# Patient Record
Sex: FEMALE | Race: WHITE | NOT HISPANIC OR LATINO | URBAN - METROPOLITAN AREA
[De-identification: names, ages, dates, MRNs, and addresses within clinical notes are randomized per-mention and may not be internally consistent; named-entity substitution may affect disease eponyms.]

---

## 2017-01-30 ENCOUNTER — INPATIENT (INPATIENT)
Facility: HOSPITAL | Age: 77
LOS: 10 days | Discharge: NURSING, REHAB OR LONG-TERM CARE | DRG: 292 | End: 2017-02-10
Attending: HOSPITALIST | Admitting: HOSPITALIST
Payer: MEDICARE

## 2017-01-30 VITALS
TEMPERATURE: 99 F | OXYGEN SATURATION: 98 % | DIASTOLIC BLOOD PRESSURE: 76 MMHG | HEIGHT: 66 IN | SYSTOLIC BLOOD PRESSURE: 134 MMHG | RESPIRATION RATE: 18 BRPM | HEART RATE: 82 BPM | WEIGHT: 162.04 LBS

## 2017-01-30 DIAGNOSIS — I10 ESSENTIAL (PRIMARY) HYPERTENSION: ICD-10-CM

## 2017-01-30 DIAGNOSIS — I50.9 HEART FAILURE, UNSPECIFIED: ICD-10-CM

## 2017-01-30 DIAGNOSIS — E78.5 HYPERLIPIDEMIA, UNSPECIFIED: ICD-10-CM

## 2017-01-30 DIAGNOSIS — I82.409 ACUTE EMBOLISM AND THROMBOSIS OF UNSPECIFIED DEEP VEINS OF UNSPECIFIED LOWER EXTREMITY: ICD-10-CM

## 2017-01-30 DIAGNOSIS — Z95.0 PRESENCE OF CARDIAC PACEMAKER: Chronic | ICD-10-CM

## 2017-01-30 PROBLEM — Z00.00 ENCOUNTER FOR PREVENTIVE HEALTH EXAMINATION: Status: ACTIVE | Noted: 2017-01-30

## 2017-01-30 LAB
ALBUMIN SERPL ELPH-MCNC: 3.5 G/DL — SIGNIFICANT CHANGE UP (ref 3.3–5.2)
ALP SERPL-CCNC: 99 U/L — SIGNIFICANT CHANGE UP (ref 40–120)
ALT FLD-CCNC: 12 U/L — SIGNIFICANT CHANGE UP
ANION GAP SERPL CALC-SCNC: 8 MMOL/L — SIGNIFICANT CHANGE UP (ref 5–17)
APTT BLD: 34.3 SEC — SIGNIFICANT CHANGE UP (ref 27.5–37.4)
AST SERPL-CCNC: 19 U/L — SIGNIFICANT CHANGE UP
BILIRUB SERPL-MCNC: 1 MG/DL — SIGNIFICANT CHANGE UP (ref 0.4–2)
BUN SERPL-MCNC: 19 MG/DL — SIGNIFICANT CHANGE UP (ref 8–20)
CALCIUM SERPL-MCNC: 8.9 MG/DL — SIGNIFICANT CHANGE UP (ref 8.6–10.2)
CHLORIDE SERPL-SCNC: 103 MMOL/L — SIGNIFICANT CHANGE UP (ref 98–107)
CK SERPL-CCNC: 66 U/L — SIGNIFICANT CHANGE UP (ref 25–170)
CO2 SERPL-SCNC: 34 MMOL/L — HIGH (ref 22–29)
CREAT SERPL-MCNC: 0.74 MG/DL — SIGNIFICANT CHANGE UP (ref 0.5–1.3)
EOSINOPHIL NFR BLD AUTO: 2 % — SIGNIFICANT CHANGE UP (ref 0–5)
GLUCOSE SERPL-MCNC: 97 MG/DL — SIGNIFICANT CHANGE UP (ref 70–115)
HCT VFR BLD CALC: 43.8 % — SIGNIFICANT CHANGE UP (ref 37–47)
HGB BLD-MCNC: 13.7 G/DL — SIGNIFICANT CHANGE UP (ref 12–16)
INR BLD: 1.44 RATIO — HIGH (ref 0.88–1.16)
LYMPHOCYTES # BLD AUTO: 10 % — LOW (ref 20–55)
MCHC RBC-ENTMCNC: 30.9 PG — SIGNIFICANT CHANGE UP (ref 27–31)
MCHC RBC-ENTMCNC: 31.3 G/DL — LOW (ref 32–36)
MCV RBC AUTO: 98.6 FL — SIGNIFICANT CHANGE UP (ref 81–99)
MONOCYTES NFR BLD AUTO: 7 % — SIGNIFICANT CHANGE UP (ref 3–10)
NEUTROPHILS NFR BLD AUTO: 80 % — HIGH (ref 37–73)
NT-PROBNP SERPL-SCNC: 6350 PG/ML — HIGH (ref 0–300)
PLAT MORPH BLD: NORMAL — SIGNIFICANT CHANGE UP
PLATELET # BLD AUTO: 161 K/UL — SIGNIFICANT CHANGE UP (ref 150–400)
POTASSIUM SERPL-MCNC: 4.5 MMOL/L — SIGNIFICANT CHANGE UP (ref 3.5–5.3)
POTASSIUM SERPL-SCNC: 4.5 MMOL/L — SIGNIFICANT CHANGE UP (ref 3.5–5.3)
PROT SERPL-MCNC: 6.9 G/DL — SIGNIFICANT CHANGE UP (ref 6.6–8.7)
PROTHROM AB SERPL-ACNC: 15.9 SEC — HIGH (ref 10–13.1)
RBC # BLD: 4.44 M/UL — SIGNIFICANT CHANGE UP (ref 4.4–5.2)
RBC # FLD: 15.5 % — SIGNIFICANT CHANGE UP (ref 11–15.6)
RBC BLD AUTO: NORMAL — SIGNIFICANT CHANGE UP
SODIUM SERPL-SCNC: 145 MMOL/L — SIGNIFICANT CHANGE UP (ref 135–145)
TROPONIN T SERPL-MCNC: <0.01 NG/ML — SIGNIFICANT CHANGE UP (ref 0–0.06)
VARIANT LYMPHS # BLD: 1 % — SIGNIFICANT CHANGE UP (ref 0–6)
WBC # BLD: 5.46 K/UL — SIGNIFICANT CHANGE UP (ref 4.8–10.8)
WBC # FLD AUTO: 5.46 K/UL — SIGNIFICANT CHANGE UP (ref 4.8–10.8)

## 2017-01-30 PROCEDURE — 93970 EXTREMITY STUDY: CPT | Mod: 26

## 2017-01-30 PROCEDURE — 93010 ELECTROCARDIOGRAM REPORT: CPT

## 2017-01-30 PROCEDURE — 99222 1ST HOSP IP/OBS MODERATE 55: CPT

## 2017-01-30 PROCEDURE — 71010: CPT | Mod: 26

## 2017-01-30 PROCEDURE — 99285 EMERGENCY DEPT VISIT HI MDM: CPT

## 2017-01-30 RX ORDER — SODIUM CHLORIDE 9 MG/ML
3 INJECTION INTRAMUSCULAR; INTRAVENOUS; SUBCUTANEOUS ONCE
Qty: 0 | Refills: 0 | Status: COMPLETED | OUTPATIENT
Start: 2017-01-30 | End: 2017-01-30

## 2017-01-30 RX ORDER — HEPARIN SODIUM 5000 [USP'U]/ML
5000 INJECTION INTRAVENOUS; SUBCUTANEOUS EVERY 12 HOURS
Qty: 0 | Refills: 0 | Status: DISCONTINUED | OUTPATIENT
Start: 2017-01-30 | End: 2017-02-10

## 2017-01-30 RX ORDER — CLOPIDOGREL BISULFATE 75 MG/1
75 TABLET, FILM COATED ORAL DAILY
Qty: 0 | Refills: 0 | Status: DISCONTINUED | OUTPATIENT
Start: 2017-01-30 | End: 2017-02-10

## 2017-01-30 RX ORDER — CARVEDILOL PHOSPHATE 80 MG/1
12.5 CAPSULE, EXTENDED RELEASE ORAL
Qty: 0 | Refills: 0 | Status: DISCONTINUED | OUTPATIENT
Start: 2017-01-30 | End: 2017-02-10

## 2017-01-30 RX ORDER — NYSTATIN CREAM 100000 [USP'U]/G
1 CREAM TOPICAL THREE TIMES A DAY
Qty: 0 | Refills: 0 | Status: DISCONTINUED | OUTPATIENT
Start: 2017-01-30 | End: 2017-02-10

## 2017-01-30 RX ORDER — FUROSEMIDE 40 MG
40 TABLET ORAL ONCE
Qty: 0 | Refills: 0 | Status: COMPLETED | OUTPATIENT
Start: 2017-01-30 | End: 2017-01-30

## 2017-01-30 RX ORDER — FUROSEMIDE 40 MG
40 TABLET ORAL EVERY 12 HOURS
Qty: 0 | Refills: 0 | Status: DISCONTINUED | OUTPATIENT
Start: 2017-01-30 | End: 2017-02-01

## 2017-01-30 RX ADMIN — Medication 40 MILLIGRAM(S): at 20:22

## 2017-01-30 RX ADMIN — SODIUM CHLORIDE 3 MILLILITER(S): 9 INJECTION INTRAMUSCULAR; INTRAVENOUS; SUBCUTANEOUS at 20:22

## 2017-01-30 NOTE — H&P ADULT. - ATTENDING COMMENTS
Code status Full  DVT proph heparin  Plan of care was discussed with patient, and sister at bedside in great details.  All questions were answered.  Seems to understand, and in agreement

## 2017-01-30 NOTE — H&P ADULT. - PROBLEM SELECTOR PLAN 1
Will admit patient to telemonitored Bed.  Will obtain Echo, Serial trop, Doppler US of LE to R/O DVT, and cardiology consult.  Continue with coreg, Plavix, ACEI, and start patient on IV lasix.  Monitor I/O.  Keep negative output  Wound care for Chronic LE fungal infection/Antifungal cream

## 2017-01-30 NOTE — H&P ADULT. - HISTORY OF PRESENT ILLNESS
Patient is 75 yo female with hx of CHF, S/P PPM presenting with Several day hx of SOB, increased LE swelling, and Cough that has been progressively worsen.  Afebrile.  Patient denies any headache, dizziness, blurry vision, chest pain, Palpitation, abdominal pain, N/V/D, numbness, or weakness    As per sister who is at bedside patient is living by herself, and has not seen a physician in several years

## 2017-01-30 NOTE — ED ADULT NURSE NOTE - PMH
CHF (congestive heart failure)    HLD (hyperlipidemia) CHF (congestive heart failure)    Essential hypertension    HLD (hyperlipidemia)    Pacemaker

## 2017-01-30 NOTE — ED PROVIDER NOTE - PHYSICAL EXAMINATION
PE: GEN: Awake, alert,  NAD,  CARDIAC: Reg rate and rhythm, S1,S2, RRR  RESP: No distress noted. +scattered rales throughout. no wheeze or ronchi.  ABD: distended and firm. +BS X4. non-tender, no guarding. PV: +1 DP BL LE. + elephantiasis BLE with weeping and crusting. NEURO: AOx3, no focal deficits

## 2017-01-30 NOTE — ED PROVIDER NOTE - OBJECTIVE STATEMENT
Pt is a 75yo female BIBEMS with PMHx of HTN, RBBB, CHF with pacemaker presents to ED c/o SOB x 1 week. Pt endorses SOB at baseline but states within the past week symptoms have increased. Pt reports associated UE and LE swelling. Pt LE swell at baseline. Pt states she has a lot of fluid in her abdomen. Pt is a 75yo female BIBEMS with PMHx of HTN, RBBB, CHF with pacemaker presents to ED c/o SOB x 1 week. Pt endorses SOB and cough at baseline but states within the past week symptoms have increased. Pt reports associated UE and LE swelling. Pt LE swell at baseline. Pt states she has a lot of fluid in her abdomen. Pt denies fever, chills, congestion, CP, N/V/D. ALLERGIES: ASA, PCN, STATINS.

## 2017-01-30 NOTE — ED PROVIDER NOTE - PROGRESS NOTE DETAILS
Pt stable. abnormal labs reviewed. CXR reviewed. Pt in probable CHF exacerbation. will admit to medicine. discussed with attending. pt verbalized understanding and agreement with plan and diagnosis.

## 2017-01-30 NOTE — ED PROVIDER NOTE - ATTENDING CONTRIBUTION TO CARE
agree with CORNELIA Dodd A/P.  pt with h/o CHF has not f/u with any doctor in years c/o worsening BLE edema and sob.  will w/u for CHF and admit

## 2017-01-30 NOTE — ED PROVIDER NOTE - DIAGNOSTIC INTERPRETATION
CXR acute read: + cardiomegaly with possible R sided effusion. please follow up for final radiology read.

## 2017-01-30 NOTE — ED ADULT NURSE NOTE - OBJECTIVE STATEMENT
Assumed pt care @ 2000. Pt received sitting on stretcher has some labored breathing, NRB @bedside but patient took off. Put back on and pt resp even and unlabored now. Pt AOx3 C/O 7/10 b/l leg pain dark dry cracking, flakey skin. Pt unsure what is happening to her legs "been there for some time but has gotten worse lately" Pt states she is unable to ambulate today even with cane due to leg pain and SOB. Pt has a h/o CHF and says its "backing her up". Pt reports not going to a doctor for months now. Neuro WNL. Lungs crackles B/L.  Abd soft non tender, + bowel sounds x 4quads. Denies Nausea, Vomiting, Diarrhea.

## 2017-01-30 NOTE — H&P ADULT. - NEGATIVE NEUROLOGICAL SYMPTOMS
no transient paralysis/no loss of consciousness/no syncope/no loss of sensation/no difficulty walking/no paresthesias

## 2017-01-31 LAB
ANION GAP SERPL CALC-SCNC: 11 MMOL/L — SIGNIFICANT CHANGE UP (ref 5–17)
APPEARANCE UR: CLEAR — SIGNIFICANT CHANGE UP
BILIRUB UR-MCNC: NEGATIVE — SIGNIFICANT CHANGE UP
BUN SERPL-MCNC: 17 MG/DL — SIGNIFICANT CHANGE UP (ref 8–20)
CALCIUM SERPL-MCNC: 9.1 MG/DL — SIGNIFICANT CHANGE UP (ref 8.6–10.2)
CHLORIDE SERPL-SCNC: 99 MMOL/L — SIGNIFICANT CHANGE UP (ref 98–107)
CO2 SERPL-SCNC: 33 MMOL/L — HIGH (ref 22–29)
COLOR SPEC: YELLOW — SIGNIFICANT CHANGE UP
CREAT SERPL-MCNC: 0.75 MG/DL — SIGNIFICANT CHANGE UP (ref 0.5–1.3)
DIFF PNL FLD: ABNORMAL
GLUCOSE SERPL-MCNC: 113 MG/DL — SIGNIFICANT CHANGE UP (ref 70–115)
GLUCOSE UR QL: NEGATIVE MG/DL — SIGNIFICANT CHANGE UP
HCT VFR BLD CALC: 42.3 % — SIGNIFICANT CHANGE UP (ref 37–47)
HGB BLD-MCNC: 13.2 G/DL — SIGNIFICANT CHANGE UP (ref 12–16)
KETONES UR-MCNC: NEGATIVE — SIGNIFICANT CHANGE UP
LEUKOCYTE ESTERASE UR-ACNC: NEGATIVE — SIGNIFICANT CHANGE UP
MCHC RBC-ENTMCNC: 30.6 PG — SIGNIFICANT CHANGE UP (ref 27–31)
MCHC RBC-ENTMCNC: 31.2 G/DL — LOW (ref 32–36)
MCV RBC AUTO: 97.9 FL — SIGNIFICANT CHANGE UP (ref 81–99)
NITRITE UR-MCNC: NEGATIVE — SIGNIFICANT CHANGE UP
PH UR: 5 — SIGNIFICANT CHANGE UP (ref 4.8–8)
PLATELET # BLD AUTO: 151 K/UL — SIGNIFICANT CHANGE UP (ref 150–400)
POTASSIUM SERPL-MCNC: 3.6 MMOL/L — SIGNIFICANT CHANGE UP (ref 3.5–5.3)
POTASSIUM SERPL-SCNC: 3.6 MMOL/L — SIGNIFICANT CHANGE UP (ref 3.5–5.3)
PROT UR-MCNC: NEGATIVE MG/DL — SIGNIFICANT CHANGE UP
RBC # BLD: 4.32 M/UL — LOW (ref 4.4–5.2)
RBC # FLD: 15.5 % — SIGNIFICANT CHANGE UP (ref 11–15.6)
RBC CASTS # UR COMP ASSIST: SIGNIFICANT CHANGE UP /HPF (ref 0–4)
SODIUM SERPL-SCNC: 143 MMOL/L — SIGNIFICANT CHANGE UP (ref 135–145)
SP GR SPEC: 1.01 — SIGNIFICANT CHANGE UP (ref 1.01–1.02)
TROPONIN T SERPL-MCNC: <0.01 NG/ML — SIGNIFICANT CHANGE UP (ref 0–0.06)
TROPONIN T SERPL-MCNC: <0.01 NG/ML — SIGNIFICANT CHANGE UP (ref 0–0.06)
TSH SERPL-MCNC: 3.77 UIU/ML — SIGNIFICANT CHANGE UP (ref 0.27–4.2)
UROBILINOGEN FLD QL: NEGATIVE MG/DL — SIGNIFICANT CHANGE UP
WBC # BLD: 5.05 K/UL — SIGNIFICANT CHANGE UP (ref 4.8–10.8)
WBC # FLD AUTO: 5.05 K/UL — SIGNIFICANT CHANGE UP (ref 4.8–10.8)
WBC UR QL: NEGATIVE — SIGNIFICANT CHANGE UP

## 2017-01-31 PROCEDURE — 99233 SBSQ HOSP IP/OBS HIGH 50: CPT

## 2017-01-31 RX ORDER — ACETAMINOPHEN 500 MG
650 TABLET ORAL EVERY 6 HOURS
Qty: 0 | Refills: 0 | Status: DISCONTINUED | OUTPATIENT
Start: 2017-01-31 | End: 2017-02-10

## 2017-01-31 RX ADMIN — Medication 1 APPLICATION(S): at 10:46

## 2017-01-31 RX ADMIN — CARVEDILOL PHOSPHATE 12.5 MILLIGRAM(S): 80 CAPSULE, EXTENDED RELEASE ORAL at 17:34

## 2017-01-31 RX ADMIN — NYSTATIN CREAM 1 APPLICATION(S): 100000 CREAM TOPICAL at 10:46

## 2017-01-31 RX ADMIN — Medication 10 MILLIGRAM(S): at 06:03

## 2017-01-31 RX ADMIN — Medication 40 MILLIGRAM(S): at 06:03

## 2017-01-31 RX ADMIN — HEPARIN SODIUM 5000 UNIT(S): 5000 INJECTION INTRAVENOUS; SUBCUTANEOUS at 17:35

## 2017-01-31 RX ADMIN — Medication 650 MILLIGRAM(S): at 06:02

## 2017-01-31 RX ADMIN — CLOPIDOGREL BISULFATE 75 MILLIGRAM(S): 75 TABLET, FILM COATED ORAL at 11:07

## 2017-01-31 RX ADMIN — NYSTATIN CREAM 1 APPLICATION(S): 100000 CREAM TOPICAL at 16:44

## 2017-01-31 RX ADMIN — Medication 40 MILLIGRAM(S): at 17:35

## 2017-01-31 RX ADMIN — CARVEDILOL PHOSPHATE 12.5 MILLIGRAM(S): 80 CAPSULE, EXTENDED RELEASE ORAL at 06:03

## 2017-01-31 RX ADMIN — HEPARIN SODIUM 5000 UNIT(S): 5000 INJECTION INTRAVENOUS; SUBCUTANEOUS at 06:03

## 2017-01-31 NOTE — PROGRESS NOTE ADULT - SUBJECTIVE AND OBJECTIVE BOX
Internal Medicine Hospitalist - Dr. Gabino DELCID    [id number]    76y      Female    Patient is a 76y old  Female who presents with a chief complaint of SOB/LE edema (2017 22:45)    HPI:  Patient is 75 yo female with hx of CHF, S/P PPM presenting with Several day hx of SOB, increased LE swelling, and Cough that has been progressively worsen.  Afebrile.  Patient denies any headache, dizziness, blurry vision, chest pain, Palpitation, abdominal pain, N/V/D, numbness, or weakness    INTERVAL HPI/ OVERNIGHT EVENTS: Patient is seen and examined, pt report breathing is getting better, report mild pain in LE, on NC, denied chest pain, abd. pain, nausea and vomiting.    REVIEW OF SYSTEMS:    Denied fever, chills, abd. pain, nausea, vomiting, chest pain, headache, dizziness    PHYSICAL EXAM:    Vital Signs Last 24 Hrs  T(C): 36.7, Max: 37 ( @ 18:10)  T(F): 98, Max: 98.6 ( @ 18:10)  HR: 75 (75 - 94)  BP: 113/62 (113/62 - 135/79)  BP(mean): --  RR: 26 (18 - 26)  SpO2: 97% (95% - 98%)    GENERAL: NAD  HEENT: EOMI  Neck: supple  CHEST/LUNG: few crackles over bases  HEART: S1S2+ audible  ABDOMEN: Soft, Nontender, Nondistended; Bowel sounds present  EXTREMITIES:  LE chronic LE skin changes   CNS: AAO X 3  Psychiatry: normal mood    LABS:                        13.2   5.05  )-----------( 151      ( 2017 07:37 )             42.3     2017 07:37    143    |  99     |  17.0   ----------------------------<  113    3.6     |  33.0   |  0.75     Ca    9.1        2017 07:37    TPro  6.9    /  Alb  3.5    /  TBili  1.0    /  DBili  x      /  AST  19     /  ALT  12     /  AlkPhos  99     2017 20:28    PT/INR - ( 2017 20:28 )   PT: 15.9 sec;   INR: 1.44 ratio         PTT - ( 2017 20:28 )  PTT:34.3 sec  Urinalysis Basic - ( 2017 02:02 )    Color: Yellow / Appearance: Clear / S.010 / pH: x  Gluc: x / Ketone: Negative  / Bili: Negative / Urobili: Negative mg/dL   Blood: x / Protein: Negative mg/dL / Nitrite: Negative   Leuk Esterase: Negative / RBC: 0-2 /HPF / WBC Negative   Sq Epi: x / Non Sq Epi: x / Bacteria: x        MEDICATIONS  (STANDING):  heparin  Injectable 5000Unit(s) SubCutaneous every 12 hours  enalapril 10milliGRAM(s) Oral daily  carvedilol 12.5milliGRAM(s) Oral two times a day  furosemide   Injectable 40milliGRAM(s) IV Push every 12 hours  clopidogrel Tablet 75milliGRAM(s) Oral daily  clotrimazole 1% Cream 1Application(s) Topical two times a day  nystatin Powder 1Application(s) Topical three times a day    MEDICATIONS  (PRN):  acetaminophen   Tablet 650milliGRAM(s) Oral every 6 hours PRN pain      RADIOLOGY & ADDITIONAL TEST   EXAM:  US DPLX LWR EXT VEINS COMPL BI                        PROCEDURE DATE:  2017    Impression:  There is no deep vein thrombus within either greater saphenous, common   femoral, femoral, or popliteal veins. The calf veins were not adequately   assessed secondary to overlying edema.

## 2017-01-31 NOTE — PROGRESS NOTE ADULT - ASSESSMENT
This is 76Y W with PMH of CHF s/p PPM, chronic LE skin fungal infection admitted for SOB due to CHF exacerbation.    A/P    >Chf Exacerbation  cardiology consult  cont. IV lasix, coreg, enalapril, plavix  daily weight, strict intake output  TTE    >Chronic LE fungal infection  venous doppler - no DVT  Nystatin powder  wound care consult requested     >HTN - stable  cont. home medication    >HLD  cont. statin    >DVT PPX  Heparin

## 2017-01-31 NOTE — CONSULT NOTE ADULT - SUBJECTIVE AND OBJECTIVE BOX
Patient is a 76y old  Female who presents with a chief complaint of SOB/LE edema (2017 22:45)      HPI:  Patient is 77 yo female with hx of CHF, nonischemic cardiomyopathy , S/P [employer] AICD presenting with Several day hx of SOB, increased LE swelling, and Cough that has been progressively ed.  Afebrile.  Patient denies any headache, dizziness, blurry vision, chest pain, Palpitation, abdominal pain, N/V/D, numbness, or weakness.  Her last visit wa sin 2014.  She ran out of Furosemide a "while ago".    PAST MEDICAL & SURGICAL HISTORY:  AICD  Essential hypertension  HLD (hyperlipidemia)  CHF (congestive heart failure) Non ischemic      PREVIOUS DIAGNOSTIC TESTING:      ECHO  FINDINGS: EF20-25%.  Mild MR      CATHETERIZATION  FINDINGS:  Severe diffuse disease of the distal LCX      Allergies    aspirin (Unknown)  atorvastatin (Unknown)  penicillin (Unknown)    Intolerances        MEDICATIONS  (STANDING):  heparin  Injectable 5000Unit(s) SubCutaneous every 12 hours  enalapril 10milliGRAM(s) Oral daily  carvedilol 12.5milliGRAM(s) Oral two times a day  furosemide   Injectable 40milliGRAM(s) IV Push every 12 hours  clopidogrel Tablet 75milliGRAM(s) Oral daily  clotrimazole 1% Cream 1Application(s) Topical two times a day  nystatin Powder 1Application(s) Topical three times a day    MEDICATIONS  (PRN):  acetaminophen   Tablet 650milliGRAM(s) Oral every 6 hours PRN pain    heparin  Injectable 5000Unit(s) SubCutaneous every 12 hours  enalapril 10milliGRAM(s) Oral daily  carvedilol 12.5milliGRAM(s) Oral two times a day  furosemide   Injectable 40milliGRAM(s) IV Push every 12 hours  clopidogrel Tablet 75milliGRAM(s) Oral daily  clotrimazole 1% Cream 1Application(s) Topical two times a day  nystatin Powder 1Application(s) Topical three times a day  acetaminophen   Tablet 650milliGRAM(s) Oral every 6 hours PRN      FAMILY HISTORY:  No pertinent family history in first degree relatives      SOCIAL HISTORY:    CIGARETTES:    ALCOHOL:    REVIEW OF SYSTEMS:  CONSTITUTIONAL: No fever, weight loss, or fatigue  EYES: No eye pain, visual disturbances, or discharge  ENMT:  No difficulty hearing, tinnitus, vertigo; No sinus or throat pain  NECK: No pain or stiffness  RESPIRATORY: No cough, wheezing, chills or hemoptysis;   CARDIOVASCULAR: No chest pain, palpitations, passing out, dizziness  GASTROINTESTINAL: No abdominal or epigastric pain. No nausea, vomiting, or hematemesis; No diarrhea or constipation. No melena or hematochezia.  GENITOURINARY: No dysuria, frequency, hematuria, or incontinence  NEUROLOGICAL: No headaches, memory loss, loss of strength, numbness, or tremors  SKIN: No itching, burning, rashes, or lesions   LYMPH Nodes: No enlarged glands  ENDOCRINE: No heat or cold intolerance; No hair loss  MUSCULOSKELETAL: No joint pain or swelling; No muscle, back.  Patient has had diffuse edema of the lower extremities with severe pain in both legs  PSYCHIATRIC: No depression, anxiety, mood swings, or difficulty sleeping  HEME/LYMPH: No easy bruising, or bleeding gums  ALLERY AND IMMUNOLOGIC: No hives or eczema	    Vital Signs Last 24 Hrs  T(C): 36.7, Max: 37 ( @ 18:10)  T(F): 98, Max: 98.6 ( @ 18:10)  HR: 75 (75 - 94)  BP: 113/62 (113/62 - 135/79)  BP(mean): --  RR: 26 (18 - 26)  SpO2: 97% (95% - 98%)    Daily Height in cm: 167.64 (2017 18:10)    Daily     I&O's Detail  I & Os for 24h ending 2017 07:00  =============================================  IN:    Total IN: 0 ml  ---------------------------------------------  OUT:    Voided: 1100 ml    Total OUT: 1100 ml  ---------------------------------------------  Total NET: -1100 ml    I & Os for current day (as of 2017 14:35)  =============================================  IN:    Total IN: 0 ml  ---------------------------------------------  OUT:    Voided: 2500 ml    Total OUT: 2500 ml  ---------------------------------------------  Total NET: -2500 ml      PHYSICAL EXAM:  Appearance: Normal, well nourished	  HEENT:   Normal oral mucosa, PERRL, EOMI, sclera non-icteric	  Lymphatic: No cervical lymphadenopathy  Cardiovascular: Normal S1 S2, No JVD, No cardiac murmurs, No carotid bruits, No peripheral edema  Respiratory: Rales bilateral bases  Psychiatry: A & O x 3, Mood & affect appropriate  Gastrointestinal:  Soft, Non-tender, + BS, no bruits	  Skin: No rashes, No ecchymoses, No cyanosis  Neurologic: Grossly non-focal with full strength in all four extremities  Extremities: Normal range of motion, No clubbing, cyanosis.  There is tense lower extremity edema bilaterally  Vascular: Peripheral pulses palpable 2+ bilaterally      INTERPRETATION OF TELEMETRY:    ECG:  A sensed V paced    LABS:                        13.2   5.05  )-----------( 151      ( 2017 07:37 )             42.3     2017 07:37    143    |  99     |  17.0   ----------------------------<  113    3.6     |  33.0   |  0.75     Ca    9.1        2017 07:37    TPro  6.9    /  Alb  3.5    /  TBili  1.0    /  DBili  x      /  AST  19     /  ALT  12     /  AlkPhos  99     2017 20:28    CARDIAC MARKERS ( 2017 11:57 )  x     / <0.01 ng/mL / x     / x     / x      CARDIAC MARKERS ( 2017 07:37 )  x     / <0.01 ng/mL / x     / x     / x      CARDIAC MARKERS ( 2017 20:28 )  x     / <0.01 ng/mL / 66 U/L / x     / x          PT/INR - ( 2017 20:28 )   PT: 15.9 sec;   INR: 1.44 ratio         PTT - ( 2017 20:28 )  PTT:34.3 sec  Urinalysis Basic - ( 2017 02:02 )    Color: Yellow / Appearance: Clear / S.010 / pH: x  Gluc: x / Ketone: Negative  / Bili: Negative / Urobili: Negative mg/dL   Blood: x / Protein: Negative mg/dL / Nitrite: Negative   Leuk Esterase: Negative / RBC: 0-2 /HPF / WBC Negative   Sq Epi: x / Non Sq Epi: x / Bacteria: x      I&O's Summary  I & Os for 24h ending 2017 07:00  =============================================  IN: 0 ml / OUT: 1100 ml / NET: -1100 ml    I & Os for current day (as of 2017 14:36)  =============================================  IN: 0 ml / OUT: 2500 ml / NET: -2500 ml    BNPSerum Pro-Brain Natriuretic Peptide: 6350 pg/mL ( @ 20:28)    RADIOLOGY & ADDITIONAL STUDIES:    Assessment:  Patient is 77 yo female with hx of CHF, nonischemic cardiomyopathy , S/P [employer] AICD presenting with Several day hx of SOB, increased LE swelling, and Cough that has been progressively ed.  Afebrile.  Patient denies any headache, dizziness, blurry vision, chest pain, Palpitation, abdominal pain, N/V/D, numbness, or weakness.  Her last visit wa sin January of 2014.  She ran out of Furosemide a "while ago".    Plan:  CHF exacerbation Acute on chronic most likely due to non compliance with medications as well as follow up.  IV Lasix  Continue Enalapril and Coreg  Check echo  Will follow with you  Will have AICD interrogated while in the hospital.

## 2017-02-01 LAB
ANION GAP SERPL CALC-SCNC: 9 MMOL/L — SIGNIFICANT CHANGE UP (ref 5–17)
BUN SERPL-MCNC: 15 MG/DL — SIGNIFICANT CHANGE UP (ref 8–20)
CALCIUM SERPL-MCNC: 8.6 MG/DL — SIGNIFICANT CHANGE UP (ref 8.6–10.2)
CHLORIDE SERPL-SCNC: 98 MMOL/L — SIGNIFICANT CHANGE UP (ref 98–107)
CO2 SERPL-SCNC: 38 MMOL/L — HIGH (ref 22–29)
CREAT SERPL-MCNC: 0.6 MG/DL — SIGNIFICANT CHANGE UP (ref 0.5–1.3)
GLUCOSE SERPL-MCNC: 88 MG/DL — SIGNIFICANT CHANGE UP (ref 70–115)
MAGNESIUM SERPL-MCNC: 1.7 MG/DL — LOW (ref 1.8–2.5)
POTASSIUM SERPL-MCNC: 3.6 MMOL/L — SIGNIFICANT CHANGE UP (ref 3.5–5.3)
POTASSIUM SERPL-SCNC: 3.6 MMOL/L — SIGNIFICANT CHANGE UP (ref 3.5–5.3)
SODIUM SERPL-SCNC: 145 MMOL/L — SIGNIFICANT CHANGE UP (ref 135–145)

## 2017-02-01 PROCEDURE — 99233 SBSQ HOSP IP/OBS HIGH 50: CPT

## 2017-02-01 RX ORDER — MAGNESIUM SULFATE 500 MG/ML
2 VIAL (ML) INJECTION ONCE
Qty: 0 | Refills: 0 | Status: COMPLETED | OUTPATIENT
Start: 2017-02-01 | End: 2017-02-01

## 2017-02-01 RX ORDER — FUROSEMIDE 40 MG
40 TABLET ORAL DAILY
Qty: 0 | Refills: 0 | Status: DISCONTINUED | OUTPATIENT
Start: 2017-02-01 | End: 2017-02-08

## 2017-02-01 RX ORDER — POTASSIUM CHLORIDE 20 MEQ
20 PACKET (EA) ORAL ONCE
Qty: 0 | Refills: 0 | Status: COMPLETED | OUTPATIENT
Start: 2017-02-01 | End: 2017-02-01

## 2017-02-01 RX ADMIN — NYSTATIN CREAM 1 APPLICATION(S): 100000 CREAM TOPICAL at 06:10

## 2017-02-01 RX ADMIN — NYSTATIN CREAM 1 APPLICATION(S): 100000 CREAM TOPICAL at 21:03

## 2017-02-01 RX ADMIN — NYSTATIN CREAM 1 APPLICATION(S): 100000 CREAM TOPICAL at 13:48

## 2017-02-01 RX ADMIN — CARVEDILOL PHOSPHATE 12.5 MILLIGRAM(S): 80 CAPSULE, EXTENDED RELEASE ORAL at 06:11

## 2017-02-01 RX ADMIN — Medication 50 GRAM(S): at 12:11

## 2017-02-01 RX ADMIN — CLOPIDOGREL BISULFATE 75 MILLIGRAM(S): 75 TABLET, FILM COATED ORAL at 12:11

## 2017-02-01 RX ADMIN — Medication 1 APPLICATION(S): at 18:40

## 2017-02-01 RX ADMIN — Medication 10 MILLIGRAM(S): at 06:11

## 2017-02-01 RX ADMIN — CARVEDILOL PHOSPHATE 12.5 MILLIGRAM(S): 80 CAPSULE, EXTENDED RELEASE ORAL at 18:40

## 2017-02-01 RX ADMIN — NYSTATIN CREAM 1 APPLICATION(S): 100000 CREAM TOPICAL at 00:05

## 2017-02-01 RX ADMIN — Medication 40 MILLIGRAM(S): at 12:11

## 2017-02-01 RX ADMIN — Medication 40 MILLIGRAM(S): at 06:11

## 2017-02-01 RX ADMIN — Medication 20 MILLIEQUIVALENT(S): at 12:11

## 2017-02-01 RX ADMIN — HEPARIN SODIUM 5000 UNIT(S): 5000 INJECTION INTRAVENOUS; SUBCUTANEOUS at 06:11

## 2017-02-01 RX ADMIN — HEPARIN SODIUM 5000 UNIT(S): 5000 INJECTION INTRAVENOUS; SUBCUTANEOUS at 18:40

## 2017-02-01 NOTE — PROGRESS NOTE ADULT - SUBJECTIVE AND OBJECTIVE BOX
Internal Medicine Hospitalist - Dr. Gabino DELCID    [id number]    76y      Female    Patient is a 76y old  Female who presents with a chief complaint of SOB/LE edema (2017 05:44)    HPI:  Patient is 75 yo female with hx of CHF, S/P PPM presenting with Several day hx of SOB, increased LE swelling, and Cough that has been progressively worsen.  Afebrile.  Patient denies any headache, dizziness, blurry vision, chest pain, Palpitation, abdominal pain, N/V/D, numbness, or weakness      INTERVAL HPI/ OVERNIGHT EVENTS: Patient is seen and examined, pt report she is feeling better, still c/o cough and SOB.     REVIEW OF SYSTEMS:    Denied fever, chills, abd. pain, nausea, vomiting, chest pain headache, dizziness    PHYSICAL EXAM:    Vital Signs Last 24 Hrs  T(C): 36.9, Max: 37.1 ( @ 03:39)  T(F): 98.5, Max: 98.7 ( @ 03:39)  HR: 65 (65 - 80)  BP: 118/60 (113/62 - 140/80)  BP(mean): --  RR: 20 (20 - 26)  SpO2: 95% (94% - 98%)    GENERAL: NAD  HEENT: EOMI  Neck: supple  CHEST/LUNG: improving air entry, scattered crackles over bases   HEART: S1S2+ audible  ABDOMEN: Soft, Nontender, Nondistended; Bowel sounds present  EXTREMITIES:  b/l LE chronic skin changes   CNS: AAO X 3  Psychiatry: normal mood    LABS:                        13.2   5.05  )-----------( 151      ( 2017 07:37 )             42.3     2017 06:39    145    |  98     |  15.0   ----------------------------<  88     3.6     |  38.0   |  0.60     Ca    8.6        2017 06:39  Mg     1.7       2017 06:39    TPro  6.9    /  Alb  3.5    /  TBili  1.0    /  DBili  x      /  AST  19     /  ALT  12     /  AlkPhos  99     2017 20:28    PT/INR - ( 2017 20:28 )   PT: 15.9 sec;   INR: 1.44 ratio         PTT - ( 2017 20:28 )  PTT:34.3 sec  Urinalysis Basic - ( 2017 02:02 )    Color: Yellow / Appearance: Clear / S.010 / pH: x  Gluc: x / Ketone: Negative  / Bili: Negative / Urobili: Negative mg/dL   Blood: x / Protein: Negative mg/dL / Nitrite: Negative   Leuk Esterase: Negative / RBC: 0-2 /HPF / WBC Negative   Sq Epi: x / Non Sq Epi: x / Bacteria: x          MEDICATIONS  (STANDING):  heparin  Injectable 5000Unit(s) SubCutaneous every 12 hours  enalapril 10milliGRAM(s) Oral daily  carvedilol 12.5milliGRAM(s) Oral two times a day  clopidogrel Tablet 75milliGRAM(s) Oral daily  clotrimazole 1% Cream 1Application(s) Topical two times a day  nystatin Powder 1Application(s) Topical three times a day  magnesium sulfate  IVPB 2Gram(s) IV Intermittent once  furosemide   Injectable 40milliGRAM(s) IV Push daily  potassium chloride    Tablet ER 20milliEquivalent(s) Oral once    MEDICATIONS  (PRN):  acetaminophen   Tablet 650milliGRAM(s) Oral every 6 hours PRN pain      RADIOLOGY & ADDITIONAL TEST

## 2017-02-01 NOTE — CONSULT NOTE ADULT - SUBJECTIVE AND OBJECTIVE BOX
HPI:  75 yo w hx ischemic CM admitted with several days of SOB, dyspnea, PND, orthopnea and dry cough.  Sh estopped taking Lasix 1 yr ago and has not followed with her cardiologist over 3 yrs.and primary care physician for about that long too  She takes coreg and enalapril though. CP , fever, chills are denied.  She also reports worsening peripheral edema        PAST MEDICAL & SURGICAL HISTORY:  BiV ICD- [employer]- gen change 2012, original was MDT 2008  Essential hypertension  HLD (hyperlipidemia)  CHF (congestive heart failure)  CAD, HTN, COPD      Medication: MEDICATIONS  (STANDING):  heparin  Injectable 5000Unit(s) SubCutaneous every 12 hours  enalapril 10milliGRAM(s) Oral daily  carvedilol 12.5milliGRAM(s) Oral two times a day  clopidogrel Tablet 75milliGRAM(s) Oral daily  clotrimazole 1% Cream 1Application(s) Topical two times a day  nystatin Powder 1Application(s) Topical three times a day  furosemide   Injectable 40milliGRAM(s) IV Push daily    MEDICATIONS  (PRN):  acetaminophen   Tablet 650milliGRAM(s) Oral every 6 hours PRN pain      Social hx:Former tobacco 1 pck/day 50 yrs quit 2008    Allergies: aspirin (Unknown)  atorvastatin (Unknown)  penicillin (Unknown)      FAMILY HISTORY:  No pertinent family history in first degree relatives      REVIEW OF SYSTEMS:    CONSTITUTIONAL: Pos weight gain. No fever, chills, weakness or fatigue.  HEENT:  Eyes:  No visual loss, blurred vision, double vision or yellow sclerae. Ears, Nose, Throat:  No hearing loss, sneezing, congestion, runny nose or sore throat.  SKIN:  No rash or itching.  CARDIOVASCULAR:  No chest pain, chest pressure or chest discomfort. No palpitations or edema.  RESPIRATORY: Pos shortness of breath, MARY, and cough  GASTROINTESTINAL:  No anorexia, nausea, vomiting or diarrhea. No abdominal pain or blood.  GENITOURINARY:  No dysuria, urgency, or frequency  NEUROLOGICAL:  No headache, dizziness, syncope, paralysis, ataxia, numbness or tingling in the extremities. No change in bowel or bladder control.  MUSCULOSKELETAL:  No muscle, back pain, joint pain or stiffness. Progressive peripheral edema  HEMATOLOGIC:  No anemia, bleeding or bruising.  LYMPHATICS:  No enlarged nodes. No history of splenectomy.  PSYCHIATRIC:  No history of depression or anxiety.  ENDOCRINOLOGIC:  No reports of sweating, cold or heat intolerance. No polyuria or polydipsia.  ALLERGIES:  No history of asthma, hives, eczema or rhinitis      Vital Signs Last 24 Hrs  T(C): 36.4, Max: 37.1 (02-01 @ 03:39)  T(F): 97.6, Max: 98.7 (02-01 @ 03:39)  HR: 80 (65 - 80)  BP: 110/60 (104/56 - 140/80)  BP(mean): --  RR: 18 (18 - 24)  SpO2: 97% (94% - 98%)    General Appearance:  Comfortable, NAD  HEENT: PERRLA, EOMI, Fundi not visualized, Pharynx clear, JVP 5 cm, Carotid pulses 2+ B/L, no bruit  Chest: Decreased BS both bases but equal B/L  CV: PMI not displaced, S1S2 normal, No S3/S$ or murmur  Abd: Soft, NT, no mass, pulsation or bruit  Extrem: chronic venous stasis dermatitis with 3 + brawny edema B/L pretibial and ankle  Neuro: A+O X3,  Motor grossly intact            CBC Full  -  ( 31 Jan 2017 07:37 )  WBC Count : 5.05 K/uL  Hemoglobin : 13.2 g/dL  Hematocrit : 42.3 %  Platelet Count - Automated : 151 K/uL  Mean Cell Volume : 97.9 fl  Mean Cell Hemoglobin : 30.6 pg  Mean Cell Hemoglobin Concentration : 31.2 g/dL  Auto Neutrophil # : x  Auto Lymphocyte # : x  Auto Monocyte # : x  Auto Eosinophil # : x  Auto Basophil # : x  Auto Neutrophil % : x  Auto Lymphocyte % : x  Auto Monocyte % : x  Auto Eosinophil % : x  Auto Basophil % : x      CARDIAC MARKERS ( 31 Jan 2017 11:57 )  x     / <0.01 ng/mL / x     / x     / x      CARDIAC MARKERS ( 31 Jan 2017 07:37 )  x     / <0.01 ng/mL / x     / x     / x            01 Feb 2017 06:39    145    |  98     |  15.0   ----------------------------<  88     3.6     |  38.0   |  0.60     Ca    8.6        01 Feb 2017 06:39  Mg     1.7       01 Feb 2017 06:39 HPI:  75 yo w hx ischemic CM admitted with several days of SOB, dyspnea, PND, orthopnea and dry cough.  Sh estopped taking Lasix 1 yr ago and has not followed with her cardiologist over 3 yrs.and primary care physician for about that long too  She takes coreg and enalapril though. CP , fever, chills are denied.  She also reports worsening peripheral edema        PAST MEDICAL & SURGICAL HISTORY:  BiV ICD- [employer]- gen change 2012, original was MDT 2008  Essential hypertension  HLD (hyperlipidemia)  CHF (congestive heart failure)  CAD, HTN, COPD  Hypothyroidism    Medication: MEDICATIONS  (STANDING):  heparin  Injectable 5000Unit(s) SubCutaneous every 12 hours  enalapril 10milliGRAM(s) Oral daily  carvedilol 12.5milliGRAM(s) Oral two times a day  clopidogrel Tablet 75milliGRAM(s) Oral daily  clotrimazole 1% Cream 1Application(s) Topical two times a day  nystatin Powder 1Application(s) Topical three times a day  furosemide   Injectable 40milliGRAM(s) IV Push daily    MEDICATIONS  (PRN):  acetaminophen   Tablet 650milliGRAM(s) Oral every 6 hours PRN pain      Social hx:Former tobacco 1 pck/day 50 yrs quit 2008    Allergies: aspirin (Unknown)  atorvastatin (Unknown)  penicillin (Unknown)      FAMILY HISTORY:  No pertinent family history in first degree relatives      REVIEW OF SYSTEMS:    CONSTITUTIONAL: Pos weight gain. No fever, chills, weakness or fatigue.  HEENT:  Eyes:  No visual loss, blurred vision, double vision or yellow sclerae. Ears, Nose, Throat:  No hearing loss, sneezing, congestion, runny nose or sore throat.  SKIN:  No rash or itching.  CARDIOVASCULAR:  No chest pain, chest pressure or chest discomfort. No palpitations or edema.  RESPIRATORY: Pos shortness of breath, MARY, and cough  GASTROINTESTINAL:  No anorexia, nausea, vomiting or diarrhea. No abdominal pain or blood.  GENITOURINARY:  No dysuria, urgency, or frequency  NEUROLOGICAL:  No headache, dizziness, syncope, paralysis, ataxia, numbness or tingling in the extremities. No change in bowel or bladder control.  MUSCULOSKELETAL:  No muscle, back pain, joint pain or stiffness. Progressive peripheral edema  HEMATOLOGIC:  No anemia, bleeding or bruising.  LYMPHATICS:  No enlarged nodes. No history of splenectomy.  PSYCHIATRIC:  No history of depression or anxiety.  ENDOCRINOLOGIC:  No reports of sweating, cold or heat intolerance. No polyuria or polydipsia.  ALLERGIES:  No history of asthma, hives, eczema or rhinitis      Vital Signs Last 24 Hrs  T(C): 36.4, Max: 37.1 (02-01 @ 03:39)  T(F): 97.6, Max: 98.7 (02-01 @ 03:39)  HR: 80 (65 - 80)  BP: 110/60 (104/56 - 140/80)  BP(mean): --  RR: 18 (18 - 24)  SpO2: 97% (94% - 98%)    General Appearance:  Comfortable, NAD  HEENT: PERRLA, EOMI, Fundi not visualized, Pharynx clear, JVP 5 cm, Carotid pulses 2+ B/L, no bruit  Chest: Decreased BS both bases but equal B/L  CV: PMI not displaced, S1S2 normal, No S3/S$ or murmur  Abd: Soft, NT, no mass, pulsation or bruit  Extrem: chronic venous stasis dermatitis with 3 + brawny edema B/L pretibial and ankle  Neuro: A+O X3,  Motor grossly intact            CBC Full  -  ( 31 Jan 2017 07:37 )  WBC Count : 5.05 K/uL  Hemoglobin : 13.2 g/dL  Hematocrit : 42.3 %  Platelet Count - Automated : 151 K/uL  Mean Cell Volume : 97.9 fl  Mean Cell Hemoglobin : 30.6 pg  Mean Cell Hemoglobin Concentration : 31.2 g/dL  Auto Neutrophil # : x  Auto Lymphocyte # : x  Auto Monocyte # : x  Auto Eosinophil # : x  Auto Basophil # : x  Auto Neutrophil % : x  Auto Lymphocyte % : x  Auto Monocyte % : x  Auto Eosinophil % : x  Auto Basophil % : x      CARDIAC MARKERS ( 31 Jan 2017 11:57 )  x     / <0.01 ng/mL / x     / x     / x      CARDIAC MARKERS ( 31 Jan 2017 07:37 )  x     / <0.01 ng/mL / x     / x     / x            01 Feb 2017 06:39    145    |  98     |  15.0   ----------------------------<  88     3.6     |  38.0   |  0.60     Ca    8.6        01 Feb 2017 06:39  Mg     1.7       01 Feb 2017 06:39    echo LVEF 35-40%  mod peric effusion No sig valve disease  ECG SR with tracking.  Severe LAD.  Paced LBBB with small R in lead V1.  CXR  CM B/L PVC and pleural effusions right greater than left.  3 lead ICD noted with leads over RA RV and LV    ICD interrogation reviewed  Threshold performed.  Battery 3.5 yrs longevity  Nonsustained AT  and NSVT 1/23/17.  98% SR with biv pacing HPI:  77 yo w hx non ischemic CM admitted with several days of SOB, dyspnea, PND, orthopnea and dry cough.  Sh estopped taking Lasix 1 yr ago and has not followed with her cardiologist over 3 yrs.and primary care physician for about that long too  She takes coreg and enalapril though. CP , fever, chills are denied.  She also reports worsening peripheral edema        PAST MEDICAL & SURGICAL HISTORY:  BiV ICD- [employer]- gen change 2012, original was MDT 2008  Essential hypertension  HLD (hyperlipidemia)  CHF (congestive heart failure)  CAD, HTN, COPD  Hypothyroidism    Medication: MEDICATIONS  (STANDING):  heparin  Injectable 5000Unit(s) SubCutaneous every 12 hours  enalapril 10milliGRAM(s) Oral daily  carvedilol 12.5milliGRAM(s) Oral two times a day  clopidogrel Tablet 75milliGRAM(s) Oral daily  clotrimazole 1% Cream 1Application(s) Topical two times a day  nystatin Powder 1Application(s) Topical three times a day  furosemide   Injectable 40milliGRAM(s) IV Push daily    MEDICATIONS  (PRN):  acetaminophen   Tablet 650milliGRAM(s) Oral every 6 hours PRN pain      Social hx:Former tobacco 1 pck/day 50 yrs quit 2008    Allergies: aspirin (Unknown)  atorvastatin (Unknown)  penicillin (Unknown)      FAMILY HISTORY:  No pertinent family history in first degree relatives      REVIEW OF SYSTEMS:    CONSTITUTIONAL: Pos weight gain. No fever, chills, weakness or fatigue.  HEENT:  Eyes:  No visual loss, blurred vision, double vision or yellow sclerae. Ears, Nose, Throat:  No hearing loss, sneezing, congestion, runny nose or sore throat.  SKIN:  No rash or itching.  CARDIOVASCULAR:  No chest pain, chest pressure or chest discomfort. No palpitations or edema.  RESPIRATORY: Pos shortness of breath, MARY, and cough  GASTROINTESTINAL:  No anorexia, nausea, vomiting or diarrhea. No abdominal pain or blood.  GENITOURINARY:  No dysuria, urgency, or frequency  NEUROLOGICAL:  No headache, dizziness, syncope, paralysis, ataxia, numbness or tingling in the extremities. No change in bowel or bladder control.  MUSCULOSKELETAL:  No muscle, back pain, joint pain or stiffness. Progressive peripheral edema  HEMATOLOGIC:  No anemia, bleeding or bruising.  LYMPHATICS:  No enlarged nodes. No history of splenectomy.  PSYCHIATRIC:  No history of depression or anxiety.  ENDOCRINOLOGIC:  No reports of sweating, cold or heat intolerance. No polyuria or polydipsia.  ALLERGIES:  No history of asthma, hives, eczema or rhinitis      Vital Signs Last 24 Hrs  T(C): 36.4, Max: 37.1 (02-01 @ 03:39)  T(F): 97.6, Max: 98.7 (02-01 @ 03:39)  HR: 80 (65 - 80)  BP: 110/60 (104/56 - 140/80)  BP(mean): --  RR: 18 (18 - 24)  SpO2: 97% (94% - 98%)    General Appearance:  Comfortable, NAD  HEENT: PERRLA, EOMI, Fundi not visualized, Pharynx clear, JVP 5 cm, Carotid pulses 2+ B/L, no bruit  Chest: Decreased BS both bases but equal B/L  CV: PMI not displaced, S1S2 normal, No S3/S$ or murmur  Abd: Soft, NT, no mass, pulsation or bruit  Extrem: chronic venous stasis dermatitis with 3 + brawny edema B/L pretibial and ankle  Neuro: A+O X3,  Motor grossly intact            CBC Full  -  ( 31 Jan 2017 07:37 )  WBC Count : 5.05 K/uL  Hemoglobin : 13.2 g/dL  Hematocrit : 42.3 %  Platelet Count - Automated : 151 K/uL  Mean Cell Volume : 97.9 fl  Mean Cell Hemoglobin : 30.6 pg  Mean Cell Hemoglobin Concentration : 31.2 g/dL  Auto Neutrophil # : x  Auto Lymphocyte # : x  Auto Monocyte # : x  Auto Eosinophil # : x  Auto Basophil # : x  Auto Neutrophil % : x  Auto Lymphocyte % : x  Auto Monocyte % : x  Auto Eosinophil % : x  Auto Basophil % : x      CARDIAC MARKERS ( 31 Jan 2017 11:57 )  x     / <0.01 ng/mL / x     / x     / x      CARDIAC MARKERS ( 31 Jan 2017 07:37 )  x     / <0.01 ng/mL / x     / x     / x            01 Feb 2017 06:39    145    |  98     |  15.0   ----------------------------<  88     3.6     |  38.0   |  0.60     Ca    8.6        01 Feb 2017 06:39  Mg     1.7       01 Feb 2017 06:39    echo LVEF 35-40%  mod peric effusion No sig valve disease  ECG SR with tracking.  Severe LAD.  Paced LBBB with small R in lead V1.  CXR  CM B/L PVC and pleural effusions right greater than left.  3 lead ICD noted with leads over RA RV and LV    ICD interrogation reviewed  Threshold performed.  Battery 3.5 yrs longevity  Nonsustained AT  and NSVT 1/23/17.  98% SR with biv pacing

## 2017-02-01 NOTE — PROGRESS NOTE ADULT - SUBJECTIVE AND OBJECTIVE BOX
INTERVAL HISTORY:  Feels better.  Legs with less pain.   NO chest pain.  Breathing more comfortable  	  MEDICATIONS:  enalapril 10milliGRAM(s) Oral daily  carvedilol 12.5milliGRAM(s) Oral two times a day  furosemide   Injectable 40milliGRAM(s) IV Push daily  acetaminophen   Tablet 650milliGRAM(s) Oral every 6 hours PRN  heparin  Injectable 5000Unit(s) SubCutaneous every 12 hours  clopidogrel Tablet 75milliGRAM(s) Oral daily  clotrimazole 1% Cream 1Application(s) Topical two times a day  nystatin Powder 1Application(s) Topical three times a day        PHYSICAL EXAM:    T(C): 36.6, Max: 37.1 ( @ 03:39)  HR: 79 (65 - 80)  BP: 120/73 (104/56 - 140/80)  RR: 20 (20 - 24)  SpO2: 97% (94% - 98%)  Wt(kg): --    I&O's Summary  I & Os for 24h ending 2017 07:00  =============================================  IN: 0 ml / OUT: 4900 ml / NET: -4900 ml    I & Os for current day (as of 2017 19:36)  =============================================  IN: 720 ml / OUT: 1500 ml / NET: -780 ml      Daily     Daily Weight in k.5 (2017 14:13)    Appearance: Normal	  HEENT:   Normal oral mucosa, PERRL, EOMI	  Lymphatic: No lymphadenopathy  Cardiovascular: Normal S1 S2, No JVD, No murmurs  Respiratory: Diminished breath sounds at the bases	  Psychiatry: A & O x 3, Mood & affect appropriate  Gastrointestinal:  Soft, Non-tender, + BS	  Skin: No rashes, No ecchymoses, No cyanosis  Neurologic: Non-focal  Extremities: Normal range of motion, No clubbing, cyanosis.  bilateral chronic venous stasis changes with scaling and edema    AICD interrogation  Device functioning within acceptable parameters.  Occasional NSVT.  An episode of VT requiring ATP.  Otherwise functioning normally. 	                          13.2   5.05  )-----------( 151      ( 2017 07:37 )             42.3     2017 06:39    145    |  98     |  15.0   ----------------------------<  88     3.6     |  38.0   |  0.60     Ca    8.6        2017 06:39  Mg     1.7       2017 06:39    TPro  6.9    /  Alb  3.5    /  TBili  1.0    /  DBili  x      /  AST  19     /  ALT  12     /  AlkPhos  99     2017 20:28    proBNP:   Lipid Profile:   HgA1c:     ASSESSMENT/PLAN: 	  Non ischemic cardiomyopathy with non compliance with medications as well as with follow up  Ran out of Lasix  Presented with shortness of breath and lower extremity edema  Continue Lasix IV  Monitor BNP  Echo with moderate pericardial effusion without tamponade.  Continue to diurese

## 2017-02-01 NOTE — DIETITIAN INITIAL EVALUATION ADULT. - OTHER INFO
Pt reports good po, follows Na restriction at home. States wt fluctuation 2/2 to fluid retention, -200#'s. Pt reports good po intake, follows Na restriction at home. States wt fluctuation 2/2 to fluid retention, -200#'s.

## 2017-02-01 NOTE — ED ADULT NURSE REASSESSMENT NOTE - CARDIO WDL
Normal rate, regular rhythm, normal S1, S2 heart sounds heard.

## 2017-02-01 NOTE — ED ADULT NURSE REASSESSMENT NOTE - COMFORT CARE
plan of care explained
assisted to bedpan/repositioned/plan of care explained
plan of care explained

## 2017-02-01 NOTE — CONSULT NOTE ADULT - ASSESSMENT
75 yo hx ischemic CM and severe LV dysfunction and bivICD lost to follow up presents with acute systolic HF .  She   has been noncompliant with diuretics over 1 yr.  She has a normal bivICD and has near 100% biv pacing.  There is interval improvement in EF- 35-40% now and was 25% in 2013.  There are some episodes of nonsustained SVT and NSVT on the ICD counters. This is compatible with hx of CM .  No action recommended for this.  The device appears optimally programmed.  Counseling was provided for compliance and follow up.  No additional recommendations.  Reconsult as needed.  greater than 45 min was spent with pt 77 yo hx nonischemic CM and severe LV dysfunction and bivICD lost to follow up presents with acute systolic HF .  She   has been noncompliant with diuretics over 1 yr.  She has a normal bivICD and has near 100% biv pacing.  There is interval improvement in EF- 35-40% now and was 25% in 2013.  There are some episodes of nonsustained SVT and NSVT on the ICD counters. This is compatible with hx of CM .  No action recommended for this.  The device appears optimally programmed.  Counseling was provided for compliance and follow up.  No additional recommendations.  Reconsult as needed.  greater than 45 min was spent with pt

## 2017-02-01 NOTE — PROGRESS NOTE ADULT - ASSESSMENT
This is 76Y W with PMH of CHF s/p PPM, chronic LE skin fungal infection admitted for SOB due to CHF exacerbation.    A/P    >Chf Exacerbation  appreciate cardiology input  decrease IV lasix, 40  coreg, enalapril, plavix  daily weight, strict intake output  TTE pending     >Chronic LE fungal infection  venous doppler - no DVT  Nystatin powder  local wound care     >Hypomagnesemia  mag supplement     >HTN - stable  cont. home medication    >HLD  cont. statin    >DVT PPX  Heparin

## 2017-02-02 LAB
ANION GAP SERPL CALC-SCNC: 7 MMOL/L — SIGNIFICANT CHANGE UP (ref 5–17)
BUN SERPL-MCNC: 13 MG/DL — SIGNIFICANT CHANGE UP (ref 8–20)
CALCIUM SERPL-MCNC: 8.9 MG/DL — SIGNIFICANT CHANGE UP (ref 8.6–10.2)
CHLORIDE SERPL-SCNC: 93 MMOL/L — LOW (ref 98–107)
CO2 SERPL-SCNC: 42 MMOL/L — HIGH (ref 22–29)
CREAT SERPL-MCNC: 0.61 MG/DL — SIGNIFICANT CHANGE UP (ref 0.5–1.3)
GLUCOSE SERPL-MCNC: 91 MG/DL — SIGNIFICANT CHANGE UP (ref 70–115)
HCT VFR BLD CALC: 38.6 % — SIGNIFICANT CHANGE UP (ref 37–47)
HGB BLD-MCNC: 11.9 G/DL — LOW (ref 12–16)
MAGNESIUM SERPL-MCNC: 2 MG/DL — SIGNIFICANT CHANGE UP (ref 1.8–2.5)
MCHC RBC-ENTMCNC: 30.8 G/DL — LOW (ref 32–36)
MCHC RBC-ENTMCNC: 30.8 PG — SIGNIFICANT CHANGE UP (ref 27–31)
MCV RBC AUTO: 100 FL — HIGH (ref 81–99)
PLATELET # BLD AUTO: 117 K/UL — LOW (ref 150–400)
POTASSIUM SERPL-MCNC: 3.9 MMOL/L — SIGNIFICANT CHANGE UP (ref 3.5–5.3)
POTASSIUM SERPL-SCNC: 3.9 MMOL/L — SIGNIFICANT CHANGE UP (ref 3.5–5.3)
RBC # BLD: 3.86 M/UL — LOW (ref 4.4–5.2)
RBC # FLD: 15 % — SIGNIFICANT CHANGE UP (ref 11–15.6)
SODIUM SERPL-SCNC: 142 MMOL/L — SIGNIFICANT CHANGE UP (ref 135–145)
WBC # BLD: 3.85 K/UL — LOW (ref 4.8–10.8)
WBC # FLD AUTO: 3.85 K/UL — LOW (ref 4.8–10.8)

## 2017-02-02 PROCEDURE — 99233 SBSQ HOSP IP/OBS HIGH 50: CPT

## 2017-02-02 RX ADMIN — Medication 1 APPLICATION(S): at 05:25

## 2017-02-02 RX ADMIN — CARVEDILOL PHOSPHATE 12.5 MILLIGRAM(S): 80 CAPSULE, EXTENDED RELEASE ORAL at 18:26

## 2017-02-02 RX ADMIN — NYSTATIN CREAM 1 APPLICATION(S): 100000 CREAM TOPICAL at 22:33

## 2017-02-02 RX ADMIN — HEPARIN SODIUM 5000 UNIT(S): 5000 INJECTION INTRAVENOUS; SUBCUTANEOUS at 18:25

## 2017-02-02 RX ADMIN — NYSTATIN CREAM 1 APPLICATION(S): 100000 CREAM TOPICAL at 11:48

## 2017-02-02 RX ADMIN — Medication 1 APPLICATION(S): at 18:25

## 2017-02-02 RX ADMIN — NYSTATIN CREAM 1 APPLICATION(S): 100000 CREAM TOPICAL at 05:25

## 2017-02-02 RX ADMIN — Medication 10 MILLIGRAM(S): at 05:25

## 2017-02-02 RX ADMIN — Medication 40 MILLIGRAM(S): at 05:25

## 2017-02-02 RX ADMIN — HEPARIN SODIUM 5000 UNIT(S): 5000 INJECTION INTRAVENOUS; SUBCUTANEOUS at 05:24

## 2017-02-02 RX ADMIN — CLOPIDOGREL BISULFATE 75 MILLIGRAM(S): 75 TABLET, FILM COATED ORAL at 11:47

## 2017-02-02 RX ADMIN — CARVEDILOL PHOSPHATE 12.5 MILLIGRAM(S): 80 CAPSULE, EXTENDED RELEASE ORAL at 05:25

## 2017-02-02 NOTE — PHYSICAL THERAPY INITIAL EVALUATION ADULT - ADDITIONAL COMMENTS
Pt reports [place] with 7 steps to enter.  Reports amb with [facility] and being independent with ADLs and self care, but admits that it has become more difficult in the last couple months.

## 2017-02-02 NOTE — PROGRESS NOTE ADULT - ASSESSMENT
This is 76Y W with PMH of CHF s/p PPM, chronic LE skin fungal infection admitted for SOB due to CHF exacerbation.    A/P    >Acute on chronic systolic Chf Exacerbation  appreciate cardiology input   cont. lasix 40 IV daily, try to taper off oxygen   coreg, enalapril, plavix  daily weight, strict intake output  TTE showed EF of 35-40%, further management as per cardiology     >Chronic LE fungal infection  venous doppler - no DVT  Nystatin powder  Vascular surgery consult requested     >Hypomagnesemia - supplemented  f/u mag      >HTN - stable  cont. home medication    >HLD  cont. statin    >Profound weakness  PT eval, may need JOHN     >DVT PPX  Heparin This is 76Y W with PMH of CHF s/p PPM, chronic LE skin fungal infection admitted for SOB due to CHF exacerbation.    A/P    >Acute on chronic systolic Chf Exacerbation  appreciate cardiology input   cont. lasix 40 IV daily, try to taper off oxygen   coreg, enalapril, plavix  daily weight, strict intake output  TTE showed EF of 35-40%, further management as per cardiology     >Chronic LE skin changes, r/o infection  wound c/s  venous doppler - no DVT  Nystatin powder  Vascular surgery consult requested     >Hypomagnesemia - supplemented  f/u mag      >HTN - stable  cont. home medication    >HLD  cont. statin    >Profound weakness  PT eval, may need JOHN     >DVT PPX  Heparin

## 2017-02-02 NOTE — PROGRESS NOTE ADULT - SUBJECTIVE AND OBJECTIVE BOX
INTERVAL HISTORY:  Diuresing.  Having NSVT.  No chest pain.  Breathing more comfortably.  	  MEDICATIONS:  enalapril 10milliGRAM(s) Oral daily  carvedilol 12.5milliGRAM(s) Oral two times a day  furosemide   Injectable 40milliGRAM(s) IV Push daily        acetaminophen   Tablet 650milliGRAM(s) Oral every 6 hours PRN        heparin  Injectable 5000Unit(s) SubCutaneous every 12 hours  clopidogrel Tablet 75milliGRAM(s) Oral daily  clotrimazole 1% Cream 1Application(s) Topical two times a day  nystatin Powder 1Application(s) Topical three times a day        PHYSICAL EXAM:    T(C): 36.7, Max: 36.7 ( @ 11:20)  HR: 70 (68 - 80)  BP: 108/62 (108/62 - 120/73)  RR: 17 (17 - 20)  SpO2: 96% (96% - 98%)  Wt(kg): --    I&O's Summary  I & Os for 24h ending 2017 07:00  =============================================  IN: 720 ml / OUT: 2500 ml / NET: -1780 ml    I & Os for current day (as of 2017 17:12)  =============================================  IN: 720 ml / OUT: 1300 ml / NET: -580 ml      Daily     Daily Weight in k.2 (2017 05:44)    Appearance: Normal	  HEENT:   Normal oral mucosa, PERRL, EOMI	  Lymphatic: No lymphadenopathy  Cardiovascular: Normal S1 S2, No JVD, No murmurs,   Respiratory: Lungs clear to auscultation	  Psychiatry: A & O x 3, Mood & affect appropriate  Gastrointestinal:  Soft, Non-tender, + BS	  Skin: No rashes, No ecchymoses, No cyanosis  Neurologic: Non-focal  Extremities: Normal range of motion, No clubbing, cyanosis.  Chronic venous stasis changes with scaling  Vascular: Peripheral pulses palpable 2+ bilaterally  Procedure Site:      TELEMETRY: 	  NSVT                        11.9   3.85  )-----------( 117      ( 2017 06:42 )             38.6     2017 06:42    142    |  93     |  13.0   ----------------------------<  91     3.9     |  42.0   |  0.61     Ca    8.9        2017 06:42  Mg     2.0       2017 06:42      proBNP:   Lipid Profile:   HgA1c:     ASSESSMENT/PLAN: 	  Continue IV diuresis  Maintain K >4.0 and Mg >2.0  Continue to monitor.

## 2017-02-02 NOTE — PROGRESS NOTE ADULT - SUBJECTIVE AND OBJECTIVE BOX
Internal Medicine Hospitalist - Dr. Gabino DELCID    [id number]    76y      Female    Patient is a 76y old  Female who presents with a chief complaint of SOB/LE edema (01 Feb 2017 05:44)    HPI:  Patient is 75 yo female with hx of CHF, S/P PPM presenting with Several day hx of SOB, increased LE swelling, and Cough that has been progressively worsen.  Afebrile.  Patient denies any headache, dizziness, blurry vision, chest pain, Palpitation, abdominal pain, N/V/D, numbness, or weakness    As per sister who is at bedside patient is living by herself, and has not seen a physician in several years (30 Jan 2017 22:45)    INTERVAL HPI/ OVERNIGHT EVENTS: Patient is seen and examined, pt report breathing is getting better, still requiring additional oxygen to maintain saturation, report pain in LE R>L    REVIEW OF SYSTEMS:    Denied fever, chills, abd. pain, nausea, vomiting, chest pain, headache, dizziness    PHYSICAL EXAM:    Vital Signs Last 24 Hrs  T(C): 36.6, Max: 36.6 (02-01 @ 11:42)  T(F): 97.9, Max: 97.9 (02-01 @ 11:42)  HR: 68 (68 - 80)  BP: 110/60 (110/60 - 138/71)  BP(mean): --  RR: 17 (17 - 20)  SpO2: 98% (94% - 98%)    GENERAL: NAD  HEENT: EOMI  Neck: supple  CHEST/LUNG: improving air entry, few crackles over bases   HEART: S1S2+ audible  ABDOMEN: Soft, Nontender, Nondistended; Bowel sounds present  EXTREMITIES:  LE - chronic skin changes with discharge noted   CNS: AAO X 3    LABS:                        11.9   3.85  )-----------( 117      ( 02 Feb 2017 06:42 )             38.6     02 Feb 2017 06:42    142    |  93     |  13.0   ----------------------------<  91     3.9     |  42.0   |  0.61     Ca    8.9        02 Feb 2017 06:42  Mg     1.7       01 Feb 2017 06:39      MEDICATIONS  (STANDING):  heparin  Injectable 5000Unit(s) SubCutaneous every 12 hours  enalapril 10milliGRAM(s) Oral daily  carvedilol 12.5milliGRAM(s) Oral two times a day  clopidogrel Tablet 75milliGRAM(s) Oral daily  clotrimazole 1% Cream 1Application(s) Topical two times a day  nystatin Powder 1Application(s) Topical three times a day  furosemide   Injectable 40milliGRAM(s) IV Push daily    MEDICATIONS  (PRN):  acetaminophen   Tablet 650milliGRAM(s) Oral every 6 hours PRN pain      RADIOLOGY & ADDITIONAL TEST  EXAM:  ECHO TRANSTHORACIC COMP W DOPP    PROCEDURE DATE:  Feb 1 2017    Summary:   1. Technically difficult study.   2. Severely enlarged left atrium.   3. Global diffuse hypokinesis and dysschrony. Left ventricular ejection   fraction, by visual estimation, is 35 to 40%.   4. Elevated left atrial and left ventricular end-diastolic pressures.   (LAP = 37 mm hg)   5. The right atrium is normal in size.   6. The right ventricular size is mildly enlarged. RV systolic function   is normal. The pacer wire appears to be inserted in the RV free wall apex   as compared to the usual septal insertion. Probably the cause of the   marked dysschrony.   7. Mild mitral valve regurgitation.   8. Moderate pericardial effusion. No echo evidence of tamponade.

## 2017-02-03 LAB
ANION GAP SERPL CALC-SCNC: 4 MMOL/L — LOW (ref 5–17)
BUN SERPL-MCNC: 13 MG/DL — SIGNIFICANT CHANGE UP (ref 8–20)
CALCIUM SERPL-MCNC: 9.3 MG/DL — SIGNIFICANT CHANGE UP (ref 8.6–10.2)
CHLORIDE SERPL-SCNC: 93 MMOL/L — LOW (ref 98–107)
CO2 SERPL-SCNC: 41 MMOL/L — HIGH (ref 22–29)
CREAT SERPL-MCNC: 0.56 MG/DL — SIGNIFICANT CHANGE UP (ref 0.5–1.3)
GLUCOSE SERPL-MCNC: 91 MG/DL — SIGNIFICANT CHANGE UP (ref 70–115)
HCT VFR BLD CALC: 39 % — SIGNIFICANT CHANGE UP (ref 37–47)
HGB BLD-MCNC: 11.8 G/DL — LOW (ref 12–16)
MAGNESIUM SERPL-MCNC: 2.1 MG/DL — SIGNIFICANT CHANGE UP (ref 1.8–2.5)
MCHC RBC-ENTMCNC: 30.3 G/DL — LOW (ref 32–36)
MCHC RBC-ENTMCNC: 30.5 PG — SIGNIFICANT CHANGE UP (ref 27–31)
MCV RBC AUTO: 100.8 FL — HIGH (ref 81–99)
PLATELET # BLD AUTO: 124 K/UL — LOW (ref 150–400)
POTASSIUM SERPL-MCNC: 4.2 MMOL/L — SIGNIFICANT CHANGE UP (ref 3.5–5.3)
POTASSIUM SERPL-SCNC: 4.2 MMOL/L — SIGNIFICANT CHANGE UP (ref 3.5–5.3)
RBC # BLD: 3.87 M/UL — LOW (ref 4.4–5.2)
RBC # FLD: 15.5 % — SIGNIFICANT CHANGE UP (ref 11–15.6)
SODIUM SERPL-SCNC: 138 MMOL/L — SIGNIFICANT CHANGE UP (ref 135–145)
WBC # BLD: 3.61 K/UL — LOW (ref 4.8–10.8)
WBC # FLD AUTO: 3.61 K/UL — LOW (ref 4.8–10.8)

## 2017-02-03 PROCEDURE — 73502 X-RAY EXAM HIP UNI 2-3 VIEWS: CPT | Mod: 26,RT

## 2017-02-03 PROCEDURE — 99233 SBSQ HOSP IP/OBS HIGH 50: CPT

## 2017-02-03 RX ORDER — DOCUSATE SODIUM 100 MG
100 CAPSULE ORAL THREE TIMES A DAY
Qty: 0 | Refills: 0 | Status: DISCONTINUED | OUTPATIENT
Start: 2017-02-03 | End: 2017-02-10

## 2017-02-03 RX ORDER — SENNA PLUS 8.6 MG/1
2 TABLET ORAL AT BEDTIME
Qty: 0 | Refills: 0 | Status: DISCONTINUED | OUTPATIENT
Start: 2017-02-03 | End: 2017-02-10

## 2017-02-03 RX ADMIN — HEPARIN SODIUM 5000 UNIT(S): 5000 INJECTION INTRAVENOUS; SUBCUTANEOUS at 06:16

## 2017-02-03 RX ADMIN — Medication 40 MILLIGRAM(S): at 06:16

## 2017-02-03 RX ADMIN — CARVEDILOL PHOSPHATE 12.5 MILLIGRAM(S): 80 CAPSULE, EXTENDED RELEASE ORAL at 17:57

## 2017-02-03 RX ADMIN — Medication 10 MILLIGRAM(S): at 06:16

## 2017-02-03 RX ADMIN — Medication 1 APPLICATION(S): at 06:17

## 2017-02-03 RX ADMIN — Medication 1 APPLICATION(S): at 17:57

## 2017-02-03 RX ADMIN — CARVEDILOL PHOSPHATE 12.5 MILLIGRAM(S): 80 CAPSULE, EXTENDED RELEASE ORAL at 06:16

## 2017-02-03 RX ADMIN — HEPARIN SODIUM 5000 UNIT(S): 5000 INJECTION INTRAVENOUS; SUBCUTANEOUS at 17:57

## 2017-02-03 RX ADMIN — CLOPIDOGREL BISULFATE 75 MILLIGRAM(S): 75 TABLET, FILM COATED ORAL at 17:57

## 2017-02-03 RX ADMIN — NYSTATIN CREAM 1 APPLICATION(S): 100000 CREAM TOPICAL at 17:57

## 2017-02-03 RX ADMIN — NYSTATIN CREAM 1 APPLICATION(S): 100000 CREAM TOPICAL at 06:17

## 2017-02-03 NOTE — PROGRESS NOTE ADULT - SUBJECTIVE AND OBJECTIVE BOX
INTERVAL HISTORY:  Feels well.  No chest pain  Shortness of breath improving    	  MEDICATIONS:  enalapril 10milliGRAM(s) Oral daily  carvedilol 12.5milliGRAM(s) Oral two times a day  furosemide   Injectable 40milliGRAM(s) IV Push daily        acetaminophen   Tablet 650milliGRAM(s) Oral every 6 hours PRN    docusate sodium 100milliGRAM(s) Oral three times a day  senna 2Tablet(s) Oral at bedtime      heparin  Injectable 5000Unit(s) SubCutaneous every 12 hours  clopidogrel Tablet 75milliGRAM(s) Oral daily  clotrimazole 1% Cream 1Application(s) Topical two times a day  nystatin Powder 1Application(s) Topical three times a day  silver sulfADIAZINE 1% Cream 1Application(s) Topical daily        PHYSICAL EXAM:    T(C): 37.2, Max: 37.3 ( @ 15:00)  HR: 76 (73 - 76)  BP: 106/54 (105/55 - 128/66)  RR: 18 (17 - 18)  SpO2: 97% (96% - 98%)  Wt(kg): --    I&O's Summary  I & Os for 24h ending 2017 07:00  =============================================  IN: 720 ml / OUT: 2100 ml / NET: -1380 ml    I & Os for current day (as of 2017 22:05)  =============================================  IN: 480 ml / OUT: 2000 ml / NET: -1520 ml      Daily     Daily Weight in k.1 (2017 05:01)    Appearance: Normal	  HEENT:   Normal oral mucosa, PERRL, EOMI	  Lymphatic: No lymphadenopathy  Cardiovascular: Normal S1 S2, No JVD, No murmurs,   Respiratory: Diminished air entry throughtout  Psychiatry: A & O x 3, Mood & affect appropriate  Gastrointestinal:  Soft, Non-tender, + BS	  Skin: No rashes, No ecchymoses, No cyanosis  Neurologic: Non-focal  Extremities: Normal range of motion, No clubbing, cyanosis.  Chronic venous stasis changes with scaling  Vascular: Peripheral pulses palpable 2+ bilaterally  Procedure Site:      TELEMETRY: 	  No further NSVT                        11.8   3.61  )-----------( 124      ( 2017 07:13 )             39.0     2017 07:13    138    |  93     |  13.0   ----------------------------<  91     4.2     |  41.0   |  0.56     Ca    9.3        2017 07:13  Mg     2.1       2017 07:13      proBNP:   Lipid Profile:   HgA1c:     ASSESSMENT/PLAN: 	    Non ischemic cardiomyopathy with non compliance with medications  Continue IV Lasix  Maintain K>4 and Mg> 2.0  Vascular surgery note regarding lower extremities appreciated  Discharge planning regarding rehab placement.  Case discussed at length with patient's daughter.

## 2017-02-03 NOTE — PROGRESS NOTE ADULT - ASSESSMENT
This is 76Y W with PMH of CHF s/p PPM, chronic LE skin fungal infection admitted for SOB due to CHF exacerbation.    A/P    >Acute on chronic systolic Chf Exacerbation - slowly improving   appreciate cardiology input   cont. lasix 40 IV daily, try to taper off oxygen   coreg, enalapril, plavix  daily weight, strict intake output  TTE showed EF of 35-40%, further management as per cardiology     >Chronic LE skin changes, r/o infection  wound c/s  venous doppler - no DVT  Nystatin powder  Vascular surgery consult requested - Dr. Michael     >Right hip pain  xray hip, PT    >Hypomagnesemia - resolved     >HTN - stable  cont. home medication    >HLD  cont. statin    >Profound weakness  PT eval, may need JOHN     >DVT PPX  Heparin

## 2017-02-03 NOTE — PROGRESS NOTE ADULT - SUBJECTIVE AND OBJECTIVE BOX
Internal Medicine Hospitalist - Dr. Gabino DELCID    [id number]    76y      Female    Patient is a 76y old  Female who presents with a chief complaint of SOB/LE edema (01 Feb 2017 05:44)    HPI:  Patient is 75 yo female with hx of CHF, S/P PPM presenting with Several day hx of SOB, increased LE swelling, and Cough that has been progressively worsen.  Afebrile.  Patient denies any headache, dizziness, blurry vision, chest pain, Palpitation, abdominal pain, N/V/D, numbness, or weaknes    INTERVAL HPI/ OVERNIGHT EVENTS: Patient is seen and examined, report breathing is getting better, still requiring additional oxygen to maintain saturation. Pt report right hip pain, worse while ambulating     REVIEW OF SYSTEMS:    Denied fever, chills, abd. pain, nausea, vomiting, chest pain, headache, dizziness    PHYSICAL EXAM:    Vital Signs Last 24 Hrs  T(C): 36.7, Max: 36.7 (02-02 @ 16:00)  T(F): 98, Max: 98.1 (02-02 @ 16:00)  HR: 74 (73 - 79)  BP: 120/65 (105/55 - 120/65)  BP(mean): --  RR: 17 (17 - 21)  SpO2: 96% (96% - 98%)    GENERAL: NAD  HEENT: EOMI  Neck: supple  CHEST/LUNG: diminished breath sounds over bases   HEART: S1S2+ audible  ABDOMEN: Soft, Nontender, Nondistended; Bowel sounds present  EXTREMITIES:  b/l LE- chronic skin changes , foul smelling   CNS: AAO X 3  Psychiatry: normal mood    LABS:                        11.8   3.61  )-----------( 124      ( 03 Feb 2017 07:13 )             39.0     03 Feb 2017 07:13    138    |  93     |  13.0   ----------------------------<  91     4.2     |  41.0   |  0.56     Ca    9.3        03 Feb 2017 07:13  Mg     2.1       03 Feb 2017 07:13              MEDICATIONS  (STANDING):  heparin  Injectable 5000Unit(s) SubCutaneous every 12 hours  enalapril 10milliGRAM(s) Oral daily  carvedilol 12.5milliGRAM(s) Oral two times a day  clopidogrel Tablet 75milliGRAM(s) Oral daily  clotrimazole 1% Cream 1Application(s) Topical two times a day  nystatin Powder 1Application(s) Topical three times a day  furosemide   Injectable 40milliGRAM(s) IV Push daily    MEDICATIONS  (PRN):  acetaminophen   Tablet 650milliGRAM(s) Oral every 6 hours PRN pain      RADIOLOGY & ADDITIONAL TEST

## 2017-02-03 NOTE — CONSULT NOTE ADULT - SUBJECTIVE AND OBJECTIVE BOX
Asked to evaluate this 77 yo F with CHF s/p PPM placement, HTN, hyperlipidemia, and chronic bilateral LE swelling with fungal infection who initially presented with chief complaints of worsening SOB.  Pt was subsequently admitted to medical service for CHF exacerbation.  Pt was seen and examined on the floor.  Appears to be comfortable without any acute distress.  c/o mild LE pain.    currently afebrile  B/L LE's - adequate distal perfusion with 2+ palpable pedal pulses bilaterally, e/o improving edema, e/o skin changes from chronic venous insufficiency with hyperemia, no large open wounds are appreciated, but there seems to be a small superficial wound in left lower leg    WBC 3.6, Hct 39, plat 124  Na 138  CO2 41, BUN/Cr 13/0.5  US doppler LE's - There is no deep vein thrombus within either greater saphenous, common   femoral, femoral, or popliteal veins. The calf veins were not adequately   assessed secondary to overlying edema.

## 2017-02-04 LAB
ANION GAP SERPL CALC-SCNC: 6 MMOL/L — SIGNIFICANT CHANGE UP (ref 5–17)
BUN SERPL-MCNC: 13 MG/DL — SIGNIFICANT CHANGE UP (ref 8–20)
CALCIUM SERPL-MCNC: 9 MG/DL — SIGNIFICANT CHANGE UP (ref 8.6–10.2)
CHLORIDE SERPL-SCNC: 96 MMOL/L — LOW (ref 98–107)
CO2 SERPL-SCNC: 39 MMOL/L — HIGH (ref 22–29)
CREAT SERPL-MCNC: 0.51 MG/DL — SIGNIFICANT CHANGE UP (ref 0.5–1.3)
CULTURE RESULTS: SIGNIFICANT CHANGE UP
GLUCOSE SERPL-MCNC: 83 MG/DL — SIGNIFICANT CHANGE UP (ref 70–115)
HCT VFR BLD CALC: 39.6 % — SIGNIFICANT CHANGE UP (ref 37–47)
HGB BLD-MCNC: 11.8 G/DL — LOW (ref 12–16)
MCHC RBC-ENTMCNC: 29.8 G/DL — LOW (ref 32–36)
MCHC RBC-ENTMCNC: 30.2 PG — SIGNIFICANT CHANGE UP (ref 27–31)
MCV RBC AUTO: 101.3 FL — HIGH (ref 81–99)
PLATELET # BLD AUTO: 135 K/UL — LOW (ref 150–400)
POTASSIUM SERPL-MCNC: 4.4 MMOL/L — SIGNIFICANT CHANGE UP (ref 3.5–5.3)
POTASSIUM SERPL-SCNC: 4.4 MMOL/L — SIGNIFICANT CHANGE UP (ref 3.5–5.3)
RBC # BLD: 3.91 M/UL — LOW (ref 4.4–5.2)
RBC # FLD: 15.4 % — SIGNIFICANT CHANGE UP (ref 11–15.6)
SODIUM SERPL-SCNC: 141 MMOL/L — SIGNIFICANT CHANGE UP (ref 135–145)
SPECIMEN SOURCE: SIGNIFICANT CHANGE UP
WBC # BLD: 3.56 K/UL — LOW (ref 4.8–10.8)
WBC # FLD AUTO: 3.56 K/UL — LOW (ref 4.8–10.8)

## 2017-02-04 PROCEDURE — 99233 SBSQ HOSP IP/OBS HIGH 50: CPT

## 2017-02-04 RX ADMIN — CARVEDILOL PHOSPHATE 12.5 MILLIGRAM(S): 80 CAPSULE, EXTENDED RELEASE ORAL at 05:57

## 2017-02-04 RX ADMIN — Medication 1 APPLICATION(S): at 12:55

## 2017-02-04 RX ADMIN — HEPARIN SODIUM 5000 UNIT(S): 5000 INJECTION INTRAVENOUS; SUBCUTANEOUS at 05:57

## 2017-02-04 RX ADMIN — Medication 100 MILLIGRAM(S): at 05:57

## 2017-02-04 RX ADMIN — Medication 10 MILLIGRAM(S): at 05:57

## 2017-02-04 RX ADMIN — Medication 40 MILLIGRAM(S): at 05:57

## 2017-02-04 RX ADMIN — Medication 1 APPLICATION(S): at 06:07

## 2017-02-04 RX ADMIN — CLOPIDOGREL BISULFATE 75 MILLIGRAM(S): 75 TABLET, FILM COATED ORAL at 12:55

## 2017-02-04 RX ADMIN — NYSTATIN CREAM 1 APPLICATION(S): 100000 CREAM TOPICAL at 05:57

## 2017-02-04 RX ADMIN — HEPARIN SODIUM 5000 UNIT(S): 5000 INJECTION INTRAVENOUS; SUBCUTANEOUS at 22:29

## 2017-02-04 RX ADMIN — CARVEDILOL PHOSPHATE 12.5 MILLIGRAM(S): 80 CAPSULE, EXTENDED RELEASE ORAL at 17:44

## 2017-02-04 NOTE — PROGRESS NOTE ADULT - SUBJECTIVE AND OBJECTIVE BOX
77 yo male with adequate perfusion, but chronic skin changes from chronic venous insufficience and edema  Small superficial left lower leg wound is stable  Continue medical management of bilateral LE edema and Silvadene cream to the wound daily, elevation  The patient may follow up with Surgery office as an outpatient for possible Unna boot treatment

## 2017-02-04 NOTE — PROGRESS NOTE ADULT - ASSESSMENT
This is 76Y W with PMH of CHF s/p PPM, chronic LE skin fungal infection admitted for SOB due to CHF exacerbation.    A/P    >Acute on chronic systolic Chf Exacerbation - slowly improving   appreciate cardiology input   cont. lasix 40 IV daily, try to taper off oxygen   coreg, enalapril, plavix  daily weight, strict intake output  TTE showed EF of 35-40%, further management as per cardiology     >Chronic LE skin changes, r/o infection  wound c/s  venous doppler - no DVT  Nystatin powder  Vascular surgery consult requested - Dr. Michael     >Right hip pain  xray hip, PT    >Hypomagnesemia - resolved     >HTN - stable  cont. home medication    >HLD  cont. statin    >Profound weakness  PT eval - needs JOHN     >DVT PPX  Heparin

## 2017-02-04 NOTE — PROGRESS NOTE ADULT - SUBJECTIVE AND OBJECTIVE BOX
HPI:  Patient is 75 yo female with hx of CHF, S/P PPM presenting with Several day hx of SOB, increased LE swelling, and Cough that has been progressively worsen.        INTERVAL HPI/ OVERNIGHT EVENTS: Patient is seen and examined,    CC: leg swelling getting better, now able to move her legs    REVIEW OF SYSTEMS:    Denied fever, chills, abd. pain, nausea, vomiting, chest pain, headache, dizziness    PHYSICAL EXAM:    Vital Signs Last 24 Hrs  T(C): 36.8, Max: 37.3 (02-03 @ 15:00)  T(F): 98.3, Max: 99.2 (02-03 @ 15:00)  HR: 74 (73 - 76)  BP: 108/60 (106/54 - 128/66)  BP(mean): --  RR: 18 (17 - 18)  SpO2: 94% (91% - 97%)    GENERAL: NAD  HEENT: EOMI  Neck: supple  CHEST/LUNG: diminished breath sounds over bases  HEART: S1S2+ audible  ABDOMEN: Soft, Nontender, Nondistended; Bowel sounds present  EXTREMITIES:  b/l LE- chronic skin changes , foul smelling   CNS: AAO X 3  Psychiatry: normal mood    LABS:                                             11.8   3.56  )-----------( 135      ( 04 Feb 2017 07:27 )             39.6   04 Feb 2017 07:27    141    |  96     |  13.0   ----------------------------<  83     4.4     |  39.0   |  0.51     Ca    9.0        04 Feb 2017 07:27  Mg     2.1       03 Feb 2017 07:13        MEDICATIONS  (STANDING):  heparin  Injectable 5000Unit(s) SubCutaneous every 12 hours  enalapril 10milliGRAM(s) Oral daily  carvedilol 12.5milliGRAM(s) Oral two times a day  clopidogrel Tablet 75milliGRAM(s) Oral daily  clotrimazole 1% Cream 1Application(s) Topical two times a day  nystatin Powder 1Application(s) Topical three times a day  furosemide   Injectable 40milliGRAM(s) IV Push daily  silver sulfADIAZINE 1% Cream 1Application(s) Topical daily  docusate sodium 100milliGRAM(s) Oral three times a day  senna 2Tablet(s) Oral at bedtime    MEDICATIONS  (PRN):  acetaminophen   Tablet 650milliGRAM(s) Oral every 6 hours PRN pain      RADIOLOGY & ADDITIONAL TEST:   Summary:   1. Technically difficult study.   2. Severely enlarged left atrium.   3. Global diffuse hypokinesis and dysschrony. Left ventricular ejection   fraction, by visual estimation, is 35 to 40%.   4. Elevated left atrial and left ventricular end-diastolic pressures.   (LAP = 37 mm hg)   5. The right atrium is normal in size.   6. The right ventricular size is mildly enlarged. RV systolic function   is normal. The pacer wire appears to be inserted in the RV free wall apex   as compared to the usual septal insertion. Probably the cause of the   marked dysschrony.   7. Mild mitral valve regurgitation.   8. Moderate pericardial effusion. No echo evidence of tamponade.

## 2017-02-04 NOTE — PROGRESS NOTE ADULT - SUBJECTIVE AND OBJECTIVE BOX
Patient is a 76y old  Female who presents with a chief complaint of SOB/LE edema (2017 05:44)        PAST MEDICAL & SURGICAL HISTORY:  Pacemaker  Essential hypertension  HLD (hyperlipidemia)  CHF (congestive heart failure)  Artificial pacemaker: defib      Summary of admission HPI:    77 yo F w/ NICM who is improving some.             PREVIOUS DIAGNOSTIC TESTING:      ECHO  FINDINGS:    STRESS  FINDINGS:    CATHETERIZATION  FINDINGS:    ELECTROPHYSIOLOGY STUDY  FINDINGS:    CAROTID ULTRASOUND:  FINDINGS    VENOUS DUPLEX SCAN:  FINDINGS:    CHEST CT PULMONARY ANGIO with IV Contrast:  FINDINGS:  MEDICATIONS  (STANDING):  heparin  Injectable 5000Unit(s) SubCutaneous every 12 hours  enalapril 10milliGRAM(s) Oral daily  carvedilol 12.5milliGRAM(s) Oral two times a day  clopidogrel Tablet 75milliGRAM(s) Oral daily  clotrimazole 1% Cream 1Application(s) Topical two times a day  nystatin Powder 1Application(s) Topical three times a day  furosemide   Injectable 40milliGRAM(s) IV Push daily  silver sulfADIAZINE 1% Cream 1Application(s) Topical daily  docusate sodium 100milliGRAM(s) Oral three times a day  senna 2Tablet(s) Oral at bedtime    MEDICATIONS  (PRN):  acetaminophen   Tablet 650milliGRAM(s) Oral every 6 hours PRN pain      FAMILY HISTORY:  No pertinent family history in first degree relatives      SOCIAL HISTORY:    CIGARETTES:    ALCOHOL:    REVIEW OF SYSTEMS:    CONSTITUTIONAL: No fever, weight loss, chills, shakes, or fatigue  EYES: No eye pain, visual disturbances, or discharge  ENMT:  No difficulty hearing, tinnitus, vertigo; No sinus or throat pain  NECK: No pain or stiffness  BREASTS: No pain, masses, or nipple discharge  RESPIRATORY: No cough, wheezing, hemoptysis, or shortness of breath  CARDIOVASCULAR: No chest pain, dyspnea, palpitations, dizziness, syncope, paroxysmal nocturnal dyspnea, orthopnea, or arm or leg swelling  GASTROINTESTINAL: No abdominal  or epigastric pain, nausea, vomiting, hematemesis, diarrhea, constipation, melena or bright red blood.  GENITOURINARY: No dysuria, nocturia, hematuria, or urinary incontinence  NEUROLOGICAL: No headaches, memory loss, slurred speech, limb weakness, loss of strength, numbness, or tremors  SKIN: No itching, burning, rashes, or lesions   LYMPH NODES: No enlarged glands  ENDOCRINE: No heat or cold intolerance, or hair loss  MUSCULOSKELETAL: No joint pain or swelling, muscle, back, or extremity pain  PSYCHIATRIC: No depression, anxiety, or difficulty sleeping  HEME/LYMPH: No easy bruising or bleeding gums  ALLERY AND IMMUNOLOGIC: No hives or rash.      Vital Signs Last 24 Hrs  T(C): 37, Max: 37 (02-04 @ 15:00)  T(F): 98.6, Max: 98.6 (02-04 @ 15:00)  HR: 76 (73 - 76)  BP: 112/64 (106/54 - 112/64)  BP(mean): --  RR: 18 (18 - 18)  SpO2: 96% (91% - 97%)    PHYSICAL EXAM:    GENERAL: In no apparent distress, well nourished, and hydrated.  HEAD:  Atraumatic, Normocephalic  EYES: EOMI, PERRLA, conjunctiva and sclera clear  ENMT: No tonsillar erythema, exudates, or enlargement; Moist mucous membranes, Good dentition, No lesions  NECK: Supple and normal thyroid.  No JVD or carotid bruit.  Carotid pulse is 2+ bilaterally.  HEART: Regular rate and rhythm; No murmurs, rubs, or gallops.  PULMONARY: Clear to auscultation and perfusion.  No rales, wheezing, or rhonchi bilaterally.  ABDOMEN: Soft, Nontender, Nondistended; Bowel sounds present  EXTREMITIES:  2+ Peripheral Pulses, No clubbing, cyanosis, or edema  LYMPH: No lymphadenopathy noted  NEUROLOGICAL: Grossly nonfocal          INTERPRETATION OF TELEMETRY:    ECG:    I&O's Detail  I & Os for 24h ending 2017 07:00  =============================================  IN:    Oral Fluid: 480 ml    Total IN: 480 ml  ---------------------------------------------  OUT:    Indwelling Catheter - Urethral: 4525 ml    Total OUT: 4525 ml  ---------------------------------------------  Total NET: -4045 ml    I & Os for current day (as of 2017 19:52)  =============================================  IN:    Oral Fluid: 480 ml    Total IN: 480 ml  ---------------------------------------------  OUT:    Indwelling Catheter - Urethral: 1350 ml    Total OUT: 1350 ml  ---------------------------------------------  Total NET: -870 ml      LABS:                        11.8   3.56  )-----------( 135      ( 2017 07:27 )             39.6     2017 07:27    141    |  96     |  13.0   ----------------------------<  83     4.4     |  39.0   |  0.51     Ca    9.0        2017 07:27  Mg     2.1       2017 07:13              BNP  I&O's Detail  I & Os for 24h ending 2017 07:00  =============================================  IN:    Oral Fluid: 480 ml    Total IN: 480 ml  ---------------------------------------------  OUT:    Indwelling Catheter - Urethral: 4525 ml    Total OUT: 4525 ml  ---------------------------------------------  Total NET: -4045 ml    I & Os for current day (as of 2017 19:52)  =============================================  IN:    Oral Fluid: 480 ml    Total IN: 480 ml  ---------------------------------------------  OUT:    Indwelling Catheter - Urethral: 1350 ml    Total OUT: 1350 ml  ---------------------------------------------  Total NET: -870 ml    Daily     Daily Weight in k.8 (2017 04:10)    RADIOLOGY & ADDITIONAL STUDIES:

## 2017-02-05 LAB
ANION GAP SERPL CALC-SCNC: 3 MMOL/L — LOW (ref 5–17)
ANISOCYTOSIS BLD QL: SLIGHT — SIGNIFICANT CHANGE UP
BUN SERPL-MCNC: 12 MG/DL — SIGNIFICANT CHANGE UP (ref 8–20)
CALCIUM SERPL-MCNC: 9.2 MG/DL — SIGNIFICANT CHANGE UP (ref 8.6–10.2)
CHLORIDE SERPL-SCNC: 96 MMOL/L — LOW (ref 98–107)
CO2 SERPL-SCNC: 41 MMOL/L — HIGH (ref 22–29)
CREAT SERPL-MCNC: 0.6 MG/DL — SIGNIFICANT CHANGE UP (ref 0.5–1.3)
EOSINOPHIL NFR BLD AUTO: 5 % — SIGNIFICANT CHANGE UP (ref 0–5)
GLUCOSE SERPL-MCNC: 87 MG/DL — SIGNIFICANT CHANGE UP (ref 70–115)
HCT VFR BLD CALC: 39.1 % — SIGNIFICANT CHANGE UP (ref 37–47)
HGB BLD-MCNC: 11.7 G/DL — LOW (ref 12–16)
LYMPHOCYTES # BLD AUTO: 18 % — LOW (ref 20–55)
MACROCYTES BLD QL: SLIGHT — SIGNIFICANT CHANGE UP
MCHC RBC-ENTMCNC: 29.9 G/DL — LOW (ref 32–36)
MCHC RBC-ENTMCNC: 30.3 PG — SIGNIFICANT CHANGE UP (ref 27–31)
MCV RBC AUTO: 101.3 FL — HIGH (ref 81–99)
MICROCYTES BLD QL: SLIGHT — SIGNIFICANT CHANGE UP
MONOCYTES NFR BLD AUTO: 6 % — SIGNIFICANT CHANGE UP (ref 3–10)
NEUTROPHILS NFR BLD AUTO: 60 % — SIGNIFICANT CHANGE UP (ref 37–73)
OVALOCYTES BLD QL SMEAR: SLIGHT — SIGNIFICANT CHANGE UP
PLAT MORPH BLD: NORMAL — SIGNIFICANT CHANGE UP
PLATELET # BLD AUTO: 128 K/UL — LOW (ref 150–400)
POIKILOCYTOSIS BLD QL AUTO: SLIGHT — SIGNIFICANT CHANGE UP
POTASSIUM SERPL-MCNC: 4.7 MMOL/L — SIGNIFICANT CHANGE UP (ref 3.5–5.3)
POTASSIUM SERPL-SCNC: 4.7 MMOL/L — SIGNIFICANT CHANGE UP (ref 3.5–5.3)
RBC # BLD: 3.86 M/UL — LOW (ref 4.4–5.2)
RBC # FLD: 15.4 % — SIGNIFICANT CHANGE UP (ref 11–15.6)
RBC BLD AUTO: ABNORMAL
SODIUM SERPL-SCNC: 140 MMOL/L — SIGNIFICANT CHANGE UP (ref 135–145)
VARIANT LYMPHS # BLD: 11 % — HIGH (ref 0–6)
WBC # BLD: 3.15 K/UL — LOW (ref 4.8–10.8)
WBC # FLD AUTO: 3.15 K/UL — LOW (ref 4.8–10.8)

## 2017-02-05 PROCEDURE — 99233 SBSQ HOSP IP/OBS HIGH 50: CPT

## 2017-02-05 RX ADMIN — Medication 40 MILLIGRAM(S): at 06:18

## 2017-02-05 RX ADMIN — HEPARIN SODIUM 5000 UNIT(S): 5000 INJECTION INTRAVENOUS; SUBCUTANEOUS at 23:05

## 2017-02-05 RX ADMIN — Medication 1 APPLICATION(S): at 12:27

## 2017-02-05 RX ADMIN — Medication 1 APPLICATION(S): at 18:21

## 2017-02-05 RX ADMIN — Medication 1 APPLICATION(S): at 06:19

## 2017-02-05 RX ADMIN — CARVEDILOL PHOSPHATE 12.5 MILLIGRAM(S): 80 CAPSULE, EXTENDED RELEASE ORAL at 06:18

## 2017-02-05 RX ADMIN — CARVEDILOL PHOSPHATE 12.5 MILLIGRAM(S): 80 CAPSULE, EXTENDED RELEASE ORAL at 18:21

## 2017-02-05 RX ADMIN — Medication 10 MILLIGRAM(S): at 06:19

## 2017-02-05 RX ADMIN — HEPARIN SODIUM 5000 UNIT(S): 5000 INJECTION INTRAVENOUS; SUBCUTANEOUS at 10:49

## 2017-02-05 RX ADMIN — CLOPIDOGREL BISULFATE 75 MILLIGRAM(S): 75 TABLET, FILM COATED ORAL at 12:27

## 2017-02-05 NOTE — PROGRESS NOTE ADULT - SUBJECTIVE AND OBJECTIVE BOX
HPI: Patient is 77 yo female with hx of CHF, S/P PPM presenting with Several day hx of SOB, increased LE swelling, and Cough that has been progressively worsen.        INTERVAL HPI/ OVERNIGHT EVENTS: Patient is seen and examined, called by RN - pinkish urine in carr, some blood clots     CC: leg swelling getting better, now able to move her legs    REVIEW OF SYSTEMS:    Denied fever, chills, abd. pain, nausea, vomiting, chest pain, headache, dizziness    PHYSICAL EXAM:    Vital Signs Last 24 Hrs  T(C): 36.6, Max: 36.6 (02-05 @ 11:27)  T(F): 97.8, Max: 97.8 (02-05 @ 11:27)  HR: 69 (66 - 77)  BP: 120/54 (110/58 - 120/54)  BP(mean): --  RR: 18 (18 - 18)  SpO2: 92% (92% - 100%)    GENERAL: NAD  HEENT: EOMI  Neck: supple  CHEST/LUNG: diminished breath sounds over bases  HEART: S1S2+ audible  ABDOMEN: Soft, Nontender, Nondistended; Bowel sounds present  EXTREMITIES:  b/l LE- chronic skin changes , foul smelling   CNS: AAO X 3  Psychiatry: normal mood  Carr blood tinged urine.     LABS:                                                     11.7   3.15  )-----------( 128      ( 05 Feb 2017 06:44 )             39.1   05 Feb 2017 06:44    140    |  96     |  12.0   ----------------------------<  87     4.7     |  41.0   |  0.60     Ca    9.2        05 Feb 2017 06:44          MEDICATIONS  (STANDING):  heparin  Injectable 5000Unit(s) SubCutaneous every 12 hours  enalapril 10milliGRAM(s) Oral daily  carvedilol 12.5milliGRAM(s) Oral two times a day  clopidogrel Tablet 75milliGRAM(s) Oral daily  clotrimazole 1% Cream 1Application(s) Topical two times a day  nystatin Powder 1Application(s) Topical three times a day  furosemide   Injectable 40milliGRAM(s) IV Push daily  silver sulfADIAZINE 1% Cream 1Application(s) Topical daily  docusate sodium 100milliGRAM(s) Oral three times a day  senna 2Tablet(s) Oral at bedtime    MEDICATIONS  (PRN):  acetaminophen   Tablet 650milliGRAM(s) Oral every 6 hours PRN pain      RADIOLOGY & ADDITIONAL TEST:   Summary:   1. Technically difficult study.   2. Severely enlarged left atrium.   3. Global diffuse hypokinesis and dysschrony. Left ventricular ejection   fraction, by visual estimation, is 35 to 40%.   4. Elevated left atrial and left ventricular end-diastolic pressures.   (LAP = 37 mm hg)   5. The right atrium is normal in size.   6. The right ventricular size is mildly enlarged. RV systolic function   is normal. The pacer wire appears to be inserted in the RV free wall apex   as compared to the usual septal insertion. Probably the cause of the   marked dysschrony.   7. Mild mitral valve regurgitation.   8. Moderate pericardial effusion. No echo evidence of tamponade.

## 2017-02-05 NOTE — PROGRESS NOTE ADULT - SUBJECTIVE AND OBJECTIVE BOX
Patient is a 76y old  Female who presents with improved volume overload.         PAST MEDICAL & SURGICAL HISTORY:  Pacemaker  Essential hypertension  HLD (hyperlipidemia)  CHF (congestive heart failure)  Artificial pacemaker: defib      Summary of admission HPI:        77 yo F w/ NICM who is improving some.       CHEST CT PULMONARY ANGIO with IV Contrast:  FINDINGS:  MEDICATIONS  (STANDING):  heparin  Injectable 5000Unit(s) SubCutaneous every 12 hours  enalapril 10milliGRAM(s) Oral daily  carvedilol 12.5milliGRAM(s) Oral two times a day  clopidogrel Tablet 75milliGRAM(s) Oral daily  clotrimazole 1% Cream 1Application(s) Topical two times a day  nystatin Powder 1Application(s) Topical three times a day  furosemide   Injectable 40milliGRAM(s) IV Push daily  silver sulfADIAZINE 1% Cream 1Application(s) Topical daily  docusate sodium 100milliGRAM(s) Oral three times a day  senna 2Tablet(s) Oral at bedtime    MEDICATIONS  (PRN):  acetaminophen   Tablet 650milliGRAM(s) Oral every 6 hours PRN pain          Vital Signs Last 24 Hrs  T(C): 36.6, Max: 36.6 (02-05 @ 11:27)  T(F): 97.8, Max: 97.8 (02-05 @ 11:27)  HR: 69 (66 - 77)  BP: 120/54 (110/58 - 120/54)  BP(mean): --  RR: 18 (18 - 18)  SpO2: 92% (92% - 100%)    PHYSICAL EXAM:    GENERAL: awake   HEART: Regular rate and rhythm; No murmurs, rubs, or gallops.  PULMONARY:decreased BS BL.  ABDOMEN: Soft, Nontender, Nondistended; Bowel sounds present  EXTREMITIES:  1+-edema-warm           I&O's Detail  I & Os for 24h ending 2017 07:00  =============================================  IN:    Oral Fluid: 480 ml    Total IN: 480 ml  ---------------------------------------------  OUT:    Indwelling Catheter - Urethral: 3775 ml    Total OUT: 3775 ml  ---------------------------------------------  Total NET: -3295 ml    I & Os for current day (as of 2017 17:06)  =============================================  IN:    Oral Fluid: 240 ml    Total IN: 240 ml  ---------------------------------------------  OUT:    Voided: 120 ml    Total OUT: 120 ml  ---------------------------------------------  Total NET: 120 ml      LABS:                        11.7   3.15  )-----------( 128      ( 2017 06:44 )             39.1     2017 06:44    140    |  96     |  12.0   ----------------------------<  87     4.7     |  41.0   |  0.60     Ca    9.2        2017 06:44              BNP  I&O's Detail  I & Os for 24h ending 2017 07:00  =============================================  IN:    Oral Fluid: 480 ml    Total IN: 480 ml  ---------------------------------------------  OUT:    Indwelling Catheter - Urethral: 3775 ml    Total OUT: 3775 ml  ---------------------------------------------  Total NET: -3295 ml    I & Os for current day (as of 2017 17:06)  =============================================  IN:    Oral Fluid: 240 ml    Total IN: 240 ml  ---------------------------------------------  OUT:    Voided: 120 ml    Total OUT: 120 ml  ---------------------------------------------  Total NET: 120 ml    Daily     Daily Weight in k.1 (2017 05:04)    RADIOLOGY & ADDITIONAL STUDIES:

## 2017-02-05 NOTE — PROGRESS NOTE ADULT - ASSESSMENT
This is 76Y W with PMH of CHF s/p PPM, chronic LE skin fungal infection admitted for SOB due to CHF exacerbation.    A/P    >Acute on chronic systolic Chf Exacerbation - slowly improving   appreciate cardiology input   cont. lasix 40 IV daily, try to taper off oxygen   coreg, enalapril, plavix  daily weight, strict intake output  TTE showed EF of 35-40%, further management as per cardiology     >Chronic LE skin changes, r/o infection  wound c/s  venous doppler - no DVT  Nystatin powder  Vascular surgery consult requested - Dr. Michael     >Right hip pain  xray hip, PT    >Hypomagnesemia - resolved     >HTN - stable  cont. home medication    >HLD  cont. statin    >Profound weakness  PT eval - needs JOHN     >DVT PPX  Heparin     >Hematuria - ct carr   Monitor

## 2017-02-06 PROCEDURE — 99233 SBSQ HOSP IP/OBS HIGH 50: CPT

## 2017-02-06 RX ADMIN — Medication 100 MILLIGRAM(S): at 12:17

## 2017-02-06 RX ADMIN — Medication 40 MILLIGRAM(S): at 05:55

## 2017-02-06 RX ADMIN — CARVEDILOL PHOSPHATE 12.5 MILLIGRAM(S): 80 CAPSULE, EXTENDED RELEASE ORAL at 17:27

## 2017-02-06 RX ADMIN — CLOPIDOGREL BISULFATE 75 MILLIGRAM(S): 75 TABLET, FILM COATED ORAL at 12:17

## 2017-02-06 RX ADMIN — HEPARIN SODIUM 5000 UNIT(S): 5000 INJECTION INTRAVENOUS; SUBCUTANEOUS at 12:17

## 2017-02-06 RX ADMIN — Medication 1 APPLICATION(S): at 17:27

## 2017-02-06 RX ADMIN — CARVEDILOL PHOSPHATE 12.5 MILLIGRAM(S): 80 CAPSULE, EXTENDED RELEASE ORAL at 05:55

## 2017-02-06 RX ADMIN — Medication 1 APPLICATION(S): at 11:23

## 2017-02-06 RX ADMIN — HEPARIN SODIUM 5000 UNIT(S): 5000 INJECTION INTRAVENOUS; SUBCUTANEOUS at 21:47

## 2017-02-06 RX ADMIN — NYSTATIN CREAM 1 APPLICATION(S): 100000 CREAM TOPICAL at 12:16

## 2017-02-06 RX ADMIN — Medication 1 APPLICATION(S): at 11:22

## 2017-02-06 RX ADMIN — Medication 10 MILLIGRAM(S): at 05:55

## 2017-02-06 NOTE — PROGRESS NOTE ADULT - ASSESSMENT
This is 76Y W with PMH of CHF s/p PPM, chronic LE skin fungal infection admitted for SOB due to CHF exacerbation.    A/P    >Acute on chronic systolic Chf Exacerbation - slowly improving   appreciate cardiology input   cont. lasix 40 IV daily  coreg, enalapril, plavix  daily weight, strict intake output  TTE showed EF of 35-40%, further management as per cardiology     >Chronic LE skin changes, r/o infection  wound c/s  venous doppler - no DVT  Nystatin powder  Vascular surgery consult requested - Dr. Michael     >Right hip pain  xray hip - OA, no e/o fracture, PT    >Hypomagnesemia - resolved     >HTN - stable  cont. home medication    >HLD  cont. statin    >Profound weakness  PT eval - needs JOHN     >DVT PPX  Heparin     >Hematuria - ct carr   Monitor

## 2017-02-06 NOTE — PROGRESS NOTE ADULT - SUBJECTIVE AND OBJECTIVE BOX
HPI: Patient is 77 yo female with hx of CHF, S/P PPM presenting with Several day hx of SOB, increased LE swelling, and Cough that has been progressively worsen.        INTERVAL HPI/ OVERNIGHT EVENTS: no acute events overnight     Patient is seen and examined    CC: leg swelling getting better, now able to move her legs    REVIEW OF SYSTEMS:    Denied fever, chills, abd. pain, nausea, vomiting, chest pain, headache, dizziness    PHYSICAL EXAM:  Vital Signs Last 24 Hrs  T(C): 36.7, Max: 36.8 (02-05 @ 15:00)  T(F): 98.1, Max: 98.3 (02-05 @ 15:00)  HR: 74 (68 - 83)  BP: 113/60 (113/60 - 126/54)  BP(mean): --  RR: 20 (16 - 20)  SpO2: 94% (94% - 97%)    GENERAL: NAD  HEENT: EOMI  Neck: supple  CHEST/LUNG: diminished breath sounds over bases  HEART: S1S2+ audible  ABDOMEN: Soft, Nontender, Nondistended; Bowel sounds present  EXTREMITIES:  b/l LE- chronic skin changes , foul smelling   CNS: AAO X 3  Psychiatry: normal mood  Tee blood tinged urine. darker today     LABS:                                                        11.7   3.15  )-----------( 128      ( 05 Feb 2017 06:44 )             39.1   05 Feb 2017 06:44    140    |  96     |  12.0   ----------------------------<  87     4.7     |  41.0   |  0.60     Ca    9.2        05 Feb 2017 06:44            MEDICATIONS  (STANDING):  heparin  Injectable 5000Unit(s) SubCutaneous every 12 hours  enalapril 10milliGRAM(s) Oral daily  carvedilol 12.5milliGRAM(s) Oral two times a day  clopidogrel Tablet 75milliGRAM(s) Oral daily  clotrimazole 1% Cream 1Application(s) Topical two times a day  nystatin Powder 1Application(s) Topical three times a day  furosemide   Injectable 40milliGRAM(s) IV Push daily  silver sulfADIAZINE 1% Cream 1Application(s) Topical daily  docusate sodium 100milliGRAM(s) Oral three times a day  senna 2Tablet(s) Oral at bedtime    MEDICATIONS  (PRN):  acetaminophen   Tablet 650milliGRAM(s) Oral every 6 hours PRN pain      RADIOLOGY & ADDITIONAL TEST:   Summary:   1. Technically difficult study.   2. Severely enlarged left atrium.   3. Global diffuse hypokinesis and dysschrony. Left ventricular ejection   fraction, by visual estimation, is 35 to 40%.   4. Elevated left atrial and left ventricular end-diastolic pressures.   (LAP = 37 mm hg)   5. The right atrium is normal in size.   6. The right ventricular size is mildly enlarged. RV systolic function   is normal. The pacer wire appears to be inserted in the RV free wall apex   as compared to the usual septal insertion. Probably the cause of the   marked dysschrony.   7. Mild mitral valve regurgitation.   8. Moderate pericardial effusion. No echo evidence of tamponade.

## 2017-02-07 LAB
ANION GAP SERPL CALC-SCNC: 7 MMOL/L — SIGNIFICANT CHANGE UP (ref 5–17)
BUN SERPL-MCNC: 14 MG/DL — SIGNIFICANT CHANGE UP (ref 8–20)
CALCIUM SERPL-MCNC: 8.8 MG/DL — SIGNIFICANT CHANGE UP (ref 8.6–10.2)
CHLORIDE SERPL-SCNC: 97 MMOL/L — LOW (ref 98–107)
CO2 SERPL-SCNC: 36 MMOL/L — HIGH (ref 22–29)
CREAT SERPL-MCNC: 0.51 MG/DL — SIGNIFICANT CHANGE UP (ref 0.5–1.3)
GLUCOSE SERPL-MCNC: 83 MG/DL — SIGNIFICANT CHANGE UP (ref 70–115)
HCT VFR BLD CALC: 36.3 % — LOW (ref 37–47)
HGB BLD-MCNC: 11.2 G/DL — LOW (ref 12–16)
MAGNESIUM SERPL-MCNC: 2.1 MG/DL — SIGNIFICANT CHANGE UP (ref 1.8–2.5)
MCHC RBC-ENTMCNC: 30.7 PG — SIGNIFICANT CHANGE UP (ref 27–31)
MCHC RBC-ENTMCNC: 30.9 G/DL — LOW (ref 32–36)
MCV RBC AUTO: 99.5 FL — HIGH (ref 81–99)
PLATELET # BLD AUTO: 126 K/UL — LOW (ref 150–400)
POTASSIUM SERPL-MCNC: 4.1 MMOL/L — SIGNIFICANT CHANGE UP (ref 3.5–5.3)
POTASSIUM SERPL-SCNC: 4.1 MMOL/L — SIGNIFICANT CHANGE UP (ref 3.5–5.3)
RBC # BLD: 3.65 M/UL — LOW (ref 4.4–5.2)
RBC # FLD: 15.1 % — SIGNIFICANT CHANGE UP (ref 11–15.6)
SODIUM SERPL-SCNC: 140 MMOL/L — SIGNIFICANT CHANGE UP (ref 135–145)
WBC # BLD: 3.37 K/UL — LOW (ref 4.8–10.8)
WBC # FLD AUTO: 3.37 K/UL — LOW (ref 4.8–10.8)

## 2017-02-07 PROCEDURE — 99233 SBSQ HOSP IP/OBS HIGH 50: CPT

## 2017-02-07 RX ADMIN — CLOPIDOGREL BISULFATE 75 MILLIGRAM(S): 75 TABLET, FILM COATED ORAL at 11:53

## 2017-02-07 RX ADMIN — Medication 40 MILLIGRAM(S): at 05:37

## 2017-02-07 RX ADMIN — HEPARIN SODIUM 5000 UNIT(S): 5000 INJECTION INTRAVENOUS; SUBCUTANEOUS at 21:27

## 2017-02-07 RX ADMIN — Medication 1 APPLICATION(S): at 11:53

## 2017-02-07 RX ADMIN — Medication 10 MILLIGRAM(S): at 05:36

## 2017-02-07 RX ADMIN — CARVEDILOL PHOSPHATE 12.5 MILLIGRAM(S): 80 CAPSULE, EXTENDED RELEASE ORAL at 17:45

## 2017-02-07 RX ADMIN — Medication 1 APPLICATION(S): at 05:37

## 2017-02-07 RX ADMIN — HEPARIN SODIUM 5000 UNIT(S): 5000 INJECTION INTRAVENOUS; SUBCUTANEOUS at 11:53

## 2017-02-07 RX ADMIN — CARVEDILOL PHOSPHATE 12.5 MILLIGRAM(S): 80 CAPSULE, EXTENDED RELEASE ORAL at 05:37

## 2017-02-07 NOTE — PROGRESS NOTE ADULT - ASSESSMENT
This is 76Y W with PMH of CHF s/p PPM, chronic LE skin fungal infection admitted for SOB due to CHF exacerbation.    A/P    >Acute on chronic systolic Chf Exacerbation - slowly improving   appreciate cardiology input   cont. lasix 40 IV daily  coreg, enalapril, plavix  daily weight, strict intake output  TTE showed EF of 35-40%, further management as per cardiology   DC carr today  DC planning pending cardio recs  >Chronic LE skin changes, r/o infection  wound c/s  venous doppler - no DVT  Nystatin powder  Vascular surgery consult requested - Dr. Michael     >Right hip pain  xray hip - OA, no e/o fracture, PT    >Hypomagnesemia - resolved     >HTN - stable  cont. home medication    >HLD  cont. statin    >Profound weakness  PT eval - needs JOHN     >DVT PPX  Heparin     >Hematuria - resolved - most likely 2/2 trauma   DC carr today   Monitor

## 2017-02-07 NOTE — PROGRESS NOTE ADULT - SUBJECTIVE AND OBJECTIVE BOX
INTERVAL HISTORY:  Continues to improve  Reamins on IV diuretics  Ambulated with assistance of physical therapy today  [place] well  Less short of breath    	  MEDICATIONS:  enalapril 10milliGRAM(s) Oral daily  carvedilol 12.5milliGRAM(s) Oral two times a day  furosemide   Injectable 40milliGRAM(s) IV Push daily        acetaminophen   Tablet 650milliGRAM(s) Oral every 6 hours PRN    docusate sodium 100milliGRAM(s) Oral three times a day  senna 2Tablet(s) Oral at bedtime      heparin  Injectable 5000Unit(s) SubCutaneous every 12 hours  clopidogrel Tablet 75milliGRAM(s) Oral daily  nystatin Powder 1Application(s) Topical three times a day  silver sulfADIAZINE 1% Cream 1Application(s) Topical daily        PHYSICAL EXAM:    T(C): 36.8, Max: 37 ( @ 05:32)  HR: 78 (78 - 80)  BP: 110/64 (110/64 - 122/78)  RR: 18 (18 - 20)  SpO2: 98% (98% - 100%)  Wt(kg): --    I&O's Summary  I & Os for 24h ending 2017 07:00  =============================================  IN: 990 ml / OUT: 4885 ml / NET: -3895 ml    I & Os for current day (as of 2017 16:34)  =============================================  IN: 480 ml / OUT: 1500 ml / NET: -1020 ml      Daily     Daily Weight in k.7 (2017 05:02)    Appearance: Normal	  HEENT:   Normal oral mucosa, PERRL, EOMI	  Lymphatic: No lymphadenopathy  Cardiovascular: Normal S1 S2, No JVD, No murmurs,   Respiratory: Lungs clear to auscultation	  Psychiatry: A & O x 3, Mood & affect appropriate  Gastrointestinal:  Soft, Non-tender, + BS	  Skin: No rashes, No ecchymoses, No cyanosis  Neurologic: Non-focal  Extremities: Normal range of motion, No clubbing, cyanosis.  Chronic venous stasis changes with less edema  Vascular: Peripheral pulses palpable 2+ bilaterally                11.2   3.37  )-----------( 126      ( 2017 06:11 )             36.3     2017 06:11    140    |  97     |  14.0   ----------------------------<  83     4.1     |  36.0   |  0.51     Ca    8.8        2017 06:11  Mg     2.1       2017 06:11      proBNP:   Lipid Profile:   HgA1c:     ASSESSMENT/PLAN: 	  Non ischemic cardiomyopathy with non compliance with medications and with follow up  Has diuresed well  Continue present regimen of medicaitons  Discharge planning.

## 2017-02-07 NOTE — PROGRESS NOTE ADULT - SUBJECTIVE AND OBJECTIVE BOX
HPI: Patient is 77 yo female with hx of CHF, S/P PPM presenting with Several day hx of SOB, increased LE swelling, and Cough that has been progressively worsen.        INTERVAL HPI/ OVERNIGHT EVENTS: no acute events overnight     Patient is seen and examined    CC: leg swelling getting better, now able to move her legs. frustrated that she is dependent on people for everything, unable to stand up on her own    REVIEW OF SYSTEMS:    Denied fever, chills, abd. pain, nausea, vomiting, chest pain, headache, dizziness    PHYSICAL EXAM:  Vital Signs Last 24 Hrs  T(C): 37, Max: 37 (02-07 @ 05:32)  T(F): 98.6, Max: 98.6 (02-07 @ 05:32)  HR: 80 (74 - 80)  BP: 119/76 (113/60 - 122/78)  BP(mean): --  RR: 18 (18 - 20)  SpO2: 100% (94% - 100%)    GENERAL: NAD  HEENT: EOMI  Neck: supple  CHEST/LUNG: diminished breath sounds over bases  HEART: S1S2+ audible  ABDOMEN: Soft, Nontender, Nondistended; Bowel sounds present  EXTREMITIES:  b/l LE- chronic skin changes , foul smelling   CNS: AAO X 3  Psychiatry: frustrated, angry   Tee urine clear - cloudy     LABS:                                                                              11.2   3.37  )-----------( 126      ( 07 Feb 2017 06:11 )             36.3   07 Feb 2017 06:11    140    |  97     |  14.0   ----------------------------<  83     4.1     |  36.0   |  0.51     Ca    8.8        07 Feb 2017 06:11  Mg     2.1       07 Feb 2017 06:11        MEDICATIONS  (STANDING):  heparin  Injectable 5000Unit(s) SubCutaneous every 12 hours  enalapril 10milliGRAM(s) Oral daily  carvedilol 12.5milliGRAM(s) Oral two times a day  clopidogrel Tablet 75milliGRAM(s) Oral daily  nystatin Powder 1Application(s) Topical three times a day  furosemide   Injectable 40milliGRAM(s) IV Push daily  silver sulfADIAZINE 1% Cream 1Application(s) Topical daily  docusate sodium 100milliGRAM(s) Oral three times a day  senna 2Tablet(s) Oral at bedtime    MEDICATIONS  (PRN):  acetaminophen   Tablet 650milliGRAM(s) Oral every 6 hours PRN pain        RADIOLOGY & ADDITIONAL TEST:   Summary:   1. Technically difficult study.   2. Severely enlarged left atrium.   3. Global diffuse hypokinesis and dysschrony. Left ventricular ejection   fraction, by visual estimation, is 35 to 40%.   4. Elevated left atrial and left ventricular end-diastolic pressures.   (LAP = 37 mm hg)   5. The right atrium is normal in size.   6. The right ventricular size is mildly enlarged. RV systolic function   is normal. The pacer wire appears to be inserted in the RV free wall apex   as compared to the usual septal insertion. Probably the cause of the   marked dysschrony.   7. Mild mitral valve regurgitation.   8. Moderate pericardial effusion. No echo evidence of tamponade.

## 2017-02-08 PROCEDURE — 99233 SBSQ HOSP IP/OBS HIGH 50: CPT

## 2017-02-08 RX ORDER — FUROSEMIDE 40 MG
40 TABLET ORAL DAILY
Qty: 0 | Refills: 0 | Status: DISCONTINUED | OUTPATIENT
Start: 2017-02-08 | End: 2017-02-10

## 2017-02-08 RX ADMIN — Medication 40 MILLIGRAM(S): at 05:40

## 2017-02-08 RX ADMIN — HEPARIN SODIUM 5000 UNIT(S): 5000 INJECTION INTRAVENOUS; SUBCUTANEOUS at 10:48

## 2017-02-08 RX ADMIN — CARVEDILOL PHOSPHATE 12.5 MILLIGRAM(S): 80 CAPSULE, EXTENDED RELEASE ORAL at 05:41

## 2017-02-08 RX ADMIN — Medication 1 APPLICATION(S): at 12:46

## 2017-02-08 RX ADMIN — HEPARIN SODIUM 5000 UNIT(S): 5000 INJECTION INTRAVENOUS; SUBCUTANEOUS at 21:22

## 2017-02-08 RX ADMIN — Medication 40 MILLIGRAM(S): at 18:40

## 2017-02-08 RX ADMIN — Medication 10 MILLIGRAM(S): at 05:40

## 2017-02-08 RX ADMIN — CLOPIDOGREL BISULFATE 75 MILLIGRAM(S): 75 TABLET, FILM COATED ORAL at 12:46

## 2017-02-08 RX ADMIN — CARVEDILOL PHOSPHATE 12.5 MILLIGRAM(S): 80 CAPSULE, EXTENDED RELEASE ORAL at 18:40

## 2017-02-08 NOTE — PROGRESS NOTE ADULT - SUBJECTIVE AND OBJECTIVE BOX
INTERVAL HISTORY:  Feels better overall  Ambulating with physical therapy  	  MEDICATIONS:  enalapril 10milliGRAM(s) Oral daily  carvedilol 12.5milliGRAM(s) Oral two times a day  furosemide    Tablet 40milliGRAM(s) Oral daily        acetaminophen   Tablet 650milliGRAM(s) Oral every 6 hours PRN    docusate sodium 100milliGRAM(s) Oral three times a day  senna 2Tablet(s) Oral at bedtime      heparin  Injectable 5000Unit(s) SubCutaneous every 12 hours  clopidogrel Tablet 75milliGRAM(s) Oral daily  nystatin Powder 1Application(s) Topical three times a day  silver sulfADIAZINE 1% Cream 1Application(s) Topical daily        PHYSICAL EXAM:    T(C): 36.8, Max: 37.1 ( @ 21:19)  HR: 72 (68 - 76)  BP: 114/50 (114/50 - 134/65)  RR: 18 (16 - 18)  SpO2: 92% (92% - 98%)  Wt(kg): --    I&O's Summary  I & Os for 24h ending 2017 07:00  =============================================  IN: 480 ml / OUT: 2975 ml / NET: -2495 ml    I & Os for current day (as of 2017 16:35)  =============================================  IN: 120 ml / OUT: 0 ml / NET: 120 ml      Daily     Daily Weight in k (2017 05:00)    Appearance: Normal	  HEENT:   Normal oral mucosa, PERRL, EOMI	  Lymphatic: No lymphadenopathy  Cardiovascular: Normal S1 S2, No JVD, No murmurs  Respiratory: Lungs clear to auscultation	  Psychiatry: A & O x 3, Mood & affect appropriate  Gastrointestinal:  Soft, Non-tender, + BS	  Skin: No rashes, No ecchymoses, No cyanosis  Neurologic: Non-focal  Extremities: Normal range of motion, No clubbing, cyanosis.  Chronic venous stasis changes with scaling and mild edema  Vascular: Peripheral pulses palpable 2+ bilaterally                        11.2   3.37  )-----------( 126      ( 2017 06:11 )             36.3     2017 06:11    140    |  97     |  14.0   ----------------------------<  83     4.1     |  36.0   |  0.51     Ca    8.8        2017 06:11  Mg     2.1       2017 06:11        ASSESSMENT/PLAN: 	  Acute on chronic systolic heart failure related to non compliance with medications and follow up  Will change Lasix to PO  Continue all other medications  DC planning

## 2017-02-08 NOTE — PROGRESS NOTE ADULT - SUBJECTIVE AND OBJECTIVE BOX
HPI: Patient is 77 yo female with hx of CHF, S/P PPM presenting with Several day hx of SOB, increased LE swelling, and Cough that has been progressively worsen.        INTERVAL HPI/ OVERNIGHT EVENTS: no acute events overnight     Patient is seen and examined    CC: leg swelling getting better, now able to move her legs. frustrated that she is dependent on people for everything, unable to stand up on her own    REVIEW OF SYSTEMS:    Denied fever, chills, abd. pain, nausea, vomiting, chest pain, headache, dizziness    PHYSICAL EXAM:  Vital Signs Last 24 Hrs  T(C): 36.8, Max: 37.1 (02-07 @ 21:19)  T(F): 98.2, Max: 98.8 (02-07 @ 21:19)  HR: 72 (68 - 76)  BP: 114/50 (114/50 - 134/65)  BP(mean): --  RR: 18 (16 - 18)  SpO2: 92% (92% - 98%)    GENERAL: NAD, obese  HEENT: EOMI  Neck: supple  CHEST/LUNG: diminished breath sounds over bases  HEART: S1S2+ audible  ABDOMEN: Soft, Nontender, Nondistended; Bowel sounds present  EXTREMITIES:  b/l LE- chronic skin changes , foul smelling   CNS: AAO X 3  Psychiatry: calm  Tee urine clear - cloudy     LABS:                                                                              11.2   3.37  )-----------( 126      ( 07 Feb 2017 06:11 )             36.3   07 Feb 2017 06:11    140    |  97     |  14.0   ----------------------------<  83     4.1     |  36.0   |  0.51     Ca    8.8        07 Feb 2017 06:11  Mg     2.1       07 Feb 2017 06:11        MEDICATIONS  (STANDING):  heparin  Injectable 5000Unit(s) SubCutaneous every 12 hours  enalapril 10milliGRAM(s) Oral daily  carvedilol 12.5milliGRAM(s) Oral two times a day  clopidogrel Tablet 75milliGRAM(s) Oral daily  nystatin Powder 1Application(s) Topical three times a day  furosemide   Injectable 40milliGRAM(s) IV Push daily  silver sulfADIAZINE 1% Cream 1Application(s) Topical daily  docusate sodium 100milliGRAM(s) Oral three times a day  senna 2Tablet(s) Oral at bedtime    MEDICATIONS  (PRN):  acetaminophen   Tablet 650milliGRAM(s) Oral every 6 hours PRN pain        RADIOLOGY & ADDITIONAL TEST:   Summary:   1. Technically difficult study.   2. Severely enlarged left atrium.   3. Global diffuse hypokinesis and dysschrony. Left ventricular ejection   fraction, by visual estimation, is 35 to 40%.   4. Elevated left atrial and left ventricular end-diastolic pressures.   (LAP = 37 mm hg)   5. The right atrium is normal in size.   6. The right ventricular size is mildly enlarged. RV systolic function   is normal. The pacer wire appears to be inserted in the RV free wall apex   as compared to the usual septal insertion. Probably the cause of the   marked dysschrony.   7. Mild mitral valve regurgitation.   8. Moderate pericardial effusion. No echo evidence of tamponade.

## 2017-02-08 NOTE — PROGRESS NOTE ADULT - ASSESSMENT
This is 76Y W with PMH of CHF s/p PPM, chronic LE skin fungal infection admitted for SOB due to CHF exacerbation.    A/P    >Acute on chronic systolic Chf Exacerbation - slowly improving   appreciate cardiology input   cont. lasix 40 IV daily - switch to po in am   coreg, enalapril, plavix  daily weight, strict intake output  TTE showed EF of 35-40%, further management as per cardiology   DC planning     >Chronic LE skin changes, r/o infection  wound c/s  venous doppler - no DVT  Nystatin powder  Vascular surgery consult requested - Dr. Michael     >Right hip pain  xray hip - OA, no e/o fracture, PT    >Hypomagnesemia - resolved     >HTN - stable  cont. home medication    >HLD  cont. statin    >Profound weakness  PT eval - needs JOHN     >DVT PPX  Heparin     >Hematuria - resolved - most likely 2/2 trauma

## 2017-02-09 LAB
ANION GAP SERPL CALC-SCNC: 8 MMOL/L — SIGNIFICANT CHANGE UP (ref 5–17)
BUN SERPL-MCNC: 20 MG/DL — SIGNIFICANT CHANGE UP (ref 8–20)
CALCIUM SERPL-MCNC: 9.1 MG/DL — SIGNIFICANT CHANGE UP (ref 8.6–10.2)
CHLORIDE SERPL-SCNC: 97 MMOL/L — LOW (ref 98–107)
CO2 SERPL-SCNC: 34 MMOL/L — HIGH (ref 22–29)
CREAT SERPL-MCNC: 0.57 MG/DL — SIGNIFICANT CHANGE UP (ref 0.5–1.3)
GLUCOSE SERPL-MCNC: 86 MG/DL — SIGNIFICANT CHANGE UP (ref 70–115)
HCT VFR BLD CALC: 36.6 % — LOW (ref 37–47)
HGB BLD-MCNC: 11.3 G/DL — LOW (ref 12–16)
MCHC RBC-ENTMCNC: 30.5 PG — SIGNIFICANT CHANGE UP (ref 27–31)
MCHC RBC-ENTMCNC: 30.9 G/DL — LOW (ref 32–36)
MCV RBC AUTO: 98.9 FL — SIGNIFICANT CHANGE UP (ref 81–99)
PLATELET # BLD AUTO: 144 K/UL — LOW (ref 150–400)
POTASSIUM SERPL-MCNC: 4.5 MMOL/L — SIGNIFICANT CHANGE UP (ref 3.5–5.3)
POTASSIUM SERPL-SCNC: 4.5 MMOL/L — SIGNIFICANT CHANGE UP (ref 3.5–5.3)
RBC # BLD: 3.7 M/UL — LOW (ref 4.4–5.2)
RBC # FLD: 15.2 % — SIGNIFICANT CHANGE UP (ref 11–15.6)
SODIUM SERPL-SCNC: 139 MMOL/L — SIGNIFICANT CHANGE UP (ref 135–145)
WBC # BLD: 3.37 K/UL — LOW (ref 4.8–10.8)
WBC # FLD AUTO: 3.37 K/UL — LOW (ref 4.8–10.8)

## 2017-02-09 PROCEDURE — 99239 HOSP IP/OBS DSCHRG MGMT >30: CPT

## 2017-02-09 RX ORDER — FUROSEMIDE 40 MG
1 TABLET ORAL
Qty: 0 | Refills: 0 | COMMUNITY
Start: 2017-02-09

## 2017-02-09 RX ORDER — NYSTATIN CREAM 100000 [USP'U]/G
1 CREAM TOPICAL
Qty: 0 | Refills: 0 | COMMUNITY
Start: 2017-02-09

## 2017-02-09 RX ORDER — DOCUSATE SODIUM 100 MG
1 CAPSULE ORAL
Qty: 0 | Refills: 0 | COMMUNITY
Start: 2017-02-09

## 2017-02-09 RX ORDER — SENNA PLUS 8.6 MG/1
2 TABLET ORAL
Qty: 0 | Refills: 0 | COMMUNITY
Start: 2017-02-09

## 2017-02-09 RX ADMIN — Medication 40 MILLIGRAM(S): at 05:56

## 2017-02-09 RX ADMIN — Medication 10 MILLIGRAM(S): at 05:56

## 2017-02-09 RX ADMIN — CARVEDILOL PHOSPHATE 12.5 MILLIGRAM(S): 80 CAPSULE, EXTENDED RELEASE ORAL at 05:57

## 2017-02-09 RX ADMIN — HEPARIN SODIUM 5000 UNIT(S): 5000 INJECTION INTRAVENOUS; SUBCUTANEOUS at 11:59

## 2017-02-09 RX ADMIN — HEPARIN SODIUM 5000 UNIT(S): 5000 INJECTION INTRAVENOUS; SUBCUTANEOUS at 21:56

## 2017-02-09 RX ADMIN — CLOPIDOGREL BISULFATE 75 MILLIGRAM(S): 75 TABLET, FILM COATED ORAL at 11:59

## 2017-02-09 RX ADMIN — Medication 1 APPLICATION(S): at 12:00

## 2017-02-09 RX ADMIN — CARVEDILOL PHOSPHATE 12.5 MILLIGRAM(S): 80 CAPSULE, EXTENDED RELEASE ORAL at 18:24

## 2017-02-09 NOTE — DISCHARGE NOTE ADULT - SECONDARY DIAGNOSIS.
Essential hypertension Pacemaker Hyperlipidemia, unspecified hyperlipidemia type Cellulitis of lower extremity, unspecified laterality

## 2017-02-09 NOTE — DISCHARGE NOTE ADULT - HOSPITAL COURSE
76Y W with PMH of CHF s/p PPM, chronic LE skin fungal infection admitted for SOB due to acute CHF exacerbation. She was seen by crdioogy. ECHO - WNL. telemetry - no rhythm abnormalities. She was diuresed with iv lasix, which she tolerated very well. She was seen by surgery for chroninc fungal skin infection in b/l LE. will need follow up.    Vital Signs Last 24 Hrs  T(C): 36.9, Max: 36.9 (02-09 @ 11:55)  T(F): 98.4, Max: 98.4 (02-09 @ 11:55)  HR: 74 (67 - 98)  BP: 106/54 (106/54 - 124/78)  BP(mean): --  RR: 18 (18 - 18)  SpO2: 94% (92% - 94%)    PHYSICAL EXAM:    GENERAL: NAD, obese  HEENT: EOMI  Neck: supple  CHEST/LUNG: diminished breath sounds over bases  HEART: S1S2+ audible  ABDOMEN: Soft, Nontender, Nondistended; Bowel sounds present  EXTREMITIES:  b/l LE- chronic skin changes   CNS: AAO X 3  Psychiatry: calm      Discussed with daughter and pt at length.  Time spent in discharge planning and co-ordination : 50min

## 2017-02-09 NOTE — DISCHARGE NOTE ADULT - MEDICATION SUMMARY - MEDICATIONS TO TAKE
I will START or STAY ON the medications listed below when I get home from the hospital:    enalapril 10 mg oral tablet  -- 1 tab(s) by mouth once a day  -- Indication: For CHF (congestive heart failure)    Plavix 75 mg oral tablet  -- 1 tab(s) by mouth once a day  -- Indication: For CHF (congestive heart failure)    Coreg 12.5 mg oral tablet  -- 1 tab(s) by mouth 2 times a day  -- Indication: For Acute on chronic congestive heart failure, unspecified congestive heart failure type    nystatin 100,000 units/g topical powder  -- 1 application on skin 3 times a day  -- Indication: For groin fungal infection    silver sulfADIAZINE 1% topical cream  -- 1 application on skin once a day  -- Indication: For b/l LE cellulitis    furosemide 40 mg oral tablet  -- 1 tab(s) by mouth once a day  -- Indication: For Acute on chronic congestive heart failure, unspecified congestive heart failure type    docusate sodium 100 mg oral capsule  -- 1 cap(s) by mouth 3 times a day  -- Indication: For Constipation     senna oral tablet  -- 2 tab(s) by mouth once a day (at bedtime)  -- Indication: For Constipation

## 2017-02-09 NOTE — DISCHARGE NOTE ADULT - PATIENT PORTAL LINK FT
“You can access the [facility] Patient Portal, offered by [facility], by registering with the following website: [email or web address]”

## 2017-02-09 NOTE — DISCHARGE NOTE ADULT - CARE PROVIDER_API CALL
Dangelo Park), Cardiovascular Disease; Interventional Cardiology  [address] [place] [address]  Phone: [phone]  Fax: [phone]

## 2017-02-09 NOTE — DISCHARGE NOTE ADULT - CARE PLAN
Principal Discharge DX:	Acute on chronic congestive heart failure, unspecified congestive heart failure type  Goal:	maintain fluid balance  Instructions for follow-up, activity and diet:	f/u with cardiology in 2 weeks  Activity as tolearted  DASH diet  Weigh yourself daily.  Low sodium diet.  if you weight 3 lbs over your dry weight, you should call your physician/cardiologist.  take your medications daily.  Secondary Diagnosis:	Essential hypertension  Secondary Diagnosis:	Pacemaker  Secondary Diagnosis:	Hyperlipidemia, unspecified hyperlipidemia type  Secondary Diagnosis:	Cellulitis of lower extremity, unspecified laterality  Goal:	resolved  Instructions for follow-up, activity and diet:	f/u with dermatology/podiatry

## 2017-02-09 NOTE — DISCHARGE NOTE ADULT - PLAN OF CARE
maintain fluid balance f/u with cardiology in 2 weeks  Activity as tolearted  DASH diet  Weigh yourself daily.  Low sodium diet.  if you weight 3 lbs over your dry weight, you should call your physician/cardiologist.  take your medications daily. resolved f/u with dermatology/podiatry

## 2017-02-10 VITALS
RESPIRATION RATE: 16 BRPM | SYSTOLIC BLOOD PRESSURE: 120 MMHG | DIASTOLIC BLOOD PRESSURE: 64 MMHG | HEART RATE: 60 BPM | OXYGEN SATURATION: 95 %

## 2017-02-10 PROCEDURE — 80053 COMPREHEN METABOLIC PANEL: CPT

## 2017-02-10 PROCEDURE — 85730 THROMBOPLASTIN TIME PARTIAL: CPT

## 2017-02-10 PROCEDURE — 93970 EXTREMITY STUDY: CPT

## 2017-02-10 PROCEDURE — 82550 ASSAY OF CK (CPK): CPT

## 2017-02-10 PROCEDURE — 85610 PROTHROMBIN TIME: CPT

## 2017-02-10 PROCEDURE — 87070 CULTURE OTHR SPECIMN AEROBIC: CPT

## 2017-02-10 PROCEDURE — 97110 THERAPEUTIC EXERCISES: CPT

## 2017-02-10 PROCEDURE — 99232 SBSQ HOSP IP/OBS MODERATE 35: CPT

## 2017-02-10 PROCEDURE — 85027 COMPLETE CBC AUTOMATED: CPT

## 2017-02-10 PROCEDURE — 97116 GAIT TRAINING THERAPY: CPT

## 2017-02-10 PROCEDURE — 93306 TTE W/DOPPLER COMPLETE: CPT

## 2017-02-10 PROCEDURE — 97530 THERAPEUTIC ACTIVITIES: CPT

## 2017-02-10 PROCEDURE — 99285 EMERGENCY DEPT VISIT HI MDM: CPT | Mod: 25

## 2017-02-10 PROCEDURE — 97163 PT EVAL HIGH COMPLEX 45 MIN: CPT

## 2017-02-10 PROCEDURE — 84443 ASSAY THYROID STIM HORMONE: CPT

## 2017-02-10 PROCEDURE — 71045 X-RAY EXAM CHEST 1 VIEW: CPT

## 2017-02-10 PROCEDURE — 84484 ASSAY OF TROPONIN QUANT: CPT

## 2017-02-10 PROCEDURE — 73502 X-RAY EXAM HIP UNI 2-3 VIEWS: CPT

## 2017-02-10 PROCEDURE — 80048 BASIC METABOLIC PNL TOTAL CA: CPT

## 2017-02-10 PROCEDURE — 83735 ASSAY OF MAGNESIUM: CPT

## 2017-02-10 PROCEDURE — 96374 THER/PROPH/DIAG INJ IV PUSH: CPT

## 2017-02-10 PROCEDURE — 81001 URINALYSIS AUTO W/SCOPE: CPT

## 2017-02-10 PROCEDURE — 83880 ASSAY OF NATRIURETIC PEPTIDE: CPT

## 2017-02-10 PROCEDURE — 93005 ELECTROCARDIOGRAM TRACING: CPT

## 2017-02-10 PROCEDURE — 36415 COLL VENOUS BLD VENIPUNCTURE: CPT

## 2017-02-10 RX ADMIN — CARVEDILOL PHOSPHATE 12.5 MILLIGRAM(S): 80 CAPSULE, EXTENDED RELEASE ORAL at 05:38

## 2017-02-10 RX ADMIN — Medication 10 MILLIGRAM(S): at 05:38

## 2017-02-10 RX ADMIN — Medication 40 MILLIGRAM(S): at 05:38

## 2017-02-10 NOTE — PROGRESS NOTE ADULT - SUBJECTIVE AND OBJECTIVE BOX
HPI: Patient is 75 yo female with hx of CHF, S/P PPM presenting with Several day hx of SOB, increased LE swelling, and Cough that has been progressively worsen.        INTERVAL HPI/ OVERNIGHT EVENTS: no acute events overnight     Patient is seen and examined    CC: No complaints, wants to go to rehab. Discharge held yesterday due to state of emergency - no ambulance service available.    REVIEW OF SYSTEMS:    Denied fever, chills, abd. pain, nausea, vomiting, chest pain, headache, dizziness    PHYSICAL EXAM:  Vital Signs Last 24 Hrs  T(C): 36.4, Max: 37 (02-09 @ 15:00)  T(F): 97.6, Max: 98.6 (02-09 @ 15:00)  HR: 60 (60 - 88)  BP: 120/64 (106/54 - 126/70)  BP(mean): --  RR: 16 (16 - 18)  SpO2: 95% (93% - 95%)    GENERAL: NAD, obese  HEENT: EOMI  Neck: supple  CHEST/LUNG: diminished breath sounds over bases  HEART: S1S2+ audible  ABDOMEN: Soft, Nontender, Nondistended; Bowel sounds present  EXTREMITIES:  b/l LE- chronic skin changes , foul smelling   CNS: AAO X 3  Psychiatry: calm  Tee urine clear - cloudy     LABS:                                           11.3   3.37  )-----------( 144      ( 09 Feb 2017 06:46 )             36.6   09 Feb 2017 06:46    139    |  97     |  20.0   ----------------------------<  86     4.5     |  34.0   |  0.57     Ca    9.1        09 Feb 2017 06:46                                  MEDICATIONS  (STANDING):  heparin  Injectable 5000Unit(s) SubCutaneous every 12 hours  enalapril 10milliGRAM(s) Oral daily  carvedilol 12.5milliGRAM(s) Oral two times a day  clopidogrel Tablet 75milliGRAM(s) Oral daily  nystatin Powder 1Application(s) Topical three times a day  silver sulfADIAZINE 1% Cream 1Application(s) Topical daily  docusate sodium 100milliGRAM(s) Oral three times a day  senna 2Tablet(s) Oral at bedtime  furosemide    Tablet 40milliGRAM(s) Oral daily    MEDICATIONS  (PRN):  acetaminophen   Tablet 650milliGRAM(s) Oral every 6 hours PRN pain          RADIOLOGY & ADDITIONAL TEST:   Summary:   1. Technically difficult study.   2. Severely enlarged left atrium.   3. Global diffuse hypokinesis and dysschrony. Left ventricular ejection   fraction, by visual estimation, is 35 to 40%.   4. Elevated left atrial and left ventricular end-diastolic pressures.   (LAP = 37 mm hg)   5. The right atrium is normal in size.   6. The right ventricular size is mildly enlarged. RV systolic function   is normal. The pacer wire appears to be inserted in the RV free wall apex   as compared to the usual septal insertion. Probably the cause of the   marked dysschrony.   7. Mild mitral valve regurgitation.   8. Moderate pericardial effusion. No echo evidence of tamponade.

## 2017-02-10 NOTE — PROGRESS NOTE ADULT - ASSESSMENT
This is 76Y W with PMH of CHF s/p PPM, chronic LE skin fungal infection admitted for SOB due to CHF exacerbation.    A/P    >Acute on chronic systolic Chf Exacerbation - improved  appreciate cardiology input   cont. lasix 40 po   coreg, enalapril, plavix  daily weight, strict intake output  TTE showed EF of 35-40%, further management as per cardiology   DC to rehab today    >Chronic LE skin changes, r/o infection  wound c/s  venous doppler - no DVT  Nystatin powder  Vascular surgery consult appreciated - Dr. Michael     >Right hip pain  xray hip - OA, no e/o fracture, PT    >Hypomagnesemia - resolved     >HTN - stable  cont. home medication    >HLD  cont. statin    >Profound weakness  PT eval - needs [facility]     >DVT PPX  Heparin     >Hematuria - resolved - most likely 2/2 trauma

## 2018-01-29 NOTE — ED ADULT NURSE REASSESSMENT NOTE - NS ED NURSE REASSESS COMMENT FT1
Spoke with patient aware of normal Abdominal ultrasound. Patient states she still has pain and would like you to be aware. Please advise, thank you.  
MD Fan to order pain meds for pt.
Patient resting comfortably in bed, Mycelex cream applied to bilateral legs, VSS, will continue to monitor.
Pt returned from US. Pt is A&Ox3 in NAD.  IV clean dry and intact, flushes without difficulty. Safety maintained, Bed locked in lowest position. Pt awaiting bed placement. Will continue to monitor.
Tee inserted as per MD Fan's orders. Pt put out an initial 700mLs specimen sent to lab will continue to monitor.
Patient recd this morning A/OX3, placed on cardiac monitor, VSS, remains on 3L NC, carr in place-patient can't not ambulate due to infection in lower extremities-patient reports very painful, patient resting at this time, will continue to monitor.
Pt reassessed.  Reports improvement in conditions.  Denies SOB.  Pt on 2LNC.  Pt has diffuse fungal infection to bilat lower legs.  No acute distress noted.

## 2018-04-17 PROBLEM — I10 ESSENTIAL (PRIMARY) HYPERTENSION: Chronic | Status: ACTIVE | Noted: 2017-01-30

## 2018-04-17 PROBLEM — Z95.0 PRESENCE OF CARDIAC PACEMAKER: Chronic | Status: ACTIVE | Noted: 2017-01-30

## 2018-04-17 PROBLEM — E78.5 HYPERLIPIDEMIA, UNSPECIFIED: Chronic | Status: ACTIVE | Noted: 2017-01-30

## 2018-04-17 PROBLEM — I50.9 HEART FAILURE, UNSPECIFIED: Chronic | Status: ACTIVE | Noted: 2017-01-30

## 2018-04-20 ENCOUNTER — OUTPATIENT (OUTPATIENT)
Dept: OUTPATIENT SERVICES | Facility: HOSPITAL | Age: 78
LOS: 1 days | End: 2018-04-20
Payer: COMMERCIAL

## 2018-04-20 DIAGNOSIS — Z01.810 ENCOUNTER FOR PREPROCEDURAL CARDIOVASCULAR EXAMINATION: ICD-10-CM

## 2018-04-20 DIAGNOSIS — Z95.0 PRESENCE OF CARDIAC PACEMAKER: Chronic | ICD-10-CM

## 2018-04-20 LAB
ALBUMIN SERPL ELPH-MCNC: 3.8 G/DL — SIGNIFICANT CHANGE UP (ref 3.3–5.2)
ALP SERPL-CCNC: 98 U/L — SIGNIFICANT CHANGE UP (ref 40–120)
ALT FLD-CCNC: 15 U/L — SIGNIFICANT CHANGE UP
ANION GAP SERPL CALC-SCNC: 11 MMOL/L — SIGNIFICANT CHANGE UP (ref 5–17)
APTT BLD: 37.2 SEC — SIGNIFICANT CHANGE UP (ref 27.5–37.4)
AST SERPL-CCNC: 18 U/L — SIGNIFICANT CHANGE UP
BILIRUB SERPL-MCNC: 0.4 MG/DL — SIGNIFICANT CHANGE UP (ref 0.4–2)
BUN SERPL-MCNC: 25 MG/DL — HIGH (ref 8–20)
CALCIUM SERPL-MCNC: 9.3 MG/DL — SIGNIFICANT CHANGE UP (ref 8.6–10.2)
CHLORIDE SERPL-SCNC: 99 MMOL/L — SIGNIFICANT CHANGE UP (ref 98–107)
CO2 SERPL-SCNC: 29 MMOL/L — SIGNIFICANT CHANGE UP (ref 22–29)
CREAT SERPL-MCNC: 0.7 MG/DL — SIGNIFICANT CHANGE UP (ref 0.5–1.3)
GLUCOSE SERPL-MCNC: 96 MG/DL — SIGNIFICANT CHANGE UP (ref 70–115)
HCT VFR BLD CALC: 35.9 % — LOW (ref 37–47)
HGB BLD-MCNC: 11.9 G/DL — LOW (ref 12–16)
INR BLD: 1.12 RATIO — SIGNIFICANT CHANGE UP (ref 0.88–1.16)
MCHC RBC-ENTMCNC: 31.4 PG — HIGH (ref 27–31)
MCHC RBC-ENTMCNC: 33.1 G/DL — SIGNIFICANT CHANGE UP (ref 32–36)
MCV RBC AUTO: 94.7 FL — SIGNIFICANT CHANGE UP (ref 81–99)
PLATELET # BLD AUTO: 178 K/UL — SIGNIFICANT CHANGE UP (ref 150–400)
POTASSIUM SERPL-MCNC: 4.4 MMOL/L — SIGNIFICANT CHANGE UP (ref 3.5–5.3)
POTASSIUM SERPL-SCNC: 4.4 MMOL/L — SIGNIFICANT CHANGE UP (ref 3.5–5.3)
PROT SERPL-MCNC: 7.3 G/DL — SIGNIFICANT CHANGE UP (ref 6.6–8.7)
PROTHROM AB SERPL-ACNC: 12.3 SEC — SIGNIFICANT CHANGE UP (ref 9.8–12.7)
RBC # BLD: 3.79 M/UL — LOW (ref 4.4–5.2)
RBC # FLD: 13.4 % — SIGNIFICANT CHANGE UP (ref 11–15.6)
SODIUM SERPL-SCNC: 139 MMOL/L — SIGNIFICANT CHANGE UP (ref 135–145)
WBC # BLD: 5.8 K/UL — SIGNIFICANT CHANGE UP (ref 4.8–10.8)
WBC # FLD AUTO: 5.8 K/UL — SIGNIFICANT CHANGE UP (ref 4.8–10.8)

## 2018-04-20 PROCEDURE — 85730 THROMBOPLASTIN TIME PARTIAL: CPT

## 2018-04-20 PROCEDURE — 93005 ELECTROCARDIOGRAM TRACING: CPT

## 2018-04-20 PROCEDURE — 93010 ELECTROCARDIOGRAM REPORT: CPT

## 2018-04-20 PROCEDURE — G0463: CPT

## 2018-04-20 PROCEDURE — 80053 COMPREHEN METABOLIC PANEL: CPT

## 2018-04-20 PROCEDURE — 36415 COLL VENOUS BLD VENIPUNCTURE: CPT

## 2018-04-20 PROCEDURE — 85610 PROTHROMBIN TIME: CPT

## 2018-04-20 PROCEDURE — 85027 COMPLETE CBC AUTOMATED: CPT

## 2018-04-20 NOTE — H&P PST ADULT - HISTORY OF PRESENT ILLNESS
Patient is a 77 year old female who presents for PST prior to a left heart cath. pmhx OA, htn, CHF, CM, LBBB, HPL, HTN. Patient had a stress test which was positive for ischemia so the plan was made to proceed with [facility]. She denies CP, SOB at this time but complains about her usual OA causing a great amount of pain in the legs and hips. Patient lives alone and is accompanied by her daughter.   TTE 3/18 patient with left ventricular systolic function imparied. EF 30%, dilated LA, mild, MR, mild TR, moderate pericardial effusion near LV  Stress test 2/18 abnormal large infarction inferior inferolateral wall with mild dania-infarct ischemia

## 2018-04-24 ENCOUNTER — OUTPATIENT (OUTPATIENT)
Dept: OUTPATIENT SERVICES | Facility: HOSPITAL | Age: 78
LOS: 1 days | End: 2018-04-24
Payer: COMMERCIAL

## 2018-04-24 ENCOUNTER — TRANSCRIPTION ENCOUNTER (OUTPATIENT)
Age: 78
End: 2018-04-24

## 2018-04-24 VITALS
DIASTOLIC BLOOD PRESSURE: 62 MMHG | SYSTOLIC BLOOD PRESSURE: 138 MMHG | RESPIRATION RATE: 20 BRPM | HEART RATE: 80 BPM | TEMPERATURE: 98 F

## 2018-04-24 VITALS
HEART RATE: 80 BPM | RESPIRATION RATE: 24 BRPM | OXYGEN SATURATION: 98 % | SYSTOLIC BLOOD PRESSURE: 118 MMHG | DIASTOLIC BLOOD PRESSURE: 53 MMHG

## 2018-04-24 DIAGNOSIS — R94.39 ABNORMAL RESULT OF OTHER CARDIOVASCULAR FUNCTION STUDY: ICD-10-CM

## 2018-04-24 DIAGNOSIS — Z95.0 PRESENCE OF CARDIAC PACEMAKER: Chronic | ICD-10-CM

## 2018-04-24 DIAGNOSIS — Z01.810 ENCOUNTER FOR PREPROCEDURAL CARDIOVASCULAR EXAMINATION: ICD-10-CM

## 2018-04-24 PROCEDURE — C1887: CPT

## 2018-04-24 PROCEDURE — 93460 R&L HRT ART/VENTRICLE ANGIO: CPT

## 2018-04-24 PROCEDURE — C1889: CPT

## 2018-04-24 PROCEDURE — C1894: CPT

## 2018-04-24 PROCEDURE — 99152 MOD SED SAME PHYS/QHP 5/>YRS: CPT

## 2018-04-24 RX ORDER — FUROSEMIDE 40 MG
1 TABLET ORAL
Qty: 0 | Refills: 0 | COMMUNITY
Start: 2018-04-24

## 2018-04-24 RX ORDER — FUROSEMIDE 40 MG
1 TABLET ORAL
Qty: 180 | Refills: 0 | OUTPATIENT
Start: 2018-04-24 | End: 2018-07-22

## 2018-04-24 RX ORDER — FUROSEMIDE 40 MG
40 TABLET ORAL
Qty: 0 | Refills: 0 | Status: DISCONTINUED | OUTPATIENT
Start: 2018-04-25 | End: 2018-05-09

## 2018-04-24 RX ORDER — SODIUM CHLORIDE 9 MG/ML
1000 INJECTION INTRAMUSCULAR; INTRAVENOUS; SUBCUTANEOUS
Qty: 0 | Refills: 0 | Status: DISCONTINUED | OUTPATIENT
Start: 2018-04-24 | End: 2018-05-09

## 2018-04-24 RX ORDER — POTASSIUM CHLORIDE 20 MEQ
1 PACKET (EA) ORAL
Qty: 30 | Refills: 0 | OUTPATIENT
Start: 2018-04-24 | End: 2018-05-23

## 2018-04-24 RX ORDER — POTASSIUM CHLORIDE 20 MEQ
1 PACKET (EA) ORAL
Qty: 0 | Refills: 0 | COMMUNITY
Start: 2018-04-24

## 2018-04-24 RX ORDER — POTASSIUM CHLORIDE 20 MEQ
20 PACKET (EA) ORAL DAILY
Qty: 0 | Refills: 0 | Status: DISCONTINUED | OUTPATIENT
Start: 2018-04-24 | End: 2018-05-09

## 2018-04-24 RX ORDER — CARVEDILOL PHOSPHATE 80 MG/1
1 CAPSULE, EXTENDED RELEASE ORAL
Qty: 0 | Refills: 0 | COMMUNITY

## 2018-04-24 RX ORDER — FUROSEMIDE 40 MG
40 TABLET ORAL ONCE
Qty: 0 | Refills: 0 | Status: COMPLETED | OUTPATIENT
Start: 2018-04-24 | End: 2018-04-24

## 2018-04-24 RX ADMIN — Medication 40 MILLIGRAM(S): at 17:04

## 2018-04-24 NOTE — DISCHARGE NOTE ADULT - PATIENT PORTAL LINK FT
You can access the [facility] Patient Portal, offered by [facility], by registering with the following website: [email or web address]

## 2018-04-24 NOTE — DISCHARGE NOTE ADULT - CARE PROVIDER_API CALL
Dangelo Park), Cardiovascular Disease; Interventional Cardiology  [address] [place] [address]  Phone: [phone]  Fax: [phone]

## 2018-04-24 NOTE — DISCHARGE NOTE ADULT - NS AS ACTIVITY OBS
Do not drive or operate machinery/Do not make important decisions/Showering allowed/Walking-Indoors allowed/No Heavy lifting/straining

## 2018-04-24 NOTE — PROGRESS NOTE ADULT - SUBJECTIVE AND OBJECTIVE BOX
Nurse Practitioner Progress note:   s/p LHC and RHC which revealed non obstructive CAD and a wedge pressure of 40.    Patient feels well.  Denies chest pain, shortness of breath, dizziness or palpitations at this time    Patient alert, very [place] and irritable   Lungs CTA  S1S2 no MRG  Right groin procedure site with angioseal CDI.  no bleeding, no hematoma, site soft, non tender, positive pedal pulses bilaterally.        T(C): 36.4 (04-24-18 @ 11:15), Max: 36.4 (04-24-18 @ 11:15)  HR: 98 (04-24-18 @ 16:30) (80 - 98)  BP: 124/89 (04-24-18 @ 16:30) (101/49 - 138/62)  RR: 20 (04-24-18 @ 16:30) (20 - 20)  SpO2: 95% (04-24-18 @ 16:30) (95% - 96%)    MEDICATIONS  (STANDING):  furosemide   Injectable 40 milliGRAM(s) IV Push once  sodium chloride 0.9%. 1000 milliLiter(s) (30 mL/Hr) IV Continuous <Continuous>    < from: Cardiac Cath Lab - Adult (04.24.18 @ 13:53) >  VENTRICLES: Global left ventricular function was severely depressed. EF  estimated was 30 %.  VALVES: MITRAL VALVE: The mitral valve exhibited mild regurgitation.  CORONARY VESSELS: The coronary circulation isright dominant.  LM:   --  Distal left main: There was a discrete 20 % stenosis.  LAD:   --  Ostial LAD: There was a tubular 30 % stenosis.  CX:   --  Ostial circumflex: There was a discrete 25 % stenosis.  --  Distal circumflex: Angiography showed mild atherosclerosis with no flow  limiting lesions.  RCA:   --  Mid RCA: There was a tubular 30 % stenosis.  COMPLICATIONS: No complications occurred during the cath lab visit.  SUMMARY:  HEMODYNAMICS: Hemodynamic assessment demonstrates moderately toseverely  elevated LVEDP, markedly elevated pulmonary capillary wedge pressure, and  severely elevated pulmonary vascular resistance.  DIAGNOSTIC RECOMMENDATIONS: Increase diuretic therapy.  INTERVENTIONAL RECOMMENDATIONS: Increase diuretic therapy.  Prepared and signed by  Dangelo Park MD  Signed 04/24/2018 15:26:11  HEMODYNAMIC TABLES  Pressures:  Baseline  Pressures:  - HR: 95  Pressures:  - Rhythm:  Pressures:  -- Aortic Pressure (S/D/M): 133/68/67  Pressures:  -- Left Ventricle (s/edp): 137/28/--  Pressures:  -- Pulmonary Artery (S/D/M): 73/35/50  Pressures:  -- Pulmonary Capillary Wedge: 46/47/40  Pressures:  -- Right Atrium (a/v/M): 22/20/17  Pressures:  -- Right Ventricle (s/edp): 74/24/--    < end of copied text >        ASSESSMENT/PLAN:    Congestive HD  -Continue all home meds  -Increase Lasix to 40 mg po Twice daily  -Add KCL 20 meq daily

## 2018-04-24 NOTE — DISCHARGE NOTE ADULT - PLAN OF CARE
maintain optimal cardiac health Follow up with your Cardiologist as instructed.  Take all medications as instructed.  Speak to your doctor before stopping any medications.  Follow the specified diet. No heavy lifting, driving, sex, tub baths, swimming, or any activity that submerges the lower half of the body in water for 48 hours.  Limited walking and stairs for 48 hours.    Change the bandaid after 24 hours and every 24 hours after that.  Keep the puncture site dry and covered with a bandaid until a scab forms.    Observe the site frequently.  If bleeding or a large lump (the size of a golf ball or bigger) occurs lie flat, apply continuous direct pressure just above the puncture site for at least 10 minutes, and notify your physician immediately.  If the bleeding cannot be controlled, call 911 immediately for assistance.  Notify your physician of pain, swelling or any drainage.    Notify your physician immediately if coldness, numbness, discoloration or pain in your foot occurs.

## 2018-04-24 NOTE — DISCHARGE NOTE ADULT - CARE PLAN
Principal Discharge DX:	CHF (congestive heart failure)  Goal:	maintain optimal cardiac health  Assessment and plan of treatment:	Follow up with your Cardiologist as instructed.  Take all medications as instructed.  Speak to your doctor before stopping any medications.  Follow the specified diet. Principal Discharge DX:	CHF (congestive heart failure)  Goal:	maintain optimal cardiac health  Assessment and plan of treatment:	Follow up with your Cardiologist as instructed.  Take all medications as instructed.  Speak to your doctor before stopping any medications.  Follow the specified diet.  Secondary Diagnosis:	S/P cardiac catheterization  Goal:	maintain optimal cardiac health  Assessment and plan of treatment:	No heavy lifting, driving, sex, tub baths, swimming, or any activity that submerges the lower half of the body in water for 48 hours.  Limited walking and stairs for 48 hours.    Change the bandaid after 24 hours and every 24 hours after that.  Keep the puncture site dry and covered with a bandaid until a scab forms.    Observe the site frequently.  If bleeding or a large lump (the size of a golf ball or bigger) occurs lie flat, apply continuous direct pressure just above the puncture site for at least 10 minutes, and notify your physician immediately.  If the bleeding cannot be controlled, call 911 immediately for assistance.  Notify your physician of pain, swelling or any drainage.    Notify your physician immediately if coldness, numbness, discoloration or pain in your foot occurs.

## 2018-04-24 NOTE — DISCHARGE NOTE ADULT - MEDICATION SUMMARY - MEDICATIONS TO TAKE
I will START or STAY ON the medications listed below when I get home from the hospital:    enalapril 10 mg oral tablet  -- 1 tab(s) by mouth once a day  -- Indication: For blood pressure    Plavix 75 mg oral tablet  -- 1 tab(s) by mouth once a day  -- Indication: For coronary artery disease    carvedilol 12.5 mg oral tablet  -- 1 tab(s) by mouth 2 times a day  -- Indication: For congestive heart disease    nystatin 100,000 units/g topical powder  -- 1 application on skin 3 times a day  -- Indication: For rash    silver sulfADIAZINE 1% topical cream  -- 1 application on skin once a day  -- Indication: For rash    furosemide 40 mg oral tablet  -- 1 tab(s) by mouth 2 times a day  -- Indication: For congestive heart     docusate sodium 100 mg oral capsule  -- 1 cap(s) by mouth 3 times a day  -- Indication: For stool softener    senna oral tablet  -- 2 tab(s) by mouth once a day (at bedtime)  -- Indication: For stool softener    potassium chloride 20 mEq oral tablet, extended release  -- 1 tab(s) by mouth once a day  -- Indication: For supplement

## 2018-04-24 NOTE — DISCHARGE NOTE ADULT - HOSPITAL COURSE
Patient is a 77 year old female who presents for PST prior to a left heart cath. pmhx OA, htn, CHF, CM, LBBB, HPL, HTN. Patient had a stress test which was positive for ischemia so the plan was made to proceed with LHC. She denies CP, SOB at this time but complains about her usual OA causing a great amount of pain in the legs and hips. Patient lives alone and is accompanied by her daughter.   TTE 3/18 patient with left ventricular systolic function imparied. EF 30%, dilated LA, mild, MR, mild TR, moderate pericardial effusion near LV  Stress test 2/18 abnormal large infarction inferior inferolateral wall with mild dania-infarct isch    s/p LHC and RHC which revealed non obstructive CAD and a wedge pressure of 40.

## 2018-06-15 ENCOUNTER — INPATIENT (INPATIENT)
Facility: HOSPITAL | Age: 78
LOS: 2 days | Discharge: HOME | DRG: 948 | End: 2018-06-18
Attending: INTERNAL MEDICINE | Admitting: INTERNAL MEDICINE
Payer: MEDICARE

## 2018-06-15 VITALS
DIASTOLIC BLOOD PRESSURE: 82 MMHG | WEIGHT: 225.09 LBS | RESPIRATION RATE: 18 BRPM | TEMPERATURE: 98 F | HEIGHT: 66 IN | OXYGEN SATURATION: 96 % | SYSTOLIC BLOOD PRESSURE: 126 MMHG | HEART RATE: 97 BPM

## 2018-06-15 DIAGNOSIS — I50.32 CHRONIC DIASTOLIC (CONGESTIVE) HEART FAILURE: ICD-10-CM

## 2018-06-15 DIAGNOSIS — M19.90 UNSPECIFIED OSTEOARTHRITIS, UNSPECIFIED SITE: ICD-10-CM

## 2018-06-15 DIAGNOSIS — R53.81 OTHER MALAISE: ICD-10-CM
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
## 2018-06-15 DIAGNOSIS — Z95.0 PRESENCE OF CARDIAC PACEMAKER: Chronic | ICD-10-CM

## 2018-06-15 LAB
ALBUMIN SERPL ELPH-MCNC: 3.8 G/DL — SIGNIFICANT CHANGE UP (ref 3.3–5.2)
ALP SERPL-CCNC: 85 U/L — SIGNIFICANT CHANGE UP (ref 40–120)
ALT FLD-CCNC: 22 U/L — SIGNIFICANT CHANGE UP
ANION GAP SERPL CALC-SCNC: 12 MMOL/L — SIGNIFICANT CHANGE UP (ref 5–17)
APTT BLD: 38.5 SEC — HIGH (ref 27.5–37.4)
AST SERPL-CCNC: 22 U/L — SIGNIFICANT CHANGE UP
BASOPHILS # BLD AUTO: 0 K/UL — SIGNIFICANT CHANGE UP (ref 0–0.2)
BASOPHILS NFR BLD AUTO: 0.3 % — SIGNIFICANT CHANGE UP (ref 0–2)
BILIRUB SERPL-MCNC: 0.5 MG/DL — SIGNIFICANT CHANGE UP (ref 0.4–2)
BUN SERPL-MCNC: 19 MG/DL — SIGNIFICANT CHANGE UP (ref 8–20)
CALCIUM SERPL-MCNC: 9.4 MG/DL — SIGNIFICANT CHANGE UP (ref 8.6–10.2)
CHLORIDE SERPL-SCNC: 103 MMOL/L — SIGNIFICANT CHANGE UP (ref 98–107)
CO2 SERPL-SCNC: 26 MMOL/L — SIGNIFICANT CHANGE UP (ref 22–29)
CREAT SERPL-MCNC: 0.64 MG/DL — SIGNIFICANT CHANGE UP (ref 0.5–1.3)
EOSINOPHIL # BLD AUTO: 0.2 K/UL — SIGNIFICANT CHANGE UP (ref 0–0.5)
EOSINOPHIL NFR BLD AUTO: 2.5 % — SIGNIFICANT CHANGE UP (ref 0–6)
GLUCOSE SERPL-MCNC: 108 MG/DL — SIGNIFICANT CHANGE UP (ref 70–115)
HCT VFR BLD CALC: 39 % — SIGNIFICANT CHANGE UP (ref 37–47)
HGB BLD-MCNC: 12.5 G/DL — SIGNIFICANT CHANGE UP (ref 12–16)
INR BLD: 1.08 RATIO — SIGNIFICANT CHANGE UP (ref 0.88–1.16)
LYMPHOCYTES # BLD AUTO: 1.1 K/UL — SIGNIFICANT CHANGE UP (ref 1–4.8)
LYMPHOCYTES # BLD AUTO: 14.9 % — LOW (ref 20–55)
MCHC RBC-ENTMCNC: 30.6 PG — SIGNIFICANT CHANGE UP (ref 27–31)
MCHC RBC-ENTMCNC: 32.1 G/DL — SIGNIFICANT CHANGE UP (ref 32–36)
MCV RBC AUTO: 95.4 FL — SIGNIFICANT CHANGE UP (ref 81–99)
MONOCYTES # BLD AUTO: 0.5 K/UL — SIGNIFICANT CHANGE UP (ref 0–0.8)
MONOCYTES NFR BLD AUTO: 7 % — SIGNIFICANT CHANGE UP (ref 3–10)
NEUTROPHILS # BLD AUTO: 5.5 K/UL — SIGNIFICANT CHANGE UP (ref 1.8–8)
NEUTROPHILS NFR BLD AUTO: 75.2 % — HIGH (ref 37–73)
PLATELET # BLD AUTO: 155 K/UL — SIGNIFICANT CHANGE UP (ref 150–400)
POTASSIUM SERPL-MCNC: 4.3 MMOL/L — SIGNIFICANT CHANGE UP (ref 3.5–5.3)
POTASSIUM SERPL-SCNC: 4.3 MMOL/L — SIGNIFICANT CHANGE UP (ref 3.5–5.3)
PROT SERPL-MCNC: 7.6 G/DL — SIGNIFICANT CHANGE UP (ref 6.6–8.7)
PROTHROM AB SERPL-ACNC: 11.9 SEC — SIGNIFICANT CHANGE UP (ref 9.8–12.7)
RBC # BLD: 4.09 M/UL — LOW (ref 4.4–5.2)
RBC # FLD: 13.7 % — SIGNIFICANT CHANGE UP (ref 11–15.6)
SODIUM SERPL-SCNC: 141 MMOL/L — SIGNIFICANT CHANGE UP (ref 135–145)
WBC # BLD: 7.3 K/UL — SIGNIFICANT CHANGE UP (ref 4.8–10.8)
WBC # FLD AUTO: 7.3 K/UL — SIGNIFICANT CHANGE UP (ref 4.8–10.8)

## 2018-06-15 PROCEDURE — 73521 X-RAY EXAM HIPS BI 2 VIEWS: CPT | Mod: 26

## 2018-06-15 PROCEDURE — 99223 1ST HOSP IP/OBS HIGH 75: CPT

## 2018-06-15 PROCEDURE — 93010 ELECTROCARDIOGRAM REPORT: CPT

## 2018-06-15 PROCEDURE — 73562 X-RAY EXAM OF KNEE 3: CPT | Mod: 26,50

## 2018-06-15 PROCEDURE — 99285 EMERGENCY DEPT VISIT HI MDM: CPT

## 2018-06-15 RX ORDER — HEPARIN SODIUM 5000 [USP'U]/ML
5000 INJECTION INTRAVENOUS; SUBCUTANEOUS
Qty: 0 | Refills: 0 | Status: DISCONTINUED | OUTPATIENT
Start: 2018-06-15 | End: 2018-06-18

## 2018-06-15 RX ORDER — LIDOCAINE 4 G/100G
1 CREAM TOPICAL DAILY
Qty: 0 | Refills: 0 | Status: DISCONTINUED | OUTPATIENT
Start: 2018-06-15 | End: 2018-06-18

## 2018-06-15 RX ORDER — ACETAMINOPHEN 500 MG
650 TABLET ORAL EVERY 6 HOURS
Qty: 0 | Refills: 0 | Status: DISCONTINUED | OUTPATIENT
Start: 2018-06-15 | End: 2018-06-18

## 2018-06-15 RX ORDER — CLOPIDOGREL BISULFATE 75 MG/1
75 TABLET, FILM COATED ORAL DAILY
Qty: 0 | Refills: 0 | Status: DISCONTINUED | OUTPATIENT
Start: 2018-06-15 | End: 2018-06-18

## 2018-06-15 RX ORDER — FUROSEMIDE 40 MG
40 TABLET ORAL
Qty: 0 | Refills: 0 | Status: DISCONTINUED | OUTPATIENT
Start: 2018-06-15 | End: 2018-06-18

## 2018-06-15 RX ORDER — CARVEDILOL PHOSPHATE 80 MG/1
12.5 CAPSULE, EXTENDED RELEASE ORAL EVERY 12 HOURS
Qty: 0 | Refills: 0 | Status: DISCONTINUED | OUTPATIENT
Start: 2018-06-15 | End: 2018-06-18

## 2018-06-15 RX ORDER — ACETAMINOPHEN 500 MG
975 TABLET ORAL ONCE
Qty: 0 | Refills: 0 | Status: COMPLETED | OUTPATIENT
Start: 2018-06-15 | End: 2018-06-15

## 2018-06-15 RX ORDER — SODIUM CHLORIDE 9 MG/ML
3 INJECTION INTRAMUSCULAR; INTRAVENOUS; SUBCUTANEOUS ONCE
Qty: 0 | Refills: 0 | Status: COMPLETED | OUTPATIENT
Start: 2018-06-15 | End: 2018-06-15

## 2018-06-15 RX ADMIN — Medication 975 MILLIGRAM(S): at 15:31

## 2018-06-15 RX ADMIN — Medication 40 MILLIGRAM(S): at 18:35

## 2018-06-15 RX ADMIN — Medication 10 MILLIGRAM(S): at 18:35

## 2018-06-15 RX ADMIN — HEPARIN SODIUM 5000 UNIT(S): 5000 INJECTION INTRAVENOUS; SUBCUTANEOUS at 18:36

## 2018-06-15 RX ADMIN — Medication 975 MILLIGRAM(S): at 11:34

## 2018-06-15 RX ADMIN — CARVEDILOL PHOSPHATE 12.5 MILLIGRAM(S): 80 CAPSULE, EXTENDED RELEASE ORAL at 21:37

## 2018-06-15 RX ADMIN — SODIUM CHLORIDE 3 MILLILITER(S): 9 INJECTION INTRAMUSCULAR; INTRAVENOUS; SUBCUTANEOUS at 14:58

## 2018-06-15 NOTE — ED ADULT NURSE NOTE - CHPI ED SYMPTOMS NEG
no numbness/no abrasion/no confusion/no deformity/no bleeding/no fever/no tingling/no loss of consciousness/no vomiting

## 2018-06-15 NOTE — CHART NOTE - NSCHARTNOTEFT_GEN_A_CORE
SOCIAL WORK NOTE:  DISCUSSED CASE WITH DR TOBAR AND PHYSICAL THERAPY.  PATIENT REQUIRES [facility] AS LIVES ALONE IN A HIGH RANCH HOME AND USES ROLLING WALKER AND CANE.  PATIENT IS S/P A FALL.  PATIENT ONLY TOOK 4 SLOW STEPS WITH PT.  THIS WORKER MET WITH PATIENT.  SHE IS AXOX4 AND PLEASANT AND COOPERATVE.  PATIENT CONFIRMED WANTING [facility] AND PREFERS [facility] [place] AS SECOND CHOICE.  PATIENT REFUSING [facility] AS WAS THERE LAST YEAR.  PATIENT WITH [facility] FULL MEDICARE SNF DAYS AS HAS NOT RECENTLY BEEN HOSPITALIZED OR IN A FACILTY.  PATIENT AWARE EARLIEST SHE CAN LEAVE IS MONDAY.  DISCUSSED WITH ADMITTING DOMINIK DICKEY.

## 2018-06-15 NOTE — ED PROVIDER NOTE - PROGRESS NOTE DETAILS
Patient evaluated by PT who recommends that patient is barely acceptable with the walker and not proficient at stairs and recommends [facility]. I discussed this with patient who agrees with admission and placement for [facility].

## 2018-06-15 NOTE — ED ADULT NURSE REASSESSMENT NOTE - NS ED NURSE REASSESS COMMENT FT1
pt to be transported to [facility], report given to rn 6 tower, pt is awake alert oriented following commands and speaking coherently, 20 g lac. belongings with patient

## 2018-06-15 NOTE — ED ADULT TRIAGE NOTE - CHIEF COMPLAINT QUOTE
Patient brought in by ambulance A/Ox3, reports she was sitting at the end of her bed and was going to get up, slide off the side of bed landing on her knees. -LOC, -blood thinners.  Reports her walker was in front of her but unable to get up.

## 2018-06-15 NOTE — PROVIDER CONTACT NOTE (OTHER) - ACTION/TREATMENT ORDERED:
PT Goals( to achieve in 2 weeks);Pt min. assist with bed mobility. Pt rex.g assist with transfers.  Pt lidyag. assist with amb with RW X 200 feet

## 2018-06-15 NOTE — ED PEDIATRIC NURSE REASSESSMENT NOTE - NS ED NURSE REASSESS COMMENT FT2
report received and care assumed. pt is awake alert oriented following commands and speaking coherently. 20 g lac, admitted for [facility]. voices no complaints at this time ,food and drink provided. aware of plan of care and will continue to monitor

## 2018-06-15 NOTE — ED ADULT NURSE NOTE - OBJECTIVE STATEMENT
Patient A&Ox3, c/o bilateral knee pain. s/p fall, pt was sitting at the end of her bed and was going to get up, slide off the side of bed landing on her knees. no blood thinners. VSS. discoloration noted BLE.

## 2018-06-15 NOTE — PHYSICAL THERAPY INITIAL EVALUATION ADULT - ADDITIONAL COMMENTS
Pt reports living alone in a [place] with 12 steps to enter.  Reports amb with RW and being Modified Independent with all ADLs and self care.  Reports a steady decline in functional status over the last couple of months.

## 2018-06-15 NOTE — ED PROVIDER NOTE - MEDICAL DECISION MAKING DETAILS
Patient with extensive PMH presents complaining of sliding off her bed onto her bilateral knees, unable to  get up on her own. She recounts the event, denies head trauma and has no outward signs of trauma. As she has pain with movement of bilateral lower extremities, worse on the left, will xray pelvis/hips & knees, will give tylenol for pain and have PT evaluate the patient as she has multiple stairs at home and lives alone.

## 2018-06-15 NOTE — PROVIDER CONTACT NOTE (OTHER) - ASSESSMENT
Pt with no c/o pain before, 5/10 bilat. knee pain during and after RX.  Pt will benefit from PT to maximize functional independence.  Will continue to follow.  Pt left supine in bed in no apparent distress and call bell within reach. MD aware.

## 2018-06-15 NOTE — ED PROVIDER NOTE - PHYSICAL EXAMINATION
Const: Awake, alert and oriented. In no acute distress. Well appearing. Unkempt with poor hygiene.  HEENT: NC/AT. Moist mucous membranes. Poor dentition.  Eyes: No scleral icterus. EOMI.  Neck:. Soft and supple. Full ROM without pain.  Cardiac: AICD in left chest wall. Regular rate and regular rhythm. +S1/S2. No murmurs. Peripheral pulses 2+ and symmetric. 2+ LE edema.  Resp: Speaking in full sentences. No evidence of respiratory distress. Diminished breath sounds bilateral bases. No wheezes, rales or rhonchi.  Abd: Morbidly obese abdomen. Soft, non-tender, non-distended. Normal bowel sounds in all 4 quadrants. No guarding or rebound.  Back: Spine midline and non-tender. No CVAT.  MSK: Pain with left hip flexion > 30 degrees. Full, painless ROM right hip to 45 degrees. Full, painless ROM bilateral knees to 45 degrees of flexion. Pelvis stable. No obvious deformity.  Skin: Chronic venous stasis changes bilateral LE to the knees. No rashes, abrasions or lacerations.  Lymph: No cervical lymphadenopathy.  Neuro: Awake, alert & oriented x 3. No focal defects.

## 2018-06-15 NOTE — ED PROVIDER NOTE - OBJECTIVE STATEMENT
76 y/o F with hx of CHF, HTN, and HLD presents s/p a fall. The patient been complaining of insomnia for the last year, and did not sleep last night. At 7 AM, she was sitting at the edge of her bed about to get up when she "dozed off" and slid off of the bed landing on her knees onto the rug. She did not hit her head and did not lose consciousness. States she woke up as she was sliding, turned herself onto all fours, and was unable to get up on her own. She called a family member who called 911 and recommended that she come to the ER as she takes Plavix. Pt c/o of soreness of the bilateral knees, and posterior left leg to hip. She states after she was helped to her feet she was able to ambulate with her walker with her normal gait to the bathroom without difficulty and she otherwise feels at her baseline. She [place] in a [place] by herself, and finds it very difficult to navigate the stairs, especially with the walker, but feels that she is safe to go home. Denies all other symptoms, and has no further complaints in the ED at this time.

## 2018-06-15 NOTE — H&P ADULT - HISTORY OF PRESENT ILLNESS
78 yo F w/ hx HFpEF, HTN, HLD presents from home after falling from bed onto her knees.  complaining of b/l knee and low back pain.   denies cp/sob/palps.  denies preceding symptoms.   states she was trying to get out of bed and slid off the bed. denies head trauma.  denies back pain radiating to legs.

## 2018-06-16 PROCEDURE — 99232 SBSQ HOSP IP/OBS MODERATE 35: CPT

## 2018-06-16 RX ADMIN — Medication 40 MILLIGRAM(S): at 05:56

## 2018-06-16 RX ADMIN — HEPARIN SODIUM 5000 UNIT(S): 5000 INJECTION INTRAVENOUS; SUBCUTANEOUS at 17:00

## 2018-06-16 RX ADMIN — CARVEDILOL PHOSPHATE 12.5 MILLIGRAM(S): 80 CAPSULE, EXTENDED RELEASE ORAL at 05:56

## 2018-06-16 RX ADMIN — Medication 650 MILLIGRAM(S): at 06:56

## 2018-06-16 RX ADMIN — CARVEDILOL PHOSPHATE 12.5 MILLIGRAM(S): 80 CAPSULE, EXTENDED RELEASE ORAL at 17:00

## 2018-06-16 RX ADMIN — Medication 40 MILLIGRAM(S): at 17:00

## 2018-06-16 RX ADMIN — Medication 10 MILLIGRAM(S): at 05:56

## 2018-06-16 RX ADMIN — Medication 650 MILLIGRAM(S): at 05:56

## 2018-06-16 RX ADMIN — CLOPIDOGREL BISULFATE 75 MILLIGRAM(S): 75 TABLET, FILM COATED ORAL at 11:02

## 2018-06-16 RX ADMIN — HEPARIN SODIUM 5000 UNIT(S): 5000 INJECTION INTRAVENOUS; SUBCUTANEOUS at 05:57

## 2018-06-16 NOTE — PROGRESS NOTE ADULT - SUBJECTIVE AND OBJECTIVE BOX
seen for social admit, falls    complaining of stiffness in LE which resolved once out of bed  ros otherwise negative     MEDICATIONS  (STANDING):  carvedilol 12.5 milliGRAM(s) Oral every 12 hours  clopidogrel Tablet 75 milliGRAM(s) Oral daily  enalapril 10 milliGRAM(s) Oral daily  furosemide    Tablet 40 milliGRAM(s) Oral two times a day  heparin  Injectable 5000 Unit(s) SubCutaneous two times a day    MEDICATIONS  (PRN):  acetaminophen   Tablet. 650 milliGRAM(s) Oral every 6 hours PRN Moderate Pain (4 - 6)  lidocaine   Patch 1 Patch Transdermal daily PRN joint or back pain      Allergies    aspirin (Unknown)  atorvastatin (Unknown)  penicillin (Unknown)      Vital Signs Last 24 Hrs  T(C): 36.9 (16 Jun 2018 08:44), Max: 36.9 (16 Jun 2018 08:44)  T(F): 98.4 (16 Jun 2018 08:44), Max: 98.4 (16 Jun 2018 08:44)  HR: 78 (16 Jun 2018 08:44) (77 - 95)  BP: 114/59 (16 Jun 2018 08:44) (114/59 - 142/73)  BP(mean): --  RR: 19 (16 Jun 2018 08:44) (18 - 19)  SpO2: 96% (15 Chiki 2018 17:33) (94% - 96%)    PHYSICAL EXAM:    GENERAL: NAD  CHEST/LUNG: Clear to percussion bilaterally  HEART: Regular rate and rhythm; S1 S2  ABDOMEN: Soft, Nontender, Nondistended; Bowel sounds present  EXTREMITIES:  +2 edema, b/l Chronic venous stasis skin changes       LABS:                        12.5   7.3   )-----------( 155      ( 15 Chiki 2018 11:54 )             39.0     06-15    141  |  103  |  19.0  ----------------------------<  108  4.3   |  26.0  |  0.64    Ca    9.4      15 Chiki 2018 11:54    TPro  7.6  /  Alb  3.8  /  TBili  0.5  /  DBili  x   /  AST  22  /  ALT  22  /  AlkPhos  85  06-15    PT/INR - ( 15 Chiki 2018 11:54 )   PT: 11.9 sec;   INR: 1.08 ratio         PTT - ( 15 Chiki 2018 11:54 )  PTT:38.5 sec      CAPILLARY BLOOD GLUCOSE            RADIOLOGY & ADDITIONAL TESTS:

## 2018-06-16 NOTE — PROGRESS NOTE ADULT - ASSESSMENT
78 yo F w/ hx HFpEF, HTN, HLD presents from home after falling from bed onto her knees.  admitted for subacute rehab placement.

## 2018-06-17 PROCEDURE — 99232 SBSQ HOSP IP/OBS MODERATE 35: CPT

## 2018-06-17 RX ADMIN — HEPARIN SODIUM 5000 UNIT(S): 5000 INJECTION INTRAVENOUS; SUBCUTANEOUS at 05:41

## 2018-06-17 RX ADMIN — Medication 40 MILLIGRAM(S): at 20:00

## 2018-06-17 RX ADMIN — CARVEDILOL PHOSPHATE 12.5 MILLIGRAM(S): 80 CAPSULE, EXTENDED RELEASE ORAL at 20:00

## 2018-06-17 RX ADMIN — Medication 650 MILLIGRAM(S): at 05:45

## 2018-06-17 RX ADMIN — Medication 650 MILLIGRAM(S): at 06:58

## 2018-06-17 RX ADMIN — CARVEDILOL PHOSPHATE 12.5 MILLIGRAM(S): 80 CAPSULE, EXTENDED RELEASE ORAL at 05:41

## 2018-06-17 RX ADMIN — CLOPIDOGREL BISULFATE 75 MILLIGRAM(S): 75 TABLET, FILM COATED ORAL at 15:45

## 2018-06-17 RX ADMIN — HEPARIN SODIUM 5000 UNIT(S): 5000 INJECTION INTRAVENOUS; SUBCUTANEOUS at 19:02

## 2018-06-17 RX ADMIN — Medication 10 MILLIGRAM(S): at 05:41

## 2018-06-17 RX ADMIN — Medication 40 MILLIGRAM(S): at 05:41

## 2018-06-17 NOTE — PROGRESS NOTE ADULT - ASSESSMENT
76 yo F w/ hx HFpEF, HTN, HLD presents from home after falling from bed onto her knees.  admitted for subacute rehab placement.

## 2018-06-17 NOTE — PROGRESS NOTE ADULT - SUBJECTIVE AND OBJECTIVE BOX
seen for social placement    no acute complaints  ros otherwise negative     MEDICATIONS  (STANDING):  carvedilol 12.5 milliGRAM(s) Oral every 12 hours  clopidogrel Tablet 75 milliGRAM(s) Oral daily  enalapril 10 milliGRAM(s) Oral daily  furosemide    Tablet 40 milliGRAM(s) Oral two times a day  heparin  Injectable 5000 Unit(s) SubCutaneous two times a day    MEDICATIONS  (PRN):  acetaminophen   Tablet. 650 milliGRAM(s) Oral every 6 hours PRN Moderate Pain (4 - 6)  lidocaine   Patch 1 Patch Transdermal daily PRN joint or back pain      Allergies    aspirin (Unknown)  atorvastatin (Unknown)  penicillin (Unknown)      Vital Signs Last 24 Hrs  T(C): 36.6 (17 Jun 2018 07:05), Max: 36.9 (16 Jun 2018 23:11)  T(F): 97.8 (17 Jun 2018 07:05), Max: 98.5 (16 Jun 2018 23:11)  HR: 71 (17 Jun 2018 07:05) (71 - 80)  BP: 115/64 (17 Jun 2018 07:05) (115/64 - 128/72)  BP(mean): --  RR: 19 (17 Jun 2018 07:05) (18 - 19)  SpO2: 99% (16 Jun 2018 23:11) (99% - 99%)    PHYSICAL EXAM:    GENERAL: NAD  CHEST/LUNG: Clear to percussion bilaterally  HEART: Regular rate and rhythm; S1 S2  ABDOMEN: Soft, Nontender, Nondistended; Bowel sounds present  EXTREMITIES: chronic venous statis skin changes      LABS:                CAPILLARY BLOOD GLUCOSE            RADIOLOGY & ADDITIONAL TESTS:

## 2018-06-18 ENCOUNTER — TRANSCRIPTION ENCOUNTER (OUTPATIENT)
Age: 78
End: 2018-06-18

## 2018-06-18 VITALS — WEIGHT: 225.09 LBS

## 2018-06-18 PROCEDURE — 97163 PT EVAL HIGH COMPLEX 45 MIN: CPT

## 2018-06-18 PROCEDURE — 36415 COLL VENOUS BLD VENIPUNCTURE: CPT

## 2018-06-18 PROCEDURE — 85730 THROMBOPLASTIN TIME PARTIAL: CPT

## 2018-06-18 PROCEDURE — 85027 COMPLETE CBC AUTOMATED: CPT

## 2018-06-18 PROCEDURE — 85610 PROTHROMBIN TIME: CPT

## 2018-06-18 PROCEDURE — 80053 COMPREHEN METABOLIC PANEL: CPT

## 2018-06-18 PROCEDURE — 73521 X-RAY EXAM HIPS BI 2 VIEWS: CPT

## 2018-06-18 PROCEDURE — 73562 X-RAY EXAM OF KNEE 3: CPT

## 2018-06-18 PROCEDURE — 99285 EMERGENCY DEPT VISIT HI MDM: CPT

## 2018-06-18 PROCEDURE — 93005 ELECTROCARDIOGRAM TRACING: CPT

## 2018-06-18 PROCEDURE — 99238 HOSP IP/OBS DSCHRG MGMT 30/<: CPT

## 2018-06-18 RX ORDER — LIDOCAINE 4 G/100G
2 CREAM TOPICAL
Qty: 0 | Refills: 0 | COMMUNITY
Start: 2018-06-18

## 2018-06-18 RX ORDER — ACETAMINOPHEN 500 MG
2 TABLET ORAL
Qty: 0 | Refills: 0 | COMMUNITY
Start: 2018-06-18

## 2018-06-18 RX ADMIN — Medication 40 MILLIGRAM(S): at 06:00

## 2018-06-18 RX ADMIN — Medication 10 MILLIGRAM(S): at 06:00

## 2018-06-18 RX ADMIN — CLOPIDOGREL BISULFATE 75 MILLIGRAM(S): 75 TABLET, FILM COATED ORAL at 12:12

## 2018-06-18 RX ADMIN — CARVEDILOL PHOSPHATE 12.5 MILLIGRAM(S): 80 CAPSULE, EXTENDED RELEASE ORAL at 06:00

## 2018-06-18 RX ADMIN — HEPARIN SODIUM 5000 UNIT(S): 5000 INJECTION INTRAVENOUS; SUBCUTANEOUS at 06:00

## 2018-06-18 NOTE — DISCHARGE NOTE ADULT - MEDICATION SUMMARY - MEDICATIONS TO TAKE
I will START or STAY ON the medications listed below when I get home from the hospital:    acetaminophen 325 mg oral tablet  -- 2 tab(s) by mouth every 6 hours, As needed, Moderate Pain (4 - 6)  -- Indication: For pain    enalapril 10 mg oral tablet  -- 1 tab(s) by mouth once a day  -- Indication: For hypertension    Plavix 75 mg oral tablet  -- 1 tab(s) by mouth once a day  -- Indication: For Coronary disease    carvedilol 12.5 mg oral tablet  -- 1 tab(s) by mouth 2 times a day  -- Indication: For Chronic diastolic heart failure    silver sulfADIAZINE 1% topical cream  -- 1 application on skin once a day  -- Indication: For skin care    lidocaine 5% topical film  -- Apply on skin to affected area once a day, As Needed  -- Indication: For pain    furosemide 40 mg oral tablet  -- 1 tab(s) by mouth 2 times a day  -- Indication: For Chronic diastolic heart failure    docusate sodium 100 mg oral capsule  -- 1 cap(s) by mouth 3 times a day  -- Indication: For Constipation    senna oral tablet  -- 2 tab(s) by mouth once a day (at bedtime)  -- Indication: For Constipation    potassium chloride 20 mEq oral tablet, extended release  -- 1 tab(s) by mouth once a day  -- Indication: For supplement

## 2018-06-18 NOTE — PROGRESS NOTE ADULT - SUBJECTIVE AND OBJECTIVE BOX
seen for JOHN placement    no acute complaints  ros otherwise negative     MEDICATIONS  (STANDING):  carvedilol 12.5 milliGRAM(s) Oral every 12 hours  clopidogrel Tablet 75 milliGRAM(s) Oral daily  enalapril 10 milliGRAM(s) Oral daily  furosemide    Tablet 40 milliGRAM(s) Oral two times a day  heparin  Injectable 5000 Unit(s) SubCutaneous two times a day    MEDICATIONS  (PRN):  acetaminophen   Tablet. 650 milliGRAM(s) Oral every 6 hours PRN Moderate Pain (4 - 6)  lidocaine   Patch 1 Patch Transdermal daily PRN joint or back pain    Allergies    aspirin (Unknown)  atorvastatin (Unknown)  penicillin (Unknown)      Vital Signs Last 24 Hrs  T(C): 36.6 (18 Jun 2018 07:37), Max: 36.9 (17 Jun 2018 23:23)  T(F): 97.8 (18 Jun 2018 07:37), Max: 98.5 (17 Jun 2018 23:23)  HR: 74 (18 Jun 2018 07:37) (60 - 74)  BP: 103/58 (18 Jun 2018 07:37) (103/58 - 125/63)  BP(mean): --  RR: 18 (18 Jun 2018 07:37) (18 - 19)  SpO2: 96% (18 Jun 2018 07:37) (96% - 99%)    PHYSICAL EXAM:    GENERAL: NAD  CHEST/LUNG: Clear to percussion bilaterally;  HEART: Regular rate and rhythm; S1 S2  ABDOMEN: Soft, Nontender, Nondistended; Bowel sounds present  EXTREMITIES: +1 edema      LABS:                CAPILLARY BLOOD GLUCOSE            RADIOLOGY & ADDITIONAL TESTS:

## 2018-06-18 NOTE — DISCHARGE NOTE ADULT - OTHER SIGNIFICANT FINDINGS
xray hips/pelvis:    Exam is limited due to over penetration. There are advanced degenerative   osteoarthrosis of bilateral hips with extensive subchondral sclerosis and   productive changes. There is extensive joint space narrowing. There is no   acute fracture    Normal visualized superior and inferior pubic rami and ischial   tuberosity. There is osteopenia.

## 2018-06-18 NOTE — DISCHARGE NOTE ADULT - PATIENT PORTAL LINK FT
You can access the [facility] Portal, offered by [facility], by registering with the following website: [email or web address]

## 2018-06-18 NOTE — DISCHARGE NOTE ADULT - ADDITIONAL INSTRUCTIONS
if you do not have a primary care doctor, call [facility] [place] [address]  Medical: [phone]

## 2018-06-18 NOTE — DISCHARGE NOTE ADULT - PLAN OF CARE
improved functioning subacute rehabilitation  follow up with primary care doctor supportive care continue home medications

## 2018-06-18 NOTE — DISCHARGE NOTE ADULT - CARE PLAN
Principal Discharge DX:	Debility  Goal:	improved functioning  Assessment and plan of treatment:	subacute rehabilitation  follow up with primary care doctor  Secondary Diagnosis:	Degenerative joint disease  Assessment and plan of treatment:	supportive care  Secondary Diagnosis:	Chronic diastolic heart failure  Assessment and plan of treatment:	continue home medications

## 2018-06-18 NOTE — DISCHARGE NOTE ADULT - HOSPITAL COURSE
78 yo F w/ hx HFpEF, HTN, HLD presents from home after falling from bed onto her knees.  admitted for subacute rehab placement per physical therapy recommendations.   no acute events during hospitalization and stable for discharge to [facility] planning 20 minutes.

## 2018-07-28 NOTE — ED ADULT TRIAGE NOTE - PRO INTERPRETER NEED 2
[facility]  Report of GI Consultation    Zehra Emery Patient Status:  Outpatient in a Bed    11/3/1937 MRN [id number]   [facility] 8NE-A Attending Andrew Flores MD   Hosp Day # 0 PCP Dara Tovar MD     Date of Admission: source Oral, resp. rate 18, height 72\", weight 217 lb 3.2 oz, SpO2 97 %.     GENERAL: NAD, oriented x 3, pleasant  CV: Regular, no murmurs  ABD: S/NT/ND, no focal tenderness    Results:     Laboratory Data:    Lab Results  Component Value Date   WBC 4.7 07 is a 7 mm noncalcified right lower lobe pulmonary nodule. Differential considerations include metastatic disease, granuloma or lung neoplasm. 5.  Hypo attenuating hepatic lesion. Followup MRI with Erick Trevino is recommended.  6.  Please see above for details a English
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
## 2019-01-15 ENCOUNTER — INPATIENT (INPATIENT)
Facility: HOSPITAL | Age: 79
LOS: 9 days | Discharge: HOME | DRG: 603 | End: 2019-01-25
Attending: INTERNAL MEDICINE | Admitting: HOSPITALIST
Payer: MEDICARE

## 2019-01-15 VITALS
OXYGEN SATURATION: 97 % | WEIGHT: 225.09 LBS | HEART RATE: 100 BPM | TEMPERATURE: 98 F | HEIGHT: 66 IN | DIASTOLIC BLOOD PRESSURE: 78 MMHG | RESPIRATION RATE: 20 BRPM | SYSTOLIC BLOOD PRESSURE: 138 MMHG

## 2019-01-15 DIAGNOSIS — Z95.0 PRESENCE OF CARDIAC PACEMAKER: Chronic | ICD-10-CM

## 2019-01-15 DIAGNOSIS — L03.90 CELLULITIS, UNSPECIFIED: ICD-10-CM

## 2019-01-15 LAB
ALBUMIN SERPL ELPH-MCNC: 4.3 G/DL — SIGNIFICANT CHANGE UP (ref 3.3–5.2)
ALP SERPL-CCNC: 125 U/L — HIGH (ref 40–120)
ALT FLD-CCNC: 23 U/L — SIGNIFICANT CHANGE UP
ANION GAP SERPL CALC-SCNC: 13 MMOL/L — SIGNIFICANT CHANGE UP (ref 5–17)
AST SERPL-CCNC: 23 U/L — SIGNIFICANT CHANGE UP
BASOPHILS # BLD AUTO: 0 K/UL — SIGNIFICANT CHANGE UP (ref 0–0.2)
BASOPHILS NFR BLD AUTO: 0.4 % — SIGNIFICANT CHANGE UP (ref 0–2)
BILIRUB SERPL-MCNC: 0.5 MG/DL — SIGNIFICANT CHANGE UP (ref 0.4–2)
BUN SERPL-MCNC: 27 MG/DL — HIGH (ref 8–20)
CALCIUM SERPL-MCNC: 9.5 MG/DL — SIGNIFICANT CHANGE UP (ref 8.6–10.2)
CHLORIDE SERPL-SCNC: 102 MMOL/L — SIGNIFICANT CHANGE UP (ref 98–107)
CO2 SERPL-SCNC: 26 MMOL/L — SIGNIFICANT CHANGE UP (ref 22–29)
CREAT SERPL-MCNC: 0.84 MG/DL — SIGNIFICANT CHANGE UP (ref 0.5–1.3)
EOSINOPHIL # BLD AUTO: 0.4 K/UL — SIGNIFICANT CHANGE UP (ref 0–0.5)
EOSINOPHIL NFR BLD AUTO: 4.2 % — SIGNIFICANT CHANGE UP (ref 0–6)
GLUCOSE SERPL-MCNC: 90 MG/DL — SIGNIFICANT CHANGE UP (ref 70–115)
HBA1C BLD-MCNC: 5.3 % — SIGNIFICANT CHANGE UP (ref 4–5.6)
HCT VFR BLD CALC: 35.9 % — LOW (ref 37–47)
HGB BLD-MCNC: 11.9 G/DL — LOW (ref 12–16)
LACTATE BLDV-MCNC: 1.3 MMOL/L — SIGNIFICANT CHANGE UP (ref 0.5–2)
LYMPHOCYTES # BLD AUTO: 1.1 K/UL — SIGNIFICANT CHANGE UP (ref 1–4.8)
LYMPHOCYTES # BLD AUTO: 11 % — LOW (ref 20–55)
MCHC RBC-ENTMCNC: 31.2 PG — HIGH (ref 27–31)
MCHC RBC-ENTMCNC: 33.1 G/DL — SIGNIFICANT CHANGE UP (ref 32–36)
MCV RBC AUTO: 94 FL — SIGNIFICANT CHANGE UP (ref 81–99)
MONOCYTES # BLD AUTO: 1 K/UL — HIGH (ref 0–0.8)
MONOCYTES NFR BLD AUTO: 10.6 % — HIGH (ref 3–10)
NEUTROPHILS # BLD AUTO: 7.1 K/UL — SIGNIFICANT CHANGE UP (ref 1.8–8)
NEUTROPHILS NFR BLD AUTO: 73.6 % — HIGH (ref 37–73)
NT-PROBNP SERPL-SCNC: 1051 PG/ML — HIGH (ref 0–300)
PLATELET # BLD AUTO: 214 K/UL — SIGNIFICANT CHANGE UP (ref 150–400)
POTASSIUM SERPL-MCNC: 4 MMOL/L — SIGNIFICANT CHANGE UP (ref 3.5–5.3)
POTASSIUM SERPL-SCNC: 4 MMOL/L — SIGNIFICANT CHANGE UP (ref 3.5–5.3)
PROT SERPL-MCNC: 8 G/DL — SIGNIFICANT CHANGE UP (ref 6.6–8.7)
RBC # BLD: 3.82 M/UL — LOW (ref 4.4–5.2)
RBC # FLD: 13.6 % — SIGNIFICANT CHANGE UP (ref 11–15.6)
SODIUM SERPL-SCNC: 141 MMOL/L — SIGNIFICANT CHANGE UP (ref 135–145)
TROPONIN T SERPL-MCNC: <0.01 NG/ML — SIGNIFICANT CHANGE UP (ref 0–0.06)
WBC # BLD: 9.6 K/UL — SIGNIFICANT CHANGE UP (ref 4.8–10.8)
WBC # FLD AUTO: 9.6 K/UL — SIGNIFICANT CHANGE UP (ref 4.8–10.8)

## 2019-01-15 PROCEDURE — 99285 EMERGENCY DEPT VISIT HI MDM: CPT

## 2019-01-15 PROCEDURE — 99222 1ST HOSP IP/OBS MODERATE 55: CPT

## 2019-01-15 PROCEDURE — 73630 X-RAY EXAM OF FOOT: CPT | Mod: 26,LT

## 2019-01-15 PROCEDURE — 93010 ELECTROCARDIOGRAM REPORT: CPT

## 2019-01-15 RX ORDER — FUROSEMIDE 40 MG
40 TABLET ORAL
Qty: 0 | Refills: 0 | Status: DISCONTINUED | OUTPATIENT
Start: 2019-01-15 | End: 2019-01-17

## 2019-01-15 RX ORDER — CLOPIDOGREL BISULFATE 75 MG/1
75 TABLET, FILM COATED ORAL DAILY
Qty: 0 | Refills: 0 | Status: DISCONTINUED | OUTPATIENT
Start: 2019-01-15 | End: 2019-01-25

## 2019-01-15 RX ORDER — HEPARIN SODIUM 5000 [USP'U]/ML
5000 INJECTION INTRAVENOUS; SUBCUTANEOUS EVERY 8 HOURS
Qty: 0 | Refills: 0 | Status: DISCONTINUED | OUTPATIENT
Start: 2019-01-15 | End: 2019-01-25

## 2019-01-15 RX ORDER — ACETAMINOPHEN 500 MG
650 TABLET ORAL EVERY 6 HOURS
Qty: 0 | Refills: 0 | Status: DISCONTINUED | OUTPATIENT
Start: 2019-01-15 | End: 2019-01-25

## 2019-01-15 RX ORDER — POTASSIUM CHLORIDE 20 MEQ
20 PACKET (EA) ORAL DAILY
Qty: 0 | Refills: 0 | Status: DISCONTINUED | OUTPATIENT
Start: 2019-01-15 | End: 2019-01-18

## 2019-01-15 RX ORDER — MORPHINE SULFATE 50 MG/1
2 CAPSULE, EXTENDED RELEASE ORAL EVERY 4 HOURS
Qty: 0 | Refills: 0 | Status: DISCONTINUED | OUTPATIENT
Start: 2019-01-15 | End: 2019-01-22

## 2019-01-15 RX ORDER — CARVEDILOL PHOSPHATE 80 MG/1
12.5 CAPSULE, EXTENDED RELEASE ORAL
Qty: 0 | Refills: 0 | Status: DISCONTINUED | OUTPATIENT
Start: 2019-01-15 | End: 2019-01-16

## 2019-01-15 RX ORDER — VANCOMYCIN HCL 1 G
750 VIAL (EA) INTRAVENOUS EVERY 12 HOURS
Qty: 0 | Refills: 0 | Status: DISCONTINUED | OUTPATIENT
Start: 2019-01-15 | End: 2019-01-16

## 2019-01-15 RX ORDER — OXYCODONE AND ACETAMINOPHEN 5; 325 MG/1; MG/1
1 TABLET ORAL ONCE
Qty: 0 | Refills: 0 | Status: DISCONTINUED | OUTPATIENT
Start: 2019-01-15 | End: 2019-01-15

## 2019-01-15 RX ORDER — MORPHINE SULFATE 50 MG/1
4 CAPSULE, EXTENDED RELEASE ORAL ONCE
Qty: 0 | Refills: 0 | Status: DISCONTINUED | OUTPATIENT
Start: 2019-01-15 | End: 2019-01-15

## 2019-01-15 RX ORDER — CEFEPIME 1 G/1
INJECTION, POWDER, FOR SOLUTION INTRAMUSCULAR; INTRAVENOUS
Qty: 0 | Refills: 0 | Status: DISCONTINUED | OUTPATIENT
Start: 2019-01-16 | End: 2019-01-23

## 2019-01-15 RX ADMIN — OXYCODONE AND ACETAMINOPHEN 1 TABLET(S): 5; 325 TABLET ORAL at 18:45

## 2019-01-15 RX ADMIN — OXYCODONE AND ACETAMINOPHEN 1 TABLET(S): 5; 325 TABLET ORAL at 20:05

## 2019-01-15 RX ADMIN — MORPHINE SULFATE 4 MILLIGRAM(S): 50 CAPSULE, EXTENDED RELEASE ORAL at 19:10

## 2019-01-15 RX ADMIN — MORPHINE SULFATE 4 MILLIGRAM(S): 50 CAPSULE, EXTENDED RELEASE ORAL at 20:05

## 2019-01-15 RX ADMIN — Medication 100 MILLIGRAM(S): at 20:07

## 2019-01-15 NOTE — ED PROVIDER NOTE - OBJECTIVE STATEMENT
77 y/o female with a h/o chf htn and pacer and she says she has a h/o cellulitis in her legs and she had her toenails cut and then noted toes and feet and legs got red and swollen and she went to vascular Dr Kennedy today and he sent her to the ed for podiatry evaluation by Dr Varner 77 y/o female with a h/o chf htn and pacer and she says she has a h/o cellulitis in her legs and she had her toenails cut and then noted toes and feet and legs got red and swollen and she went to vascular Dr Kennedy today and he sent her to the ed for podiatry evaluation by Dr Varner pt denies fever or chills

## 2019-01-15 NOTE — ED ADULT NURSE NOTE - OBJECTIVE STATEMENT
Assumed pt care at 1845.  Pt a&ox4 c/o 10/10 L foot pain.  Pt states she was seen by podiatrist a few months ago and states 3 days later toe nails started to turn black.  Pt L foot presenting with weeping edema and purulent exudate.  No acute s/s of respiratory distress noted or reported at this time, will continue to monitor

## 2019-01-15 NOTE — ED ADULT NURSE NOTE - NSIMPLEMENTINTERV_GEN_ALL_ED
Implemented All Fall Risk Interventions:  Farnam to call system. Call bell, personal items and telephone within reach. Instruct patient to call for assistance. Room bathroom lighting operational. Non-slip footwear when patient is off stretcher. Physically safe environment: no spills, clutter or unnecessary equipment. Stretcher in lowest position, wheels locked, appropriate side rails in place. Provide visual cue, wrist band, yellow gown, etc. Monitor gait and stability. Monitor for mental status changes and reorient to person, place, and time. Review medications for side effects contributing to fall risk. Reinforce activity limits and safety measures with patient and family.

## 2019-01-15 NOTE — ED ADULT TRIAGE NOTE - CHIEF COMPLAINT QUOTE
lt foot pain and being treated. Now rt foot pain. wound is draining and cellulitic, red, swelling, Stared 2 months ago and getting worse. To see Dr Varner Podiatry.

## 2019-01-15 NOTE — ED STATDOCS - PROGRESS NOTE DETAILS
79 y/o F, with hx of HTN and cardiac pacemaker, presents to the ED c/o worsening pain to her left foot, onset 3 weeks ago.  Pt states that she was seen by her podiatrist who clipped her toe nails.  Pt notes shortly after her toes turned black and began to drain fluids.  Pt notes severe pain to touch and persistent drainage from her wounds.  Associated sx include chills. Self medicating with Tylenol and Ibuprofen, with no relief.  Pt was seen by Dr. Kennedy today and referred to the ED for further evaluation.    Allergic to Penicillin.  Pt to be moved to the main ED for further evaluation and follow up by another provider.  On exam: LLE: weeping thick, white, foul smelling discharge, erythema to the knee  RLE: Erythema noted to RLE, not as severe as left

## 2019-01-15 NOTE — H&P ADULT - NSHPPHYSICALEXAM_GEN_ALL_CORE
T(C): 36.8 (01-15-19 @ 21:28), Max: 36.8 (01-15-19 @ 21:28)  HR: 83 (01-15-19 @ 21:28) (83 - 100)  BP: 100/62 (01-15-19 @ 21:28) (100/62 - 138/78)  RR: 20 (01-15-19 @ 21:28) (20 - 20)  SpO2: 95% (01-15-19 @ 21:28) (95% - 97%)  Wt(kg): --    Physical Exam:   GENERAL: well-groomed, well-developed, NAD  HEENT: head NC/AT; EOM intact, conjunctiva & sclera clear; hearing grossly intact, moist mucous membranes  NECK: supple, no JVD  RESPIRATORY: CTA B/L, no wheezing, rales, rhonchi or rubs  CARDIOVASCULAR: S1&S2, RRR, no murmurs or gallops  ABDOMEN: soft, non-tender, non-distended, + Bowel sounds x4 quadrants, no guarding, rebound or rigidity  MUSCULOSKELETAL:  b/l LE edema and chronic skin changes, L foot ulcer on dorsum  LYMPH: no cervical lymphadenopathy  VASCULAR: Radial pulses 2+ bilaterally, no varicose veins, could not palpate DP pulses as patient would not allow  SKIN: warm and dry, b/l LE erythema  NEUROLOGIC: AA&O X3, CN2-12 intact w/ no focal deficits, no sensory loss, motor Strength 5/5 in UE & LE B/L  Psych: Normal mood and affect, normal behavior

## 2019-01-15 NOTE — H&P ADULT - HISTORY OF PRESENT ILLNESS
Pt is a 79 yo F sent to ED by her vascular surgeon Dr. Kennedy, for LLE cellulitis, concern for osteo. PMH CHF w/ EF 30%, CAD, HTN, Pacemaker, PVD.   Patient states that she saw her vasc surgeon Dr. Kennedy today who referred her to the ED because her LLE appeared infected. She states that beginning about Sept or Oct 2018 she noted increased redness of her LLE. She states around that time she saw a podiatrist who clipped her nails, but she feels it got infected after the clipping because she states there was a scab solo afterwards. She did not follow up with him afterwards, but she did see vascular surgeon today for a f/u and he noted that she might have an infection and to come to the ED for further eval by Podiatry and abx treatment. Patient has 5/10 pain in LLE, no radiation, denies any numbness, no hx of DM2, pain is worse with movement, the pain has been present for many months and she has tried OTC pain meds with minimal relief. She denies any injury or trauma to the foot other than the possible toenail clipping mentioned above.   Denies headaches, nausea, vomiting, chest pain, SOB, palpitations, abdominal pain, constipation, diarrhea, melena, hematochezia. No fevers or chills.   In ED patient was given abx and seen by ID.   She does not know the name of the medications she takes, but states there has been no changes since her last hospitalization in summer 2018.

## 2019-01-16 LAB
ANION GAP SERPL CALC-SCNC: 13 MMOL/L — SIGNIFICANT CHANGE UP (ref 5–17)
BUN SERPL-MCNC: 26 MG/DL — HIGH (ref 8–20)
CALCIUM SERPL-MCNC: 9.5 MG/DL — SIGNIFICANT CHANGE UP (ref 8.6–10.2)
CHLORIDE SERPL-SCNC: 102 MMOL/L — SIGNIFICANT CHANGE UP (ref 98–107)
CO2 SERPL-SCNC: 24 MMOL/L — SIGNIFICANT CHANGE UP (ref 22–29)
CREAT SERPL-MCNC: 0.8 MG/DL — SIGNIFICANT CHANGE UP (ref 0.5–1.3)
CRP SERPL-MCNC: 6.3 MG/DL — HIGH (ref 0–0.4)
ERYTHROCYTE [SEDIMENTATION RATE] IN BLOOD: 55 MM/HR — HIGH (ref 0–20)
GLUCOSE SERPL-MCNC: 111 MG/DL — SIGNIFICANT CHANGE UP (ref 70–115)
HCT VFR BLD CALC: 33.2 % — LOW (ref 37–47)
HGB BLD-MCNC: 10.7 G/DL — LOW (ref 12–16)
MCHC RBC-ENTMCNC: 30.7 PG — SIGNIFICANT CHANGE UP (ref 27–31)
MCHC RBC-ENTMCNC: 32.2 G/DL — SIGNIFICANT CHANGE UP (ref 32–36)
MCV RBC AUTO: 95.4 FL — SIGNIFICANT CHANGE UP (ref 81–99)
PLATELET # BLD AUTO: 183 K/UL — SIGNIFICANT CHANGE UP (ref 150–400)
POTASSIUM SERPL-MCNC: 4.7 MMOL/L — SIGNIFICANT CHANGE UP (ref 3.5–5.3)
POTASSIUM SERPL-SCNC: 4.7 MMOL/L — SIGNIFICANT CHANGE UP (ref 3.5–5.3)
RBC # BLD: 3.48 M/UL — LOW (ref 4.4–5.2)
RBC # FLD: 13.9 % — SIGNIFICANT CHANGE UP (ref 11–15.6)
SODIUM SERPL-SCNC: 139 MMOL/L — SIGNIFICANT CHANGE UP (ref 135–145)
WBC # BLD: 8.2 K/UL — SIGNIFICANT CHANGE UP (ref 4.8–10.8)
WBC # FLD AUTO: 8.2 K/UL — SIGNIFICANT CHANGE UP (ref 4.8–10.8)

## 2019-01-16 PROCEDURE — 99223 1ST HOSP IP/OBS HIGH 75: CPT

## 2019-01-16 PROCEDURE — 99233 SBSQ HOSP IP/OBS HIGH 50: CPT

## 2019-01-16 RX ORDER — SODIUM HYPOCHLORITE 0.125 %
1 SOLUTION, NON-ORAL MISCELLANEOUS DAILY
Qty: 0 | Refills: 0 | Status: DISCONTINUED | OUTPATIENT
Start: 2019-01-16 | End: 2019-01-20

## 2019-01-16 RX ORDER — CEFEPIME 1 G/1
1000 INJECTION, POWDER, FOR SOLUTION INTRAMUSCULAR; INTRAVENOUS EVERY 12 HOURS
Qty: 0 | Refills: 0 | Status: DISCONTINUED | OUTPATIENT
Start: 2019-01-16 | End: 2019-01-23

## 2019-01-16 RX ORDER — CARVEDILOL PHOSPHATE 80 MG/1
12.5 CAPSULE, EXTENDED RELEASE ORAL
Qty: 0 | Refills: 0 | Status: DISCONTINUED | OUTPATIENT
Start: 2019-01-16 | End: 2019-01-17

## 2019-01-16 RX ORDER — SACCHAROMYCES BOULARDII 250 MG
250 POWDER IN PACKET (EA) ORAL
Qty: 0 | Refills: 0 | Status: DISCONTINUED | OUTPATIENT
Start: 2019-01-16 | End: 2019-01-24

## 2019-01-16 RX ORDER — VANCOMYCIN HCL 1 G
1000 VIAL (EA) INTRAVENOUS EVERY 12 HOURS
Qty: 0 | Refills: 0 | Status: DISCONTINUED | OUTPATIENT
Start: 2019-01-16 | End: 2019-01-20

## 2019-01-16 RX ORDER — CEFEPIME 1 G/1
1000 INJECTION, POWDER, FOR SOLUTION INTRAMUSCULAR; INTRAVENOUS ONCE
Qty: 0 | Refills: 0 | Status: COMPLETED | OUTPATIENT
Start: 2019-01-16 | End: 2019-01-16

## 2019-01-16 RX ADMIN — HEPARIN SODIUM 5000 UNIT(S): 5000 INJECTION INTRAVENOUS; SUBCUTANEOUS at 23:04

## 2019-01-16 RX ADMIN — MORPHINE SULFATE 2 MILLIGRAM(S): 50 CAPSULE, EXTENDED RELEASE ORAL at 09:57

## 2019-01-16 RX ADMIN — MORPHINE SULFATE 2 MILLIGRAM(S): 50 CAPSULE, EXTENDED RELEASE ORAL at 10:41

## 2019-01-16 RX ADMIN — MORPHINE SULFATE 2 MILLIGRAM(S): 50 CAPSULE, EXTENDED RELEASE ORAL at 18:12

## 2019-01-16 RX ADMIN — Medication 10 MILLIGRAM(S): at 06:01

## 2019-01-16 RX ADMIN — MORPHINE SULFATE 2 MILLIGRAM(S): 50 CAPSULE, EXTENDED RELEASE ORAL at 13:46

## 2019-01-16 RX ADMIN — MORPHINE SULFATE 2 MILLIGRAM(S): 50 CAPSULE, EXTENDED RELEASE ORAL at 03:35

## 2019-01-16 RX ADMIN — Medication 40 MILLIGRAM(S): at 17:18

## 2019-01-16 RX ADMIN — CLOPIDOGREL BISULFATE 75 MILLIGRAM(S): 75 TABLET, FILM COATED ORAL at 09:56

## 2019-01-16 RX ADMIN — Medication 250 MILLIGRAM(S): at 06:00

## 2019-01-16 RX ADMIN — Medication 40 MILLIGRAM(S): at 06:00

## 2019-01-16 RX ADMIN — CEFEPIME 100 MILLIGRAM(S): 1 INJECTION, POWDER, FOR SOLUTION INTRAMUSCULAR; INTRAVENOUS at 17:19

## 2019-01-16 RX ADMIN — Medication 250 MILLIGRAM(S): at 17:19

## 2019-01-16 RX ADMIN — Medication 250 MILLIGRAM(S): at 17:18

## 2019-01-16 RX ADMIN — MORPHINE SULFATE 2 MILLIGRAM(S): 50 CAPSULE, EXTENDED RELEASE ORAL at 07:09

## 2019-01-16 RX ADMIN — CARVEDILOL PHOSPHATE 12.5 MILLIGRAM(S): 80 CAPSULE, EXTENDED RELEASE ORAL at 09:57

## 2019-01-16 RX ADMIN — MORPHINE SULFATE 2 MILLIGRAM(S): 50 CAPSULE, EXTENDED RELEASE ORAL at 14:36

## 2019-01-16 RX ADMIN — Medication 20 MILLIEQUIVALENT(S): at 09:56

## 2019-01-16 RX ADMIN — HEPARIN SODIUM 5000 UNIT(S): 5000 INJECTION INTRAVENOUS; SUBCUTANEOUS at 13:46

## 2019-01-16 RX ADMIN — CEFEPIME 100 MILLIGRAM(S): 1 INJECTION, POWDER, FOR SOLUTION INTRAMUSCULAR; INTRAVENOUS at 01:47

## 2019-01-16 RX ADMIN — CARVEDILOL PHOSPHATE 12.5 MILLIGRAM(S): 80 CAPSULE, EXTENDED RELEASE ORAL at 17:18

## 2019-01-16 RX ADMIN — HEPARIN SODIUM 5000 UNIT(S): 5000 INJECTION INTRAVENOUS; SUBCUTANEOUS at 06:01

## 2019-01-16 RX ADMIN — MORPHINE SULFATE 2 MILLIGRAM(S): 50 CAPSULE, EXTENDED RELEASE ORAL at 17:18

## 2019-01-16 RX ADMIN — MORPHINE SULFATE 2 MILLIGRAM(S): 50 CAPSULE, EXTENDED RELEASE ORAL at 06:00

## 2019-01-16 RX ADMIN — MORPHINE SULFATE 2 MILLIGRAM(S): 50 CAPSULE, EXTENDED RELEASE ORAL at 01:47

## 2019-01-16 NOTE — PROGRESS NOTE ADULT - SUBJECTIVE AND OBJECTIVE BOX
CHIEF COMPLAINT/INTERVAL HISTORY:    Patient is a 78y old  Female who presents with a chief complaint of LLE Cellulitis (16 Jan 2019 13:21)    SUBJECTIVE & OBJECTIVE: Pt seen and examined at bedside. Admitted overnight with left foot ulcer and left lower extremity ulcer. Concerns of left second toe OM but cant have MRI due to PPM. Patient complaining of foot/leg pain, otherwise new complaints.    ROS: No chest pain, palpitations, SOB, light headedness, dizziness, headache, nausea/vomiting, fevers/chills, abdominal pain, dysuria or increased urinary frequency.    ICU Vital Signs Last 24 Hrs  T(C): 36.2 (16 Jan 2019 15:35), Max: 36.9 (16 Jan 2019 07:33)  T(F): 97.1 (16 Jan 2019 15:35), Max: 98.4 (16 Jan 2019 07:33)  HR: 86 (16 Jan 2019 15:35) (70 - 100)  BP: 111/65 (16 Jan 2019 15:35) (93/56 - 138/78)  RR: 18 (16 Jan 2019 15:35) (18 - 20)  SpO2: 97% (16 Jan 2019 15:35) (95% - 99%)    MEDICATIONS  (STANDING):  carvedilol Oral Tab/Cap - Peds 12.5 milliGRAM(s) Oral two times a day  cefepime   IVPB      cefepime   IVPB 1000 milliGRAM(s) IV Intermittent every 12 hours  clopidogrel Tablet 75 milliGRAM(s) Oral daily  Dakins Solution - 1/4 Strength 1 Application(s) Topical daily  enalapril 10 milliGRAM(s) Oral daily  furosemide    Tablet 40 milliGRAM(s) Oral two times a day  heparin  Injectable 5000 Unit(s) SubCutaneous every 8 hours  potassium chloride    Tablet ER 20 milliEquivalent(s) Oral daily  saccharomyces boulardii 250 milliGRAM(s) Oral two times a day  vancomycin  IVPB 1000 milliGRAM(s) IV Intermittent every 12 hours    MEDICATIONS  (PRN):  acetaminophen   Tablet .. 650 milliGRAM(s) Oral every 6 hours PRN Mild Pain (1 - 3)  morphine  - Injectable 2 milliGRAM(s) IV Push every 4 hours PRN Severe Pain (7 - 10)      LABS:                        10.7   8.2   )-----------( 183      ( 16 Jan 2019 10:40 )             33.2     01-16    139  |  102  |  26.0<H>  ----------------------------<  111  4.7   |  24.0  |  0.80    Ca    9.5      16 Jan 2019 05:59    TPro  8.0  /  Alb  4.3  /  TBili  0.5  /  DBili  x   /  AST  23  /  ALT  23  /  AlkPhos  125<H>  01-15      PHYSICAL EXAM:    GENERAL: elderly female, sitting in chair, in mild painful distress  HEAD:  Atraumatic, Normocephalic  EYES: EOMI, PERRLA, conjunctiva and sclera clear  ENMT: Moist mucous membranes  NECK: Supple   NERVOUS SYSTEM:  Alert & Oriented X3   CHEST/LUNG: Bilateral air entry  HEART: Regular rate and rhythm; + S1/S2  ABDOMEN: Soft, Nontender, Nondistended  EXTREMITIES:  bilateral lower extremities dressings c/d/i

## 2019-01-16 NOTE — PROGRESS NOTE ADULT - SUBJECTIVE AND OBJECTIVE BOX
77 yo female with PMHX CHF, HTN, Pacemaker seen in ED for left foot painful ulcers. Patient is seen sitting in chair, with legs hanging off chair. Patient states her wounds are very painful .Pt denies being diabetic. Pt denies N/V/C/F/SOB. Pt denies any previous injury to the foot    Allergies: Aspirin, Atorvastatin, Penicillin    01-16    139  |  102  |  26.0<H>  ----------------------------<  111  4.7   |  24.0  |  0.80    Ca    9.5      16 Jan 2019 05:59    TPro  8.0  /  Alb  4.3  /  TBili  0.5  /  DBili  x   /  AST  23  /  ALT  23  /  AlkPhos  125<H>  01-15                            10.7   8.2   )-----------( 183      ( 16 Jan 2019 10:40 )             33.2     PE: Left Foot  Vascular: DP/PT: non-palable due to pain; CFT< 3 sec x 5; TG: wnl; severe edema noted on the left foot and leg  Derm: onychomycosis nails noted on the left foot 1 to 5; IDM noted; ulceration noted on the dorsal digits with necrotic fibrotic base; dorsal left foot ulcer noted with fibrogranular base; mild mal odor noted; no pus noted; severe pain upon palpation; cellulitis extending to proximal leg noted;   Neuro: Protective sensation intact        A: Left Foot Ulcer with Leg Cellulitis     P:  Patient evaluated and chart reviewed  Educated pt on proper foot care and change in shoe gear  Xray ordered; results pending  BELINDA ordered; results pending  Cx obtained; results pending  Rx Dakins  Left Foot Ulcer evaluated, possible fungal component to the wound base with cellulitis extending to leg, possible OM of 2nd Digit. Cannot perform MRI because pt has a pacemaker.     Left Foot Wound Dressed with Christine/Betadine/DSD  Instructed pt to keep dressing clean dry and intact to the left foot   Consult ID for IV abx   Podiatry will follow patient while inhouse.

## 2019-01-16 NOTE — ED ADULT NURSE REASSESSMENT NOTE - NS ED NURSE REASSESS COMMENT FT1
Pt remains a&ox4 resting comfortably with VSS, no acute s/s of respiratory distress noted or reported at this time, will continue to monitor

## 2019-01-17 DIAGNOSIS — L03.90 CELLULITIS, UNSPECIFIED: ICD-10-CM

## 2019-01-17 DIAGNOSIS — I10 ESSENTIAL (PRIMARY) HYPERTENSION: ICD-10-CM

## 2019-01-17 DIAGNOSIS — Z95.0 PRESENCE OF CARDIAC PACEMAKER: ICD-10-CM

## 2019-01-17 DIAGNOSIS — E78.5 HYPERLIPIDEMIA, UNSPECIFIED: ICD-10-CM

## 2019-01-17 DIAGNOSIS — I50.9 HEART FAILURE, UNSPECIFIED: ICD-10-CM

## 2019-01-17 LAB
ANION GAP SERPL CALC-SCNC: 13 MMOL/L — SIGNIFICANT CHANGE UP (ref 5–17)
BASOPHILS # BLD AUTO: 0 K/UL — SIGNIFICANT CHANGE UP (ref 0–0.2)
BASOPHILS NFR BLD AUTO: 0.2 % — SIGNIFICANT CHANGE UP (ref 0–2)
BUN SERPL-MCNC: 34 MG/DL — HIGH (ref 8–20)
CALCIUM SERPL-MCNC: 9.3 MG/DL — SIGNIFICANT CHANGE UP (ref 8.6–10.2)
CHLORIDE SERPL-SCNC: 96 MMOL/L — LOW (ref 98–107)
CO2 SERPL-SCNC: 25 MMOL/L — SIGNIFICANT CHANGE UP (ref 22–29)
CREAT SERPL-MCNC: 0.95 MG/DL — SIGNIFICANT CHANGE UP (ref 0.5–1.3)
EOSINOPHIL # BLD AUTO: 0.3 K/UL — SIGNIFICANT CHANGE UP (ref 0–0.5)
EOSINOPHIL NFR BLD AUTO: 3.5 % — SIGNIFICANT CHANGE UP (ref 0–6)
GLUCOSE SERPL-MCNC: 114 MG/DL — SIGNIFICANT CHANGE UP (ref 70–115)
HCT VFR BLD CALC: 33.7 % — LOW (ref 37–47)
HGB BLD-MCNC: 10.6 G/DL — LOW (ref 12–16)
LYMPHOCYTES # BLD AUTO: 1 K/UL — SIGNIFICANT CHANGE UP (ref 1–4.8)
LYMPHOCYTES # BLD AUTO: 11.3 % — LOW (ref 20–55)
MAGNESIUM SERPL-MCNC: 2.2 MG/DL — SIGNIFICANT CHANGE UP (ref 1.8–2.6)
MCHC RBC-ENTMCNC: 29.9 PG — SIGNIFICANT CHANGE UP (ref 27–31)
MCHC RBC-ENTMCNC: 31.5 G/DL — LOW (ref 32–36)
MCV RBC AUTO: 95.2 FL — SIGNIFICANT CHANGE UP (ref 81–99)
MONOCYTES # BLD AUTO: 0.9 K/UL — HIGH (ref 0–0.8)
MONOCYTES NFR BLD AUTO: 10.5 % — HIGH (ref 3–10)
NEUTROPHILS # BLD AUTO: 6.6 K/UL — SIGNIFICANT CHANGE UP (ref 1.8–8)
NEUTROPHILS NFR BLD AUTO: 74.4 % — HIGH (ref 37–73)
PHOSPHATE SERPL-MCNC: 3.3 MG/DL — SIGNIFICANT CHANGE UP (ref 2.4–4.7)
PLATELET # BLD AUTO: 173 K/UL — SIGNIFICANT CHANGE UP (ref 150–400)
POTASSIUM SERPL-MCNC: 4.2 MMOL/L — SIGNIFICANT CHANGE UP (ref 3.5–5.3)
POTASSIUM SERPL-SCNC: 4.2 MMOL/L — SIGNIFICANT CHANGE UP (ref 3.5–5.3)
RBC # BLD: 3.54 M/UL — LOW (ref 4.4–5.2)
RBC # FLD: 13.9 % — SIGNIFICANT CHANGE UP (ref 11–15.6)
SODIUM SERPL-SCNC: 134 MMOL/L — LOW (ref 135–145)
WBC # BLD: 8.9 K/UL — SIGNIFICANT CHANGE UP (ref 4.8–10.8)
WBC # FLD AUTO: 8.9 K/UL — SIGNIFICANT CHANGE UP (ref 4.8–10.8)

## 2019-01-17 PROCEDURE — 99232 SBSQ HOSP IP/OBS MODERATE 35: CPT

## 2019-01-17 RX ORDER — CARVEDILOL PHOSPHATE 80 MG/1
6.25 CAPSULE, EXTENDED RELEASE ORAL
Qty: 0 | Refills: 0 | Status: DISCONTINUED | OUTPATIENT
Start: 2019-01-17 | End: 2019-01-25

## 2019-01-17 RX ORDER — INFLUENZA VIRUS VACCINE 15; 15; 15; 15 UG/.5ML; UG/.5ML; UG/.5ML; UG/.5ML
0.5 SUSPENSION INTRAMUSCULAR ONCE
Qty: 0 | Refills: 0 | Status: COMPLETED | OUTPATIENT
Start: 2019-01-17 | End: 2019-01-17

## 2019-01-17 RX ORDER — SODIUM CHLORIDE 9 MG/ML
250 INJECTION INTRAMUSCULAR; INTRAVENOUS; SUBCUTANEOUS ONCE
Qty: 0 | Refills: 0 | Status: COMPLETED | OUTPATIENT
Start: 2019-01-17 | End: 2019-01-17

## 2019-01-17 RX ORDER — OXYCODONE HYDROCHLORIDE 5 MG/1
5 TABLET ORAL ONCE
Qty: 0 | Refills: 0 | Status: DISCONTINUED | OUTPATIENT
Start: 2019-01-17 | End: 2019-01-17

## 2019-01-17 RX ORDER — SODIUM HYPOCHLORITE 0.125 %
1 SOLUTION, NON-ORAL MISCELLANEOUS
Qty: 0 | Refills: 0 | Status: DISCONTINUED | OUTPATIENT
Start: 2019-01-17 | End: 2019-01-20

## 2019-01-17 RX ADMIN — HEPARIN SODIUM 5000 UNIT(S): 5000 INJECTION INTRAVENOUS; SUBCUTANEOUS at 14:30

## 2019-01-17 RX ADMIN — CARVEDILOL PHOSPHATE 12.5 MILLIGRAM(S): 80 CAPSULE, EXTENDED RELEASE ORAL at 06:05

## 2019-01-17 RX ADMIN — CLOPIDOGREL BISULFATE 75 MILLIGRAM(S): 75 TABLET, FILM COATED ORAL at 12:09

## 2019-01-17 RX ADMIN — Medication 40 MILLIGRAM(S): at 06:05

## 2019-01-17 RX ADMIN — Medication 250 MILLIGRAM(S): at 17:21

## 2019-01-17 RX ADMIN — Medication 250 MILLIGRAM(S): at 17:20

## 2019-01-17 RX ADMIN — Medication 250 MILLIGRAM(S): at 06:05

## 2019-01-17 RX ADMIN — SODIUM CHLORIDE 500 MILLILITER(S): 9 INJECTION INTRAMUSCULAR; INTRAVENOUS; SUBCUTANEOUS at 17:23

## 2019-01-17 RX ADMIN — CEFEPIME 100 MILLIGRAM(S): 1 INJECTION, POWDER, FOR SOLUTION INTRAMUSCULAR; INTRAVENOUS at 17:21

## 2019-01-17 RX ADMIN — CARVEDILOL PHOSPHATE 6.25 MILLIGRAM(S): 80 CAPSULE, EXTENDED RELEASE ORAL at 21:11

## 2019-01-17 RX ADMIN — CEFEPIME 100 MILLIGRAM(S): 1 INJECTION, POWDER, FOR SOLUTION INTRAMUSCULAR; INTRAVENOUS at 06:04

## 2019-01-17 RX ADMIN — MORPHINE SULFATE 2 MILLIGRAM(S): 50 CAPSULE, EXTENDED RELEASE ORAL at 06:04

## 2019-01-17 RX ADMIN — OXYCODONE HYDROCHLORIDE 5 MILLIGRAM(S): 5 TABLET ORAL at 14:25

## 2019-01-17 RX ADMIN — SODIUM CHLORIDE 250 MILLILITER(S): 9 INJECTION INTRAMUSCULAR; INTRAVENOUS; SUBCUTANEOUS at 12:09

## 2019-01-17 RX ADMIN — Medication 250 MILLIGRAM(S): at 06:52

## 2019-01-17 RX ADMIN — Medication 20 MILLIEQUIVALENT(S): at 12:09

## 2019-01-17 RX ADMIN — Medication 10 MILLIGRAM(S): at 06:05

## 2019-01-17 RX ADMIN — MORPHINE SULFATE 2 MILLIGRAM(S): 50 CAPSULE, EXTENDED RELEASE ORAL at 07:49

## 2019-01-17 RX ADMIN — HEPARIN SODIUM 5000 UNIT(S): 5000 INJECTION INTRAVENOUS; SUBCUTANEOUS at 06:05

## 2019-01-17 RX ADMIN — HEPARIN SODIUM 5000 UNIT(S): 5000 INJECTION INTRAVENOUS; SUBCUTANEOUS at 22:30

## 2019-01-17 RX ADMIN — Medication 1 APPLICATION(S): at 17:22

## 2019-01-17 NOTE — PROGRESS NOTE ADULT - SUBJECTIVE AND OBJECTIVE BOX
INTERVAL HPI/OVERNIGHT EVENTS:    CC:        Vital Signs Last 24 Hrs  T(C): 36.6 (17 Jan 2019 06:10), Max: 36.8 (16 Jan 2019 11:12)  T(F): 97.8 (17 Jan 2019 06:10), Max: 98.3 (16 Jan 2019 11:12)  HR: 93 (17 Jan 2019 06:10) (70 - 93)  BP: 106/60 (17 Jan 2019 06:10) (93/56 - 120/66)  BP(mean): --  RR: 18 (17 Jan 2019 06:10) (18 - 18)  SpO2: 96% (17 Jan 2019 06:10) (95% - 99%)    PHYSICAL EXAM:    GENERAL:   CHEST/LUNG:   HEART:   ABDOMEN:   EXTREMITIES:    MEDICATIONS  (STANDING):  carvedilol 12.5 milliGRAM(s) Oral two times a day  cefepime   IVPB      cefepime   IVPB 1000 milliGRAM(s) IV Intermittent every 12 hours  clopidogrel Tablet 75 milliGRAM(s) Oral daily  Dakins Solution - 1/4 Strength 1 Application(s) Topical daily  enalapril 10 milliGRAM(s) Oral daily  furosemide    Tablet 40 milliGRAM(s) Oral two times a day  heparin  Injectable 5000 Unit(s) SubCutaneous every 8 hours  influenza   Vaccine 0.5 milliLiter(s) IntraMuscular once  potassium chloride    Tablet ER 20 milliEquivalent(s) Oral daily  saccharomyces boulardii 250 milliGRAM(s) Oral two times a day  vancomycin  IVPB 1000 milliGRAM(s) IV Intermittent every 12 hours    MEDICATIONS  (PRN):  acetaminophen   Tablet .. 650 milliGRAM(s) Oral every 6 hours PRN Mild Pain (1 - 3)  morphine  - Injectable 2 milliGRAM(s) IV Push every 4 hours PRN Severe Pain (7 - 10)      Allergies    aspirin (Unknown)  atorvastatin (Unknown)  penicillin (Unknown)    Intolerances          LABS:                          10.7   8.2   )-----------( 183      ( 16 Jan 2019 10:40 )             33.2     01-16    139  |  102  |  26.0<H>  ----------------------------<  111  4.7   |  24.0  |  0.80    Ca    9.5      16 Jan 2019 05:59    TPro  8.0  /  Alb  4.3  /  TBili  0.5  /  DBili  x   /  AST  23  /  ALT  23  /  AlkPhos  125<H>  01-15          RADIOLOGY & ADDITIONAL TESTS: INTERVAL HPI/OVERNIGHT EVENTS:    CC: left foot ulcer with cellulitis, hypertension, systolic CHF, pacemaker    Chart and course reviewed. No complaints this morning. Pain controlled at the time. Ambulates with walker at home[place] No fever.        Vital Signs Last 24 Hrs  T(C): 36.6 (17 Jan 2019 06:10), Max: 36.8 (16 Jan 2019 11:12)  T(F): 97.8 (17 Jan 2019 06:10), Max: 98.3 (16 Jan 2019 11:12)  HR: 93 (17 Jan 2019 06:10) (70 - 93)  BP: 106/60 (17 Jan 2019 06:10) (93/56 - 120/66)  BP(mean): --  RR: 18 (17 Jan 2019 06:10) (18 - 18)  SpO2: 96% (17 Jan 2019 06:10) (95% - 99%)    PHYSICAL EXAM:    GENERAL: obese, sitting in chair, no distress.  CHEST/LUNG: b/l air entry  HEART: reg  ABDOMEN: soft, non tender  EXTREMITIES: dressing over b/l ext, + erythema and skin excoriation with edema, non tender    MEDICATIONS  (STANDING):  carvedilol 12.5 milliGRAM(s) Oral two times a day  cefepime   IVPB      cefepime   IVPB 1000 milliGRAM(s) IV Intermittent every 12 hours  clopidogrel Tablet 75 milliGRAM(s) Oral daily  Dakins Solution - 1/4 Strength 1 Application(s) Topical daily  enalapril 10 milliGRAM(s) Oral daily  furosemide    Tablet 40 milliGRAM(s) Oral two times a day  heparin  Injectable 5000 Unit(s) SubCutaneous every 8 hours  influenza   Vaccine 0.5 milliLiter(s) IntraMuscular once  potassium chloride    Tablet ER 20 milliEquivalent(s) Oral daily  saccharomyces boulardii 250 milliGRAM(s) Oral two times a day  vancomycin  IVPB 1000 milliGRAM(s) IV Intermittent every 12 hours    MEDICATIONS  (PRN):  acetaminophen   Tablet .. 650 milliGRAM(s) Oral every 6 hours PRN Mild Pain (1 - 3)  morphine  - Injectable 2 milliGRAM(s) IV Push every 4 hours PRN Severe Pain (7 - 10)      Allergies    aspirin (Unknown)  atorvastatin (Unknown)  penicillin (Unknown)    Intolerances          LABS:                          10.7   8.2   )-----------( 183      ( 16 Jan 2019 10:40 )             33.2     01-16    139  |  102  |  26.0<H>  ----------------------------<  111  4.7   |  24.0  |  0.80    Ca    9.5      16 Jan 2019 05:59    TPro  8.0  /  Alb  4.3  /  TBili  0.5  /  DBili  x   /  AST  23  /  ALT  23  /  AlkPhos  125<H>  01-15          RADIOLOGY & ADDITIONAL TESTS:

## 2019-01-17 NOTE — PROGRESS NOTE ADULT - PROBLEM SELECTOR PLAN 1
Continue antibiotics, ID follow up await cultures. Local wound care by podiatry, PT sofia. Vascular studies ordered.

## 2019-01-17 NOTE — PROGRESS NOTE ADULT - PROBLEM SELECTOR PLAN 2
Chronic and stable, continue Lasix, enalapril and coreg. On Plavix. Will start low dose statin. Chronic and stable, continue Lasix, enalapril and coreg. On Plavix. ? statin allergy

## 2019-01-17 NOTE — PROGRESS NOTE ADULT - SUBJECTIVE AND OBJECTIVE BOX
77 yo female with PMHX CHF, HTN, Pacemaker seen for left foot painful ulcers. Patient is seen sitting in chair, with legs hanging off chair. Patient states her wounds are very painful .Pt denies being diabetic. Pt denies N/V/C/F/SOB. Pt denies any previous injury to the foot    Allergies: Aspirin, Atorvastatin, Penicillin                          10.7   8.2   )-----------( 183      ( 16 Jan 2019 10:40 )             33.2     PE: Left Foot  Vascular: DP/PT: non-palable due to pain; CFT< 3 sec x 5; TG: wnl; severe edema noted on the left foot and leg  Derm: onychomycosis nails noted on the left foot 1 to 5; IDM noted; ulceration noted on the dorsal digits with necrotic fibrotic base; dorsal left foot ulcer noted with fibrogranular base; mild mal odor noted; no pus noted; severe pain upon palpation; cellulitis extending to proximal leg noted;   right foot; interdigital maceration noted on the 1st interspace with dorsal wound   Neuro: Protective sensation intact    Findings: Marked diffuse osteoporosis involving the bones of the foot. No   definite evidence of fracture or dislocation. Evaluation is limited. No   definite evidence of bone destruction. No evidence of soft tissue gas.   There is diffuse soft tissue swelling..    Impression:    Diffuse soft tissue swelling. Marked diffuse osteoporosis..        A: Left Foot Ulcer with Leg Cellulitis; Right Foot Ulcer     P:  Patient evaluated and chart reviewed  Educated pt on proper foot care and change in shoe gear  Xray reviewed; results noted above   BELINDA ordered; results pending  Cx: Gram Negative [place]   Left Foot Ulcer evaluated, possible fungal component to the wound base with cellulitis extending to leg, possible OM of 2nd Digit. Cannot perform MRI because pt has a pacemaker.     Left Foot Wound Dressed with Christine/Betadine/DSD  Instructed pt to keep dressing clean dry and intact to the left foot   Dakin ordered for next dressing change   WCO Placed for Bilateral Dakins/DSD. Podiatry will perform dressing in morning. Nursing staff will perform dressing in evening.   Continue IV abx as per ID recommendation   Podiatry will follow patient while inhouse.

## 2019-01-17 NOTE — PROGRESS NOTE ADULT - ASSESSMENT
78 yr old female with hypertension, CHF, PVD, pacemaker was sent in for evaluation of left lower extremity cellulitis by her vascular surgeon. She was started on empiric Vancomycin and Cefepime for possible osteomyelitis, left second toe ulcer, ID and podiatry was consulted. MRI could not be performed as she has a pacemaker. Local wound care done by podiatry.

## 2019-01-18 DIAGNOSIS — L03.119 CELLULITIS OF UNSPECIFIED PART OF LIMB: ICD-10-CM

## 2019-01-18 DIAGNOSIS — L97.509 NON-PRESSURE CHRONIC ULCER OF OTHER PART OF UNSPECIFIED FOOT WITH UNSPECIFIED SEVERITY: ICD-10-CM

## 2019-01-18 DIAGNOSIS — I73.9 PERIPHERAL VASCULAR DISEASE, UNSPECIFIED: ICD-10-CM

## 2019-01-18 LAB
ANION GAP SERPL CALC-SCNC: 12 MMOL/L — SIGNIFICANT CHANGE UP (ref 5–17)
BUN SERPL-MCNC: 37 MG/DL — HIGH (ref 8–20)
CALCIUM SERPL-MCNC: 9.2 MG/DL — SIGNIFICANT CHANGE UP (ref 8.6–10.2)
CHLORIDE SERPL-SCNC: 99 MMOL/L — SIGNIFICANT CHANGE UP (ref 98–107)
CHOLEST SERPL-MCNC: 145 MG/DL — SIGNIFICANT CHANGE UP (ref 110–199)
CO2 SERPL-SCNC: 23 MMOL/L — SIGNIFICANT CHANGE UP (ref 22–29)
CREAT SERPL-MCNC: 0.96 MG/DL — SIGNIFICANT CHANGE UP (ref 0.5–1.3)
GLUCOSE SERPL-MCNC: 119 MG/DL — HIGH (ref 70–115)
HCT VFR BLD CALC: 31.3 % — LOW (ref 37–47)
HDLC SERPL-MCNC: 38 MG/DL — LOW
HGB BLD-MCNC: 10 G/DL — LOW (ref 12–16)
INR BLD: 1.18 RATIO — HIGH (ref 0.88–1.16)
LIPID PNL WITH DIRECT LDL SERPL: 86 MG/DL — SIGNIFICANT CHANGE UP
MAGNESIUM SERPL-MCNC: 2.4 MG/DL — SIGNIFICANT CHANGE UP (ref 1.6–2.6)
MCHC RBC-ENTMCNC: 29.9 PG — SIGNIFICANT CHANGE UP (ref 27–31)
MCHC RBC-ENTMCNC: 31.9 G/DL — LOW (ref 32–36)
MCV RBC AUTO: 93.7 FL — SIGNIFICANT CHANGE UP (ref 81–99)
PHOSPHATE SERPL-MCNC: 3.8 MG/DL — SIGNIFICANT CHANGE UP (ref 2.4–4.7)
PLATELET # BLD AUTO: 152 K/UL — SIGNIFICANT CHANGE UP (ref 150–400)
POTASSIUM SERPL-MCNC: 4.7 MMOL/L — SIGNIFICANT CHANGE UP (ref 3.5–5.3)
POTASSIUM SERPL-SCNC: 4.7 MMOL/L — SIGNIFICANT CHANGE UP (ref 3.5–5.3)
PROTHROM AB SERPL-ACNC: 13.6 SEC — HIGH (ref 10–12.9)
RBC # BLD: 3.34 M/UL — LOW (ref 4.4–5.2)
RBC # FLD: 13.6 % — SIGNIFICANT CHANGE UP (ref 11–15.6)
SODIUM SERPL-SCNC: 134 MMOL/L — LOW (ref 135–145)
TOTAL CHOLESTEROL/HDL RATIO MEASUREMENT: 4 RATIO — SIGNIFICANT CHANGE UP (ref 3.3–7.1)
TRIGL SERPL-MCNC: 104 MG/DL — SIGNIFICANT CHANGE UP (ref 10–200)
VANCOMYCIN TROUGH SERPL-MCNC: 15 UG/ML — SIGNIFICANT CHANGE UP (ref 10–20)
WBC # BLD: 6.1 K/UL — SIGNIFICANT CHANGE UP (ref 4.8–10.8)
WBC # FLD AUTO: 6.1 K/UL — SIGNIFICANT CHANGE UP (ref 4.8–10.8)

## 2019-01-18 PROCEDURE — 99232 SBSQ HOSP IP/OBS MODERATE 35: CPT

## 2019-01-18 PROCEDURE — 99233 SBSQ HOSP IP/OBS HIGH 50: CPT

## 2019-01-18 RX ADMIN — HEPARIN SODIUM 5000 UNIT(S): 5000 INJECTION INTRAVENOUS; SUBCUTANEOUS at 05:49

## 2019-01-18 RX ADMIN — CARVEDILOL PHOSPHATE 6.25 MILLIGRAM(S): 80 CAPSULE, EXTENDED RELEASE ORAL at 05:48

## 2019-01-18 RX ADMIN — Medication 250 MILLIGRAM(S): at 19:33

## 2019-01-18 RX ADMIN — MORPHINE SULFATE 2 MILLIGRAM(S): 50 CAPSULE, EXTENDED RELEASE ORAL at 23:55

## 2019-01-18 RX ADMIN — CLOPIDOGREL BISULFATE 75 MILLIGRAM(S): 75 TABLET, FILM COATED ORAL at 12:35

## 2019-01-18 RX ADMIN — Medication 250 MILLIGRAM(S): at 09:13

## 2019-01-18 RX ADMIN — HEPARIN SODIUM 5000 UNIT(S): 5000 INJECTION INTRAVENOUS; SUBCUTANEOUS at 15:05

## 2019-01-18 RX ADMIN — Medication 1 APPLICATION(S): at 11:29

## 2019-01-18 RX ADMIN — MORPHINE SULFATE 2 MILLIGRAM(S): 50 CAPSULE, EXTENDED RELEASE ORAL at 12:50

## 2019-01-18 RX ADMIN — Medication 2.5 MILLIGRAM(S): at 05:49

## 2019-01-18 RX ADMIN — MORPHINE SULFATE 2 MILLIGRAM(S): 50 CAPSULE, EXTENDED RELEASE ORAL at 02:08

## 2019-01-18 RX ADMIN — MORPHINE SULFATE 2 MILLIGRAM(S): 50 CAPSULE, EXTENDED RELEASE ORAL at 12:35

## 2019-01-18 RX ADMIN — CARVEDILOL PHOSPHATE 6.25 MILLIGRAM(S): 80 CAPSULE, EXTENDED RELEASE ORAL at 19:33

## 2019-01-18 RX ADMIN — CEFEPIME 100 MILLIGRAM(S): 1 INJECTION, POWDER, FOR SOLUTION INTRAMUSCULAR; INTRAVENOUS at 05:49

## 2019-01-18 RX ADMIN — Medication 250 MILLIGRAM(S): at 05:49

## 2019-01-18 RX ADMIN — MORPHINE SULFATE 2 MILLIGRAM(S): 50 CAPSULE, EXTENDED RELEASE ORAL at 01:32

## 2019-01-18 RX ADMIN — Medication 1 APPLICATION(S): at 05:49

## 2019-01-18 RX ADMIN — CEFEPIME 100 MILLIGRAM(S): 1 INJECTION, POWDER, FOR SOLUTION INTRAMUSCULAR; INTRAVENOUS at 19:32

## 2019-01-18 RX ADMIN — HEPARIN SODIUM 5000 UNIT(S): 5000 INJECTION INTRAVENOUS; SUBCUTANEOUS at 22:09

## 2019-01-18 NOTE — PROGRESS NOTE ADULT - ASSESSMENT
78 yr old female with hypertension, CHF, PVD, pacemaker was sent in for evaluation of left lower extremity cellulitis by her vascular surgeon. She was started on empiric Vancomycin and Cefepime for possible osteomyelitis, left second toe ulcer, ID and podiatry was consulted. MRI could not be performed as she has a pacemaker. Local wound care done by podiatry. PT evaluation was requested, advised [facility] vs home PT. Blood cultures were negative, wound cultures grew Proteus, Klebsiella, Corynebacterium and alpha hemolytic streptococcus.

## 2019-01-18 NOTE — PROGRESS NOTE ADULT - ASSESSMENT
79y/o F  with hypertension, CHF, PVD, pacemaker was sent in for evaluation of left lower extremity cellulitis by her vascular surgeon  Local culture with Proteus, Klebsiella, Corynebacterium and alpha hemolytic streptococcus      Cellulitis   r/o Osteomyelitis   Foot ulcer with polymicrobial culture  PVD  would r/o PAD    - Blood cultures no growth  - Wound culture is polymicrobial   - No MRI due to PPM  - Will order bone scan   - Continue IV antibiotics  - Trend Fever  - Trend Leukocytosis  - Recommend vascular surgery eval      will follow

## 2019-01-18 NOTE — PROGRESS NOTE ADULT - SUBJECTIVE AND OBJECTIVE BOX
[facility]  INFECTIOUS DISEASES AND INTERNAL MEDICINE at [place]  =======================================================  Car Alejandre MD  Diplomates American Board of Internal Medicine and Infectious Diseases  =======================================================    LESLY DELCID [id number]    Follow up: Cellulitis    No complaints  No fevers or chills  Reports feeling better since admission    Allergies:  aspirin (Unknown)  atorvastatin (Unknown)  penicillin (Unknown)      Antibiotics:      cefepime   IVPB 1000 milliGRAM(s) IV Intermittent every 12 hours  vancomycin  IVPB 1000 milliGRAM(s) IV Intermittent every 12 hours       REVIEW OF SYSTEMS:  CONSTITUTIONAL:  No Fever or chills  HEENT:   No diplopia or blurred vision.  No earache, sore throat or runny nose.  CARDIOVASCULAR:  No pressure, squeezing, strangling, tightness, heaviness or aching about the chest, neck, axilla or epigastrium.  RESPIRATORY:  No cough, shortness of breath  GASTROINTESTINAL:  No nausea, vomiting or diarrhea.  GENITOURINARY:  No dysuria, frequency or urgency.  MUSCULOSKELETAL:  no joint aches, no muscle pain  SKIN: B/L LE swelling  NEUROLOGIC:  No seizures or weakness.  PSYCHIATRIC:  No disorder of thought or mood.  ENDOCRINE:  No heat or cold intolerance  HEMATOLOGICAL:  No easy bruising or bleeding.       Physical Exam:  Vital Signs Last 24 Hrs  T(C): 36.8 (18 Jan 2019 07:45), Max: 37 (18 Jan 2019 05:45)  T(F): 98.2 (18 Jan 2019 07:45), Max: 98.6 (18 Jan 2019 05:45)  HR: 70 (18 Jan 2019 07:45) (70 - 96)  BP: 106/63 (18 Jan 2019 07:45) (90/48 - 106/64)  RR: 18 (18 Jan 2019 07:45) (18 - 18)  SpO2: 94% (18 Jan 2019 07:45) (94% - 97%)      GEN: NAD, pleasant  HEENT: normocephalic and atraumatic. EOMI. PERRL.  Anicteric   NECK: Supple.   LUNGS: Clear to auscultation.  HEART: Regular rate and rhythm   ABDOMEN: Soft, nontender, and nondistended.  Positive bowel sounds.    : No CVA tenderness  EXTREMITIES: + edema.  MSK: no joint swelling  NEUROLOGIC: Alert and oriented x3  PSYCHIATRIC: Appropriate affect .  SKIN: Chronic venus stasis skin changed B/L LE and ulcers      Labs:  01-18    134<L>  |  99  |  37.0<H>  ----------------------------<  119<H>  4.7   |  23.0  |  0.96    Ca    9.2      18 Jan 2019 08:03  Phos  3.8     01-18  Mg     2.4     01-18               10.6   8.9   )-----------( 173      ( 17 Jan 2019 09:49 )             33.7       PT/INR - ( 18 Jan 2019 08:03 )   PT: 13.6 sec;   INR: 1.18 ratio        RECENT CULTURES:  01-15 @ 21:21 .Other Proteus mirabilis  Klebsiella oxytoca    Moderate Proteus mirabilis  Moderate Klebsiella oxytoca  Few Corynebacterium species  Moderate Alpha hemolytic strep      01-15 @ 19:33 .Blood     No growth at 48 hours      01-15 @ 19:32 .Blood     No growth at 48 hours          EXAM:  FOOT-LEFT                        PROCEDURE DATE:  01/15/2019    INTERPRETATION:  LEFT FOOT X-RAY:  History: Left foot pain.  Date and time of exam: 1/15/2019 6:08 PM  3 views were obtained.    Findings: Marked diffuse osteoporosis involving the bones of the foot. No   definite evidence of fracture or dislocation. Evaluation is limited. No   definite evidence of bone destruction. No evidence of soft tissue gas.   There is diffuse soft tissue swelling..  Impression:  Diffuse soft tissue swelling. Marked diffuse osteoporosis..

## 2019-01-18 NOTE — PHYSICAL THERAPY INITIAL EVALUATION ADULT - ADDITIONAL COMMENTS
Pt. states she lives in a house, alone with 0 TANO and 6 STI / bilat HR. Pt. ambulates with RW and owns a shower chair. Pt. states she has her sister that provides transportation but she is unable to assist.

## 2019-01-18 NOTE — PROGRESS NOTE ADULT - SUBJECTIVE AND OBJECTIVE BOX
INTERVAL HPI/OVERNIGHT EVENTS:     CC: left foot ulcer with cellulitis, hypertension, systolic CHF, pacemaker    No overnight events, pain controlled, no diarrhea.    Vital Signs Last 24 Hrs  T(C): 36.8 (18 Jan 2019 07:45), Max: 37 (18 Jan 2019 05:45)  T(F): 98.2 (18 Jan 2019 07:45), Max: 98.6 (18 Jan 2019 05:45)  HR: 70 (18 Jan 2019 07:45) (70 - 96)  BP: 106/63 (18 Jan 2019 07:45) (90/48 - 106/64)  BP(mean): --  RR: 18 (18 Jan 2019 07:45) (18 - 18)  SpO2: 94% (18 Jan 2019 07:45) (94% - 97%)    PHYSICAL EXAM:    GENERAL: Not in distress, alert  CHEST/LUNG: b/l air entry  HEART: Regular   ABDOMEN: Soft, BS+  EXTREMITIES:  dressing in place, + edema and tenderness.    MEDICATIONS  (STANDING):  carvedilol 6.25 milliGRAM(s) Oral two times a day  cefepime   IVPB      cefepime   IVPB 1000 milliGRAM(s) IV Intermittent every 12 hours  clopidogrel Tablet 75 milliGRAM(s) Oral daily  Dakins Solution - 1/4 Strength 1 Application(s) Topical two times a day  Dakins Solution - 1/4 Strength 1 Application(s) Topical daily  enalapril 2.5 milliGRAM(s) Oral daily  heparin  Injectable 5000 Unit(s) SubCutaneous every 8 hours  saccharomyces boulardii 250 milliGRAM(s) Oral two times a day  vancomycin  IVPB 1000 milliGRAM(s) IV Intermittent every 12 hours    MEDICATIONS  (PRN):  acetaminophen   Tablet .. 650 milliGRAM(s) Oral every 6 hours PRN Mild Pain (1 - 3)  morphine  - Injectable 2 milliGRAM(s) IV Push every 4 hours PRN Severe Pain (7 - 10)      Allergies    aspirin (Unknown)  atorvastatin (Unknown)  penicillin (Unknown)    Intolerances          LABS:                          10.6   8.9   )-----------( 173      ( 17 Jan 2019 09:49 )             33.7     01-18    134<L>  |  99  |  37.0<H>  ----------------------------<  119<H>  4.7   |  23.0  |  0.96    Ca    9.2      18 Jan 2019 08:03  Phos  3.8     01-18  Mg     2.4     01-18      PT/INR - ( 18 Jan 2019 08:03 )   PT: 13.6 sec;   INR: 1.18 ratio               RADIOLOGY & ADDITIONAL TESTS:

## 2019-01-18 NOTE — PHARMACOTHERAPY INTERVENTION NOTE - COMMENTS
Outpatient medication reconciliation completed and updated
Vanco increased to 1000mg every 12 hours.  Will draw level on 1/18 @5am (draw 1 hour before giving 6am dose)
Vanco trough ordered for 1/19 @8pm (draw 1 hour before giving 9pm dose)

## 2019-01-18 NOTE — PROGRESS NOTE ADULT - PROBLEM SELECTOR PLAN 1
Cultures noted, check MRSA swab. Continue current antibiotics, ID follow up. Local wound care, pain control. PT recs noted. Cultures noted, check MRSA swab. Continue current antibiotics, ID follow up. Local wound care, pain control. PT recs noted. Nuclear study ordered. Called [employer], pacemaker not MRI compatible.

## 2019-01-19 LAB
ANION GAP SERPL CALC-SCNC: 9 MMOL/L — SIGNIFICANT CHANGE UP (ref 5–17)
BUN SERPL-MCNC: 27 MG/DL — HIGH (ref 8–20)
CALCIUM SERPL-MCNC: 9.5 MG/DL — SIGNIFICANT CHANGE UP (ref 8.6–10.2)
CHLORIDE SERPL-SCNC: 103 MMOL/L — SIGNIFICANT CHANGE UP (ref 98–107)
CO2 SERPL-SCNC: 26 MMOL/L — SIGNIFICANT CHANGE UP (ref 22–29)
CREAT SERPL-MCNC: 0.78 MG/DL — SIGNIFICANT CHANGE UP (ref 0.5–1.3)
CRP SERPL-MCNC: 4.4 MG/DL — HIGH (ref 0–0.4)
ERYTHROCYTE [SEDIMENTATION RATE] IN BLOOD: 58 MM/HR — HIGH (ref 0–20)
GLUCOSE SERPL-MCNC: 99 MG/DL — SIGNIFICANT CHANGE UP (ref 70–115)
HCT VFR BLD CALC: 30.8 % — LOW (ref 37–47)
HGB BLD-MCNC: 10.3 G/DL — LOW (ref 12–16)
MAGNESIUM SERPL-MCNC: 2.5 MG/DL — SIGNIFICANT CHANGE UP (ref 1.6–2.6)
MCHC RBC-ENTMCNC: 31.2 PG — HIGH (ref 27–31)
MCHC RBC-ENTMCNC: 33.4 G/DL — SIGNIFICANT CHANGE UP (ref 32–36)
MCV RBC AUTO: 93.3 FL — SIGNIFICANT CHANGE UP (ref 81–99)
PHOSPHATE SERPL-MCNC: 3 MG/DL — SIGNIFICANT CHANGE UP (ref 2.4–4.7)
PLATELET # BLD AUTO: 168 K/UL — SIGNIFICANT CHANGE UP (ref 150–400)
POTASSIUM SERPL-MCNC: 4.9 MMOL/L — SIGNIFICANT CHANGE UP (ref 3.5–5.3)
POTASSIUM SERPL-SCNC: 4.9 MMOL/L — SIGNIFICANT CHANGE UP (ref 3.5–5.3)
RBC # BLD: 3.3 M/UL — LOW (ref 4.4–5.2)
RBC # FLD: 13.6 % — SIGNIFICANT CHANGE UP (ref 11–15.6)
SODIUM SERPL-SCNC: 138 MMOL/L — SIGNIFICANT CHANGE UP (ref 135–145)
VANCOMYCIN TROUGH SERPL-MCNC: 15.8 UG/ML — SIGNIFICANT CHANGE UP (ref 10–20)
VANCOMYCIN TROUGH SERPL-MCNC: 18.6 UG/ML — SIGNIFICANT CHANGE UP (ref 10–20)
WBC # BLD: 5.9 K/UL — SIGNIFICANT CHANGE UP (ref 4.8–10.8)
WBC # FLD AUTO: 5.9 K/UL — SIGNIFICANT CHANGE UP (ref 4.8–10.8)

## 2019-01-19 PROCEDURE — 99232 SBSQ HOSP IP/OBS MODERATE 35: CPT

## 2019-01-19 RX ADMIN — Medication 1 APPLICATION(S): at 11:53

## 2019-01-19 RX ADMIN — CEFEPIME 100 MILLIGRAM(S): 1 INJECTION, POWDER, FOR SOLUTION INTRAMUSCULAR; INTRAVENOUS at 05:18

## 2019-01-19 RX ADMIN — CARVEDILOL PHOSPHATE 6.25 MILLIGRAM(S): 80 CAPSULE, EXTENDED RELEASE ORAL at 05:17

## 2019-01-19 RX ADMIN — MORPHINE SULFATE 2 MILLIGRAM(S): 50 CAPSULE, EXTENDED RELEASE ORAL at 10:47

## 2019-01-19 RX ADMIN — MORPHINE SULFATE 2 MILLIGRAM(S): 50 CAPSULE, EXTENDED RELEASE ORAL at 11:30

## 2019-01-19 RX ADMIN — MORPHINE SULFATE 2 MILLIGRAM(S): 50 CAPSULE, EXTENDED RELEASE ORAL at 05:16

## 2019-01-19 RX ADMIN — CARVEDILOL PHOSPHATE 6.25 MILLIGRAM(S): 80 CAPSULE, EXTENDED RELEASE ORAL at 17:08

## 2019-01-19 RX ADMIN — Medication 250 MILLIGRAM(S): at 23:54

## 2019-01-19 RX ADMIN — Medication 250 MILLIGRAM(S): at 17:09

## 2019-01-19 RX ADMIN — HEPARIN SODIUM 5000 UNIT(S): 5000 INJECTION INTRAVENOUS; SUBCUTANEOUS at 05:17

## 2019-01-19 RX ADMIN — MORPHINE SULFATE 2 MILLIGRAM(S): 50 CAPSULE, EXTENDED RELEASE ORAL at 21:44

## 2019-01-19 RX ADMIN — HEPARIN SODIUM 5000 UNIT(S): 5000 INJECTION INTRAVENOUS; SUBCUTANEOUS at 14:19

## 2019-01-19 RX ADMIN — CEFEPIME 100 MILLIGRAM(S): 1 INJECTION, POWDER, FOR SOLUTION INTRAMUSCULAR; INTRAVENOUS at 17:08

## 2019-01-19 RX ADMIN — CLOPIDOGREL BISULFATE 75 MILLIGRAM(S): 75 TABLET, FILM COATED ORAL at 14:19

## 2019-01-19 RX ADMIN — MORPHINE SULFATE 2 MILLIGRAM(S): 50 CAPSULE, EXTENDED RELEASE ORAL at 00:20

## 2019-01-19 RX ADMIN — Medication 250 MILLIGRAM(S): at 00:52

## 2019-01-19 RX ADMIN — Medication 250 MILLIGRAM(S): at 14:18

## 2019-01-19 RX ADMIN — Medication 2.5 MILLIGRAM(S): at 05:17

## 2019-01-19 RX ADMIN — Medication 1 APPLICATION(S): at 21:45

## 2019-01-19 RX ADMIN — MORPHINE SULFATE 2 MILLIGRAM(S): 50 CAPSULE, EXTENDED RELEASE ORAL at 17:07

## 2019-01-19 RX ADMIN — HEPARIN SODIUM 5000 UNIT(S): 5000 INJECTION INTRAVENOUS; SUBCUTANEOUS at 21:44

## 2019-01-19 RX ADMIN — Medication 1 APPLICATION(S): at 00:27

## 2019-01-19 RX ADMIN — MORPHINE SULFATE 2 MILLIGRAM(S): 50 CAPSULE, EXTENDED RELEASE ORAL at 04:43

## 2019-01-19 RX ADMIN — Medication 250 MILLIGRAM(S): at 05:17

## 2019-01-19 RX ADMIN — MORPHINE SULFATE 2 MILLIGRAM(S): 50 CAPSULE, EXTENDED RELEASE ORAL at 23:07

## 2019-01-19 RX ADMIN — Medication 1 APPLICATION(S): at 11:54

## 2019-01-19 NOTE — PROGRESS NOTE ADULT - ASSESSMENT
77y/o F  with hypertension, CHF, PVD, pacemaker was sent in for evaluation of left lower extremity cellulitis by her vascular surgeon  Local culture with Proteus, Klebsiella, Corynebacterium and alpha hemolytic streptococcus      Cellulitis   r/o Osteomyelitis   Foot ulcer with polymicrobial culture  PVD  would r/o PAD    - Blood cultures no growth  - Wound culture is polymicrobial   - No MRI due to PPM  - WBC cell scan pending  - Continue IV antibiotics  - Trend Fever  - Trend Leukocytosis  - Recommend vascular surgery eval      will follow

## 2019-01-19 NOTE — PROGRESS NOTE ADULT - ASSESSMENT
78 yr old female with hypertension, CHF, PVD, pacemaker was sent in for evaluation of left lower extremity cellulitis by her vascular surgeon. She was started on empiric Vancomycin and Cefepime for possible osteomyelitis, left second toe ulcer, ID and podiatry was consulted. MRI could not be performed as she has a pacemaker. Local wound care done by podiatry. PT evaluation was requested, advised JOHN vs home PT. Blood cultures were negative, wound cultures grew Proteus, Klebsiella, Corynebacterium and alpha hemolytic streptococcus. Indium scan was ordered to rule out osteomyelitis.

## 2019-01-19 NOTE — PROGRESS NOTE ADULT - SUBJECTIVE AND OBJECTIVE BOX
[facility]  INFECTIOUS DISEASES AND INTERNAL MEDICINE at [place]  =======================================================  Car Alejandre MD  Diplomates American Board of Internal Medicine and Infectious Diseases  =======================================================    LESLY DELCID [id number]    Follow up: Cellulitis    No complaints  No fevers or chills  Reports feeling better since admission    Allergies:  aspirin (Unknown)  atorvastatin (Unknown)  penicillin (Unknown)      Antibiotics:      cefepime   IVPB 1000 milliGRAM(s) IV Intermittent every 12 hours  vancomycin  IVPB 1000 milliGRAM(s) IV Intermittent every 12 hours       REVIEW OF SYSTEMS:  CONSTITUTIONAL:  No Fever or chills  HEENT:   No diplopia or blurred vision.  No earache, sore throat or runny nose.  CARDIOVASCULAR:  No pressure, squeezing, strangling, tightness, heaviness or aching about the chest, neck, axilla or epigastrium.  RESPIRATORY:  No cough, shortness of breath  GASTROINTESTINAL:  No nausea, vomiting or diarrhea.  GENITOURINARY:  No dysuria, frequency or urgency.  MUSCULOSKELETAL:  no joint aches, no muscle pain  SKIN: B/L LE swelling  NEUROLOGIC:  No seizures or weakness.  PSYCHIATRIC:  No disorder of thought or mood.  ENDOCRINE:  No heat or cold intolerance  HEMATOLOGICAL:  No easy bruising or bleeding.       Physical Exam:  Vital Signs Last 24 Hrs  T(C): 37.2 (18 Jan 2019 23:16), Max: 37.2 (18 Jan 2019 23:16)  T(F): 99 (18 Jan 2019 23:16), Max: 99 (18 Jan 2019 23:16)  HR: 81 (19 Jan 2019 04:45) (70 - 95)  BP: 145/72 (19 Jan 2019 04:45) (104/60 - 145/72)  RR: 18 (18 Jan 2019 23:16) (18 - 18)  SpO2: 95% (18 Jan 2019 23:16) (94% - 95%)      GEN: NAD, pleasant  HEENT: normocephalic and atraumatic. EOMI. PERRL.  Anicteric   NECK: Supple.   LUNGS: Clear to auscultation.  HEART: Regular rate and rhythm   ABDOMEN: Soft, nontender, and nondistended.  Positive bowel sounds.    : No CVA tenderness  EXTREMITIES: + edema.  MSK: no joint swelling  NEUROLOGIC: Alert and oriented x3  PSYCHIATRIC: Appropriate affect .  SKIN: Chronic venus stasis skin changed B/L LE and ulcers      Labs:  01-18    134<L>  |  99  |  37.0<H>  ----------------------------<  119<H>  4.7   |  23.0  |  0.96    Ca    9.2      18 Jan 2019 08:03  Phos  3.8     01-18  Mg     2.4     01-18               10.0   6.1   )-----------( 152      ( 18 Jan 2019 08:03 )             31.3       PT/INR - ( 18 Jan 2019 08:03 )   PT: 13.6 sec;   INR: 1.18 ratio        RECENT CULTURES:  01-15 @ 21:21 .Other Proteus mirabilis  Klebsiella oxytoca    Moderate Proteus mirabilis  Moderate Klebsiella oxytoca  Few Corynebacterium species  Moderate Alpha hemolytic strep      01-15 @ 19:33 .Blood     No growth at 48 hours      01-15 @ 19:32 .Blood     No growth at 48 hours          EXAM:  FOOT-LEFT                        PROCEDURE DATE:  01/15/2019    INTERPRETATION:  LEFT FOOT X-RAY:  History: Left foot pain.  Date and time of exam: 1/15/2019 6:08 PM  3 views were obtained.    Findings: Marked diffuse osteoporosis involving the bones of the foot. No   definite evidence of fracture or dislocation. Evaluation is limited. No   definite evidence of bone destruction. No evidence of soft tissue gas.   There is diffuse soft tissue swelling..  Impression:  Diffuse soft tissue swelling. Marked diffuse osteoporosis..

## 2019-01-19 NOTE — PROGRESS NOTE ADULT - SUBJECTIVE AND OBJECTIVE BOX
INTERVAL HPI/OVERNIGHT EVENTS:    CC: left foot ulcer with cellulitis, hypertension, systolic CHF, pacemaker    No overnight events, no new complaints.    Vital Signs Last 24 Hrs  T(C): 36.8 (19 Jan 2019 08:30), Max: 37.2 (18 Jan 2019 23:16)  T(F): 98.2 (19 Jan 2019 08:30), Max: 99 (18 Jan 2019 23:16)  HR: 76 (19 Jan 2019 08:30) (75 - 95)  BP: 144/85 (19 Jan 2019 08:30) (104/60 - 145/72)  BP(mean): --  RR: 18 (19 Jan 2019 08:30) (18 - 18)  SpO2: 98% (19 Jan 2019 08:30) (95% - 98%)    PHYSICAL EXAM:    GENERAL: Not in distress, alert  CHEST/LUNG: b/l air entry, clear  HEART: Regular   ABDOMEN: Soft, BS+  EXTREMITIES:  dressing in place, + erythema and swelling.    MEDICATIONS  (STANDING):  carvedilol 6.25 milliGRAM(s) Oral two times a day  cefepime   IVPB      cefepime   IVPB 1000 milliGRAM(s) IV Intermittent every 12 hours  clopidogrel Tablet 75 milliGRAM(s) Oral daily  Dakins Solution - 1/4 Strength 1 Application(s) Topical two times a day  Dakins Solution - 1/4 Strength 1 Application(s) Topical daily  enalapril 2.5 milliGRAM(s) Oral daily  heparin  Injectable 5000 Unit(s) SubCutaneous every 8 hours  saccharomyces boulardii 250 milliGRAM(s) Oral two times a day  vancomycin  IVPB 1000 milliGRAM(s) IV Intermittent every 12 hours    MEDICATIONS  (PRN):  acetaminophen   Tablet .. 650 milliGRAM(s) Oral every 6 hours PRN Mild Pain (1 - 3)  morphine  - Injectable 2 milliGRAM(s) IV Push every 4 hours PRN Severe Pain (7 - 10)      Allergies    aspirin (Unknown)  atorvastatin (Unknown)  penicillin (Unknown)    Intolerances          LABS:                          10.3   5.9   )-----------( 168      ( 19 Jan 2019 09:14 )             30.8     01-19    138  |  103  |  27.0<H>  ----------------------------<  99  4.9   |  26.0  |  0.78    Ca    9.5      19 Jan 2019 09:14  Phos  3.0     01-19  Mg     2.5     01-19      PT/INR - ( 18 Jan 2019 08:03 )   PT: 13.6 sec;   INR: 1.18 ratio               RADIOLOGY & ADDITIONAL TESTS:

## 2019-01-20 LAB
CULTURE RESULTS: SIGNIFICANT CHANGE UP
CULTURE RESULTS: SIGNIFICANT CHANGE UP
SPECIMEN SOURCE: SIGNIFICANT CHANGE UP
SPECIMEN SOURCE: SIGNIFICANT CHANGE UP

## 2019-01-20 PROCEDURE — 99232 SBSQ HOSP IP/OBS MODERATE 35: CPT

## 2019-01-20 RX ORDER — SODIUM HYPOCHLORITE 0.125 %
1 SOLUTION, NON-ORAL MISCELLANEOUS
Qty: 0 | Refills: 0 | Status: DISCONTINUED | OUTPATIENT
Start: 2019-01-20 | End: 2019-01-25

## 2019-01-20 RX ORDER — SODIUM HYPOCHLORITE 0.125 %
1 SOLUTION, NON-ORAL MISCELLANEOUS
Qty: 0 | Refills: 0 | Status: DISCONTINUED | OUTPATIENT
Start: 2019-01-20 | End: 2019-01-20

## 2019-01-20 RX ORDER — FUROSEMIDE 40 MG
40 TABLET ORAL
Qty: 0 | Refills: 0 | Status: DISCONTINUED | OUTPATIENT
Start: 2019-01-20 | End: 2019-01-22

## 2019-01-20 RX ADMIN — MORPHINE SULFATE 2 MILLIGRAM(S): 50 CAPSULE, EXTENDED RELEASE ORAL at 05:46

## 2019-01-20 RX ADMIN — MORPHINE SULFATE 2 MILLIGRAM(S): 50 CAPSULE, EXTENDED RELEASE ORAL at 21:35

## 2019-01-20 RX ADMIN — MORPHINE SULFATE 2 MILLIGRAM(S): 50 CAPSULE, EXTENDED RELEASE ORAL at 17:04

## 2019-01-20 RX ADMIN — MORPHINE SULFATE 2 MILLIGRAM(S): 50 CAPSULE, EXTENDED RELEASE ORAL at 11:11

## 2019-01-20 RX ADMIN — Medication 250 MILLIGRAM(S): at 06:00

## 2019-01-20 RX ADMIN — HEPARIN SODIUM 5000 UNIT(S): 5000 INJECTION INTRAVENOUS; SUBCUTANEOUS at 15:51

## 2019-01-20 RX ADMIN — Medication 1 APPLICATION(S): at 11:17

## 2019-01-20 RX ADMIN — CARVEDILOL PHOSPHATE 6.25 MILLIGRAM(S): 80 CAPSULE, EXTENDED RELEASE ORAL at 18:31

## 2019-01-20 RX ADMIN — HEPARIN SODIUM 5000 UNIT(S): 5000 INJECTION INTRAVENOUS; SUBCUTANEOUS at 06:00

## 2019-01-20 RX ADMIN — CEFEPIME 100 MILLIGRAM(S): 1 INJECTION, POWDER, FOR SOLUTION INTRAMUSCULAR; INTRAVENOUS at 18:31

## 2019-01-20 RX ADMIN — Medication 2.5 MILLIGRAM(S): at 06:00

## 2019-01-20 RX ADMIN — CEFEPIME 100 MILLIGRAM(S): 1 INJECTION, POWDER, FOR SOLUTION INTRAMUSCULAR; INTRAVENOUS at 06:00

## 2019-01-20 RX ADMIN — HEPARIN SODIUM 5000 UNIT(S): 5000 INJECTION INTRAVENOUS; SUBCUTANEOUS at 21:34

## 2019-01-20 RX ADMIN — CARVEDILOL PHOSPHATE 6.25 MILLIGRAM(S): 80 CAPSULE, EXTENDED RELEASE ORAL at 06:00

## 2019-01-20 RX ADMIN — CLOPIDOGREL BISULFATE 75 MILLIGRAM(S): 75 TABLET, FILM COATED ORAL at 11:16

## 2019-01-20 RX ADMIN — Medication 40 MILLIGRAM(S): at 18:31

## 2019-01-20 RX ADMIN — Medication 250 MILLIGRAM(S): at 18:31

## 2019-01-20 RX ADMIN — Medication 1 APPLICATION(S): at 21:34

## 2019-01-20 NOTE — PROGRESS NOTE ADULT - ASSESSMENT
78 yr old female with hypertension, CHF, PVD, pacemaker was sent in for evaluation of left lower extremity cellulitis by her vascular surgeon. She was started on empiric Vancomycin and Cefepime for possible osteomyelitis, left second toe ulcer, ID and podiatry was consulted. MRI could not be performed as she has a pacemaker. Local wound care done by podiatry. PT evaluation was requested, advised JOHN vs home PT. Blood cultures were negative, wound cultures grew Proteus, Klebsiella, Corynebacterium and alpha hemolytic streptococcus. Indium scan was ordered to rule out osteomyelitis. Lasix was held during the initial course of stay given hypotension and dehydration, this was subsequently resumed.

## 2019-01-20 NOTE — PROGRESS NOTE ADULT - SUBJECTIVE AND OBJECTIVE BOX
[facility]  INFECTIOUS DISEASES AND INTERNAL MEDICINE at [place]  =======================================================  Car Alejandre MD  Diplomates American Board of Internal Medicine and Infectious Diseases  =======================================================    LESLY DELCID [id number]    Follow up: Cellulitis    No complaints  No fevers or chills  Reports feeling better since admission    Allergies:  aspirin (Unknown)  atorvastatin (Unknown)  penicillin (Unknown)      Antibiotics:      cefepime   IVPB 1000 milliGRAM(s) IV Intermittent every 12 hours  vancomycin  IVPB 1000 milliGRAM(s) IV Intermittent every 12 hours       REVIEW OF SYSTEMS:  CONSTITUTIONAL:  No Fever or chills  HEENT:   No diplopia or blurred vision.  No earache, sore throat or runny nose.  CARDIOVASCULAR:  No pressure, squeezing, strangling, tightness, heaviness or aching about the chest, neck, axilla or epigastrium.  RESPIRATORY:  No cough, shortness of breath  GASTROINTESTINAL:  No nausea, vomiting or diarrhea.  GENITOURINARY:  No dysuria, frequency or urgency.  MUSCULOSKELETAL:  no joint aches, no muscle pain  SKIN: B/L LE swelling  NEUROLOGIC:  No seizures or weakness.  PSYCHIATRIC:  No disorder of thought or mood.  ENDOCRINE:  No heat or cold intolerance  HEMATOLOGICAL:  No easy bruising or bleeding.       Physical Exam:  Vital Signs Last 24 Hrs  T(C): 36.9 (20 Jan 2019 07:45), Max: 36.9 (20 Jan 2019 07:45)  T(F): 98.5 (20 Jan 2019 07:45), Max: 98.5 (20 Jan 2019 07:45)  HR: 68 (20 Jan 2019 07:45) (68 - 86)  BP: 111/61 (20 Jan 2019 07:45) (111/61 - 144/85)  RR: 18 (20 Jan 2019 07:45) (18 - 18)  SpO2: 98% (20 Jan 2019 07:45) (88% - 98%)      GEN: NAD, pleasant  HEENT: normocephalic and atraumatic. EOMI. PERRL.  Anicteric   NECK: Supple.   LUNGS: Clear to auscultation.  HEART: Regular rate and rhythm   ABDOMEN: Soft, nontender, and nondistended.  Positive bowel sounds.    : No CVA tenderness  EXTREMITIES: + edema.  MSK: no joint swelling  NEUROLOGIC: Alert and oriented x3  PSYCHIATRIC: Appropriate affect .  SKIN: Chronic venus stasis skin changed B/L LE and ulcers      Labs:  01-19    138  |  103  |  27.0<H>  ----------------------------<  99  4.9   |  26.0  |  0.78    Ca    9.5      19 Jan 2019 09:14  Phos  3.0     01-19  Mg     2.5     01-19               10.3   5.9   )-----------( 168      ( 19 Jan 2019 09:14 )             30.8       RECENT CULTURES:  01-15 @ 21:21 .Other Proteus mirabilis  Klebsiella oxytoca    Moderate Proteus mirabilis  Moderate Klebsiella oxytoca  Few Corynebacterium species  Moderate Alpha hemolytic strep      01-15 @ 19:33 .Blood     No growth at 48 hours      01-15 @ 19:32 .Blood     No growth at 48 hours          EXAM:  FOOT-LEFT                        PROCEDURE DATE:  01/15/2019    INTERPRETATION:  LEFT FOOT X-RAY:  History: Left foot pain.  Date and time of exam: 1/15/2019 6:08 PM  3 views were obtained.    Findings: Marked diffuse osteoporosis involving the bones of the foot. No   definite evidence of fracture or dislocation. Evaluation is limited. No   definite evidence of bone destruction. No evidence of soft tissue gas.   There is diffuse soft tissue swelling..  Impression:  Diffuse soft tissue swelling. Marked diffuse osteoporosis..

## 2019-01-20 NOTE — PROGRESS NOTE ADULT - SUBJECTIVE AND OBJECTIVE BOX
INTERVAL HPI/OVERNIGHT EVENTS:    CC:     No overnight events, no new complaints.    Vital Signs Last 24 Hrs  T(C): 36.9 (20 Jan 2019 07:45), Max: 36.9 (20 Jan 2019 07:45)  T(F): 98.5 (20 Jan 2019 07:45), Max: 98.5 (20 Jan 2019 07:45)  HR: 68 (20 Jan 2019 07:45) (68 - 86)  BP: 111/61 (20 Jan 2019 07:45) (111/61 - 142/75)  BP(mean): --  RR: 18 (20 Jan 2019 07:45) (18 - 18)  SpO2: 98% (20 Jan 2019 07:45) (88% - 98%)    PHYSICAL EXAM:    GENERAL: Not in distress, alert, sitting in chair  CHEST/LUNG: b/l air entry, clear  HEART: Regular   ABDOMEN: Soft, BS+  EXTREMITIES:  dressing in place, + erythema and swelling.    MEDICATIONS  (STANDING):  carvedilol 6.25 milliGRAM(s) Oral two times a day  cefepime   IVPB      cefepime   IVPB 1000 milliGRAM(s) IV Intermittent every 12 hours  clopidogrel Tablet 75 milliGRAM(s) Oral daily  Dakins Solution - 1/4 Strength 1 Application(s) Topical two times a day  Dakins Solution - 1/4 Strength 1 Application(s) Topical daily  enalapril 2.5 milliGRAM(s) Oral daily  furosemide    Tablet 40 milliGRAM(s) Oral two times a day  heparin  Injectable 5000 Unit(s) SubCutaneous every 8 hours  saccharomyces boulardii 250 milliGRAM(s) Oral two times a day    MEDICATIONS  (PRN):  acetaminophen   Tablet .. 650 milliGRAM(s) Oral every 6 hours PRN Mild Pain (1 - 3)  morphine  - Injectable 2 milliGRAM(s) IV Push every 4 hours PRN Severe Pain (7 - 10)      Allergies    aspirin (Unknown)  atorvastatin (Unknown)  penicillin (Unknown)    Intolerances          LABS:                          10.3   5.9   )-----------( 168      ( 19 Jan 2019 09:14 )             30.8     01-19    138  |  103  |  27.0<H>  ----------------------------<  99  4.9   |  26.0  |  0.78    Ca    9.5      19 Jan 2019 09:14  Phos  3.0     01-19  Mg     2.5     01-19            RADIOLOGY & ADDITIONAL TESTS:

## 2019-01-21 LAB
ANION GAP SERPL CALC-SCNC: 12 MMOL/L — SIGNIFICANT CHANGE UP (ref 5–17)
BUN SERPL-MCNC: 26 MG/DL — HIGH (ref 8–20)
CALCIUM SERPL-MCNC: 9.6 MG/DL — SIGNIFICANT CHANGE UP (ref 8.6–10.2)
CHLORIDE SERPL-SCNC: 98 MMOL/L — SIGNIFICANT CHANGE UP (ref 98–107)
CO2 SERPL-SCNC: 24 MMOL/L — SIGNIFICANT CHANGE UP (ref 22–29)
CREAT SERPL-MCNC: 0.81 MG/DL — SIGNIFICANT CHANGE UP (ref 0.5–1.3)
GLUCOSE SERPL-MCNC: 95 MG/DL — SIGNIFICANT CHANGE UP (ref 70–115)
HCT VFR BLD CALC: 32.9 % — LOW (ref 37–47)
HGB BLD-MCNC: 10.5 G/DL — LOW (ref 12–16)
MAGNESIUM SERPL-MCNC: 2.4 MG/DL — SIGNIFICANT CHANGE UP (ref 1.6–2.6)
MCHC RBC-ENTMCNC: 29.9 PG — SIGNIFICANT CHANGE UP (ref 27–31)
MCHC RBC-ENTMCNC: 31.9 G/DL — LOW (ref 32–36)
MCV RBC AUTO: 93.7 FL — SIGNIFICANT CHANGE UP (ref 81–99)
PHOSPHATE SERPL-MCNC: 3.6 MG/DL — SIGNIFICANT CHANGE UP (ref 2.4–4.7)
PLATELET # BLD AUTO: 164 K/UL — SIGNIFICANT CHANGE UP (ref 150–400)
POTASSIUM SERPL-MCNC: 4.9 MMOL/L — SIGNIFICANT CHANGE UP (ref 3.5–5.3)
POTASSIUM SERPL-SCNC: 4.9 MMOL/L — SIGNIFICANT CHANGE UP (ref 3.5–5.3)
RBC # BLD: 3.51 M/UL — LOW (ref 4.4–5.2)
RBC # FLD: 13.7 % — SIGNIFICANT CHANGE UP (ref 11–15.6)
SODIUM SERPL-SCNC: 134 MMOL/L — LOW (ref 135–145)
WBC # BLD: 5.8 K/UL — SIGNIFICANT CHANGE UP (ref 4.8–10.8)
WBC # FLD AUTO: 5.8 K/UL — SIGNIFICANT CHANGE UP (ref 4.8–10.8)

## 2019-01-21 PROCEDURE — 99232 SBSQ HOSP IP/OBS MODERATE 35: CPT

## 2019-01-21 RX ORDER — OXYCODONE AND ACETAMINOPHEN 5; 325 MG/1; MG/1
2 TABLET ORAL EVERY 4 HOURS
Qty: 0 | Refills: 0 | Status: DISCONTINUED | OUTPATIENT
Start: 2019-01-21 | End: 2019-01-25

## 2019-01-21 RX ADMIN — OXYCODONE AND ACETAMINOPHEN 2 TABLET(S): 5; 325 TABLET ORAL at 17:54

## 2019-01-21 RX ADMIN — CEFEPIME 100 MILLIGRAM(S): 1 INJECTION, POWDER, FOR SOLUTION INTRAMUSCULAR; INTRAVENOUS at 05:51

## 2019-01-21 RX ADMIN — Medication 40 MILLIGRAM(S): at 05:51

## 2019-01-21 RX ADMIN — HEPARIN SODIUM 5000 UNIT(S): 5000 INJECTION INTRAVENOUS; SUBCUTANEOUS at 22:02

## 2019-01-21 RX ADMIN — CARVEDILOL PHOSPHATE 6.25 MILLIGRAM(S): 80 CAPSULE, EXTENDED RELEASE ORAL at 17:55

## 2019-01-21 RX ADMIN — Medication 1 APPLICATION(S): at 22:02

## 2019-01-21 RX ADMIN — Medication 250 MILLIGRAM(S): at 17:55

## 2019-01-21 RX ADMIN — Medication 40 MILLIGRAM(S): at 17:55

## 2019-01-21 RX ADMIN — HEPARIN SODIUM 5000 UNIT(S): 5000 INJECTION INTRAVENOUS; SUBCUTANEOUS at 05:51

## 2019-01-21 RX ADMIN — MORPHINE SULFATE 2 MILLIGRAM(S): 50 CAPSULE, EXTENDED RELEASE ORAL at 22:02

## 2019-01-21 RX ADMIN — Medication 250 MILLIGRAM(S): at 05:51

## 2019-01-21 RX ADMIN — HEPARIN SODIUM 5000 UNIT(S): 5000 INJECTION INTRAVENOUS; SUBCUTANEOUS at 16:02

## 2019-01-21 RX ADMIN — MORPHINE SULFATE 2 MILLIGRAM(S): 50 CAPSULE, EXTENDED RELEASE ORAL at 09:30

## 2019-01-21 RX ADMIN — CARVEDILOL PHOSPHATE 6.25 MILLIGRAM(S): 80 CAPSULE, EXTENDED RELEASE ORAL at 05:51

## 2019-01-21 RX ADMIN — CEFEPIME 100 MILLIGRAM(S): 1 INJECTION, POWDER, FOR SOLUTION INTRAMUSCULAR; INTRAVENOUS at 17:55

## 2019-01-21 RX ADMIN — MORPHINE SULFATE 2 MILLIGRAM(S): 50 CAPSULE, EXTENDED RELEASE ORAL at 05:58

## 2019-01-21 RX ADMIN — CLOPIDOGREL BISULFATE 75 MILLIGRAM(S): 75 TABLET, FILM COATED ORAL at 09:12

## 2019-01-21 RX ADMIN — OXYCODONE AND ACETAMINOPHEN 2 TABLET(S): 5; 325 TABLET ORAL at 11:56

## 2019-01-21 RX ADMIN — Medication 2.5 MILLIGRAM(S): at 05:51

## 2019-01-21 RX ADMIN — Medication 1 APPLICATION(S): at 09:13

## 2019-01-21 NOTE — PROGRESS NOTE ADULT - ASSESSMENT
78 yr old female with hypertension, CHF, PVD, pacemaker was sent in for evaluation of left lower extremity cellulitis by her vascular surgeon. She was started on empiric Vancomycin and Cefepime for possible osteomyelitis, left second toe ulcer, ID and podiatry was consulted. MRI could not be performed as she has a pacemaker. Local wound care done by podiatry. PT evaluation was requested, advised JOHN vs home PT. Blood cultures were negative, wound cultures grew Proteus, Klebsiella, Corynebacterium and alpha hemolytic streptococcus. Indium scan was ordered to rule out osteomyelitis. Lasix was held during the initial course of stay given hypotension and dehydration, this was subsequently resumed. Arterial duplex was ordered. Percocet added for pain control.

## 2019-01-21 NOTE — CONSULT NOTE ADULT - ASSESSMENT
78 year old female with b/l LE wounds and associated cellulitis currently refusing physical exam for vascular team to evaluate her wounds. Vascular to re-attempt physical exam when patient becomes agreeable. On call vascular surgery attending notified.

## 2019-01-21 NOTE — PROGRESS NOTE ADULT - PROBLEM SELECTOR PLAN 1
Continue antibiotics and local wound care, indium scan tomorrow to rule out osteomyelitis. Unable to perform MRI given pacemaker. Continue antibiotics and local wound care, indium scan tomorrow to rule out osteomyelitis. Unable to perform MRI given pacemaker. Vascular surgery consulted.

## 2019-01-21 NOTE — PROGRESS NOTE ADULT - PROBLEM SELECTOR PLAN 4
On Plavix. On Plavix. Will need eventual EP evaluation as patient reports might need ?generator change.

## 2019-01-21 NOTE — PROGRESS NOTE ADULT - SUBJECTIVE AND OBJECTIVE BOX
INTERVAL HPI/OVERNIGHT EVENTS:    CC:  left foot ulcer with cellulitis, hypertension, systolic CHF, pacemaker    No overnight events, denies complaints    Vital Signs Last 24 Hrs  T(C): 37.3 (21 Jan 2019 08:14), Max: 37.3 (21 Jan 2019 08:14)  T(F): 99.2 (21 Jan 2019 08:14), Max: 99.2 (21 Jan 2019 08:14)  HR: 82 (21 Jan 2019 08:14) (78 - 85)  BP: 116/67 (21 Jan 2019 08:14) (110/60 - 149/79)  BP(mean): --  RR: 18 (21 Jan 2019 08:14) (18 - 20)  SpO2: 96% (21 Jan 2019 08:14) (96% - 97%)    PHYSICAL EXAM:    GENERAL: Not in distress, alert  CHEST/LUNG: b/l air entry, clear  HEART: Regular  ABDOMEN: Soft, BS+  EXTREMITIES:  + edema, and erythema    MEDICATIONS  (STANDING):  carvedilol 6.25 milliGRAM(s) Oral two times a day  cefepime   IVPB      cefepime   IVPB 1000 milliGRAM(s) IV Intermittent every 12 hours  clopidogrel Tablet 75 milliGRAM(s) Oral daily  Dakins Solution - 1/2 Strength 1 Application(s) Topical two times a day  enalapril 2.5 milliGRAM(s) Oral daily  furosemide    Tablet 40 milliGRAM(s) Oral two times a day  heparin  Injectable 5000 Unit(s) SubCutaneous every 8 hours  saccharomyces boulardii 250 milliGRAM(s) Oral two times a day    MEDICATIONS  (PRN):  acetaminophen   Tablet .. 650 milliGRAM(s) Oral every 6 hours PRN Mild Pain (1 - 3)  morphine  - Injectable 2 milliGRAM(s) IV Push every 4 hours PRN Severe Pain (7 - 10)  oxyCODONE    5 mG/acetaminophen 325 mG 2 Tablet(s) Oral every 4 hours PRN Moderate Pain (4 - 6)      Allergies    aspirin (Unknown)  atorvastatin (Unknown)  penicillin (Unknown)    Intolerances          LABS:                          10.5   5.8   )-----------( 164      ( 21 Jan 2019 07:47 )             32.9     01-21    134<L>  |  98  |  26.0<H>  ----------------------------<  95  4.9   |  24.0  |  0.81    Ca    9.6      21 Jan 2019 07:47  Phos  3.6     01-21  Mg     2.4     01-21            RADIOLOGY & ADDITIONAL TESTS:

## 2019-01-21 NOTE — PROGRESS NOTE ADULT - PROBLEM SELECTOR PLAN 3
Continue medications, BP acceptable.
Continue medications. Stable, resume Lasix.
Medication doses were adjusted given low BP yesterday. Continue to monitor, as patient is also on morphine for pain control.
Monitor BP, continue above medications.
Continue medications.

## 2019-01-21 NOTE — CONSULT NOTE ADULT - SUBJECTIVE AND OBJECTIVE BOX
[facility] [place] GATO ALVARADO MD FACP   JUAQUIN GARCIA MD  Diplomates American Board of Internal Medicine and Infecctious Diseases  [phone]  [phone] LOU [id number]      HPI:  Pt is a 77 yo F sent to ED by her vascular surgeon Dr. Medley, for LLE cellulitis, concern for osteo. PMH CHF w/ EF 30%, CAD, HTN, Pacemaker, PVD.   Patient states that she saw her vasc surgeon Dr. Medley today who referred her to the ED because her LLE appeared infected. She states that beginning about Sept or Oct 2018 she noted increased redness of her LLE. She states around that time she saw a podiatrist who clipped her nails, but she feels it got infected after the clipping because she states there was a scab solo afterwards. She did not follow up with him afterwards, but she did see vascular surgeon today for a f/u and he noted that she might have an infection and to come to the ED for further eval by Podiatry and abx treatment. Patient has 5/10 pain in LLE, no radiation, denies any numbness, no hx of DM2, pain is worse with movement, the pain has been present for many months and she has tried OTC pain meds with minimal relief. She denies any injury or trauma to the foot other than the possible toenail clipping mentioned above.   PT PLACED ON IV ABX  ASKED TO EVALUATE FROM ID STANDPOINT      PAST MEDICAL & SURGICAL HISTORY:  Pacemaker  Essential hypertension  HLD (hyperlipidemia)  CHF (congestive heart failure)  Artificial pacemaker: defib      ANTIBIOTICS  cefepime   IVPB      cefepime   IVPB 1000 milliGRAM(s) IV Intermittent every 12 hours  vancomycin  IVPB 1000 milliGRAM(s) IV Intermittent every 12 hours      Allergies    aspirin (Unknown)  atorvastatin (Unknown)  penicillin (Unknown)    Intolerances        SOCIAL HISTORY:       FAMILY HX   FAMILY HISTORY:  No pertinent family history in first degree relatives      Vital Signs Last 24 Hrs  T(C): 36.9 (16 Jan 2019 07:33), Max: 36.9 (16 Jan 2019 07:33)  T(F): 98.4 (16 Jan 2019 07:33), Max: 98.4 (16 Jan 2019 07:33)  HR: 87 (16 Jan 2019 07:33) (83 - 100)  BP: 116/52 (16 Jan 2019 07:33) (100/62 - 138/78)  BP(mean): --  RR: 18 (16 Jan 2019 07:33) (18 - 20)  SpO2: 99% (16 Jan 2019 07:33) (95% - 99%)  Drug Dosing Weight  Height (cm): 167.64 (15 John 2019 16:14)  Weight (kg): 102.1 (15 John 2019 16:14)  BMI (kg/m2): 36.3 (15 John 2019 16:14)  BSA (m2): 2.1 (15 John 2019 16:14)      REVIEW OF SYSTEMS:    CONSTITUTIONAL:  As per HPI.    HEENT:  Eyes:  No diplopia or blurred vision. ENT:  No earache, sore throat or runny nose.    CARDIOVASCULAR:  No pressure, squeezing, strangling, tightness, heaviness or aching about the chest, neck, axilla or epigastrium.    RESPIRATORY:  No cough, shortness of breath, PND or orthopnea.    GASTROINTESTINAL:  No nausea, vomiting or diarrhea.    GENITOURINARY:  No dysuria, frequency or urgency.    MUSCULOSKELETAL:  As per HPI.    SKIN:  AS PER HPI.    NEUROLOGIC:  No paresthesias, fasciculations, seizures or weakness.                  PHYSICAL EXAMINATION:    GENERAL: The patient is a well-developed, IN NAD    VITAL SIGNS: T(C): 36.9 (01-16-19 @ 07:33), Max: 36.9 (01-16-19 @ 07:33)  HR: 87 (01-16-19 @ 07:33) (83 - 100)  BP: 116/52 (01-16-19 @ 07:33) (100/62 - 138/78)  RR: 18 (01-16-19 @ 07:33) (18 - 20)  SpO2: 99% (01-16-19 @ 07:33) (95% - 99%)  Wt(kg): --    HEENT: Head is normocephalic and atraumatic.  ANICTERIC  NECK: Supple. No carotid bruits.  No lymphadenopathy or thyromegaly.    LUNGS:COARSE BREATH SOUNDS    HEART: Regular rate and rhythm without murmur.    ABDOMEN: Soft, nontender, and nondistended.  Positive bowel sounds.  No hepatosplenomegaly was noted. NO REBOUND NO GUARDING    EXTREMITIES: CHRONIC STASIS CHAGNES BOTHE LEGS LEFT LEG WITH WEEPING ERYTHEMA EDEMA NO CREPITUS   WITH FOUL ODOR   FOOT WRAPPED     NEUROLOGIC: NON FOCAL             BLOOD CULTURES       URINE CX          LABS:                        11.9   9.6   )-----------( 214      ( 15 John 2019 19:19 )             35.9     01-16    139  |  102  |  26.0<H>  ----------------------------<  111  4.7   |  24.0  |  0.80    Ca    9.5      16 Jan 2019 05:59    TPro  8.0  /  Alb  4.3  /  TBili  0.5  /  DBili  x   /  AST  23  /  ALT  23  /  AlkPhos  125<H>  01-15          RADIOLOGY & ADDITIONAL STUDIES:      ASSESSMENT/PLAN  PT IS 79 YO FEMALE SEEN BY VASCULAR SURGEON DR MEDLEY  AND SEND TO ER WITH LEFT SWELLING  WILL CONTINUE IV ABX CHECK BLOOD CX   WILL FOLLOW UP  WILL D/W PODIATRY                   JUAQUIN COOL MD
77 yo female with PMHX CHF, HTN, Pacemaker presents to ED for left foot painful ulcer. Pt states she went to a Podiatrist who clipped her nails. Pt states all the nails were nipped on the left foot leading to the ulceration formation. Pt states she has this going on for 1 month. Pt states the left foot is very painful. Pt states she went today to see Dr. Alicia (Vascular Doctor) who instructed her to go to ED for evaluation. Pt denies being diabetic. Pt denies N/V/C/F/SOB. Pt denies any injury to the foot    Allergies: Aspirin, Atorvastatin, Penicillin                          11.9   9.6   )-----------( 214      ( 15 John 2019 19:19 )             35.9     PE: Left Foot  Vascular: DP/PT: non-palable due to pain; CFT< 3 sec x 5; TG: wnl; severe edema noted on the left foot and leg  Derm: onychomycosis nails noted on the left foot 1 to 5; IDM noted; ulceration noted on the dorsal digits with necrotic fibrotic base; dorsal left foot ulcer noted with fibrogranular base; mild mal odor noted; no pus noted; severe pain upon palpation; cellulitis extending to proximal leg noted;   Neuro: Protective sensation intact    A: Left Foot Ulcer with Leg Cellulitis     P:  Patient evaluated and chart reviewed  Educated pt on proper foot care and change in shoe gear  Xray ordered; results pending  BELINDA ordered; results pending  Cx obtained; results pending  Foot scrubbed with chlorhexidine prep and flushed with saline  Left Foot Ulcer evaluated, possible fungal component to the wound base with cellulitis extending to leg, possible OM of 2nd Digit. Cannot perform MRI because pt has a pacemaker.     Left Foot Wound Dressed with Christine/Betadine/DSD  Instructed pt to keep dressing clean dry and intact to the left foot   Consult ID for IV abx   Podiatry will follow patient while inhouse.
78 year old female with chief complaint of LLE cellulitis send to ED by her vascular surgeon Dr. Kennedy to evaluate wounds and r/u osteomyelitis. Vascular consulted to evaluate patient's wounds. Patient seen and examiend at bedside. States that she saw a podiatrist before Christmas who clipped her nails and believe she got an infection in her left foot after that. Denies following up with podiatrist and vascular surgeon afterwards. Reports that she had unna boots at one time for 2 weeks. States that she is constantly in pain and describes LE pain as sharp, non radiating 8/10 pain not relieved by OTC medications. Currently refusing physical exam and take down of b/l LE wraps at this time and agreeable for team to return to re-examine her later today versus tomorrow morning.     PAST MEDICAL HISTORY:  Pacemaker  Essential hypertension  HLD (hyperlipidemia)  CHF (congestive heart failure)    PAST SURGICAL HISTORY:  Artificial pacemaker    ALLERGIES:  aspirin (Unknown)  atorvastatin (Unknown)  penicillin (Unknown)    MEDICATIONS  (STANDING):  carvedilol 6.25 milliGRAM(s) Oral two times a day  cefepime   IVPB      cefepime   IVPB 1000 milliGRAM(s) IV Intermittent every 12 hours  clopidogrel Tablet 75 milliGRAM(s) Oral daily  Dakins Solution - 1/2 Strength 1 Application(s) Topical two times a day  enalapril 2.5 milliGRAM(s) Oral daily  furosemide    Tablet 40 milliGRAM(s) Oral two times a day  heparin  Injectable 5000 Unit(s) SubCutaneous every 8 hours  saccharomyces boulardii 250 milliGRAM(s) Oral two times a day    MEDICATIONS  (PRN):  acetaminophen   Tablet .. 650 milliGRAM(s) Oral every 6 hours PRN Mild Pain (1 - 3)  morphine  - Injectable 2 milliGRAM(s) IV Push every 4 hours PRN Severe Pain (7 - 10)  oxyCODONE    5 mG/acetaminophen 325 mG 2 Tablet(s) Oral every 4 hours PRN Moderate Pain (4 - 6)    VITALS & I/Os:  Vital Signs Last 24 Hrs  T(C): 37.3 (21 Jan 2019 08:14), Max: 37.3 (21 Jan 2019 08:14)  T(F): 99.2 (21 Jan 2019 08:14), Max: 99.2 (21 Jan 2019 08:14)  HR: 82 (21 Jan 2019 08:14) (78 - 85)  BP: 116/67 (21 Jan 2019 08:14) (110/60 - 149/79)  BP(mean): --  RR: 18 (21 Jan 2019 08:14) (18 - 20)  SpO2: 96% (21 Jan 2019 08:14) (96% - 97%)  CAPILLARY BLOOD GLUCOSE    I&O's Summary    Constitutional: Patient resting comfortably in bed in no acute distress   HEENT: EOMI/PERRL b/l  Neck: No JVD, full ROM w/o pain  Respiratory: CTAB with unlabored respirations, no accessory muscle use or conversational dyspnea   Cardiovascular: Regular rate and rhythm with no arrythmias or murmurs  Gastrointestinal: Abdomen soft, non-tender, non-distended with no rebound tenderness or guarding   Neurological: GCS 15 ([id number]) with no gross sensory, motor or coordination deficits   Psychiatric: Normal mood and affect   Musculoskeletal: Ace wraps and kerlix present to b/l LE extremities, motor and sensory intact  Vascular: Unable to evaluate 2/2 to patient refusal   Skin: Unable to evaluate 2/2 to patient refusal     LABS:                        10.5   5.8   )-----------( 164      ( 21 Jan 2019 07:47 )             32.9     01-21    134<L>  |  98  |  26.0<H>  ----------------------------<  95  4.9   |  24.0  |  0.81    Ca    9.6      21 Jan 2019 07:47  Phos  3.6     01-21  Mg     2.4     01-21      Lactate: acetaminophen   Tablet .. 650 milliGRAM(s) Oral every 6 hours PRN  carvedilol 6.25 milliGRAM(s) Oral two times a day  cefepime   IVPB      cefepime   IVPB 1000 milliGRAM(s) IV Intermittent every 12 hours  clopidogrel Tablet 75 milliGRAM(s) Oral daily  Dakins Solution - 1/2 Strength 1 Application(s) Topical two times a day  enalapril 2.5 milliGRAM(s) Oral daily  furosemide    Tablet 40 milliGRAM(s) Oral two times a day  heparin  Injectable 5000 Unit(s) SubCutaneous every 8 hours  morphine  - Injectable 2 milliGRAM(s) IV Push every 4 hours PRN  oxyCODONE    5 mG/acetaminophen 325 mG 2 Tablet(s) Oral every 4 hours PRN  saccharomyces boulardii 250 milliGRAM(s) Oral two times a day

## 2019-01-21 NOTE — PROGRESS NOTE ADULT - SUBJECTIVE AND OBJECTIVE BOX
[facility]  INFECTIOUS DISEASES AND INTERNAL MEDICINE at [place]  =======================================================  Car Alejandre MD  Diplomates American Board of Internal Medicine and Infectious Diseases  =======================================================    LESLY DELCID [id number]    Follow up: Cellulitis    No complaints  No fevers or chills  Reports feeling better since admission    Allergies:  aspirin (Unknown)  atorvastatin (Unknown)  penicillin (Unknown)      Antibiotics:         cefepime   IVPB 1000 milliGRAM(s) IV Intermittent every 12 hours       REVIEW OF SYSTEMS:  CONSTITUTIONAL:  No Fever or chills  HEENT:   No diplopia or blurred vision.  No earache, sore throat or runny nose.  CARDIOVASCULAR:  No pressure, squeezing, strangling, tightness, heaviness or aching about the chest, neck, axilla or epigastrium.  RESPIRATORY:  No cough, shortness of breath  GASTROINTESTINAL:  No nausea, vomiting or diarrhea.  GENITOURINARY:  No dysuria, frequency or urgency.  MUSCULOSKELETAL:  no joint aches, no muscle pain  SKIN: B/L LE swelling  NEUROLOGIC:  No seizures or weakness.  PSYCHIATRIC:  No disorder of thought or mood.  ENDOCRINE:  No heat or cold intolerance  HEMATOLOGICAL:  No easy bruising or bleeding.       Physical Exam:  Vital Signs Last 24 Hrs  T(C): 37.3 (21 Jan 2019 08:14), Max: 37.3 (21 Jan 2019 08:14)  T(F): 99.2 (21 Jan 2019 08:14), Max: 99.2 (21 Jan 2019 08:14)  HR: 82 (21 Jan 2019 08:14) (78 - 85)  BP: 116/67 (21 Jan 2019 08:14) (110/60 - 149/79)  RR: 18 (21 Jan 2019 08:14) (18 - 20)  SpO2: 96% (21 Jan 2019 08:14) (96% - 97%)      GEN: NAD, pleasant  HEENT: normocephalic and atraumatic. EOMI. PERRL.  Anicteric   NECK: Supple.   LUNGS: Clear to auscultation.  HEART: Regular rate and rhythm   ABDOMEN: Soft, nontender, and nondistended.  Positive bowel sounds.    : No CVA tenderness  EXTREMITIES: + edema.  MSK: no joint swelling  NEUROLOGIC: Alert and oriented x3  PSYCHIATRIC: Appropriate affect .  SKIN: Chronic venus stasis skin changed B/L LE and ulcers      Labs:  01-21    134<L>  |  98  |  26.0<H>  ----------------------------<  95  4.9   |  24.0  |  0.81    Ca    9.6      21 Jan 2019 07:47  Phos  3.6     01-21  Mg     2.4     01-21               10.5   5.8   )-----------( 164      ( 21 Jan 2019 07:47 )             32.9       RECENT CULTURES:  01-15 @ 21:21 .Other Proteus mirabilis  Klebsiella oxytoca    Moderate Proteus mirabilis  Moderate Klebsiella oxytoca  Few Corynebacterium species  Moderate Alpha hemolytic strep      01-15 @ 19:33 .Blood     No growth at 5 days.      01-15 @ 19:32 .Blood     No growth at 5 days.        EXAM:  FOOT-LEFT                        PROCEDURE DATE:  01/15/2019    INTERPRETATION:  LEFT FOOT X-RAY:  History: Left foot pain.  Date and time of exam: 1/15/2019 6:08 PM  3 views were obtained.    Findings: Marked diffuse osteoporosis involving the bones of the foot. No   definite evidence of fracture or dislocation. Evaluation is limited. No   definite evidence of bone destruction. No evidence of soft tissue gas.   There is diffuse soft tissue swelling..  Impression:  Diffuse soft tissue swelling. Marked diffuse osteoporosis..

## 2019-01-21 NOTE — PROGRESS NOTE ADULT - ASSESSMENT
79y/o F  with hypertension, CHF, PVD, pacemaker was sent in for evaluation of left lower extremity cellulitis by her vascular surgeon  Local culture with Proteus, Klebsiella, Corynebacterium and alpha hemolytic streptococcus      Cellulitis   r/o Osteomyelitis   Foot ulcer with polymicrobial culture  PVD  would r/o PAD    - Blood cultures no growth  - Wound culture is polymicrobial   - No MRI due to PPM  - WBC cell scan pending  - Continue IV antibiotics  - Trend Fever  - Trend Leukocytosis      will follow

## 2019-01-21 NOTE — PROGRESS NOTE ADULT - ATTENDING COMMENTS
Discussed with patient.
I reviewed the above assessment and documentation completed by the resident physician.  I verbally discussed the evaluation and treatment plan with resident.  The podiatry team will continue to follow patient while in house.
Patient was seen bedside with resident.  I reviewed the above assessment and documentation completed by the resident physician.  I verbally discussed the evaluation and treatment plan with resident.  The podiatry team will continue to follow patient while in house.
Patient was seen bedside with resident.  I reviewed the above assessment and documentation completed by the resident physician.  I verbally discussed the evaluation and treatment plan with resident.  The podiatry team will continue to follow patient while in house.
Discussed with patient and RN.
Discussed with patient and RN.
Discussed with patient at bedside plan of care.
Discussed with patient and RN.

## 2019-01-22 LAB
ANION GAP SERPL CALC-SCNC: 13 MMOL/L — SIGNIFICANT CHANGE UP (ref 5–17)
BUN SERPL-MCNC: 35 MG/DL — HIGH (ref 8–20)
CALCIUM SERPL-MCNC: 9.2 MG/DL — SIGNIFICANT CHANGE UP (ref 8.6–10.2)
CHLORIDE SERPL-SCNC: 96 MMOL/L — LOW (ref 98–107)
CO2 SERPL-SCNC: 24 MMOL/L — SIGNIFICANT CHANGE UP (ref 22–29)
CREAT SERPL-MCNC: 1.09 MG/DL — SIGNIFICANT CHANGE UP (ref 0.5–1.3)
GLUCOSE SERPL-MCNC: 114 MG/DL — SIGNIFICANT CHANGE UP (ref 70–115)
MAGNESIUM SERPL-MCNC: 2.2 MG/DL — SIGNIFICANT CHANGE UP (ref 1.6–2.6)
NT-PROBNP SERPL-SCNC: 880 PG/ML — HIGH (ref 0–300)
POTASSIUM SERPL-MCNC: 4.8 MMOL/L — SIGNIFICANT CHANGE UP (ref 3.5–5.3)
POTASSIUM SERPL-SCNC: 4.8 MMOL/L — SIGNIFICANT CHANGE UP (ref 3.5–5.3)
SODIUM SERPL-SCNC: 133 MMOL/L — LOW (ref 135–145)

## 2019-01-22 PROCEDURE — 93925 LOWER EXTREMITY STUDY: CPT | Mod: 26

## 2019-01-22 PROCEDURE — 99232 SBSQ HOSP IP/OBS MODERATE 35: CPT

## 2019-01-22 RX ORDER — FUROSEMIDE 40 MG
40 TABLET ORAL DAILY
Qty: 0 | Refills: 0 | Status: DISCONTINUED | OUTPATIENT
Start: 2019-01-22 | End: 2019-01-22

## 2019-01-22 RX ADMIN — Medication 250 MILLIGRAM(S): at 17:40

## 2019-01-22 RX ADMIN — Medication 40 MILLIGRAM(S): at 05:09

## 2019-01-22 RX ADMIN — Medication 1 APPLICATION(S): at 23:13

## 2019-01-22 RX ADMIN — CEFEPIME 100 MILLIGRAM(S): 1 INJECTION, POWDER, FOR SOLUTION INTRAMUSCULAR; INTRAVENOUS at 17:40

## 2019-01-22 RX ADMIN — HEPARIN SODIUM 5000 UNIT(S): 5000 INJECTION INTRAVENOUS; SUBCUTANEOUS at 13:18

## 2019-01-22 RX ADMIN — OXYCODONE AND ACETAMINOPHEN 2 TABLET(S): 5; 325 TABLET ORAL at 17:39

## 2019-01-22 RX ADMIN — MORPHINE SULFATE 2 MILLIGRAM(S): 50 CAPSULE, EXTENDED RELEASE ORAL at 22:29

## 2019-01-22 RX ADMIN — MORPHINE SULFATE 2 MILLIGRAM(S): 50 CAPSULE, EXTENDED RELEASE ORAL at 22:42

## 2019-01-22 RX ADMIN — OXYCODONE AND ACETAMINOPHEN 2 TABLET(S): 5; 325 TABLET ORAL at 05:07

## 2019-01-22 RX ADMIN — CLOPIDOGREL BISULFATE 75 MILLIGRAM(S): 75 TABLET, FILM COATED ORAL at 12:08

## 2019-01-22 RX ADMIN — OXYCODONE AND ACETAMINOPHEN 2 TABLET(S): 5; 325 TABLET ORAL at 12:06

## 2019-01-22 RX ADMIN — CARVEDILOL PHOSPHATE 6.25 MILLIGRAM(S): 80 CAPSULE, EXTENDED RELEASE ORAL at 17:40

## 2019-01-22 RX ADMIN — HEPARIN SODIUM 5000 UNIT(S): 5000 INJECTION INTRAVENOUS; SUBCUTANEOUS at 05:09

## 2019-01-22 RX ADMIN — Medication 250 MILLIGRAM(S): at 05:09

## 2019-01-22 RX ADMIN — CARVEDILOL PHOSPHATE 6.25 MILLIGRAM(S): 80 CAPSULE, EXTENDED RELEASE ORAL at 05:09

## 2019-01-22 RX ADMIN — CEFEPIME 100 MILLIGRAM(S): 1 INJECTION, POWDER, FOR SOLUTION INTRAMUSCULAR; INTRAVENOUS at 05:16

## 2019-01-22 RX ADMIN — HEPARIN SODIUM 5000 UNIT(S): 5000 INJECTION INTRAVENOUS; SUBCUTANEOUS at 23:14

## 2019-01-22 RX ADMIN — MORPHINE SULFATE 2 MILLIGRAM(S): 50 CAPSULE, EXTENDED RELEASE ORAL at 09:23

## 2019-01-22 RX ADMIN — Medication 2.5 MILLIGRAM(S): at 05:09

## 2019-01-22 RX ADMIN — Medication 1 APPLICATION(S): at 11:07

## 2019-01-22 NOTE — PROGRESS NOTE ADULT - ASSESSMENT
78yoF with venous stasis ulcer of BLE, worse on L than R.  - continue dressing changes per podiatry and nursing  - no vascular surgery intervention needed at this time  - vascular surgery will sign off

## 2019-01-22 NOTE — PROGRESS NOTE ADULT - ASSESSMENT
78 yr old female with hypertension, CHF, PVD, pacemaker was sent in for evaluation of left lower extremity cellulitis by her vascular surgeon. She was started on empiric Vancomycin and Cefepime for possible osteomyelitis, left second toe ulcer, ID and podiatry was consulted. MRI could not be performed as she has a pacemaker. Local wound care done by podiatry. PT evaluation was requested, advised JOHN vs home PT. Blood cultures were negative, wound cultures grew Proteus, Klebsiella, Corynebacterium and alpha hemolytic streptococcus. Indium scan was ordered to rule out osteomyelitis. Lasix was held during the initial course of stay given hypotension and dehydration, this was subsequently resumed. Arterial duplex was done; no further intervention per vascular surgery. Percocet added for pain control.    Assessment/Plan:    1. Left lower extremity cellulitis: Day 6 of IV cefepime; local wound care, indium scan tomorrow to rule out osteomyelitis. Unable to perform MRI given pacemaker.  Arterial duplex wnl; no further recommendations per vascular surgery    2. Chronic diastolic CHF: [place]ma noted with hyponatremia: hold lasix  Check BMP    3. ALMA with hyponatremia; Hold lasix for today.     4. Hypertension: controlled  Continue medications, BP acceptable.    5. Pacemaker in place: On Plavix. Will need eventual EP evaluation as patient reports might need ?generator change.    VTE- heparin subcut

## 2019-01-22 NOTE — PROGRESS NOTE ADULT - SUBJECTIVE AND OBJECTIVE BOX
77 yo female with PMHX CHF, HTN, Pacemaker seen for left foot painful ulcers. Patient is seen sitting in chair, with legs hanging off chair. Patient states her wounds are very painful .Pt denies being diabetic. Pt denies N/V/C/F/SOB. Pt denies any previous injury to the foot    Allergies: Aspirin, Atorvastatin, Penicillin                                     10.5   5.8   )-----------( 164      ( 21 Jan 2019 07:47 )             32.9     PE: Left Foot  Vascular: DP/PT: non-palable due to pain; CFT< 3 sec x 5; TG: wnl; severe edema noted on the left foot and leg  Derm: onychomycosis nails noted on the left foot 1 to 5; IDM noted; ulceration noted on the dorsal digits with necrotic fibrotic base; dorsal left foot ulcer noted with fibrogranular base; mild mal odor noted; no pus noted; severe pain upon palpation; cellulitis extending to proximal leg noted;   right foot; interdigital maceration noted on the 1st interspace with dorsal wound   Neuro: Protective sensation intact    Findings: Marked diffuse osteoporosis involving the bones of the foot. No   definite evidence of fracture or dislocation. Evaluation is limited. No   definite evidence of bone destruction. No evidence of soft tissue gas.   There is diffuse soft tissue swelling..    Impression:    Diffuse soft tissue swelling. Marked diffuse osteoporosis..        A: Venous Stasis Ulcer of Left Foot & Leg,  Right Foot Ulcer     P:  Patient evaluated and chart reviewed  Educated pt on proper foot care and change in shoe gear  Xray reviewed; results noted above   BELINDA ordered; results pending.   Cx: Proteus Mirabilis, Klebsiella Oxytoca, Corynebacterium, Alpha Hemolytic Strep   Left Foot Ulcer evaluated, venous stasis ulcer extending to leg, possible OM of 2nd Digit. Cannot perform MRI because pt has a pacemaker.     Bilateral Foot Ulcerations dressed with Christine/Dakins/DSD  Instructed pt to keep dressing clean dry and intact to bilateral foot   WCO Placed for Bilateral Dakins/DSD. Podiatry will perform dressing in morning. Nursing staff will perform dressing in evening.   Continue IV abx as per ID recommendation   As per Podiatry continue local wound care BID, continue IV abx, f/up on Bone Scan results to r/o OM.   Podiatry will follow patient while inhouse.

## 2019-01-22 NOTE — DIETITIAN INITIAL EVALUATION ADULT. - OTHER INFO
Pt states tolerating meals, no issues. states understands DASH diet and denies having any questions.

## 2019-01-22 NOTE — PROGRESS NOTE ADULT - SUBJECTIVE AND OBJECTIVE BOX
CC: Follow up cellulitis and left leg wound     INTERVAL HPI/OVERNIGHT EVENTS: Patient seen and examined, severe pain in her left leg after he duplex this morning. Afebrile. No urinary or bowel complaints.       Vital Signs Last 24 Hrs  T(C): 36.9 (22 Jan 2019 00:07), Max: 36.9 (22 Jan 2019 00:07)  T(F): 98.4 (22 Jan 2019 00:07), Max: 98.4 (22 Jan 2019 00:07)  HR: 82 (22 Jan 2019 05:06) (71 - 82)  BP: 119/69 (22 Jan 2019 05:06) (110/59 - 127/71)  BP(mean): --  RR: 20 (22 Jan 2019 05:06) (18 - 20)  SpO2: 97% (22 Jan 2019 05:06) (96% - 99%)    PHYSICAL EXAM:    GENERAL: NAD, AOX3  HEAD:  Atraumatic, Normocephalic  CHEST/LUNG: Clear to auscultation bilaterally; No rales, rhonchi, wheezing, or rubs  HEART: Regular rate and rhythm; No murmurs, rubs, or gallops  ABDOMEN: Soft, Nontender, Nondistended; Bowel sounds present  EXTREMITIES:  Bilateral lower extremity dressings in place         MEDICATIONS  (STANDING):  carvedilol 6.25 milliGRAM(s) Oral two times a day  cefepime   IVPB      cefepime   IVPB 1000 milliGRAM(s) IV Intermittent every 12 hours  clopidogrel Tablet 75 milliGRAM(s) Oral daily  Dakins Solution - 1/2 Strength 1 Application(s) Topical two times a day  heparin  Injectable 5000 Unit(s) SubCutaneous every 8 hours  saccharomyces boulardii 250 milliGRAM(s) Oral two times a day    MEDICATIONS  (PRN):  acetaminophen   Tablet .. 650 milliGRAM(s) Oral every 6 hours PRN Mild Pain (1 - 3)  morphine  - Injectable 2 milliGRAM(s) IV Push every 4 hours PRN Severe Pain (7 - 10)  oxyCODONE    5 mG/acetaminophen 325 mG 2 Tablet(s) Oral every 4 hours PRN Moderate Pain (4 - 6)      Allergies    aspirin (Unknown)  atorvastatin (Unknown)  penicillin (Unknown)    Intolerances          LABS:                          10.5   5.8   )-----------( 164      ( 21 Jan 2019 07:47 )             32.9     01-22    133<L>  |  96<L>  |  35.0<H>  ----------------------------<  114  4.8   |  24.0  |  1.09    Ca    9.2      22 Jan 2019 12:04  Phos  3.6     01-21  Mg     2.2     01-22            RADIOLOGY & ADDITIONAL TESTS:

## 2019-01-22 NOTE — PROGRESS NOTE ADULT - SUBJECTIVE AND OBJECTIVE BOX
HPI/OVERNIGHT EVENTS:  No acute events overnight. Patient seen and evaluated with podiatry. complaining of extreme sensitivity in b/l LE, worse on the left than right with "new open sore" per pt on the left calf. Denies f/c/n/v/cp/sob.    MEDICATIONS  (STANDING):  carvedilol 6.25 milliGRAM(s) Oral two times a day  cefepime   IVPB      cefepime   IVPB 1000 milliGRAM(s) IV Intermittent every 12 hours  clopidogrel Tablet 75 milliGRAM(s) Oral daily  Dakins Solution - 1/2 Strength 1 Application(s) Topical two times a day  enalapril 2.5 milliGRAM(s) Oral daily  furosemide    Tablet 40 milliGRAM(s) Oral daily  heparin  Injectable 5000 Unit(s) SubCutaneous every 8 hours  saccharomyces boulardii 250 milliGRAM(s) Oral two times a day    MEDICATIONS  (PRN):  acetaminophen   Tablet .. 650 milliGRAM(s) Oral every 6 hours PRN Mild Pain (1 - 3)  morphine  - Injectable 2 milliGRAM(s) IV Push every 4 hours PRN Severe Pain (7 - 10)  oxyCODONE    5 mG/acetaminophen 325 mG 2 Tablet(s) Oral every 4 hours PRN Moderate Pain (4 - 6)      Vital Signs Last 24 Hrs  T(C): 36.9 (22 Jan 2019 00:07), Max: 36.9 (22 Jan 2019 00:07)  T(F): 98.4 (22 Jan 2019 00:07), Max: 98.4 (22 Jan 2019 00:07)  HR: 82 (22 Jan 2019 05:06) (71 - 82)  BP: 119/69 (22 Jan 2019 05:06) (110/59 - 127/71)  BP(mean): --  RR: 20 (22 Jan 2019 05:06) (18 - 20)  SpO2: 97% (22 Jan 2019 05:06) (96% - 99%)      gen: obese, nad, a&ox3  cv: rrr  resp: nonlabored breathing  gi: soft, nd, nttp  msk: LLE: severe edema with fungal component on dorsal aspect of Left foot. venoustasis changes extending proximally towards knee. no pus. possible cellulitis. superficial open on left calf.   RLE: hyperpigmentation, venous stasis changes extending proximally towards the knee.   vasc: unable to palpate 2/2 to pain and pt refusal      I&O's Detail      LABS:                        10.5   5.8   )-----------( 164      ( 21 Jan 2019 07:47 )             32.9     01-21    134<L>  |  98  |  26.0<H>  ----------------------------<  95  4.9   |  24.0  |  0.81    Ca    9.6      21 Jan 2019 07:47  Phos  3.6     01-21  Mg     2.4     01-21

## 2019-01-23 LAB
ANION GAP SERPL CALC-SCNC: 15 MMOL/L — SIGNIFICANT CHANGE UP (ref 5–17)
BUN SERPL-MCNC: 41 MG/DL — HIGH (ref 8–20)
CALCIUM SERPL-MCNC: 9.3 MG/DL — SIGNIFICANT CHANGE UP (ref 8.6–10.2)
CHLORIDE SERPL-SCNC: 96 MMOL/L — LOW (ref 98–107)
CO2 SERPL-SCNC: 23 MMOL/L — SIGNIFICANT CHANGE UP (ref 22–29)
CREAT SERPL-MCNC: 1.2 MG/DL — SIGNIFICANT CHANGE UP (ref 0.5–1.3)
GLUCOSE SERPL-MCNC: 113 MG/DL — SIGNIFICANT CHANGE UP (ref 70–115)
HCT VFR BLD CALC: 30.7 % — LOW (ref 37–47)
HGB BLD-MCNC: 10 G/DL — LOW (ref 12–16)
MCHC RBC-ENTMCNC: 30.3 PG — SIGNIFICANT CHANGE UP (ref 27–31)
MCHC RBC-ENTMCNC: 32.6 G/DL — SIGNIFICANT CHANGE UP (ref 32–36)
MCV RBC AUTO: 93 FL — SIGNIFICANT CHANGE UP (ref 81–99)
PLATELET # BLD AUTO: 168 K/UL — SIGNIFICANT CHANGE UP (ref 150–400)
POTASSIUM SERPL-MCNC: 4.8 MMOL/L — SIGNIFICANT CHANGE UP (ref 3.5–5.3)
POTASSIUM SERPL-SCNC: 4.8 MMOL/L — SIGNIFICANT CHANGE UP (ref 3.5–5.3)
RBC # BLD: 3.3 M/UL — LOW (ref 4.4–5.2)
RBC # FLD: 13.6 % — SIGNIFICANT CHANGE UP (ref 11–15.6)
SODIUM SERPL-SCNC: 134 MMOL/L — LOW (ref 135–145)
WBC # BLD: 6.5 K/UL — SIGNIFICANT CHANGE UP (ref 4.8–10.8)
WBC # FLD AUTO: 6.5 K/UL — SIGNIFICANT CHANGE UP (ref 4.8–10.8)

## 2019-01-23 PROCEDURE — 78102 BONE MARROW IMAGING LTD: CPT | Mod: 26

## 2019-01-23 PROCEDURE — 78805: CPT | Mod: 26

## 2019-01-23 PROCEDURE — 99232 SBSQ HOSP IP/OBS MODERATE 35: CPT

## 2019-01-23 RX ADMIN — HEPARIN SODIUM 5000 UNIT(S): 5000 INJECTION INTRAVENOUS; SUBCUTANEOUS at 05:12

## 2019-01-23 RX ADMIN — OXYCODONE AND ACETAMINOPHEN 2 TABLET(S): 5; 325 TABLET ORAL at 08:59

## 2019-01-23 RX ADMIN — CLOPIDOGREL BISULFATE 75 MILLIGRAM(S): 75 TABLET, FILM COATED ORAL at 12:42

## 2019-01-23 RX ADMIN — CARVEDILOL PHOSPHATE 6.25 MILLIGRAM(S): 80 CAPSULE, EXTENDED RELEASE ORAL at 05:12

## 2019-01-23 RX ADMIN — OXYCODONE AND ACETAMINOPHEN 2 TABLET(S): 5; 325 TABLET ORAL at 21:28

## 2019-01-23 RX ADMIN — HEPARIN SODIUM 5000 UNIT(S): 5000 INJECTION INTRAVENOUS; SUBCUTANEOUS at 15:51

## 2019-01-23 RX ADMIN — OXYCODONE AND ACETAMINOPHEN 2 TABLET(S): 5; 325 TABLET ORAL at 18:33

## 2019-01-23 RX ADMIN — OXYCODONE AND ACETAMINOPHEN 2 TABLET(S): 5; 325 TABLET ORAL at 17:25

## 2019-01-23 RX ADMIN — Medication 250 MILLIGRAM(S): at 05:12

## 2019-01-23 RX ADMIN — OXYCODONE AND ACETAMINOPHEN 2 TABLET(S): 5; 325 TABLET ORAL at 12:42

## 2019-01-23 RX ADMIN — Medication 1 APPLICATION(S): at 23:00

## 2019-01-23 RX ADMIN — OXYCODONE AND ACETAMINOPHEN 2 TABLET(S): 5; 325 TABLET ORAL at 04:21

## 2019-01-23 RX ADMIN — CEFEPIME 100 MILLIGRAM(S): 1 INJECTION, POWDER, FOR SOLUTION INTRAMUSCULAR; INTRAVENOUS at 05:12

## 2019-01-23 RX ADMIN — CARVEDILOL PHOSPHATE 6.25 MILLIGRAM(S): 80 CAPSULE, EXTENDED RELEASE ORAL at 17:25

## 2019-01-23 RX ADMIN — Medication 250 MILLIGRAM(S): at 17:25

## 2019-01-23 RX ADMIN — HEPARIN SODIUM 5000 UNIT(S): 5000 INJECTION INTRAVENOUS; SUBCUTANEOUS at 21:05

## 2019-01-23 RX ADMIN — OXYCODONE AND ACETAMINOPHEN 2 TABLET(S): 5; 325 TABLET ORAL at 10:00

## 2019-01-23 RX ADMIN — OXYCODONE AND ACETAMINOPHEN 2 TABLET(S): 5; 325 TABLET ORAL at 22:09

## 2019-01-23 RX ADMIN — OXYCODONE AND ACETAMINOPHEN 2 TABLET(S): 5; 325 TABLET ORAL at 13:00

## 2019-01-23 RX ADMIN — OXYCODONE AND ACETAMINOPHEN 2 TABLET(S): 5; 325 TABLET ORAL at 05:20

## 2019-01-23 NOTE — PROGRESS NOTE ADULT - ASSESSMENT
78 yr old female with hypertension, CHF, PVD, pacemaker was sent in for evaluation of left lower extremity cellulitis by her vascular surgeon. She was started on empiric Vancomycin and Cefepime for possible osteomyelitis, left second toe ulcer, ID and podiatry was consulted. MRI could not be performed as she has a pacemaker. Local wound care done by podiatry. PT evaluation was requested, advised JOHN vs home PT. Blood cultures were negative, wound cultures grew Proteus, Klebsiella, Corynebacterium and alpha hemolytic streptococcus. Indium scan was ordered to rule out osteomyelitis. Lasix was held during the initial course of stay given hypotension and dehydration, this was subsequently resumed. Arterial duplex was done; no further intervention per vascular surgery. Percocet added for pain control.    Assessment/Plan:    1. Left lower extremity cellulitis: Day 7 of IV cefepime; local wound care, indium scan negative for osteomyelitis. Unable to perform MRI given pacemaker.  Arterial duplex wnl; no further recommendations per vascular surgery    2. Chronic diastolic CHF: Alma noted with hyponatremia: continue to hold lasix  Check BMP    3. ALMA with hyponatremia; continue to hold lasix    4. Hypertension: controlled  Continue medications, BP acceptable.    5. Pacemaker in place: On Plavix. Will need eventual EP evaluation as patient reports might need ?generator change.    VTE- heparin subcut

## 2019-01-23 NOTE — PROGRESS NOTE ADULT - PROBLEM SELECTOR PROBLEM 3
Foot ulcer
Essential hypertension

## 2019-01-23 NOTE — PROGRESS NOTE ADULT - SUBJECTIVE AND OBJECTIVE BOX
77 yo female with PMHX CHF, HTN, Pacemaker seen for left foot painful ulcers. Patient is seen sitting in chair, with legs hanging off chair. Patient states her wounds are very painful .Pt denies being diabetic. Pt denies N/V/C/F/SOB. Pt denies any previous injury to the foot    Allergies: Aspirin, Atorvastatin, Penicillin                                     10.0   6.5   )-----------( 168      ( 23 Jan 2019 07:03 )             30.7     PE: Left Foot  Vascular: DP/PT: non-palable due to pain; CFT< 3 sec x 5; TG: wnl; severe edema noted on the left foot and leg  Derm: onychomycosis nails noted on the left foot 1 to 5; IDM noted; ulceration noted on the dorsal digits with necrotic fibrotic base; dorsal left foot ulcer noted with fibrogranular base; mild mal odor noted; no pus noted; severe pain upon palpation; cellulitis extending to proximal leg noted;   right foot; interdigital maceration noted on the 1st interspace with dorsal wound   Neuro: Protective sensation intact    Findings: Marked diffuse osteoporosis involving the bones of the foot. No   definite evidence of fracture or dislocation. Evaluation is limited. No   definite evidence of bone destruction. No evidence of soft tissue gas.   There is diffuse soft tissue swelling..    Impression:    Diffuse soft tissue swelling. Marked diffuse osteoporosis..    ------------------  < from: US Duplex Arterial Lower Ext Compl, Bilateral (01.22.19 @ 09:06) >     EXAM:  US DPLX LWR EXT ARTS COMPL BI                          PROCEDURE DATE:  01/22/2019          INTERPRETATION:  History:Bilateral lower extremity nonhealing ulcers.   Cellulitis.    Technique: Grayscale, color ultrasound, and spectral analysisof the   lower examination arteries bilaterally    Comparison: None     Findings:     On the right side, the external iliac, common femoral, superficial   femoral, pelvic material, posterior tibial arteries, and dorsalis pedis   are triphasic/biphasic. Monophasic waveforms are seen in the anterior   tibial and peroneal arteries. The profunda femoris artery is not seen and   may BE occluded    On the left side, the external iliac artery is patent with triphasic   waveforms. Monophasic waveforms are seen from the common femoral artery   through the dorsalis pedis artery. The peroneal artery could not be seen.   The profunda femoris artery is not seen and may BE occluded.    Impression:     THERE IS GOOD INFLOW TO THE PELVIS AND OUTFLOW TO THE KNEES BILATERALLY.    THREE-VESSEL RUNOFF IS SEEN TO THE RIGHT FOOT.    TWO-VESSEL RUNOFF IS SEEN TO THE LEFT FOOT WITH OCCLUSION OF THE PERONEAL   ARTERY.    THE PROFUNDUS FEMORIS ARTERIES ARE NOT SEEN BILATERALLY, AND MAY BE   OCCLUDED.    < end of copied text >      A: Venous Stasis Ulcer of Left Foot & Leg,  Right Foot Ulcer     P:  Patient evaluated and chart reviewed  Educated pt on proper foot care and change in shoe gear  Xray reviewed; results noted above   BELINDA ordered; results pending.   Cx: Proteus Mirabilis, Klebsiella Oxytoca, Corynebacterium, Alpha Hemolytic Strep   Left Foot Ulcer evaluated, venous stasis ulcer extending to leg, possible OM of 2nd Digit. Cannot perform MRI because pt has a pacemaker.   Pt going for Indium scan today  Bilateral Foot Ulcerations dressed with Christine/Dakins/DSD  Instructed pt to keep dressing clean dry and intact to bilateral foot   WCO Placed for Bilateral Dakins/DSD. Podiatry will perform dressing in morning. Nursing staff will perform dressing in evening.   Continue IV abx as per ID recommendation   As per Podiatry continue local wound care BID, continue IV abx, f/up on Bone Scan results to r/o OM.   Podiatry will follow patient while inhouse.

## 2019-01-23 NOTE — PROGRESS NOTE ADULT - SUBJECTIVE AND OBJECTIVE BOX
CC: LLE Cellulitis (23 Jan 2019 10:57)    HPI:  Pt is a 77 yo F with PMH CHF w/ EF 30%, CAD, HTN, Pacemaker, PVD sent to ED by her vascular surgeon Dr. Kennedy, for LLE cellulitis, concern for osteo.     INTERVAL HPI/OVERNIGHT EVENTS: Patient seen and examined sitting up in the chair.  Pain controlled with pain medications.  Patient denies any headache, dizziness, SOB, CP, abdominal pain, nausea, vomiting, dysuria.  Other ROS reviewed and are negative.    Vital Signs Last 24 Hrs  T(C): 36.7 (23 Jan 2019 07:26), Max: 36.7 (22 Jan 2019 23:50)  T(F): 98.1 (23 Jan 2019 07:26), Max: 98.1 (22 Jan 2019 23:50)  HR: 84 (23 Jan 2019 07:26) (80 - 90)  BP: 124/70 (23 Jan 2019 07:26) (102/55 - 124/70)  BP(mean): --  RR: 18 (23 Jan 2019 07:26) (18 - 18)  SpO2: 96% (22 Jan 2019 23:50) (96% - 96%)  I&O's Detail      PHYSICAL EXAM:  GENERAL: NAD  HEAD:  Atraumatic, Normocephalic  NECK: Supple, No JVD, Normal thyroid  NERVOUS SYSTEM:  Alert & Oriented X3, Good concentration; Motor Strength 5/5 B/L upper and lower extremities  CHEST/LUNG: Clear to auscultation bilaterally; No rales, rhonchi, wheezing, or rubs  HEART: Regular rate and rhythm; No murmurs, rubs, or gallops  ABDOMEN: Soft, Nontender, Nondistended; Bowel sounds present  EXTREMITIES:  Bilateral LE with clean dressings and ACE wraps.  (+) tenderness in LLE                              10.0   6.5   )-----------( 168      ( 23 Jan 2019 07:03 )             30.7     23 Jan 2019 07:03    134    |  96     |  41.0   ----------------------------<  113    4.8     |  23.0   |  1.20     Ca    9.3        23 Jan 2019 07:03  Mg     2.2       22 Jan 2019 12:04          MEDICATIONS  (STANDING):  carvedilol 6.25 milliGRAM(s) Oral two times a day  cefepime   IVPB      cefepime   IVPB 1000 milliGRAM(s) IV Intermittent every 12 hours  clopidogrel Tablet 75 milliGRAM(s) Oral daily  Dakins Solution - 1/2 Strength 1 Application(s) Topical two times a day  heparin  Injectable 5000 Unit(s) SubCutaneous every 8 hours  saccharomyces boulardii 250 milliGRAM(s) Oral two times a day    MEDICATIONS  (PRN):  acetaminophen   Tablet .. 650 milliGRAM(s) Oral every 6 hours PRN Mild Pain (1 - 3)  oxyCODONE    5 mG/acetaminophen 325 mG 2 Tablet(s) Oral every 4 hours PRN Moderate Pain (4 - 6)      RADIOLOGY & ADDITIONAL TESTS:  < from: NM Bone Marrow Imaging Limited (01.23.19 @ 12:05) >     EXAM:  NM MULTI DAY PROCEDURE                         EXAM:  NM INFLAMMATORY LOC WBC LTD                         EXAM:  NM BONE MARROW IMG LTD AREA                          PROCEDURE DATE:  01/23/2019          INTERPRETATION:  CLINICAL INFORMATION: 78 year-old female with cellulitis   and pain in the left foot, bilateral foot ulcer,  referred to evaluate   for osteomyelitis.    RADIOPHARMACEUTICAL: 520 microcuries In-111 labeled autologous   leukocytes, injected intravenously on 1/22/2019; 10.1 mCi 99mTc- sulfur   colloid, injected intravenously on 1/23/2019.    TECHNIQUE:  Static images of the feet and lower legs were obtained in the   anterior, posterior, and lateral projections approximately 24 hours   following administration of radiolabeled leukocytes. The patient was then   injected with 99mTc- sulfur colloid and images were repeated in the same   projections using dual isotope acquisition mode. Study is limited by   patient motion and inability to stay still for imaging (unable to obtain   dorsal and plantar feet).    COMPARISON: No previous Indium-labeled leukocyte/marrow scan.    FINDINGS: The study is limited by patient motion. There is congruent   uptake of radiolabeled leukocytes and 99mTc- sulfur colloid in the   visualized structures. There is mild diffuse increased tracer   accumulation in the left foot, likely related to cellulitis.    IMPRESSION: Limited combined Indium- labeled leukocyte study and marrow   scan demonstrates:    No scan evidence of osteomyelitis.       Mild diffuse increased uptake in the left foot, likely cellulitis.                JOHN RICHTER M.D., NUCLEAR MEDICINE ATTENDING  This document has been electronically signed. Jan 23 2019 12:09PM                < end of copied text >

## 2019-01-23 NOTE — PROGRESS NOTE ADULT - PROBLEM SELECTOR PROBLEM 2
R/O Osteomyelitis
CHF (congestive heart failure)

## 2019-01-23 NOTE — PROGRESS NOTE ADULT - SUBJECTIVE AND OBJECTIVE BOX
[facility]  INFECTIOUS DISEASES AND INTERNAL MEDICINE at [place]  =======================================================  Car Alejandre MD  Diplomates American Board of Internal Medicine and Infectious Diseases  =======================================================    LESLY DELCID [id number]    Follow up: Cellulitis    No complaints  No fevers or chills  Reports feeling better since admission    Allergies:  aspirin (Unknown)  atorvastatin (Unknown)  penicillin (Unknown)      Antibiotics:         cefepime   IVPB 1000 milliGRAM(s) IV Intermittent every 12 hours       REVIEW OF SYSTEMS:  CONSTITUTIONAL:  No Fever or chills  HEENT:   No diplopia or blurred vision.  No earache, sore throat or runny nose.  CARDIOVASCULAR:  No pressure, squeezing, strangling, tightness, heaviness or aching about the chest, neck, axilla or epigastrium.  RESPIRATORY:  No cough, shortness of breath  GASTROINTESTINAL:  No nausea, vomiting or diarrhea.  GENITOURINARY:  No dysuria, frequency or urgency.  MUSCULOSKELETAL:  no joint aches, no muscle pain  SKIN: B/L LE swelling  NEUROLOGIC:  No seizures or weakness.  PSYCHIATRIC:  No disorder of thought or mood.  ENDOCRINE:  No heat or cold intolerance  HEMATOLOGICAL:  No easy bruising or bleeding.       Physical Exam:  Vital Signs Last 24 Hrs  T(C): 36.7 (23 Jan 2019 07:26), Max: 36.7 (22 Jan 2019 23:50)  T(F): 98.1 (23 Jan 2019 07:26), Max: 98.1 (22 Jan 2019 23:50)  HR: 84 (23 Jan 2019 07:26) (80 - 90)  BP: 124/70 (23 Jan 2019 07:26) (102/55 - 124/70)  RR: 18 (23 Jan 2019 07:26) (18 - 18)  SpO2: 96% (22 Jan 2019 23:50) (96% - 96%)      GEN: NAD, pleasant  HEENT: normocephalic and atraumatic. EOMI. PERRL.  Anicteric   NECK: Supple.   LUNGS: Clear to auscultation.  HEART: Regular rate and rhythm   ABDOMEN: Soft, nontender, and nondistended.  Positive bowel sounds.    : No CVA tenderness  EXTREMITIES: + edema.  MSK: no joint swelling  NEUROLOGIC: Alert and oriented x3  PSYCHIATRIC: Appropriate affect .  SKIN: Chronic venus stasis skin changed B/L LE and ulcers      Labs:  01-23    134<L>  |  96<L>  |  41.0<H>  ----------------------------<  113  4.8   |  23.0  |  1.20    Ca    9.3      23 Jan 2019 07:03  Mg     2.2     01-22               10.0   6.5   )-----------( 168      ( 23 Jan 2019 07:03 )             30.7     RECENT CULTURES:  01-15 @ 21:21 .Other Proteus mirabilis  Klebsiella oxytoca    Moderate Proteus mirabilis  Moderate Klebsiella oxytoca  Few Corynebacterium species  Moderate Alpha hemolytic strep      01-15 @ 19:33 .Blood     No growth at 5 days.      01-15 @ 19:32 .Blood     No growth at 5 days.        EXAM:  NM MULTI DAY PROCEDURE                        EXAM:  NM INFLAMMATORY LOC WBC LTD                        EXAM:  NM BONE MARROW IMG LTD AREA                        PROCEDURE DATE:  01/23/2019    INTERPRETATION:  CLINICAL INFORMATION: 78 year-old female with cellulitis   and pain in the left foot, bilateral foot ulcer,  referred to evaluate   for osteomyelitis.  RADIOPHARMACEUTICAL: 520 microcuries In-111 labeled autologous   leukocytes, injected intravenously on 1/22/2019; 10.1 mCi 99mTc- sulfur   colloid, injected intravenously on 1/23/2019.  TECHNIQUE:  Static images of the feet and lower legs were obtained in the   anterior, posterior, and lateral projections approximately 24 hours   following administration of radiolabeled leukocytes. The patient was then   injected with 99mTc- sulfur colloid and images were repeated in the same   projections using dual isotope acquisition mode. Study is limited by   patient motion and inability to stay still for imaging (unable to obtain   dorsal and plantar feet).  COMPARISON: No previous Indium-labeled leukocyte/marrow scan.  FINDINGS: The study is limited by patient motion. There is congruent   uptake of radiolabeled leukocytes and 99mTc- sulfur colloid in the   visualized structures. There is mild diffuse increased tracer   accumulation in the left foot, likely related to cellulitis.  IMPRESSION: Limited combined Indium- labeled leukocyte study and marrow   scan demonstrates:  No scan evidence of osteomyelitis.     Mild diffuse increased uptake in the left foot, likely cellulitis.

## 2019-01-23 NOTE — PROGRESS NOTE ADULT - PROBLEM SELECTOR PROBLEM 1
Cellulitis of lower extremity, unspecified laterality
Cellulitis
Cellulitis of lower extremity, unspecified laterality
Cellulitis
Cellulitis

## 2019-01-23 NOTE — PROGRESS NOTE ADULT - ASSESSMENT
77y/o F  with hypertension, CHF, PVD, pacemaker was sent in for evaluation of left lower extremity cellulitis by her vascular surgeon  Local culture with Proteus, Klebsiella, Corynebacterium and alpha hemolytic streptococcus      Cellulitis   r/o Osteomyelitis   Foot ulcer with polymicrobial culture  PVD  would r/o PAD    - Blood cultures no growth  - Wound culture is polymicrobial   - No MRI due to PPM  - WBC cell scan with no osteomyelitis  - D/C IV antibiotics completed 7 days for cellulitis   - Trend Fever  - Trend Leukocytosis      will sign off. Please call PRN.

## 2019-01-23 NOTE — PROGRESS NOTE ADULT - ASSESSMENT
78 yr old female with hypertension, CHF, PVD, pacemaker was sent in for evaluation of left lower extremity cellulitis by her vascular surgeon. She was started on empiric Vancomycin and Cefepime for possible osteomyelitis, left second toe ulcer, ID and podiatry was consulted. MRI could not be performed as she has a pacemaker. Local wound care done by podiatry. PT evaluation was requested, advised JOHN vs home PT. Blood cultures were negative, wound cultures grew Proteus, Klebsiella, Corynebacterium and alpha hemolytic streptococcus. Indium scan was ordered to rule out osteomyelitis. Lasix was held during the initial course of stay given hypotension and dehydration, this was subsequently resumed. Arterial duplex was done; no further intervention per vascular surgery. Percocet added for pain control.Indum scan negative for osteomyelitis.     Assessment/Plan:    1. Left lower extremity cellulitis: Day 6 of IV cefepime; local wound care, indium scan negative for osteomyelitis. Unable to perform MRI given pacemaker.  Arterial duplex wnl; no further recommendations per vascular surgery  ID to follow up     2. Chronic diastolic CHF: Alma noted with hyponatremia: hold lasix  Check BMP    3. ALMA with hyponatremia; Hold lasix for today.     4. Hypertension: controlled  Continue medications, BP acceptable.    5. Pacemaker in place: On Plavix. Will need eventual EP evaluation as patient reports might need ?generator change.    VTE- heparin subcut 78 yr old female with hypertension, CHF, PVD, pacemaker was sent in for evaluation of left lower extremity cellulitis by her vascular surgeon. She was started on empiric Vancomycin and Cefepime for possible osteomyelitis, left second toe ulcer, ID and podiatry was consulted. MRI could not be performed as she has a pacemaker. Local wound care done by podiatry. PT evaluation was requested, advised JOHN vs home PT. Blood cultures were negative, wound cultures grew Proteus, Klebsiella, Corynebacterium and alpha hemolytic streptococcus. Indium scan was ordered to rule out osteomyelitis. Lasix was held during the initial course of stay given hypotension and dehydration, this was subsequently resumed. Arterial duplex was done; no further intervention per vascular surgery. Percocet added for pain control.Indum scan negative for osteomyelitis.     Assessment/Plan:    1. Left lower extremity cellulitis: Day 6 of IV cefepime; local wound care, indium scan negative for osteomyelitis. Unable to perform MRI given pacemaker.  Arterial duplex wnl; no further recommendations per vascular surgery  ID to follow up     2. Chronic diastolic CHF: Alma noted with hyponatremia: hold lasix  Check BMP    3. ALMA with hyponatremia; Hold lasix for today.     4. Hypertension: controlled  Continue medications, BP acceptable.    5. Pacemaker in place: On Plavix. Spoke with Dr Park, RV ID to interrogate    VTE- heparin subcut

## 2019-01-23 NOTE — PROGRESS NOTE ADULT - PROBLEM SELECTOR PROBLEM 4
PVD (peripheral vascular disease)
Pacemaker
HLD (hyperlipidemia)
Pacemaker

## 2019-01-24 ENCOUNTER — TRANSCRIPTION ENCOUNTER (OUTPATIENT)
Age: 79
End: 2019-01-24

## 2019-01-24 LAB
ANION GAP SERPL CALC-SCNC: 9 MMOL/L — SIGNIFICANT CHANGE UP (ref 5–17)
BUN SERPL-MCNC: 34 MG/DL — HIGH (ref 8–20)
CALCIUM SERPL-MCNC: 9.5 MG/DL — SIGNIFICANT CHANGE UP (ref 8.6–10.2)
CHLORIDE SERPL-SCNC: 96 MMOL/L — LOW (ref 98–107)
CO2 SERPL-SCNC: 27 MMOL/L — SIGNIFICANT CHANGE UP (ref 22–29)
CREAT SERPL-MCNC: 0.93 MG/DL — SIGNIFICANT CHANGE UP (ref 0.5–1.3)
GLUCOSE SERPL-MCNC: 94 MG/DL — SIGNIFICANT CHANGE UP (ref 70–115)
POTASSIUM SERPL-MCNC: 5 MMOL/L — SIGNIFICANT CHANGE UP (ref 3.5–5.3)
POTASSIUM SERPL-SCNC: 5 MMOL/L — SIGNIFICANT CHANGE UP (ref 3.5–5.3)
SODIUM SERPL-SCNC: 132 MMOL/L — LOW (ref 135–145)

## 2019-01-24 PROCEDURE — 99232 SBSQ HOSP IP/OBS MODERATE 35: CPT

## 2019-01-24 RX ORDER — SODIUM HYPOCHLORITE 0.125 %
1 SOLUTION, NON-ORAL MISCELLANEOUS
Qty: 3 | Refills: 0 | OUTPATIENT
Start: 2019-01-24 | End: 2019-02-06

## 2019-01-24 RX ORDER — SODIUM HYPOCHLORITE 0.125 %
1 SOLUTION, NON-ORAL MISCELLANEOUS
Qty: 0 | Refills: 0 | COMMUNITY
Start: 2019-01-24

## 2019-01-24 RX ADMIN — OXYCODONE AND ACETAMINOPHEN 2 TABLET(S): 5; 325 TABLET ORAL at 05:17

## 2019-01-24 RX ADMIN — Medication 250 MILLIGRAM(S): at 05:17

## 2019-01-24 RX ADMIN — OXYCODONE AND ACETAMINOPHEN 2 TABLET(S): 5; 325 TABLET ORAL at 19:00

## 2019-01-24 RX ADMIN — CARVEDILOL PHOSPHATE 6.25 MILLIGRAM(S): 80 CAPSULE, EXTENDED RELEASE ORAL at 18:38

## 2019-01-24 RX ADMIN — HEPARIN SODIUM 5000 UNIT(S): 5000 INJECTION INTRAVENOUS; SUBCUTANEOUS at 21:53

## 2019-01-24 RX ADMIN — OXYCODONE AND ACETAMINOPHEN 2 TABLET(S): 5; 325 TABLET ORAL at 06:00

## 2019-01-24 RX ADMIN — OXYCODONE AND ACETAMINOPHEN 2 TABLET(S): 5; 325 TABLET ORAL at 18:36

## 2019-01-24 RX ADMIN — Medication 1 APPLICATION(S): at 10:00

## 2019-01-24 RX ADMIN — CLOPIDOGREL BISULFATE 75 MILLIGRAM(S): 75 TABLET, FILM COATED ORAL at 12:47

## 2019-01-24 RX ADMIN — CARVEDILOL PHOSPHATE 6.25 MILLIGRAM(S): 80 CAPSULE, EXTENDED RELEASE ORAL at 05:17

## 2019-01-24 RX ADMIN — OXYCODONE AND ACETAMINOPHEN 2 TABLET(S): 5; 325 TABLET ORAL at 10:04

## 2019-01-24 RX ADMIN — OXYCODONE AND ACETAMINOPHEN 2 TABLET(S): 5; 325 TABLET ORAL at 23:32

## 2019-01-24 RX ADMIN — Medication 1 APPLICATION(S): at 23:32

## 2019-01-24 RX ADMIN — HEPARIN SODIUM 5000 UNIT(S): 5000 INJECTION INTRAVENOUS; SUBCUTANEOUS at 05:17

## 2019-01-24 RX ADMIN — OXYCODONE AND ACETAMINOPHEN 2 TABLET(S): 5; 325 TABLET ORAL at 11:00

## 2019-01-24 RX ADMIN — HEPARIN SODIUM 5000 UNIT(S): 5000 INJECTION INTRAVENOUS; SUBCUTANEOUS at 13:42

## 2019-01-24 RX ADMIN — OXYCODONE AND ACETAMINOPHEN 2 TABLET(S): 5; 325 TABLET ORAL at 22:43

## 2019-01-24 NOTE — DISCHARGE NOTE ADULT - PATIENT PORTAL LINK FT
You can access the [facility] Patient Portal, offered by [facility], by registering with the following website: [email or web address]

## 2019-01-24 NOTE — DISCHARGE NOTE ADULT - HOSPITAL COURSE
78 yr old female with hypertension, CHF, PVD, pacemaker was sent in for evaluation of left lower extremity cellulitis by her vascular surgeon. She was started on empiric Vancomycin and Cefepime for possible osteomyelitis, left second toe ulcer, ID and podiatry was consulted. MRI could not be performed as she has a pacemaker. Local wound care done by podiatry. PT evaluation was requested, advised JOHN vs home PT. Blood cultures were negative, wound cultures grew Proteus, Klebsiella, Corynebacterium and alpha hemolytic streptococcus. Indium scan was ordered to rule out osteomyelitis. Lasix was held during the initial course of stay given hypotension and dehydration, this was subsequently resumed. Arterial duplex was done; no further intervention per vascular surgery. Percocet added for pain control. Indum scan negative for osteomyelitis.  Completed 7 days of IV antibiotics. 78 yr old female with hypertension, CHF, PVD, pacemaker was sent in for evaluation of left lower extremity cellulitis by her vascular surgeon. She was started on empiric Vancomycin and Cefepime for possible osteomyelitis, left second toe ulcer, ID and podiatry was consulted. MRI could not be performed as she has a pacemaker. Local wound care done by podiatry. PT evaluation was requested, advised JOHN vs home PT. Blood cultures were negative, wound cultures grew Proteus, Klebsiella, Corynebacterium and alpha hemolytic streptococcus. Indium scan was ordered to rule out osteomyelitis. Lasix was held during the initial course of stay given hypotension and dehydration, this was subsequently resumed. Arterial duplex was done; no further intervention per vascular surgery. Percocet added for pain control. Indum scan negative for osteomyelitis.  Completed 7 days of IV antibiotics. Evaluated by PT, patient refused JOHN> set up with home care on discharge for wound care 3xweek.     Discharged home in stable condition 50 mins spent coordinating care and discharge 78 yr old female with hypertension, CHF, PVD, pacemaker was sent in for evaluation of left lower extremity cellulitis by her vascular surgeon. She was started on empiric Vancomycin and Cefepime for possible osteomyelitis, left second toe ulcer, ID and podiatry was consulted. MRI could not be performed as she has a pacemaker. Local wound care done by podiatry. PT evaluation was requested, advised JOHN vs home PT. Blood cultures were negative, wound cultures grew Proteus, Klebsiella, Corynebacterium and alpha hemolytic streptococcus. Indium scan was ordered to rule out osteomyelitis. Lasix was held during the initial course of stay given hypotension and dehydration, this was subsequently resumed. Arterial duplex was done; no further intervention per vascular surgery. Percocet added for pain control. Indum scan negative for osteomyelitis.  Completed 7 days of IV antibiotics. Evaluated by PT, INitially patient refused JOHN> set up with home care on discharge for wound care 3xweek. Then agreed to [facility].     Discharged to [facility]  in stable condition 50 mins spent coordinating care and discharge

## 2019-01-24 NOTE — PROGRESS NOTE ADULT - ASSESSMENT
78 yr old female with hypertension, CHF, PVD, pacemaker was sent in for evaluation of left lower extremity cellulitis by her vascular surgeon. She was started on empiric Vancomycin and Cefepime for possible osteomyelitis, left second toe ulcer, ID and podiatry was consulted. MRI could not be performed as she has a pacemaker. Local wound care done by podiatry. PT evaluation was requested, advised JOHN vs home PT. Blood cultures were negative, wound cultures grew Proteus, Klebsiella, Corynebacterium and alpha hemolytic streptococcus. Indium scan was ordered to rule out osteomyelitis. Lasix was held during the initial course of stay given hypotension and dehydration, this was subsequently resumed. Arterial duplex was done; no further intervention per vascular surgery. Percocet added for pain control. Indum scan negative for osteomyelitis.  Completed 7 days of IV antibiotics.     Assessment/Plan:    1. Left lower extremity cellulitis: COmpleted 7 days of iv antibiotics per ID   local wound care, indium scan negative for osteomyelitis. Unable to perform MRI given pacemaker.  Arterial duplex wnl; no further recommendations per vascular surgery  To follow up with podiatry as outpatient     2. Chronic diastolic CHF: Alma noted with hyponatremia: hold lasix  Check BMP    3. ALMA with hyponatremia; IMproving. Lasix held     4. Hypertension: controlled  Continue medications, BP acceptable.    5. AICD in place: Interrogated with adequate battery life.     VTE- heparin subcut     PT to follow up for discharge disposition. JOHN vs HOme with home care

## 2019-01-24 NOTE — DISCHARGE NOTE ADULT - MEDICATION SUMMARY - MEDICATIONS TO TAKE
I will START or STAY ON the medications listed below when I get home from the hospital:    oxycodone-acetaminophen 5 mg-325 mg oral tablet  -- 2 tab(s) by mouth every 4 hours, As needed, Moderate Pain (4 - 6)  -- Indication: For As need pain    Plavix 75 mg oral tablet  -- 1 tab(s) by mouth once a day  -- Indication: For Cad    sodium hypochlorite 0.25% topical solution  -- 1 application on skin 2 times a day  -- Indication: For left foot ucler    carvedilol 12.5 mg oral tablet  -- 1 tab(s) by mouth 2 times a day  -- Indication: For Cad    furosemide 40 mg oral tablet  -- 1 tab(s) by mouth 2 times a day  -- Indication: For CHF (congestive heart failure)    potassium chloride 20 mEq oral tablet, extended release  -- 1 tab(s) by mouth once a day  -- Indication: For supplement I will START or STAY ON the medications listed below when I get home from the hospital:    .  -- Medi-honey 1 jaspreet on skin to the affected leg 3 times per week for 14 days   -- Indication: For Foot ulcer    oxycodone-acetaminophen 5 mg-325 mg oral tablet  -- 2 tab(s) by mouth every 6 hours, As Needed -Moderate Pain (4 - 6) MDD:2 tabs 4 times per day  -- Indication: For As needed for pain    Plavix 75 mg oral tablet  -- 1 tab(s) by mouth once a day  -- Indication: For Cad    sodium hypochlorite 0.25% topical solution  -- 1 application on skin 2 times a day  -- Indication: For Foot ulcer    carvedilol 12.5 mg oral tablet  -- 1 tab(s) by mouth 2 times a day  -- Indication: For Cad    Silvadene 1% topical cream  -- Apply on skin to affected area 3 times a week   -- For external use only.    -- Indication: For Foot ulcer    furosemide 40 mg oral tablet  -- 1 tab(s) by mouth 2 times a day  -- Indication: For CHF (congestive heart failure)    potassium chloride 20 mEq oral tablet, extended release  -- 1 tab(s) by mouth once a day  -- Indication: For supplement

## 2019-01-24 NOTE — DISCHARGE NOTE ADULT - SECONDARY DIAGNOSIS.
Ulcer of left foot, unspecified ulcer stage JULIA (acute kidney injury) CHF (congestive heart failure), NYHA class I, chronic, diastolic

## 2019-01-24 NOTE — PROGRESS NOTE ADULT - SUBJECTIVE AND OBJECTIVE BOX
CC: Follow up cellulitis     INTERVAL HPI/OVERNIGHT EVENTS: Patient seen and examined, afebrile. Pain controlled. No urinary or bowel complaints. Indium scan negative for OM.       Vital Signs Last 24 Hrs  T(C): 36.6 (23 Jan 2019 23:24), Max: 36.6 (23 Jan 2019 23:24)  T(F): 97.8 (23 Jan 2019 23:24), Max: 97.8 (23 Jan 2019 23:24)  HR: 86 (24 Jan 2019 05:16) (86 - 96)  BP: 128/68 (24 Jan 2019 05:16) (128/68 - 148/77)  BP(mean): --  RR: 20 (23 Jan 2019 23:24) (20 - 20)  SpO2: 96% (23 Jan 2019 23:24) (96% - 96%)    PHYSICAL EXAM:    GENERAL: NAD, AOX3  HEAD:  Atraumatic, Normocephalic  EYES: EOMI, PERRLA, conjunctiva and sclera clear  CHEST/LUNG: Clear to auscultation bilaterally; No rales, rhonchi, wheezing, or rubs  HEART: Regular rate and rhythm; No murmurs, rubs, or gallops  ABDOMEN: Soft, Nontender, Nondistended; Bowel sounds present  EXTREMITIES: Bilateral lower extremity dressings in place       MEDICATIONS  (STANDING):  carvedilol 6.25 milliGRAM(s) Oral two times a day  clopidogrel Tablet 75 milliGRAM(s) Oral daily  Dakins Solution - 1/2 Strength 1 Application(s) Topical two times a day  heparin  Injectable 5000 Unit(s) SubCutaneous every 8 hours    MEDICATIONS  (PRN):  acetaminophen   Tablet .. 650 milliGRAM(s) Oral every 6 hours PRN Mild Pain (1 - 3)  oxyCODONE    5 mG/acetaminophen 325 mG 2 Tablet(s) Oral every 4 hours PRN Moderate Pain (4 - 6)      Allergies    aspirin (Unknown)  atorvastatin (Unknown)  penicillin (Unknown)    Intolerances          LABS:                          10.0   6.5   )-----------( 168      ( 23 Jan 2019 07:03 )             30.7     01-24    132<L>  |  96<L>  |  34.0<H>  ----------------------------<  94  5.0   |  27.0  |  0.93    Ca    9.5      24 Jan 2019 07:44  Mg     2.2     01-22            RADIOLOGY & ADDITIONAL TESTS:

## 2019-01-24 NOTE — DISCHARGE NOTE ADULT - PLAN OF CARE
Monitor for worsening symptoms Completed 7 days of iv antibiotics Continue daily wound care  Bilateral leg elevation  Encourage ambulation  FOllow up with podiatrist in 1 week Resolved  Enalapril was held for samantha and hypotension; follow up with your PMD for a repeat kidney function testing (BMP) in 1 week to evaluate for resumption. Resume lasix

## 2019-01-24 NOTE — DISCHARGE NOTE ADULT - ADDITIONAL INSTRUCTIONS
Please remove dressing from bilateral leg  Apply dakin soaked gauze to bilateral leg   Apply ABD pad anteriorly   Wrap with Kerlix   Keep dressing clean dry and intact to bilateral leg. Remove Bilateral Leg Dressing  Apply Generous Amount of Dakin Soaked Gauze on Bilateral Anterior Leg   Apply Calcium alginate and silver dressing to open wounds  Apply 4x4 gauze, wrap kerlix around the foot and legs  Apply Unna boot, webril and coban over both feet and legs  Repeat 3x a week.  Pt needs to follow up in Podiatry Wound Clinic with Dr. Varner or Dr. Rodriguez for follow up wound care once discharge.

## 2019-01-24 NOTE — DISCHARGE NOTE ADULT - CARE PROVIDER_API CALL
Kev Varner (FRANSISCA), Podiatric Medicine and Surgery  [address] [place] [address]  Phone: [phone]  Fax: [phone]

## 2019-01-24 NOTE — DISCHARGE NOTE ADULT - CARE PLAN
Principal Discharge DX:	Cellulitis of lower extremity, unspecified laterality  Goal:	Monitor for worsening symptoms  Assessment and plan of treatment:	Completed 7 days of iv antibiotics  Secondary Diagnosis:	Ulcer of left foot, unspecified ulcer stage  Assessment and plan of treatment:	Continue daily wound care  Bilateral leg elevation  Encourage ambulation  FOllow up with podiatrist in 1 week  Secondary Diagnosis:	JULIA (acute kidney injury)  Assessment and plan of treatment:	Resolved  Enalapril was held for julia and hypotension; follow up with your PMD for a repeat kidney function testing (BMP) in 1 week to evaluate for resumption.  Secondary Diagnosis:	CHF (congestive heart failure), NYHA class I, chronic, diastolic  Assessment and plan of treatment:	Resume lasix

## 2019-01-25 VITALS
TEMPERATURE: 99 F | RESPIRATION RATE: 18 BRPM | OXYGEN SATURATION: 99 % | HEART RATE: 83 BPM | DIASTOLIC BLOOD PRESSURE: 75 MMHG | SYSTOLIC BLOOD PRESSURE: 132 MMHG

## 2019-01-25 PROCEDURE — 80053 COMPREHEN METABOLIC PANEL: CPT

## 2019-01-25 PROCEDURE — 78102 BONE MARROW IMAGING LTD: CPT

## 2019-01-25 PROCEDURE — 85027 COMPLETE CBC AUTOMATED: CPT

## 2019-01-25 PROCEDURE — 83036 HEMOGLOBIN GLYCOSYLATED A1C: CPT

## 2019-01-25 PROCEDURE — 96375 TX/PRO/DX INJ NEW DRUG ADDON: CPT

## 2019-01-25 PROCEDURE — 80048 BASIC METABOLIC PNL TOTAL CA: CPT

## 2019-01-25 PROCEDURE — 99239 HOSP IP/OBS DSCHRG MGMT >30: CPT

## 2019-01-25 PROCEDURE — 97163 PT EVAL HIGH COMPLEX 45 MIN: CPT

## 2019-01-25 PROCEDURE — 78800 RP LOCLZJ TUM 1 AREA 1 D IMG: CPT

## 2019-01-25 PROCEDURE — 85652 RBC SED RATE AUTOMATED: CPT

## 2019-01-25 PROCEDURE — 36415 COLL VENOUS BLD VENIPUNCTURE: CPT

## 2019-01-25 PROCEDURE — 87040 BLOOD CULTURE FOR BACTERIA: CPT

## 2019-01-25 PROCEDURE — 99285 EMERGENCY DEPT VISIT HI MDM: CPT | Mod: 25

## 2019-01-25 PROCEDURE — 80061 LIPID PANEL: CPT

## 2019-01-25 PROCEDURE — 93005 ELECTROCARDIOGRAM TRACING: CPT

## 2019-01-25 PROCEDURE — 80202 ASSAY OF VANCOMYCIN: CPT

## 2019-01-25 PROCEDURE — 83735 ASSAY OF MAGNESIUM: CPT

## 2019-01-25 PROCEDURE — 83880 ASSAY OF NATRIURETIC PEPTIDE: CPT

## 2019-01-25 PROCEDURE — 97110 THERAPEUTIC EXERCISES: CPT

## 2019-01-25 PROCEDURE — A9570: CPT

## 2019-01-25 PROCEDURE — 86140 C-REACTIVE PROTEIN: CPT

## 2019-01-25 PROCEDURE — 84484 ASSAY OF TROPONIN QUANT: CPT

## 2019-01-25 PROCEDURE — 84100 ASSAY OF PHOSPHORUS: CPT

## 2019-01-25 PROCEDURE — 97116 GAIT TRAINING THERAPY: CPT

## 2019-01-25 PROCEDURE — 87070 CULTURE OTHR SPECIMN AEROBIC: CPT

## 2019-01-25 PROCEDURE — 85610 PROTHROMBIN TIME: CPT

## 2019-01-25 PROCEDURE — A9541: CPT

## 2019-01-25 PROCEDURE — 96374 THER/PROPH/DIAG INJ IV PUSH: CPT

## 2019-01-25 PROCEDURE — 73630 X-RAY EXAM OF FOOT: CPT

## 2019-01-25 PROCEDURE — 87186 SC STD MICRODIL/AGAR DIL: CPT

## 2019-01-25 PROCEDURE — 97530 THERAPEUTIC ACTIVITIES: CPT

## 2019-01-25 PROCEDURE — 83605 ASSAY OF LACTIC ACID: CPT

## 2019-01-25 PROCEDURE — 93925 LOWER EXTREMITY STUDY: CPT

## 2019-01-25 RX ADMIN — OXYCODONE AND ACETAMINOPHEN 2 TABLET(S): 5; 325 TABLET ORAL at 04:41

## 2019-01-25 RX ADMIN — OXYCODONE AND ACETAMINOPHEN 2 TABLET(S): 5; 325 TABLET ORAL at 16:20

## 2019-01-25 RX ADMIN — Medication 1 APPLICATION(S): at 12:12

## 2019-01-25 RX ADMIN — CARVEDILOL PHOSPHATE 6.25 MILLIGRAM(S): 80 CAPSULE, EXTENDED RELEASE ORAL at 06:04

## 2019-01-25 RX ADMIN — CLOPIDOGREL BISULFATE 75 MILLIGRAM(S): 75 TABLET, FILM COATED ORAL at 12:13

## 2019-01-25 RX ADMIN — HEPARIN SODIUM 5000 UNIT(S): 5000 INJECTION INTRAVENOUS; SUBCUTANEOUS at 06:04

## 2019-01-25 RX ADMIN — HEPARIN SODIUM 5000 UNIT(S): 5000 INJECTION INTRAVENOUS; SUBCUTANEOUS at 15:46

## 2019-01-25 RX ADMIN — OXYCODONE AND ACETAMINOPHEN 2 TABLET(S): 5; 325 TABLET ORAL at 10:30

## 2019-01-25 RX ADMIN — OXYCODONE AND ACETAMINOPHEN 2 TABLET(S): 5; 325 TABLET ORAL at 15:44

## 2019-01-25 RX ADMIN — OXYCODONE AND ACETAMINOPHEN 2 TABLET(S): 5; 325 TABLET ORAL at 04:12

## 2019-01-25 RX ADMIN — OXYCODONE AND ACETAMINOPHEN 2 TABLET(S): 5; 325 TABLET ORAL at 09:30

## 2019-01-25 NOTE — PROGRESS NOTE ADULT - PROVIDER SPECIALTY LIST ADULT
Hospitalist
Infectious Disease
Internal Medicine
Podiatry
Vascular Surgery
Hospitalist
Infectious Disease

## 2019-01-25 NOTE — PROGRESS NOTE ADULT - ASSESSMENT
78 yr old female with hypertension, CHF, PVD, pacemaker was sent in for evaluation of left lower extremity cellulitis by her vascular surgeon. She was started on empiric Vancomycin and Cefepime for possible osteomyelitis, left second toe ulcer, ID and podiatry was consulted. MRI could not be performed as she has a pacemaker. Local wound care done by podiatry. PT evaluation was requested, advised [facility] vs home PT. Blood cultures were negative, wound cultures grew Proteus, Klebsiella, Corynebacterium and alpha hemolytic streptococcus. Indium scan was ordered to rule out osteomyelitis. Lasix was held during the initial course of stay given hypotension and dehydration, this was subsequently resumed. Arterial duplex was done; no further intervention per vascular surgery. Percocet added for pain control. Indum scan negative for osteomyelitis.  Completed 7 days of IV antibiotics. In agreement for discharge to [facility].     Assessment/Plan:    1. Left lower extremity cellulitis: COmpleted 7 days of iv antibiotics per ID   local wound care, indium scan negative for osteomyelitis. Unable to perform MRI given pacemaker.  Arterial duplex wnl; no further recommendations per vascular surgery  To follow up with podiatry as outpatient     2. Chronic diastolic CHF: Resume lasix     3. JULIA with hyponatremia; Improving, Resume lasix    4. Hypertension: controlled  Continue medications, BP acceptable.    5. AICD in place: Interrogated with adequate battery life.     VTE- heparin subcut     Medically stable for discharge to [facility]

## 2019-01-25 NOTE — PROGRESS NOTE ADULT - SUBJECTIVE AND OBJECTIVE BOX
CC: follow up cellulitis     INTERVAL HPI/OVERNIGHT EVENTS: Patient seen and examined, no acute complaints overnight. Afebrile.       Vital Signs Last 24 Hrs  T(C): 36.6 (25 Jan 2019 09:08), Max: 36.8 (24 Jan 2019 17:08)  T(F): 97.9 (25 Jan 2019 09:08), Max: 98.2 (24 Jan 2019 17:08)  HR: 98 (25 Jan 2019 09:08) (86 - 98)  BP: 148/80 (25 Jan 2019 09:08) (129/72 - 148/80)  BP(mean): --  RR: 18 (25 Jan 2019 09:08) (18 - 18)  SpO2: 99% (25 Jan 2019 09:08) (95% - 99%)    PHYSICAL EXAM:    GENERAL: NAD, AOX3  CHEST/LUNG: Clear to auscultation bilaterally; No rales, rhonchi, wheezing, or rubs  HEART: Regular rate and rhythm; No murmurs, rubs, or gallops  ABDOMEN: Soft, Nontender, Nondistended; Bowel sounds present  EXTREMITIES: Bilateral dressings in place LE        MEDICATIONS  (STANDING):  carvedilol 6.25 milliGRAM(s) Oral two times a day  clopidogrel Tablet 75 milliGRAM(s) Oral daily  Dakins Solution - 1/2 Strength 1 Application(s) Topical two times a day  heparin  Injectable 5000 Unit(s) SubCutaneous every 8 hours    MEDICATIONS  (PRN):  acetaminophen   Tablet .. 650 milliGRAM(s) Oral every 6 hours PRN Mild Pain (1 - 3)  oxyCODONE    5 mG/acetaminophen 325 mG 2 Tablet(s) Oral every 4 hours PRN Moderate Pain (4 - 6)      Allergies    aspirin (Unknown)  atorvastatin (Unknown)  penicillin (Unknown)    Intolerances          LABS:      01-24    132<L>  |  96<L>  |  34.0<H>  ----------------------------<  94  5.0   |  27.0  |  0.93    Ca    9.5      24 Jan 2019 07:44            RADIOLOGY & ADDITIONAL TESTS:

## 2019-01-25 NOTE — PROGRESS NOTE ADULT - REASON FOR ADMISSION
LLE Cellulitis

## 2019-01-25 NOTE — PROGRESS NOTE ADULT - SUBJECTIVE AND OBJECTIVE BOX
77 yo female with PMHX CHF, HTN, Pacemaker seen for left foot painful ulcers. Patient is seen sitting in chair, with legs hanging off chair but "elevated as it is on a bucket". Patient states her wounds are very painful .Pt denies being diabetic. Pt denies N/V/C/F/SOB. Pt denies any previous injury to the foot    Allergies: Aspirin, Atorvastatin, Penicillin    01-24    132<L>  |  96<L>  |  34.0<H>  ----------------------------<  94  5.0   |  27.0  |  0.93    Ca    9.5      24 Jan 2019 07:44          PE: Left Foot  Vascular: DP/PT: non-palable due to pain; CFT< 3 sec x 5; TG: wnl; severe edema noted on the left foot and leg  Derm: onychomycosis nails noted on the left foot 1 to 5; IDM noted; ulceration noted on the dorsal digits with necrotic fibrotic base; dorsal left foot ulcer noted with fibrogranular base; mild mal odor noted; no pus noted; severe pain upon palpation; cellulitis extending to proximal leg noted;   right foot; interdigital maceration noted on the 1st interspace with dorsal wound   Neuro: Protective sensation intact    Findings: Marked diffuse osteoporosis involving the bones of the foot. No   definite evidence of fracture or dislocation. Evaluation is limited. No   definite evidence of bone destruction. No evidence of soft tissue gas.   There is diffuse soft tissue swelling..    Impression:    Diffuse soft tissue swelling. Marked diffuse osteoporosis..    ------------------  < from: US Duplex Arterial Lower Ext Compl, Bilateral (01.22.19 @ 09:06) >     EXAM:  US DPLX LWR EXT ARTS COMPL BI                          PROCEDURE DATE:  01/22/2019          INTERPRETATION:  History:Bilateral lower extremity nonhealing ulcers.   Cellulitis.    Technique: Grayscale, color ultrasound, and spectral analysisof the   lower examination arteries bilaterally    Comparison: None     Findings:     On the right side, the external iliac, common femoral, superficial   femoral, pelvic material, posterior tibial arteries, and dorsalis pedis   are triphasic/biphasic. Monophasic waveforms are seen in the anterior   tibial and peroneal arteries. The profunda femoris artery is not seen and   may BE occluded    On the left side, the external iliac artery is patent with triphasic   waveforms. Monophasic waveforms are seen from the common femoral artery   through the dorsalis pedis artery. The peroneal artery could not be seen.   The profunda femoris artery is not seen and may BE occluded.    Impression:     THERE IS GOOD INFLOW TO THE PELVIS AND OUTFLOW TO THE KNEES BILATERALLY.    THREE-VESSEL RUNOFF IS SEEN TO THE RIGHT FOOT.    TWO-VESSEL RUNOFF IS SEEN TO THE LEFT FOOT WITH OCCLUSION OF THE PERONEAL   ARTERY.    THE PROFUNDUS FEMORIS ARTERIES ARE NOT SEEN BILATERALLY, AND MAY BE   OCCLUDED.    -----------------------------------------------  < from: NM Bone Marrow Imaging Limited (01.23.19 @ 12:05) >   EXAM:  NM MULTI DAY PROCEDURE                         EXAM:  NM INFLAMMATORY LOC WBC LTD                         EXAM:  NM BONE MARROW IMG LTD AREA                          PROCEDURE DATE:  01/23/2019          INTERPRETATION:  CLINICAL INFORMATION: 78 year-old female with cellulitis   and pain in the left foot, bilateral foot ulcer,  referred to evaluate   for osteomyelitis.    RADIOPHARMACEUTICAL: 520 microcuries In-111 labeled autologous   leukocytes, injected intravenously on 1/22/2019; 10.1 mCi 99mTc- sulfur   colloid, injected intravenously on 1/23/2019.    TECHNIQUE:  Static images of the feet and lower legs were obtained in the   anterior, posterior, and lateral projections approximately 24 hours   following administration of radiolabeled leukocytes. The patient was then   injected with 99mTc- sulfur colloid and images were repeated in the same   projections using dual isotope acquisition mode. Study is limited by   patient motion and inability to stay still for imaging (unable to obtain   dorsal and plantar feet).    COMPARISON: No previous Indium-labeled leukocyte/marrow scan.    FINDINGS: The study is limited by patient motion. There is congruent   uptake of radiolabeled leukocytes and 99mTc- sulfur colloid in the   visualized structures. There is mild diffuse increased tracer   accumulation in the left foot, likely related to cellulitis.    IMPRESSION: Limited combined Indium- labeled leukocyte study and marrow   scan demonstrates:    No scan evidence of osteomyelitis.       Mild diffuse increased uptake in the left foot, likely cellulitis.  -----------------------------------------        A: Venous Stasis Ulcer of Left Foot & Leg,  Right Foot Ulcer     P:  Patient evaluated and chart reviewed  Educated pt on proper foot care and change in shoe gear  Xray reviewed; results noted above   BELINDA ordered; results pending.   Cx: Proteus Mirabilis, Klebsiella Oxytoca, Corynebacterium, Alpha Hemolytic Strep   Left Foot Ulcer evaluated, venous stasis ulcer extending to leg, possible OM of 2nd Digit. Cannot perform MRI because pt has a pacemaker.    Bilateral Foot Ulcerations dressed with Christine/Dakins/DSD  Instructed pt to keep dressing clean dry and intact to bilateral foot   WCO Placed for Bilateral Dakins/DSD. Podiatry will perform dressing in morning. Nursing staff will perform dressing in evening.   Continue IV abx as per ID recommendation   As per Podiatry continue local wound care, continue IV abx,  Patient may follow up with Dr Varner or Dr Rodriguez outpatient for local wound care once a week.   Continue 3x a week dressing changes at home by nursing aid.  Podiatry will follow patient while inhouse.      Wound Care Order  Remove Bilateral Leg Dressing  Apply Generous Amount of 0.25% (quarter strength) Dakin Soaked Gauze on Bilateral Anterior Leg   Apply Calcium alginate and silver dressing to open wounds  Apply Dakins soaked 4x4 gauze to all interspaces bilaterally  Apply 4x4 gauze, wrap kerlix around the foot and legs  Apply ACE bandage over both feet and legs  Repeat 3x a week.  Pt needs to follow up in Podiatry Wound Clinic with Dr. Varner or Dr. Rodriguez for follow up wound care once discharge.

## 2019-03-13 ENCOUNTER — OUTPATIENT (OUTPATIENT)
Dept: OUTPATIENT SERVICES | Facility: HOSPITAL | Age: 79
LOS: 1 days | Discharge: ANOTHER HOSPITAL | End: 2019-03-13

## 2019-03-13 ENCOUNTER — INPATIENT (INPATIENT)
Facility: HOSPITAL | Age: 79
LOS: 5 days | Discharge: NURSING, REHAB OR LONG-TERM CARE | DRG: 603 | End: 2019-03-19
Attending: INTERNAL MEDICINE | Admitting: INTERNAL MEDICINE
Payer: MEDICARE

## 2019-03-13 VITALS
HEIGHT: 65 IN | TEMPERATURE: 98 F | WEIGHT: 201.94 LBS | RESPIRATION RATE: 16 BRPM | DIASTOLIC BLOOD PRESSURE: 55 MMHG | SYSTOLIC BLOOD PRESSURE: 106 MMHG | OXYGEN SATURATION: 97 % | HEART RATE: 995 BPM

## 2019-03-13 DIAGNOSIS — Z95.0 PRESENCE OF CARDIAC PACEMAKER: Chronic | ICD-10-CM

## 2019-03-13 DIAGNOSIS — S91.309A UNSPECIFIED OPEN WOUND, UNSPECIFIED FOOT, INITIAL ENCOUNTER: ICD-10-CM

## 2019-03-13 DIAGNOSIS — Z90.89 ACQUIRED ABSENCE OF OTHER ORGANS: Chronic | ICD-10-CM

## 2019-03-13 DIAGNOSIS — L03.90 CELLULITIS, UNSPECIFIED: ICD-10-CM

## 2019-03-13 LAB
ALBUMIN SERPL ELPH-MCNC: 2.7 G/DL — LOW (ref 3.3–5)
ALP SERPL-CCNC: 117 U/L — SIGNIFICANT CHANGE UP (ref 40–120)
ALT FLD-CCNC: 39 U/L — SIGNIFICANT CHANGE UP (ref 12–78)
ANION GAP SERPL CALC-SCNC: 11 MMOL/L — SIGNIFICANT CHANGE UP (ref 5–17)
APTT BLD: 34.6 SEC — SIGNIFICANT CHANGE UP (ref 27.5–36.3)
AST SERPL-CCNC: 24 U/L — SIGNIFICANT CHANGE UP (ref 15–37)
BASOPHILS # BLD AUTO: 0.07 K/UL — SIGNIFICANT CHANGE UP (ref 0–0.2)
BASOPHILS NFR BLD AUTO: 0.7 % — SIGNIFICANT CHANGE UP (ref 0–2)
BILIRUB SERPL-MCNC: 0.2 MG/DL — SIGNIFICANT CHANGE UP (ref 0.2–1.2)
BUN SERPL-MCNC: 46 MG/DL — HIGH (ref 7–23)
CALCIUM SERPL-MCNC: 9 MG/DL — SIGNIFICANT CHANGE UP (ref 8.5–10.1)
CHLORIDE SERPL-SCNC: 101 MMOL/L — SIGNIFICANT CHANGE UP (ref 96–108)
CO2 SERPL-SCNC: 26 MMOL/L — SIGNIFICANT CHANGE UP (ref 22–31)
CREAT SERPL-MCNC: 1.4 MG/DL — HIGH (ref 0.5–1.3)
EOSINOPHIL # BLD AUTO: 0.35 K/UL — SIGNIFICANT CHANGE UP (ref 0–0.5)
EOSINOPHIL NFR BLD AUTO: 3.6 % — SIGNIFICANT CHANGE UP (ref 0–6)
GLUCOSE SERPL-MCNC: 106 MG/DL — HIGH (ref 70–99)
HCT VFR BLD CALC: 33.4 % — LOW (ref 34.5–45)
HGB BLD-MCNC: 10.8 G/DL — LOW (ref 11.5–15.5)
IMM GRANULOCYTES NFR BLD AUTO: 0.3 % — SIGNIFICANT CHANGE UP (ref 0–1.5)
INR BLD: 1.15 RATIO — SIGNIFICANT CHANGE UP (ref 0.88–1.16)
LACTATE SERPL-SCNC: 1.9 MMOL/L — SIGNIFICANT CHANGE UP (ref 0.7–2)
LYMPHOCYTES # BLD AUTO: 1.37 K/UL — SIGNIFICANT CHANGE UP (ref 1–3.3)
LYMPHOCYTES # BLD AUTO: 14.1 % — SIGNIFICANT CHANGE UP (ref 13–44)
MCHC RBC-ENTMCNC: 29 PG — SIGNIFICANT CHANGE UP (ref 27–34)
MCHC RBC-ENTMCNC: 32.3 GM/DL — SIGNIFICANT CHANGE UP (ref 32–36)
MCV RBC AUTO: 89.8 FL — SIGNIFICANT CHANGE UP (ref 80–100)
MONOCYTES # BLD AUTO: 0.71 K/UL — SIGNIFICANT CHANGE UP (ref 0–0.9)
MONOCYTES NFR BLD AUTO: 7.3 % — SIGNIFICANT CHANGE UP (ref 2–14)
NEUTROPHILS # BLD AUTO: 7.19 K/UL — SIGNIFICANT CHANGE UP (ref 1.8–7.4)
NEUTROPHILS NFR BLD AUTO: 74 % — SIGNIFICANT CHANGE UP (ref 43–77)
NRBC # BLD: 0 /100 WBCS — SIGNIFICANT CHANGE UP (ref 0–0)
PLATELET # BLD AUTO: 272 K/UL — SIGNIFICANT CHANGE UP (ref 150–400)
POTASSIUM SERPL-MCNC: 3.9 MMOL/L — SIGNIFICANT CHANGE UP (ref 3.5–5.3)
POTASSIUM SERPL-SCNC: 3.9 MMOL/L — SIGNIFICANT CHANGE UP (ref 3.5–5.3)
PROT SERPL-MCNC: 7.9 G/DL — SIGNIFICANT CHANGE UP (ref 6–8.3)
PROTHROM AB SERPL-ACNC: 13.1 SEC — HIGH (ref 10–12.9)
RBC # BLD: 3.72 M/UL — LOW (ref 3.8–5.2)
RBC # FLD: 14.3 % — SIGNIFICANT CHANGE UP (ref 10.3–14.5)
SODIUM SERPL-SCNC: 138 MMOL/L — SIGNIFICANT CHANGE UP (ref 135–145)
WBC # BLD: 9.72 K/UL — SIGNIFICANT CHANGE UP (ref 3.8–10.5)
WBC # FLD AUTO: 9.72 K/UL — SIGNIFICANT CHANGE UP (ref 3.8–10.5)

## 2019-03-13 PROCEDURE — 93010 ELECTROCARDIOGRAM REPORT: CPT

## 2019-03-13 PROCEDURE — 99285 EMERGENCY DEPT VISIT HI MDM: CPT

## 2019-03-13 PROCEDURE — 71045 X-RAY EXAM CHEST 1 VIEW: CPT | Mod: 26

## 2019-03-13 RX ORDER — FUROSEMIDE 40 MG
40 TABLET ORAL DAILY
Qty: 0 | Refills: 0 | Status: DISCONTINUED | OUTPATIENT
Start: 2019-03-13 | End: 2019-03-19

## 2019-03-13 RX ORDER — SENNA PLUS 8.6 MG/1
2 TABLET ORAL AT BEDTIME
Qty: 0 | Refills: 0 | Status: DISCONTINUED | OUTPATIENT
Start: 2019-03-13 | End: 2019-03-19

## 2019-03-13 RX ORDER — VANCOMYCIN HCL 1 G
1250 VIAL (EA) INTRAVENOUS EVERY 24 HOURS
Qty: 0 | Refills: 0 | Status: DISCONTINUED | OUTPATIENT
Start: 2019-03-14 | End: 2019-03-14

## 2019-03-13 RX ORDER — DOCUSATE SODIUM 100 MG
100 CAPSULE ORAL THREE TIMES A DAY
Qty: 0 | Refills: 0 | Status: DISCONTINUED | OUTPATIENT
Start: 2019-03-13 | End: 2019-03-19

## 2019-03-13 RX ORDER — SODIUM HYPOCHLORITE 0.125 %
1 SOLUTION, NON-ORAL MISCELLANEOUS
Qty: 0 | Refills: 0 | Status: DISCONTINUED | OUTPATIENT
Start: 2019-03-13 | End: 2019-03-19

## 2019-03-13 RX ORDER — AZTREONAM 2 G
1000 VIAL (EA) INJECTION ONCE
Qty: 0 | Refills: 0 | Status: COMPLETED | OUTPATIENT
Start: 2019-03-13 | End: 2019-03-13

## 2019-03-13 RX ORDER — TRAMADOL HYDROCHLORIDE 50 MG/1
50 TABLET ORAL EVERY 4 HOURS
Qty: 0 | Refills: 0 | Status: DISCONTINUED | OUTPATIENT
Start: 2019-03-13 | End: 2019-03-19

## 2019-03-13 RX ORDER — TRAMADOL HYDROCHLORIDE 50 MG/1
25 TABLET ORAL EVERY 6 HOURS
Qty: 0 | Refills: 0 | Status: DISCONTINUED | OUTPATIENT
Start: 2019-03-13 | End: 2019-03-19

## 2019-03-13 RX ORDER — LACTOBACILLUS ACIDOPHILUS 100MM CELL
1 CAPSULE ORAL
Qty: 0 | Refills: 0 | Status: DISCONTINUED | OUTPATIENT
Start: 2019-03-13 | End: 2019-03-19

## 2019-03-13 RX ORDER — CLOPIDOGREL BISULFATE 75 MG/1
75 TABLET, FILM COATED ORAL DAILY
Qty: 0 | Refills: 0 | Status: DISCONTINUED | OUTPATIENT
Start: 2019-03-13 | End: 2019-03-19

## 2019-03-13 RX ORDER — OXYCODONE AND ACETAMINOPHEN 5; 325 MG/1; MG/1
1 TABLET ORAL EVERY 6 HOURS
Qty: 0 | Refills: 0 | Status: DISCONTINUED | OUTPATIENT
Start: 2019-03-13 | End: 2019-03-19

## 2019-03-13 RX ORDER — CARVEDILOL PHOSPHATE 80 MG/1
12.5 CAPSULE, EXTENDED RELEASE ORAL EVERY 12 HOURS
Qty: 0 | Refills: 0 | Status: DISCONTINUED | OUTPATIENT
Start: 2019-03-13 | End: 2019-03-19

## 2019-03-13 RX ORDER — OXYCODONE AND ACETAMINOPHEN 5; 325 MG/1; MG/1
2 TABLET ORAL ONCE
Qty: 0 | Refills: 0 | Status: DISCONTINUED | OUTPATIENT
Start: 2019-03-13 | End: 2019-03-13

## 2019-03-13 RX ORDER — VANCOMYCIN HCL 1 G
1000 VIAL (EA) INTRAVENOUS ONCE
Qty: 0 | Refills: 0 | Status: COMPLETED | OUTPATIENT
Start: 2019-03-13 | End: 2019-03-13

## 2019-03-13 RX ORDER — ENOXAPARIN SODIUM 100 MG/ML
40 INJECTION SUBCUTANEOUS DAILY
Qty: 0 | Refills: 0 | Status: DISCONTINUED | OUTPATIENT
Start: 2019-03-13 | End: 2019-03-19

## 2019-03-13 RX ORDER — AZTREONAM 2 G
1000 VIAL (EA) INJECTION EVERY 8 HOURS
Qty: 0 | Refills: 0 | Status: DISCONTINUED | OUTPATIENT
Start: 2019-03-13 | End: 2019-03-14

## 2019-03-13 RX ORDER — OXYCODONE AND ACETAMINOPHEN 5; 325 MG/1; MG/1
1 TABLET ORAL EVERY 6 HOURS
Qty: 0 | Refills: 0 | Status: DISCONTINUED | OUTPATIENT
Start: 2019-03-13 | End: 2019-03-13

## 2019-03-13 RX ADMIN — OXYCODONE AND ACETAMINOPHEN 2 TABLET(S): 5; 325 TABLET ORAL at 20:06

## 2019-03-13 RX ADMIN — Medication 250 MILLIGRAM(S): at 21:25

## 2019-03-13 RX ADMIN — OXYCODONE AND ACETAMINOPHEN 2 TABLET(S): 5; 325 TABLET ORAL at 21:10

## 2019-03-13 RX ADMIN — OXYCODONE AND ACETAMINOPHEN 1 TABLET(S): 5; 325 TABLET ORAL at 23:18

## 2019-03-13 RX ADMIN — Medication 1000 MILLIGRAM(S): at 21:10

## 2019-03-13 RX ADMIN — Medication 1000 MILLIGRAM(S): at 22:45

## 2019-03-13 RX ADMIN — Medication 100 MILLIGRAM(S): at 20:10

## 2019-03-13 NOTE — H&P ADULT - NS MD HP PULSE DORSALIS
diminished DP pulse, unable to assess left due to bandaging (patient refused to allow foot to be unwrapped)

## 2019-03-13 NOTE — H&P ADULT - RS GEN PE MLT RESP DETAILS PC
respirations non-labored/clear to auscultation bilaterally/no rales/no subcutaneous emphysema/good air movement/no wheezes/no rhonchi

## 2019-03-13 NOTE — H&P ADULT - HISTORY OF PRESENT ILLNESS
77 yo F PMH CHF w/ EF 30% with PPM, CAD, HTN, HLD, PVD presenting to ED after being sent in by Dr. Fletcher for cellulitis due to left foot wound.  Patient was admitted to [place] in January 2019 for LLE cellulitis concerning for osteomyelitis. Patient's pacemaker not MRI compatible, indium scan performed, negative for osteomyelitis. Wound cultures grew Proteus, Klebsiella, Corynebacterium and alpha hemolytic streptococcus. Patient was treated with Cefepime and vancomycin. Blood cultures negative.   In ED VS T 98.3  /69 RR 16 O2 sat 99 on RA  Labs significant for BUN 48, creatinine 1.4 (baseline <1), GFR 36  CXR shows no active CP disease  Received azactam 1000 mg IV x1, vancomycin 1000 mg IV x1 77 yo F PMH systolic and diastolic CHF w/ EF 30% with PPM, CAD, HTN, HLD, PVD presenting to ED after being sent in by Dr. Fletcher for cellulitis due to left foot wound. Patient was discharged from [facility], where she had been sent after discharge from [place] for LLE cellulitis. Patient was sent home from  with Unna boot. Was found to have weeping LLE wounds, sent to [facility] wound care center for evaluation.   Patient was admitted to [place] in January 2019 for LLE cellulitis concerning for osteomyelitis. Patient's pacemaker not MRI compatible, indium scan performed, negative for osteomyelitis. Wound cultures grew Proteus, Klebsiella, Corynebacterium and alpha hemolytic streptococcus. Patient was treated with Cefepime and vancomycin. Blood cultures negative. She was seen by vascular surgery at [place], but no intervention was performed at that time  In ED VS T 98.3  /69 RR 16 O2 sat 99 on RA  Labs significant for BUN 48, creatinine 1.4 (baseline <1), GFR 36  CXR shows no active CP disease  Received azactam 1000 mg IV x1, vancomycin 1000 mg IV x1 79 yo F PMH systolic and diastolic CHF w/ EF 30% with PPM, CAD, HTN, HLD, PVD presenting to ED after being sent in by Dr. Fletcher for cellulitis due to left foot wound. Patient was discharged from [facility], where she had been sent after discharge from [place] for LLE cellulitis. Patient was sent home from  on Saturday with Unna boot. Was seen by outside podiatrist today found to have weeping LLE wounds, sent to [facility] wound care center for evaluation. Patient complaining of severe pain in left foot, especially with weight bearing. Refusing to allow leg to be unwrapped and examined due to pain. Denies fever/chills, headache, dizziness, CP, SOB, abdominal pain, N/V/D. Was constipated while in  due to being on opioids since January, last BM was yesterday.  Patient was admitted to [place] in January 2019 for LLE cellulitis concerning for osteomyelitis. Patient's pacemaker not MRI compatible, indium scan performed, negative for osteomyelitis. Wound cultures grew Proteus, Klebsiella, Corynebacterium and alpha hemolytic streptococcus. Patient was treated with Cefepime and vancomycin. Blood cultures negative. She was seen by vascular surgery at [place], but no intervention was performed during that admission. Patient unsure what kind of pacemaker she has, knows her pacemaker battery is Inbox.   In ED VS T 98.3  /69 RR 16 O2 sat 99 on RA  Labs significant for BUN 48, creatinine 1.4 (baseline <1), GFR 36  CXR shows no active CP disease  Received azactam 1000 mg IV x1, vancomycin 1000 mg IV x1 79 yo F PMH systolic and diastolic CHF w/ EF 30% with PPM, CAD, HTN, HLD, PVD presenting to ED after being sent in by Dr. Fletcher for cellulitis due to left foot wound. Patient was discharged from [facility], where she had been sent after discharge from [place] for LLE cellulitis. Patient was sent home from  on Saturday with Unna boot. Was seen by outside podiatrist today found to have weeping LLE wounds, sent to [facility] wound care center for evaluation. Patient complaining of severe pain in left foot, especially with weight bearing. Refusing to allow leg to be unwrapped and examined due to pain. Denies fever/chills, headache, dizziness, CP, SOB, abdominal pain, N/V/D. Was constipated while in  due to being on opioids since January, last BM was yesterday.  Patient was admitted to [place] in January 2019 for LLE cellulitis concerning for osteomyelitis. Patient's pacemaker not MRI compatible, indium scan performed, negative for osteomyelitis. Wound cultures grew Proteus, Klebsiella, Corynebacterium and alpha hemolytic streptococcus. Patient was treated with Cefepime and vancomycin. Blood cultures negative. She was seen by vascular surgery at [facility], but no intervention was performed during that admission. Patient unsure what kind of pacemaker she has, knows her pacemaker battery is [employer].   In ED VS T 98.3  /69 RR 16 O2 sat 99 on RA  12 lead EKG showed atrial sensed, ventricular paced rhythm at 96 bpm  Labs significant for BUN 48, creatinine 1.4 (baseline <1), GFR 36  CXR shows no active CP disease  Received azactam 1000 mg IV x1, vancomycin 1000 mg IV x1

## 2019-03-13 NOTE — ED ADULT TRIAGE NOTE - SPO2 (%)
Bed: 15  Expected date:   Expected time:   Means of arrival:   Comments:  5 month F cough Lungs % 168 36 97.3 axillary    
97

## 2019-03-13 NOTE — ED PROVIDER NOTE - CLINICAL SUMMARY MEDICAL DECISION MAKING FREE TEXT BOX
Chief Complaint   Patient presents with    Post-op Evaluation     Drain removal         46 y.o. male presents for a post op visit. He has been doing well since his recent surgery. He has minimal pain. ESA drain output has been < 30 ml in the past 24 hours. Lip weakness has resolved. He has no complaints today.    Review of Systems     Constitutional: Negative for fatigue and unexpected weight change.   HENT: per HPI.  Eyes: Negative for visual disturbance.   Respiratory: Negative for shortness of breath, hemoptysis  Cardiovascular: Negative for chest pain and palpitations.   Genitourinary: Negative for dysuria and difficulty urinating.   Musculoskeletal: Negative for decreased ROM, back pain.   Skin: Negative for rash, sunburn, itching.   Neurological: Negative for dizziness and seizures.   Hematological: Negative for adenopathy. Does not bruise/bleed easily.   Psychiatric/Behavioral: Negative for agitation. The patient is not nervous/anxious.   Endocrine: Negative for rapid weight loss/weight gain, heat/cold intolerance.     Past Medical History:   Diagnosis Date    Back pain     Seizures        Past Surgical History:   Procedure Laterality Date    c5-c7 surgery      HERNIA REPAIR         He was adopted. Family history is unknown by patient.    Pt  reports that he has been smoking Cigars.  His smokeless tobacco use includes Chew. He reports that he drinks about 3.6 oz of alcohol per week . He reports that he does not use drugs.    Review of patient's allergies indicates:  No Known Allergies     Physical Exam    Vitals:    10/10/17 0846   BP: 109/74   Pulse: 71     There is no height or weight on file to calculate BMI.    Physical Exam   Constitutional: He is oriented to person, place, and time. He appears well-developed and well-nourished. No distress.   HENT:   Head: Normocephalic.   Right Ear: External ear normal.   Left Ear: External ear normal.   Left neck Incision CDI.  No redness, drainage, or fluid  collection noted.  Edges well approximated.  Sutures intact.    ESA drain with small amount of SS drainage. Removed without difficulty.   Cardiovascular: Normal rate.    Pulmonary/Chest: Effort normal. No respiratory distress.   Neurological: He is alert and oriented to person, place, and time.   Skin: He is not diaphoretic.   Vitals reviewed.        Studies reviewed:  U/S left parotid 10/14/16:  Findings:  Heterogeneous solid mass with internal vascular flow within the left parotid gland measuring 2.6 x 1.8 x 2.2 cm.  The right parotid gland is normal in appearance.    CT neck 3/9/17:  Impression          2.3 cm left parotid mass. Differential diagnosis includes pleomorphic adenoma and Warthin tumor.       Pathology 3/8/17:  FINAL PATHOLOGIC DIAGNOSIS  Negative for malignant cells  Cohesive clusters of benign epithelial and spindle cells in a myxoid background, suggestive of a benign mixed  tumor. I advise correlation with clinical and radiographic findings    Assessment     1. Parotid mass          Plan  Colby Boucher Jr. is 4 days s/p left superficial parotidectomy. Path pending. Drain removed. Will RTC Friday for suture removal.       Referred from Wound Care for admission today, lab, ekg, xr, us , med

## 2019-03-13 NOTE — ED ADULT NURSE REASSESSMENT NOTE - NS ED NURSE REASSESS COMMENT FT1
Two-person assist to help pt up to bedside commode.  Pt steadfastly refuses to use bedpan.  While pt standing, attempted to assess backside for wounds.  Pt relatively uncooperative, states "make it quick."  Able to remove old dressing from left side buttock.  Entire buttock area deeply red with almost waxy-feel.  Left buttock has area where top few layers of skin appears to have been lifted away, leaving abrasion-like wound.  Pt then refused to sit on commode, stated "I know I don't have to go" and insisted on being placed back on stretcher.  Pt assisted to stretcher and made comfortable with extra pillows/padding under backside and left leg elevated.  Pt still refuses to allow dressing on left foot to be removed.  PIcture of left foot seen earlier as shown to this RN by patient's sister and documented in initial note.  Dr. Serrano made aware of all interactions and verbally described pt's wounds as best as possible.  Dr. Serrano to have wound care nurse involved with patient's care.

## 2019-03-13 NOTE — H&P ADULT - NEUROLOGICAL DETAILS
responds to pain/sensation intact/alert and oriented x 3/responds to verbal commands responds to pain/responds to verbal commands/strength decreased/sensation intact/alert and oriented x 3/cranial nerves intact

## 2019-03-13 NOTE — H&P ADULT - ATTENDING COMMENTS
Pt seen, examined, case & care plan d/w pt, residents, & pt at detail.  AM labs  ID Consult- Dr Mauricio  Podiatry-Dr Fletcher  Wound care RN -Kristi in AM for B/L Buttocks/ Sacral  for skin care,

## 2019-03-13 NOTE — ED ADULT NURSE NOTE - OBJECTIVE STATEMENT
Pt A&Ox4, ambulatory to ED c/o left foot wound and pain.  Pt states she developed wound to left foot and was hospitalized at [facility] and then sent to rehab at [facility] for about 5 weeks.  Pt was D/C home on Saturday, and immediately had difficulty ambulating and with pain control.  Pt was seen by podiatrist today who suspected an infection and sent her to wound care.  Wound care cleansed and dressed wound and sent pt to ED for eval and IV antibiotics.  Pt has redness and swelling with warmth to lower left foot.  Pt also has some redness to lower right leg but no swelling or warmth. Lower legs appear to have venous stasis characteristics.  Pt states foot wound has been "dripping" and is extremely painful.  Pt was not ambulatory in rehab, states they left her in wheelchair the entire stay. Pt A&Ox4, ambulatory to ED c/o left foot wound and pain.  Pt states she developed wound to left foot and was hospitalized at [facility] and then sent to rehab at [facility] for about 5 weeks.  Pt was D/C home on Saturday, and immediately had difficulty ambulating and with pain control.  Pt was seen by podiatrist today who suspected an infection and sent her to wound care.  Wound care cleansed and dressed wound and sent pt to ED for eval and IV antibiotics.  Pt has redness and swelling with warmth to lower left foot.  Pt also has some redness to lower right leg but no swelling or warmth. Lower legs appear to have venous stasis characteristics.  Pt states foot wound has been "dripping" and is extremely painful.  Pt was not ambulatory in rehab, states they left her in wheelchair the entire stay.  Pt refuses to allow this RN to undress left foot to look at wounds.  Pt's sister showed picture in which dorsal area of left foot appears deep red with excoriation and open wounds with discharge. Pt A&Ox4, ambulatory to ED c/o left foot wound and pain.  Pt states she developed wound to left foot and was hospitalized at [facility] and then sent to rehab at [facility] for about 5 weeks.  Pt was D/C home on Saturday, and immediately had difficulty ambulating and with pain control.  Pt was seen by podiatrist today who suspected an infection and sent her to wound care.  Wound care cleansed and dressed wound and sent pt to ED for eval and IV antibiotics.  Pt has redness and swelling with warmth to lower left leg.  Pt also has some redness to lower right leg but no swelling or warmth. Lower legs appear to have venous stasis characteristics.  Pt states foot wound has been "dripping" and is extremely painful.  Pt was not ambulatory in rehab, states they left her in wheelchair the entire stay.  Pt refuses to allow this RN to undress left foot to look at wounds.  Pt's sister showed picture in which dorsal area of left foot appears deep red with excoriation and open wounds with discharge.

## 2019-03-13 NOTE — H&P ADULT - ASSESSMENT
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.    1. Cellulitis:  - Patient admitted to [place] for cellulitis of LLE, indium scan negative for osteomyelitis  - will continue azactam 1g IV Q8 and vancomycin 1250 mg IV Q24   - follow up vanc trough after 3rd dose  - podiatry, Dr. Fletcher, will follow up recs  - follow up blood cultures  - no wound cultures ordered at this time, patient cannot tolerate unwrapping of left foot due to pain. Will obtain wound cultures when pain is better controlled.  - patient's pacemaker is not MRI compatible, patient unsure what brand her ppm is. Follow up with patient's PMD, Dr. Park  - Continue tramadol 25 mg Q6 PRN for mild pain, tramadol 50 mg Q4 PRN for moderate pain, percocet 5-25 mg Q6 PRN for severe pain  - PT eval    2. Sacral ulcer  - patient recently discharged from  after spending over 1 month there, minimally ambulatory due to LE wounds  - wound care RN consult  - frequent position changes, Q3    3. 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.    1. Cellulitis:  - Patient admitted to [place] for cellulitis of LLE, indium scan negative for osteomyelitis  - will continue azactam 1g IV Q8 and vancomycin 1250 mg IV Q24   - follow up vanc trough after 3rd dose  - podiatry, Dr. Fletcher, will follow up recs  - ID consult, Dr. Mauricio  - follow up blood cultures  - no wound cultures ordered at this time, patient cannot tolerate unwrapping of left foot due to pain. Will obtain wound cultures when pain is better controlled.  - patient's pacemaker is not MRI compatible, patient unsure what brand her ppm is. Follow up with patient's PMD, Dr. Park  - Continue tramadol 25 mg Q6 PRN for mild pain, tramadol 50 mg Q4 PRN for moderate pain, percocet 5-25 mg Q6 PRN for severe pain  - PT eval    2. Sacral ulcer  - patient recently discharged from  after spending over 1 month there, minimally ambulatory due to LE wounds  - wound care RN consult  - frequent position changes, Q3    3. HF  - with EF approximately 30 percent, on carvedilol, lasix  - will continue carvedilol 12.5 mg BID  - continue lasix 40 mg po QD  - patient taken off enalapril by PMD  - continue to monitor for signs of fluid overload    4. PVD  - continue Plavix    5. HTN  - continue carvedilol with hold parameters  - DASH diet    6. Need for prophylactic measure    Improve score: 1   Heparin 5000 u subq Q8 for DVT PPX  PT eval  out of bed with assistance  fall precautions  bedside commode 77 yo F [facility] systolic and diastolic CHF w/ EF 30% with PPM, CAD, HTN, PVD admitted for cellulitis of LLE after being discharged from [facility].    1. Cellulitis:  - Patient admitted to [place] for cellulitis of LLE, indium scan negative for osteomyelitis  - will continue azactam 1g IV Q8 and vancomycin 1250 mg IV Q24 (dosing discussed with pharmacy)  - follow up vanc trough after 3rd dose  - podiatry, Dr. Fletcher, will follow up recs  - ID consult, Dr. Mauricio  - follow up blood cultures  - no wound cultures ordered at this time, patient cannot tolerate unwrapping of left foot due to pain. Will obtain wound cultures when pain is better controlled.  - patient's pacemaker is not MRI compatible, patient unsure what brand her ppm is. Follow up with patient's PMD, Dr. Park  - Continue tramadol 25 mg Q6 PRN for mild pain, tramadol 50 mg Q4 PRN for moderate pain, percocet 5-25 mg Q6 PRN for severe pain  - continue Bacid  - PT eval    2. Sacral ulcer  - patient recently discharged from  after spending over 1 month there, minimally ambulatory due to LE wounds  - wound care RN consult  - frequent position changes, Q3    3. JULIA  - likely due to dehydration  - continue po hydration  - follow up daily lytes    4. Constipation  - while in rehab, last BM yesterday  - percocet only opioid for severe pain  - continue colace (3x/day) and senna (2 tabs at bedtime)    5. HF  - with EF approximately 30 percent, on carvedilol, lasix  - will continue carvedilol 12.5 mg BID  - continue lasix 40 mg po QD  - patient taken off enalapril by PMD  - continue to monitor for signs of fluid overload    6. PVD  - continue Plavix    7. HTN  - continue carvedilol with hold parameters  - DASH diet    8. Need for prophylactic measure    - Improve score: 1   - Lovenox 40 daily for DVT PPX  - PT eval  - out of bed with assistance  - fall precautions  - bedside commode   - frequent position changes for decubitus ulcer 79 yo F PMH systolic and diastolic CHF w/ EF 30% with PPM, CAD, HTN, PVD admitted for cellulitis of LLE after being discharged from [facility].    1. Cellulitis: with Left foot wound & severe PVD  - Patient admitted to [place] for cellulitis of LLE, indium scan negative for osteomyelitis  - will continue Azactam 1g IV Q8 and vancomycin 1250 mg IV Q24 (dosing discussed with pharmacy)  - follow up vanc trough after 3rd dose  - podiatry, Dr. Fletcher, will follow up recs  - ID consult, Dr. Mauricio  - follow up blood cultures  - no wound cultures ordered at this time, patient cannot tolerate unwrapping of left foot due to pain. Will obtain wound cultures when pain is better controlled.  - patient's pacemaker is not MRI compatible, patient unsure what brand her ppm is. Follow up with patient's PMD, Dr. Park  - Continue tramadol 25 mg Q6 PRN for mild pain, tramadol 50 mg Q4 PRN for moderate pain, percocet 5-25 mg Q6 PRN for severe pain  - continue Bacid  - PT eval    2.Anemia   -Chronic Anemia , Multifactorial -JULIA, Cellulitis  -ACD likely  -CBC in AM    3. JULIA  - likely due to dehydration  - continue po hydration  - follow up daily lytes    4. HF  - with EF approximately 30 percent, on carvedilol, Lasix  - will continue carvedilol 12.5 mg BID  - continue Lasix 40 mg po QD  - patient taken off enalapril by PMD  - continue to monitor for signs of fluid overload    5. PVD- severe PVD   - Pt had arterial doppler done 1/22/19  - continue Plavix    6. HTN  - continue carvedilol with hold parameters  - DASH diet    7. Constipation  - while in rehab, last BM yesterday  - percocet only opioid for severe pain  - continue colace (3x/day) and senna (2 tabs at bedtime)    8. Sacral / buttock Excoriation & Erythema  - patient recently discharged from  after spending over 1 month there, minimally ambulatory due to LE wounds  - wound care RN consult  - frequent position changes, Q3    9. Need for prophylactic measure    - Improve score: 1   - Lovenox 40 daily for DVT PPX  - PT eval  - out of bed with assistance  - fall precautions  - bedside commode   - frequent position changes for decubitus ulcer

## 2019-03-13 NOTE — H&P ADULT - NSICDXPASTMEDICALHX_GEN_ALL_CORE_FT
PAST MEDICAL HISTORY:  CHF (congestive heart failure)     Essential hypertension     HLD (hyperlipidemia)     Pacemaker

## 2019-03-13 NOTE — H&P ADULT - ADDITIONAL PE
As stated above, patient refused to let RN/resident/attending unwrap left foot to examine wound due to pain

## 2019-03-14 ENCOUNTER — TRANSCRIPTION ENCOUNTER (OUTPATIENT)
Age: 79
End: 2019-03-14

## 2019-03-14 DIAGNOSIS — L03.116 CELLULITIS OF LEFT LOWER LIMB: ICD-10-CM

## 2019-03-14 DIAGNOSIS — I83.891 VARICOSE VEINS OF RIGHT LOWER EXTREMITY WITH OTHER COMPLICATIONS: ICD-10-CM

## 2019-03-14 DIAGNOSIS — Z79.899 OTHER LONG TERM (CURRENT) DRUG THERAPY: ICD-10-CM

## 2019-03-14 DIAGNOSIS — Z29.9 ENCOUNTER FOR PROPHYLACTIC MEASURES, UNSPECIFIED: ICD-10-CM

## 2019-03-14 DIAGNOSIS — Z82.0 FAMILY HISTORY OF EPILEPSY AND OTHER DISEASES OF THE NERVOUS SYSTEM: ICD-10-CM

## 2019-03-14 DIAGNOSIS — I50.9 HEART FAILURE, UNSPECIFIED: ICD-10-CM

## 2019-03-14 DIAGNOSIS — K59.00 CONSTIPATION, UNSPECIFIED: ICD-10-CM

## 2019-03-14 DIAGNOSIS — L97.522 NON-PRESSURE CHRONIC ULCER OF OTHER PART OF LEFT FOOT WITH FAT LAYER EXPOSED: ICD-10-CM

## 2019-03-14 DIAGNOSIS — E66.8 OTHER OBESITY: ICD-10-CM

## 2019-03-14 DIAGNOSIS — I83.025 VARICOSE VEINS OF LEFT LOWER EXTREMITY WITH ULCER OTHER PART OF FOOT: ICD-10-CM

## 2019-03-14 DIAGNOSIS — Z88.8 ALLERGY STATUS TO OTHER DRUGS, MEDICAMENTS AND BIOLOGICAL SUBSTANCES STATUS: ICD-10-CM

## 2019-03-14 DIAGNOSIS — L03.119 CELLULITIS OF UNSPECIFIED PART OF LIMB: ICD-10-CM

## 2019-03-14 DIAGNOSIS — L98.429 NON-PRESSURE CHRONIC ULCER OF BACK WITH UNSPECIFIED SEVERITY: ICD-10-CM

## 2019-03-14 DIAGNOSIS — Z88.6 ALLERGY STATUS TO ANALGESIC AGENT: ICD-10-CM

## 2019-03-14 DIAGNOSIS — Z88.0 ALLERGY STATUS TO PENICILLIN: ICD-10-CM

## 2019-03-14 DIAGNOSIS — N17.9 ACUTE KIDNEY FAILURE, UNSPECIFIED: ICD-10-CM

## 2019-03-14 DIAGNOSIS — Z95.810 PRESENCE OF AUTOMATIC (IMPLANTABLE) CARDIAC DEFIBRILLATOR: ICD-10-CM

## 2019-03-14 DIAGNOSIS — Z87.891 PERSONAL HISTORY OF NICOTINE DEPENDENCE: ICD-10-CM

## 2019-03-14 DIAGNOSIS — I10 ESSENTIAL (PRIMARY) HYPERTENSION: ICD-10-CM

## 2019-03-14 DIAGNOSIS — Z82.69 FAMILY HISTORY OF OTHER DISEASES OF THE MUSCULOSKELETAL SYSTEM AND CONNECTIVE TISSUE: ICD-10-CM

## 2019-03-14 DIAGNOSIS — M12.9 ARTHROPATHY, UNSPECIFIED: ICD-10-CM

## 2019-03-14 DIAGNOSIS — E78.00 PURE HYPERCHOLESTEROLEMIA, UNSPECIFIED: ICD-10-CM

## 2019-03-14 LAB
ALBUMIN SERPL ELPH-MCNC: 2.5 G/DL — LOW (ref 3.3–5)
ALP SERPL-CCNC: 107 U/L — SIGNIFICANT CHANGE UP (ref 40–120)
ALT FLD-CCNC: 31 U/L — SIGNIFICANT CHANGE UP (ref 12–78)
ANION GAP SERPL CALC-SCNC: 8 MMOL/L — SIGNIFICANT CHANGE UP (ref 5–17)
APPEARANCE UR: ABNORMAL
AST SERPL-CCNC: 19 U/L — SIGNIFICANT CHANGE UP (ref 15–37)
BACTERIA # UR AUTO: ABNORMAL
BASOPHILS # BLD AUTO: 0.08 K/UL — SIGNIFICANT CHANGE UP (ref 0–0.2)
BASOPHILS NFR BLD AUTO: 0.9 % — SIGNIFICANT CHANGE UP (ref 0–2)
BILIRUB SERPL-MCNC: 0.3 MG/DL — SIGNIFICANT CHANGE UP (ref 0.2–1.2)
BILIRUB UR-MCNC: NEGATIVE — SIGNIFICANT CHANGE UP
BUN SERPL-MCNC: 35 MG/DL — HIGH (ref 7–23)
CALCIUM SERPL-MCNC: 8.9 MG/DL — SIGNIFICANT CHANGE UP (ref 8.5–10.1)
CHLORIDE SERPL-SCNC: 106 MMOL/L — SIGNIFICANT CHANGE UP (ref 96–108)
CO2 SERPL-SCNC: 25 MMOL/L — SIGNIFICANT CHANGE UP (ref 22–31)
COLOR SPEC: YELLOW — SIGNIFICANT CHANGE UP
CREAT SERPL-MCNC: 1 MG/DL — SIGNIFICANT CHANGE UP (ref 0.5–1.3)
DIFF PNL FLD: ABNORMAL
EOSINOPHIL # BLD AUTO: 0.33 K/UL — SIGNIFICANT CHANGE UP (ref 0–0.5)
EOSINOPHIL NFR BLD AUTO: 3.7 % — SIGNIFICANT CHANGE UP (ref 0–6)
EPI CELLS # UR: SIGNIFICANT CHANGE UP
GLUCOSE SERPL-MCNC: 90 MG/DL — SIGNIFICANT CHANGE UP (ref 70–99)
GLUCOSE UR QL: NEGATIVE — SIGNIFICANT CHANGE UP
HCT VFR BLD CALC: 31.6 % — LOW (ref 34.5–45)
HGB BLD-MCNC: 10.2 G/DL — LOW (ref 11.5–15.5)
IMM GRANULOCYTES NFR BLD AUTO: 0.2 % — SIGNIFICANT CHANGE UP (ref 0–1.5)
KETONES UR-MCNC: NEGATIVE — SIGNIFICANT CHANGE UP
LEUKOCYTE ESTERASE UR-ACNC: ABNORMAL
LYMPHOCYTES # BLD AUTO: 1.28 K/UL — SIGNIFICANT CHANGE UP (ref 1–3.3)
LYMPHOCYTES # BLD AUTO: 14.4 % — SIGNIFICANT CHANGE UP (ref 13–44)
MCHC RBC-ENTMCNC: 29.3 PG — SIGNIFICANT CHANGE UP (ref 27–34)
MCHC RBC-ENTMCNC: 32.3 GM/DL — SIGNIFICANT CHANGE UP (ref 32–36)
MCV RBC AUTO: 90.8 FL — SIGNIFICANT CHANGE UP (ref 80–100)
MONOCYTES # BLD AUTO: 0.82 K/UL — SIGNIFICANT CHANGE UP (ref 0–0.9)
MONOCYTES NFR BLD AUTO: 9.2 % — SIGNIFICANT CHANGE UP (ref 2–14)
NEUTROPHILS # BLD AUTO: 6.35 K/UL — SIGNIFICANT CHANGE UP (ref 1.8–7.4)
NEUTROPHILS NFR BLD AUTO: 71.6 % — SIGNIFICANT CHANGE UP (ref 43–77)
NITRITE UR-MCNC: POSITIVE
NRBC # BLD: 0 /100 WBCS — SIGNIFICANT CHANGE UP (ref 0–0)
PH UR: 5 — SIGNIFICANT CHANGE UP (ref 5–8)
PLATELET # BLD AUTO: 225 K/UL — SIGNIFICANT CHANGE UP (ref 150–400)
POTASSIUM SERPL-MCNC: 3.7 MMOL/L — SIGNIFICANT CHANGE UP (ref 3.5–5.3)
POTASSIUM SERPL-SCNC: 3.7 MMOL/L — SIGNIFICANT CHANGE UP (ref 3.5–5.3)
PROT SERPL-MCNC: 7.2 G/DL — SIGNIFICANT CHANGE UP (ref 6–8.3)
PROT UR-MCNC: 75 MG/DL
RBC # BLD: 3.48 M/UL — LOW (ref 3.8–5.2)
RBC # FLD: 14.3 % — SIGNIFICANT CHANGE UP (ref 10.3–14.5)
RBC CASTS # UR COMP ASSIST: SIGNIFICANT CHANGE UP /HPF (ref 0–4)
SODIUM SERPL-SCNC: 139 MMOL/L — SIGNIFICANT CHANGE UP (ref 135–145)
SP GR SPEC: 1.01 — SIGNIFICANT CHANGE UP (ref 1.01–1.02)
UROBILINOGEN FLD QL: NEGATIVE — SIGNIFICANT CHANGE UP
WBC # BLD: 8.88 K/UL — SIGNIFICANT CHANGE UP (ref 3.8–10.5)
WBC # FLD AUTO: 8.88 K/UL — SIGNIFICANT CHANGE UP (ref 3.8–10.5)
WBC UR QL: >50

## 2019-03-14 PROCEDURE — 93970 EXTREMITY STUDY: CPT | Mod: 26

## 2019-03-14 RX ORDER — OXYCODONE AND ACETAMINOPHEN 5; 325 MG/1; MG/1
1 TABLET ORAL ONCE
Qty: 0 | Refills: 0 | Status: DISCONTINUED | OUTPATIENT
Start: 2019-03-14 | End: 2019-03-14

## 2019-03-14 RX ORDER — CEFTRIAXONE 500 MG/1
1 INJECTION, POWDER, FOR SOLUTION INTRAMUSCULAR; INTRAVENOUS EVERY 24 HOURS
Qty: 0 | Refills: 0 | Status: DISCONTINUED | OUTPATIENT
Start: 2019-03-15 | End: 2019-03-19

## 2019-03-14 RX ORDER — CEFTRIAXONE 500 MG/1
INJECTION, POWDER, FOR SOLUTION INTRAMUSCULAR; INTRAVENOUS
Qty: 0 | Refills: 0 | Status: DISCONTINUED | OUTPATIENT
Start: 2019-03-14 | End: 2019-03-19

## 2019-03-14 RX ORDER — CEFTRIAXONE 500 MG/1
1 INJECTION, POWDER, FOR SOLUTION INTRAMUSCULAR; INTRAVENOUS ONCE
Qty: 0 | Refills: 0 | Status: COMPLETED | OUTPATIENT
Start: 2019-03-14 | End: 2019-03-14

## 2019-03-14 RX ADMIN — Medication 1 TABLET(S): at 08:05

## 2019-03-14 RX ADMIN — OXYCODONE AND ACETAMINOPHEN 1 TABLET(S): 5; 325 TABLET ORAL at 14:10

## 2019-03-14 RX ADMIN — OXYCODONE AND ACETAMINOPHEN 1 TABLET(S): 5; 325 TABLET ORAL at 21:36

## 2019-03-14 RX ADMIN — Medication 1 TABLET(S): at 17:37

## 2019-03-14 RX ADMIN — Medication 1 APPLICATION(S): at 13:54

## 2019-03-14 RX ADMIN — Medication 1 TABLET(S): at 12:04

## 2019-03-14 RX ADMIN — Medication 100 MILLIGRAM(S): at 13:15

## 2019-03-14 RX ADMIN — CARVEDILOL PHOSPHATE 12.5 MILLIGRAM(S): 80 CAPSULE, EXTENDED RELEASE ORAL at 17:37

## 2019-03-14 RX ADMIN — CLOPIDOGREL BISULFATE 75 MILLIGRAM(S): 75 TABLET, FILM COATED ORAL at 12:04

## 2019-03-14 RX ADMIN — OXYCODONE AND ACETAMINOPHEN 1 TABLET(S): 5; 325 TABLET ORAL at 08:30

## 2019-03-14 RX ADMIN — OXYCODONE AND ACETAMINOPHEN 1 TABLET(S): 5; 325 TABLET ORAL at 09:30

## 2019-03-14 RX ADMIN — Medication 100 MILLIGRAM(S): at 06:17

## 2019-03-14 RX ADMIN — OXYCODONE AND ACETAMINOPHEN 1 TABLET(S): 5; 325 TABLET ORAL at 22:30

## 2019-03-14 RX ADMIN — Medication 50 MILLIGRAM(S): at 06:17

## 2019-03-14 RX ADMIN — CEFTRIAXONE 100 GRAM(S): 500 INJECTION, POWDER, FOR SOLUTION INTRAMUSCULAR; INTRAVENOUS at 12:03

## 2019-03-14 RX ADMIN — ENOXAPARIN SODIUM 40 MILLIGRAM(S): 100 INJECTION SUBCUTANEOUS at 12:04

## 2019-03-14 RX ADMIN — OXYCODONE AND ACETAMINOPHEN 1 TABLET(S): 5; 325 TABLET ORAL at 13:11

## 2019-03-14 NOTE — PHYSICAL THERAPY INITIAL EVALUATION ADULT - ADDITIONAL COMMENTS
Pt lives alone in a house, + steps to enter/exit. pt was [facility] and was home for roughly 3 days. Pt stated she was ambulating with RW after d/c from rehab

## 2019-03-14 NOTE — DISCHARGE NOTE PROVIDER - CARE PROVIDERS DIRECT ADDRESSES
,DirectAddress_Unknown,DirectAddress_Unknown ,DirectAddress_Unknown,DirectAddress_Unknown,[email or web address]

## 2019-03-14 NOTE — PROGRESS NOTE ADULT - ASSESSMENT
77 yo F [facility] systolic and diastolic CHF w/ EF 30% with PPM, CAD, HTN, PVD admitted for cellulitis of LLE after being discharged from [facility].    1. Cellulitis:  - Patient admitted to [place] for cellulitis of LLE, indium scan negative for osteomyelitis in January   -afebrile, no WBC this AM   -S/P azactam 1g IV Q8 and vancomycin 1250 mg IV Q24, ID (Dr. Mauricio following), now on ceftriaxone only    - Podiatry, Dr. Fletcher consulted, will follow up recs  - ID consult, Dr. Mauricio following, recs appreciated   - follow up blood cultures  - Continue tramadol 25 mg Q6 PRN for mild pain, tramadol 50 mg Q4 PRN for moderate pain, percocet 5-25 mg Q6 PRN for severe pain  - continue Bacid  - PT eval    2. Sacral ulcer  - patient recently discharged from  after spending over 1 month there, minimally ambulatory due to LE wounds  - wound care RN consulted   - frequent position changes, Q3    3. JULIA  - resolved. likely due to dehydration  - continue po hydration  - follow up daily lytes    4. Constipation  - while in rehab, last BM yesterday  - percocet only opioid for severe pain  - continue colace (3x/day) and senna (2 tabs at bedtime)    5. HF  - with EF approximately 30 percent, on carvedilol, lasix  - will continue carvedilol 12.5 mg BID  - continue lasix 40 mg po QD  - patient taken off enalapril by PMD  - continue to monitor for signs of fluid overload  -patient's pacemaker is not MRI compatible, patient has [employer] pacemaker.     6. PVD  - continue Plavix    7. HTN  - continue carvedilol with hold parameters  - DASH diet    8. 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    1. Cellulitis:  - Patient admitted to [place] for cellulitis of LLE, indium scan negative for osteomyelitis in January   -afebrile, no WBC this AM   -S/P azactam 1g IV Q8 and vancomycin 1250 mg IV Q24, ID (Dr. Hang jurado), now on ceftriaxone only    - Podiatry, Dr. Fletcher consulted, seen by wound care today, Wound cleansed, dressed with dakins, adaptic, DSD  - ID consult, Dr. Mauricio following, recs appreciated   - follow up blood cultures  - Continue tramadol 25 mg Q6 PRN for mild pain, tramadol 50 mg Q4 PRN for moderate pain, percocet 5-25 mg Q6 PRN for severe pain  - continue Bacid  - PT eval    2. Sacral ulcer  - patient recently discharged from  after spending over 1 month there, minimally ambulatory due to LE wounds  - wound care RN consulted, f/u recs   - frequent position changes, Q3    3. JULIA  - resolved. likely due to dehydration  - continue po hydration  - follow up daily lytes    4. Constipation  - while in rehab, last BM yesterday  - percocet only opioid for severe pain  - continue colace (3x/day) and senna (2 tabs at bedtime)    5. HF  - with EF approximately 30 percent, on carvedilol, lasix  - will continue carvedilol 12.5 mg BID  - continue lasix 40 mg po QD  - patient taken off enalapril by PMD  - continue to monitor for signs of fluid overload  -patient's pacemaker is not MRI compatible, patient has [employer]tr[employer] pacemaker.     6. PVD  - continue Plavix    7. HTN  - continue carvedilol with hold parameters  - DASH diet    8. Need for prophylactic measure    - Improve score: 1   - Lovenox 40 daily for DVT PPX  - PT evaluated today: recommend JOHN   - out of bed with assistance  - fall precautions  - bedside commode   - frequent position changes for decubitus ulcer 77 yo F [facility] systolic and diastolic CHF w/ EF 30% with PPM, CAD, HTN, PVD admitted for cellulitis of LLE after being discharged from [facility].    1. Cellulitis:  - Patient admitted to [place] for cellulitis of LLE, indium scan negative for osteomyelitis in January   -afebrile, no WBC this AM   -S/P azactam 1g IV Q8 and vancomycin 1250 mg IV Q24, ID (Dr. Hang jurado), now on ceftriaxone 1gm  only    - Podiatry, Dr. Fletcher consulted, seen by wound care today, Wound cleansed, dressed with dakins, adaptic, DSD  - ID consult, Dr. Mauricio following, recs appreciated   - follow up blood cultures, Vascular Eval Dr Parsons called  - Continue tramadol 25 mg Q6 PRN for mild pain, tramadol 50 mg Q4 PRN for moderate pain, percocet 5-25 mg Q6 PRN for severe pain  - continue Bacid  - PT eval    2. Sacral ulcer  - patient recently discharged from  after spending over 1 month there, minimally ambulatory due to LE wounds  - wound care RN consulted, f/u recs   - frequent position changes, Q3    3. JULIA  - resolved. likely due to dehydration  - continue po hydration  - follow up daily lytes    4. Constipation  - while in rehab, last BM yesterday  - percocet only opioid for severe pain  - continue colace (3x/day) and senna (2 tabs at bedtime)    5. HF, Stable Systolic/ Diastolic CHF  - with EF approximately 30 %, on carvedilol, Lasix 40 mg daily  - will continue carvedilol 12.5 mg BID  - continue Lasix 40 mg po QD  - patient taken off enalapril by PMD  - continue to monitor for signs of fluid overload  -patient's pacemaker is not MRI compatible, patient has [employer]tronic pacemaker.     6. PVD  - continue Plavix    7. HTN  - continue carvedilol with hold parameters  - DASH diet    8. Need for prophylactic measure    - Improve score: 1   - Lovenox 40 daily for DVT PPX  - PT evaluated today: recommend JOHN   - out of bed with assistance  - fall precautions  - bedside commode   - frequent position changes for decubitus ulcer

## 2019-03-14 NOTE — DISCHARGE NOTE PROVIDER - NSDCFUADDINST_GEN_ALL_CORE_FT
You will be going to [facility] to regain strength.   Within 1 week of discharge from [facility] please follow-up with your PCP and your podiatrist. -You will be going to [facility] to regain strength.   -Within 1 week of discharge from [facility] please follow-up with your PCP  (Dr. Park) 1 week of discharge from [facility], and your podiatrist (Dr. Fletcher).  -Repeat CBC to be done in [facility] for anemia   -Recommend outpatient work-up for Anemia -You will be going to [facility] to regain strength.   -Within 1 week of discharge from [facility] please follow-up with your PCP / Cardiologist  (Dr. Park) 1 week of discharge from [facility], and your podiatrist (Dr. Fletcher). Vascular Sx Dr Morejon  -Repeat CBC & BMP in 1 week to be done in [facility] for anemia   -Recommend outpatient work-up for Anemia

## 2019-03-14 NOTE — CONSULT NOTE ADULT - SUBJECTIVE AND OBJECTIVE BOX
79yo female seen bedside during podiatry rounds, was seen in wound care clinic yesterday by Dr. Fletcher and Dr. Durant, shortly after being discharged from a recent hospital admission and sub-acute rehab placement, with severely painful left foot wound of chronic duration. Patient has tried all types of wound care products without relief.     As per H&P:  79 yo F PMH systolic and diastolic CHF w/ EF 30% with PPM, CAD, HTN, HLD, PVD presenting to ED after being sent in by Dr. Fletcher for cellulitis due to left foot wound. Patient was discharged from [facility], where she had been sent after discharge from [facility] for LLE cellulitis. Patient was sent home from  on Saturday with Unna boot. Was seen by outside podiatrist today found to have weeping LLE wounds, sent to [facility] wound care center for evaluation. Patient complaining of severe pain in left foot, especially with weight bearing. Refusing to allow leg to be unwrapped and examined due to pain. Denies fever/chills, headache, dizziness, CP, SOB, abdominal pain, N/V/D. Was constipated while in  due to being on opioids since January, last BM was yesterday.  Patient was admitted to [facility] in January 2019 for LLE cellulitis concerning for osteomyelitis. Patient's pacemaker not MRI compatible, indium scan performed, negative for osteomyelitis. Wound cultures grew Proteus, Klebsiella, Corynebacterium and alpha hemolytic streptococcus. Patient was treated with Cefepime and vancomycin. Blood cultures negative. She was seen by vascular surgery at [facility], but no intervention was performed during that admission. Patient unsure what kind of pacemaker she has, knows her pacemaker battery is [employer].   In ED VS T 98.3  /69 RR 16 O2 sat 99 on RA  12 lead EKG showed atrial sensed, ventricular paced rhythm at 96 bpm  Labs significant for BUN 48, creatinine 1.4 (baseline <1), GFR 36  CXR shows no active CP disease  Received azactam 1000 mg IV x1, vancomycin 1000 mg IV x1    Vital Signs Last 24 Hrs  T(C): 36.3 (14 Mar 2019 12:45), Max: 37.2 (14 Mar 2019 04:45)  T(F): 97.4 (14 Mar 2019 12:45), Max: 98.9 (14 Mar 2019 04:45)  HR: 103 (14 Mar 2019 12:45) (92 - 106)  BP: 103/65 (14 Mar 2019 12:45) (103/65 - 130/69)  BP(mean): --  RR: 17 (14 Mar 2019 12:45) (16 - 18)  SpO2: 91% (14 Mar 2019 12:45) (91% - 99%)    03-14    139  |  106  |  35<H>  ----------------------------<  90  3.7   |  25  |  1.00    Ca    8.9      14 Mar 2019 08:17    TPro  7.2  /  Alb  2.5<L>  /  TBili  0.3  /  DBili  x   /  AST  19  /  ALT  31  /  AlkPhos  107  03-14    CBC Full  -  ( 14 Mar 2019 08:17 )  WBC Count : 8.88 K/uL  Hemoglobin : 10.2 g/dL  Hematocrit : 31.6 %  Platelet Count - Automated : 225 K/uL  Mean Cell Volume : 90.8 fl  Mean Cell Hemoglobin : 29.3 pg  Mean Cell Hemoglobin Concentration : 32.3 gm/dL  Auto Neutrophil # : 6.35 K/uL  Auto Lymphocyte # : 1.28 K/uL  Auto Monocyte # : 0.82 K/uL  Auto Eosinophil # : 0.33 K/uL  Auto Basophil # : 0.08 K/uL  Auto Neutrophil % : 71.6 %  Auto Lymphocyte % : 14.4 %  Auto Monocyte % : 9.2 %  Auto Eosinophil % : 3.7 %  Auto Basophil % : 0.9 %      PAST MEDICAL HISTORY:  CHF (congestive heart failure)     Essential hypertension     HLD (hyperlipidemia)     Pacemaker.     PAST SURGICAL HISTORY:  Artificial pacemaker defib    S/P tonsillectomy.     FAMILY HISTORY:  FH: Alzheimers disease.    SOCIAL HISTORY:  Lives in private home. Former smoker, 1ppd for 40 years. Denies EtOH, recreational drug use.    EXAM: US DPLX LWR EXT VEINS COMPL BI       PROCEDURE DATE: 03/14/2019         INTERPRETATION: [facility] RADIOLOGIST PRELIMINARY REPORT     EXAM:    US Duplex Bilateral Lower Extremity Veins     EXAM DATE/TIME:    3/13/2019 11:48 PM     CLINICAL HISTORY:    78 years old, female; Signs and symptoms; Swelling (edema) of limb and   other:   Cellulitis; Lower extremity, bilateral     TECHNIQUE:    Real-time duplex ultrasound of the Bilateral Lower Extremities with 2-D   gray   scale, color Doppler flow and spectral waveform analysis. Complete exam   focused   on the bilateral lower extremity veins.     COMPARISON:    No relevant prior studies available.     FINDINGS:    Right deep veins: The common femoral, femoral, proximal profunda femoral   and   popliteal veins are patent without thrombus. Normal Doppler waveforms.   Normal   compressibility and/or augmentation response. Posterior tibial vein patent.    Right superficial veins: Saphenofemoral junction is patent without   thrombus.      Left deep veins: The common femoral, femoral, proximal profunda femoral   and   popliteal veins are patent without thrombus. Normal Doppler waveforms.   Normal   compressibility and/or augmentation response. Calf veins not visualized   well.    Left superficial veins: Saphenofemoral junction is patent without thrombus.    Soft tissues: Unremarkable.     IMPRESSION:   No acute findings. No evidence of deep vein thrombosis bilateral lower   extremities.     Objective:   Vasc: DP and PT non-palpable bilaterally; cap refill time delayed to all digits; temp gradient warm to cool; no edema noted  Derm:  -dorsal left foot wound noted to the level of subcutaneous tissue measuring approximately 7cm x 4cm x 0.2cm covered with fibro-granular tissue (40:60), with mild serous drainage, pain on palpable; mild malodor, periwound skin intact  Neuro: epicritic sensation intact to all digits bilaterally  Ortho: pain on palpation to entire left foot, ankle and leg

## 2019-03-14 NOTE — DISCHARGE NOTE PROVIDER - NSDCACTIVITY_GEN_ALL_CORE
No heavy lifting/straining No heavy lifting/straining/Walking - Indoors allowed/Bathing allowed/Do not drive or operate machinery

## 2019-03-14 NOTE — DISCHARGE NOTE PROVIDER - NSDCCPCAREPLAN_GEN_ALL_CORE_FT
PRINCIPAL DISCHARGE DIAGNOSIS  Diagnosis: Cellulitis  Assessment and Plan of Treatment:       SECONDARY DISCHARGE DIAGNOSES  Diagnosis: HTN (hypertension)  Assessment and Plan of Treatment: Continue home medication carvedilol  Follow-up with your PCP 1 week of discharge.    Diagnosis: PVD (peripheral vascular disease)  Assessment and Plan of Treatment: Continue home medication Plavix.  Follow-up with your PCP 1 week of discharge.    Diagnosis: HF (heart failure)  Assessment and Plan of Treatment: Continue home medication carvedilol and forosemide.   Follow-up with your PCP 1 week of discharge.    Diagnosis: Sacral ulcer  Assessment and Plan of Treatment: PRINCIPAL DISCHARGE DIAGNOSIS  Diagnosis: Cellulitis  Assessment and Plan of Treatment: -During your hospital stay you were treated with IV antibiotics.   -You have venous stasis ulcers/ wounds on your lower extremities, worse on the left lower extremity   - During your hospital stay, you were seen by a Podiatrist (Dr. Fletcher).   1. left footcleanse would with normal saline, squirt with dakins solution  2. apply adaptic non-adherent dressing and secure with gauze and kyle  3. change daily      SECONDARY DISCHARGE DIAGNOSES  Diagnosis: HTN (hypertension)  Assessment and Plan of Treatment: Continue home medication carvedilol  Follow-up with your PCP 1 week of discharge.    Diagnosis: PVD (peripheral vascular disease)  Assessment and Plan of Treatment: Continue home medication Plavix.  Follow-up with your PCP 1 week of discharge.    Diagnosis: HF (heart failure)  Assessment and Plan of Treatment: Continue home medication carvedilol and forosemide.   Follow-up with your PCP 1 week of discharge.    Diagnosis: Sacral ulcer  Assessment and Plan of Treatment: PRINCIPAL DISCHARGE DIAGNOSIS  Diagnosis: Cellulitis  Assessment and Plan of Treatment: -During your hospital stay you were treated with IV antibiotics.   -You have venous stasis ulcers/ wounds on your lower extremities, worse on the left lower extremity   - During your hospital stay, you were seen by a Podiatrist (Dr. Fletcher).   1. left foot cleanse would with normal saline, squirt with dakins solution  2. apply adaptic non-adherent dressing and secure with gauze and kyle  3. change daily  -Please follow-up with Podiatrist (Dr. Fletcher) 1 week after discharge from [facility]  -Follow-up with your PCP (Dr. Park) 1 week of discharge.      SECONDARY DISCHARGE DIAGNOSES  Diagnosis: Anemia  Assessment and Plan of Treatment: You Hemglobin was low throughout your hospital stay, and remained stable.   -Please repeat CBC during [facility]   -Recommend outpatient work-up for Anemia  -Follow-up with your PCP (Dr. Park) 1 week of discharge.    Diagnosis: HTN (hypertension)  Assessment and Plan of Treatment: Continue home medication carvedilol  Follow-up with your PCP (Dr. Park) 1 week of discharge.    Diagnosis: PVD (peripheral vascular disease)  Assessment and Plan of Treatment: Continue home medication Plavix.  Follow-up with your PCP 1 (Dr. Park) week of discharge.    Diagnosis: HF (heart failure)  Assessment and Plan of Treatment: Continue home medication carvedilol and forosemide.   Follow-up with your PCP 1 week (Dr. Park) of discharge.    Diagnosis: Sacral ulcer  Assessment and Plan of Treatment: -There was blanchable erythema to the left and right buttocks on admission.   -During [facility] continue with: cavilon daily citric-aide Q shit, Z-floats all the time PRINCIPAL DISCHARGE DIAGNOSIS  Diagnosis: Cellulitis  Assessment and Plan of Treatment: -During your hospital stay you were treated with IV antibiotics.   -You have venous stasis ulcers/ wounds on your lower extremities, worse on the left lower extremity   - During your hospital stay, you were seen by a Podiatrist (Dr. Fletcher).   1. left foot cleanse would with normal saline, squirt with dakins solution  2. apply adaptic non-adherent dressing and secure with gauze and kyle  3. change daily  -Please follow-up with Podiatrist (Dr. Fletcher) 1 week after discharge from [facility]  -Follow-up with your PCP (Dr. Park) 1 week of discharge.      SECONDARY DISCHARGE DIAGNOSES  Diagnosis: Anemia  Assessment and Plan of Treatment: You Hemglobin was low throughout your hospital stay, and remained stable.   -Please repeat CBC during [facility]   -Recommend outpatient work-up for Anemia  -Follow-up with your PCP (Dr. Park) 1 week of discharge.    Diagnosis: HTN (hypertension)  Assessment and Plan of Treatment: Continue home medication carvedilol  Follow-up with your PCP (Dr. Park) 1 week of discharge.    Diagnosis: PVD (peripheral vascular disease)  Assessment and Plan of Treatment: Continue home medication Plavix.  Follow-up with your PCP 1 (Dr. Park) week of discharge.    Diagnosis: HF (heart failure)  Assessment and Plan of Treatment: Continue home medication carvedilol and forosemide.   Follow-up with your PCP 1 week (Dr. Park) of discharge.    Diagnosis: Sacral ulcer  Assessment and Plan of Treatment: -There was blanchable erythema to the left and right buttocks on admission.   -During [facility] continue with: cavilon daily citric-aide Q shit   -Nystatin for groin/abdominal folds PRINCIPAL DISCHARGE DIAGNOSIS  Diagnosis: Venous stasis ulcer without varicose veins  Assessment and Plan of Treatment: -During your hospital stay you were treated with IV antibiotics.   -You have venous stasis ulcers/ wounds on your lower extremities, worse on the left lower extremity   - During your hospital stay, you were seen by a Podiatrist (Dr. Fletcher).   1. left foot cleanse would with normal saline, squirt with dakins solution  2. apply hydroge ldressing and secure with gauze and kyle  3. change daily  -For closed scabs on foot, can use lac hydrin lotion   -Please follow-up with Podiatrist (Dr. Fletcher) 1 week after discharge from [facility]  -Follow-up with your PCP (Dr. Park) 1 week of discharge.      SECONDARY DISCHARGE DIAGNOSES  Diagnosis: Constipation  Assessment and Plan of Treatment: -Continue with colace 3x a day   -Continue with senna 2 tablets at bedtime   -Continue with mirlax 2x a day   -Ducolax suppository as needed every two days for no BM    Diagnosis: Anemia  Assessment and Plan of Treatment: You Hemglobin was low throughout your hospital stay, and remained stable.   -Please repeat CBC during [facility]   -Recommend outpatient work-up for Anemia  -Follow-up with your PCP (Dr. Park) 1 week of discharge.    Diagnosis: HTN (hypertension)  Assessment and Plan of Treatment: Continue home medication carvedilol  Follow-up with your PCP (Dr. Park) 1 week of discharge.    Diagnosis: PVD (peripheral vascular disease)  Assessment and Plan of Treatment: Continue home medication Plavix.  Follow-up with your PCP 1 (Dr. Park) week of discharge.    Diagnosis: HF (heart failure)  Assessment and Plan of Treatment: Continue home medication carvedilol and forosemide.   Follow-up with your PCP 1 week (Dr. Park) of discharge.    Diagnosis: Sacral ulcer  Assessment and Plan of Treatment: -There was blanchable erythema to the left and right buttocks on admission.   -During [facility] continue with: cavilon daily citric-aide Q shit   -Nystatin for groin/abdominal folds PRINCIPAL DISCHARGE DIAGNOSIS  Diagnosis: Venous stasis ulcer without varicose veins  Assessment and Plan of Treatment: -During your hospital stay you were treated with IV antibiotics.   -You have venous stasis ulcers/ wounds on your left lower extremity worse on the left Foot  - During your hospital stay, you were seen by a Podiatrist (Dr. Fletcher).   1. left foot cleanse would with normal saline, squirt with dakins solution  2. apply hydroge ldressing and secure with gauze and kyle  3. change  dressing daily  -For closed scabs on  Both lower EXT & , Rt Foot ,  use lac hydrin lotion 2 x day  -Please follow-up with Podiatrist (Dr. Fletcher) 1 week after discharge from [facility]  -Follow-up with your PCP (Dr. Park) 1 week of discharge.  - Follow up with Vascular Sx Dr Lynn upon D/C as out pt.      SECONDARY DISCHARGE DIAGNOSES  Diagnosis: Constipation  Assessment and Plan of Treatment: -Continue with colace 3x a day   -Continue with senna 2 tablets at bedtime   -Continue with mirlax 2x a day   -Ducolax suppository as needed every two days for no BM    Diagnosis: Anemia  Assessment and Plan of Treatment: You Hemglobin was low throughout your hospital stay, and remained stable.   -Please repeat CBC in 1 week during [facility]   -Recommend outpatient work-up for Anemia & Hematology follow up as out pt   -Follow-up with your PCP (Dr. Park) 1 week of discharge.    Diagnosis: HTN (hypertension)  Assessment and Plan of Treatment: Continue home medication carvedilol 2x day & Lasix 40 mg daily  Follow-up with your PCP (Dr. Park) 1 week of discharge.    Diagnosis: PVD (peripheral vascular disease)  Assessment and Plan of Treatment: Continue home medication Plavix.  Follow-up with your PCP 1 (Dr. Park) week of discharge.    Diagnosis: HF (heart failure)  Assessment and Plan of Treatment: -Chronic stable systolic/Diastolic CHF   -Continue home medication carvedilol and forosemide.   Follow-up with your PCP/ cardiology  1 week (Dr. Park) of discharge.    Diagnosis: Sacral ulcer  Assessment and Plan of Treatment: -There was blanchable erythema to the left and right buttocks on admission.   -During [facility] continue with: cavilon daily citric-aide Q shit   -Nystatin for groin/abdominal folds

## 2019-03-14 NOTE — CONSULT NOTE ADULT - SUBJECTIVE AND OBJECTIVE BOX
78 year old female with hx of CHF (with defibrillator) with chronic LE edema and venous stasis. Has had weeping and swelling in her LE with painful wounds arising in her L foot approximately 2 months ago, when she present to [facility]. Seen at time by vascular, Dr. Lynn and felt to be due to venous stasis and managed with compression and UB. Since then, she went to SNF and has had weekly UB dressing changes for the past 8 weeks at SNF, without much improvement in the wounds per pt. Swelling and weeping have improved. On R, she says weeping has resolved. Comes back to [place] now because she was sent in by podiatrist in the office earlier this week for suspected infection in her wounds. Seen here by Dr. Fletcher and podiatry service, with concerns for poorly palpable pedal pulses and possible arterial insufficiency contributing to the poor healing nature of the wounds. She denies diabetes. Is an ex-smoker, but quit 15 years ago. Has hx of CHF as mentioned. Not on AC. On plavix. Cardiologist: Dr. Park    PAST MEDICAL HISTORY:  CHF (congestive heart failure)   Essential hypertension   HLD (hyperlipidemia)      PAST SURGICAL HISTORY:  Artificial pacemaker defibrillator  tonsillectomy.     FAMILY HISTORY:  FH: Alzheimers disease.    SOCIAL HISTORY:  Lives in private home. Former smoker, 1ppd for 40 years. Denies EtOH, recreational drug use.    EXAM: US DPLX LWR EXT VEINS COMPL BI       PROCEDURE DATE: 03/14/2019   US Duplex Bilateral Lower Extremity Veins  IMPRESSION:   No acute findings. No evidence of deep vein thrombosis bilateral lower   extremities.     Physical Exam:    VSS AF  Gen: In NAD, obese  HEENT: PERRLA, EOMI  Neck: No carotid bruits; supple  Lungs: RR normal, nonlabored breathing, on RA  CV: Regular rate/rhythm. No murmurs  Abd: Soft, obese    Vascular:  BLE with chronic venous stasis changes with hyperpigmentation up to knees bilaterally. RLE without edema, LLE with mild edema  Foot dressed with Kerlix and patient adamantly refuses dressing change to allow for exam of wound or pedal pulses  Pulse exam:  Femoral 2+ palp bilaterally  Pop not appreciated bilaterally  R DP/PT 2+ palp, L foot unable to be examined 78 year old female with hx of CHF (with defibrillator) with chronic LE edema and venous stasis. Has had weeping and swelling in her LE with painful wounds arising in her L foot approximately 2 months ago, when she present to [facility]. Seen at time by vascular, Dr. Lynn and felt to be due to venous stasis and managed with compression and UB. Since then, she went to SNF and has had weekly UB dressing changes for the past 8 weeks at SNF, without much improvement in the wounds per pt. Swelling and weeping have improved. On R, she says weeping has resolved. Comes back to [place] now because she was sent in by podiatrist in the office earlier this week for suspected infection in her wounds. Seen here by Dr. Fletcher and podiatry service, with concerns for poorly palpable pedal pulses and possible arterial insufficiency contributing to the poor healing nature of the wounds. She denies diabetes. Is an ex-smoker, but quit 15 years ago. Has hx of CHF as mentioned. Not on AC. On plavix. Cardiologist: Dr. Park    PAST MEDICAL HISTORY:  CHF (congestive heart failure)   Essential hypertension   HLD (hyperlipidemia)      PAST SURGICAL HISTORY:  Artificial pacemaker defibrillator  tonsillectomy.     FAMILY HISTORY:  FH: Alzheimers disease.    SOCIAL HISTORY:  Lives in private home. Former smoker, 1ppd for 40 years. Denies EtOH, recreational drug use.    EXAM: US DPLX LWR EXT VEINS COMPL BI       PROCEDURE DATE: 03/14/2019   US Duplex Bilateral Lower Extremity Veins  IMPRESSION:   No acute findings. No evidence of deep vein thrombosis bilateral lower   extremities.     Physical Exam:    VSS AF  Gen: In NAD, obese  HEENT: PERRLA, EOMI  Neck: No carotid bruits; supple  Lungs: RR normal, nonlabored breathing, on RA  CV: Regular rate/rhythm. No murmurs  Abd: Soft, obese    Vascular:  BLE with chronic venous stasis changes with hyperpigmentation up to knees bilaterally. RLE without edema, LLE with mild edema  Foot dressed with Kerlix and patient adamantly refuses dressing change to allow for exam of wound or pedal pulses  Pulse exam:  Femoral 2+ palp bilaterally  Pop not appreciated bilaterally  R DP/PT 2+ palp, L foot unable to be examined 78 year old female with hx of CHF (with defibrillator) with chronic LE edema and venous stasis. Has had weeping and swelling in her LE with painful wounds arising in her L foot approximately 2 months ago, when she present to [facility]. Seen at time by vascular, Dr. Lynn and felt to be due to venous stasis and managed with compression and UB. Since then, she went to SNF and has had weekly UB dressing changes for the past 8 weeks at SNF, without much improvement in the wounds per pt. Swelling and weeping have improved. On R, she says weeping has resolved. Comes back to [place] now because she was sent in by podiatrist in the office earlier this week for suspected infection in her wounds. Seen here by Dr. Fletcher and podiatry service, with concerns for poorly palpable pedal pulses and possible arterial insufficiency contributing to the poor healing nature of the wounds. She denies diabetes. Is an ex-smoker, but quit 15 years ago. Has hx of CHF as mentioned. Not on AC. On plavix. Cardiologist: Dr. Park    PAST MEDICAL HISTORY:  CHF (congestive heart failure)   Essential hypertension   HLD (hyperlipidemia)      PAST SURGICAL HISTORY:  Artificial pacemaker defibrillator  tonsillectomy.     FAMILY HISTORY:  FH: Alzheimers disease.    SOCIAL HISTORY:  Lives in private home. Former smoker, 1ppd for 40 years. Denies EtOH, recreational drug use.    EXAM: US DPLX LWR EXT VEINS COMPL BI       PROCEDURE DATE: 03/14/2019   US Duplex Bilateral Lower Extremity Veins  IMPRESSION:   No acute findings. No evidence of deep vein thrombosis bilateral lower   extremities.     Physical Exam:    VSS AF  Gen: In NAD, obese  HEENT: PERRLA, EOMI  Neck: No carotid bruits; supple  Lungs: RR normal, nonlabored breathing, on RA  CV: Regular rate/rhythm. No murmurs  Abd: Soft, obese    Vascular:  BLE with chronic venous stasis changes with hyperpigmentation up to knees bilaterally. RLE without edema, LLE with mild edema  Foot dressed with Kerlix and patient adamantly refuses dressing change to allow for exam of wound or pedal pulses  Pulse exam:  Femoral 2+ palp bilaterally  Pop not appreciated bilaterally  R DP/PT 2+ palp, L foot unable to be examined    139  |  106  |  35<H>  ----------------------------<  90  3.7   |  25  |  1.00    Ca    8.9      14 Mar 2019 08:17    TPro  7.2  /  Alb  2.5<L>  /  TBili  0.3  /  DBili  x   /  AST  19  /  ALT  31  /  AlkPhos  107  03-14    CBC Full  -  ( 14 Mar 2019 08:17 )  WBC Count : 8.88 K/uL  Hemoglobin : 10.2 g/dL  Hematocrit : 31.6 %  Platelet Count - Automated : 225 K/uL  Mean Cell Volume : 90.8 fl  Mean Cell Hemoglobin : 29.3 pg  Mean Cell Hemoglobin Concentration : 32.3 gm/dL  Auto Neutrophil # : 6.35 K/uL  Auto Lymphocyte # : 1.28 K/uL  Auto Monocyte # : 0.82 K/uL  Auto Eosinophil # : 0.33 K/uL  Auto Basophil # : 0.08 K/uL  Auto Neutrophil % : 71.6 %  Auto Lymphocyte % : 14.4 %  Auto Monocyte % : 9.2 %  Auto Eosinophil % : 3.7 %  Auto Basophil % : 0.9 %

## 2019-03-14 NOTE — CONSULT NOTE ADULT - SUBJECTIVE AND OBJECTIVE BOX
HPI:  79 yo F PMH systolic and diastolic CHF w/ EF 30% with PPM, CAD, HTN, HLD, PVD presenting to ED after being sent in by Dr. Fletcher for cellulitis due to left foot wound. Patient was discharged from [facility], where she had been sent after discharge from [facility] for LLE cellulitis. Patient was sent home from  on Saturday with Unna boot. Was seen by outside podiatrist today found to have weeping LLE wounds, sent to [facility] wound care center for evaluation. Patient complaining of severe pain in left foot, especially with weight bearing. Refusing to allow leg to be unwrapped and examined due to pain. Denies fever/chills, headache, dizziness, CP, SOB, abdominal pain, N/V/D. Was constipated while in  due to being on opioids since January, last BM was yesterday.  Patient was admitted to [facility] in 2019 for LLE cellulitis concerning for osteomyelitis. Patient's pacemaker not MRI compatible, indium scan performed, negative for osteomyelitis. Wound cultures grew Proteus, Klebsiella, Corynebacterium and alpha hemolytic streptococcus. Patient was treated with Cefepime and vancomycin. Blood cultures negative. She was seen by vascular surgery at [facility], but no intervention was performed during that admission. Patient unsure what kind of pacemaker she has, knows her pacemaker battery is [employer].       PAST MEDICAL & SURGICAL HISTORY:  Pacemaker  Essential hypertension  HLD (hyperlipidemia)  CHF (congestive heart failure)  S/P tonsillectomy  Artificial pacemaker: defib      Antimicrobials  aztreonam  IVPB 1000 milliGRAM(s) IV Intermittent every 8 hours  vancomycin  IVPB 1250 milliGRAM(s) IV Intermittent every 24 hours      Immunological      Other  carvedilol 12.5 milliGRAM(s) Oral every 12 hours  clopidogrel Tablet 75 milliGRAM(s) Oral daily  Dakins Solution - 1/2 Strength 1 Application(s) Topical two times a day  docusate sodium 100 milliGRAM(s) Oral three times a day  enoxaparin Injectable 40 milliGRAM(s) SubCutaneous daily  furosemide    Tablet 40 milliGRAM(s) Oral daily  lactobacillus acidophilus 1 Tablet(s) Oral three times a day with meals  oxyCODONE    5 mG/acetaminophen 325 mG 1 Tablet(s) Oral every 6 hours PRN  senna 2 Tablet(s) Oral at bedtime  traMADol 25 milliGRAM(s) Oral every 6 hours PRN  traMADol 50 milliGRAM(s) Oral every 4 hours PRN      Allergies    aspirin (Unknown)  atorvastatin (Unknown)  penicillin (Unknown)    Intolerances      SOCIAL HISTORY: no toxic habits reported    FAMILY HISTORY:  FH: Alzheimers disease      ROS:    EYES:  Negative  blurry vision or double vision  GASTROINTESTINAL:  Negative for nausea, vomiting, diarrhea  -otherwise negative except for subjective    Vital Signs Last 24 Hrs  T(C): 37.2 (14 Mar 2019 04:45), Max: 37.2 (14 Mar 2019 04:45)  T(F): 98.9 (14 Mar 2019 04:45), Max: 98.9 (14 Mar 2019 04:45)  HR: 95 (14 Mar 2019 04:45) (92 - 106)  BP: 109/62 (14 Mar 2019 04:45) (106/55 - 130/69)  BP(mean): --  RR: 18 (14 Mar 2019 04:45) (16 - 18)  SpO2: 95% (14 Mar 2019 04:45) (95% - 99%)    PE:  WDWN in no distress  HEENT:  NC, PERRL, sclerae anicteric, conjunctivae clear, EOMI.  Sinuses nontender, no nasal exudate.  No buccal or pharyngeal lesions, erythema or exudate  Neck:  Supple, no adenopathy  Lungs:  No accessory muscle use, bilaterally clear to auscultation  Cor:  RRR, S1, S2, no murmur appreciated  Abd:  Symmetric, normoactive BS.  Soft, nontender, no masses, guarding or rebound.  Liver and spleen not enlarged  Extrem/Skin: noted chronic LE changes with dark and dry scaling skin, left foot with open area left ankle and wrapped foot  Neuro: grossly intact  Musc: moving all limbs freely, no focal deficits    LABS:                        10.2   8.88  )-----------( 225      ( 14 Mar 2019 08:17 )             31.6       WBC Count: 8.88 K/uL (19 @ 08:17)  WBC Count: 9.72 K/uL (19 @ 19:56)          139  |  106  |  35<H>  ----------------------------<  90  3.7   |  25  |  1.00    Ca    8.9      14 Mar 2019 08:17    TPro  7.2  /  Alb  2.5<L>  /  TBili  0.3  /  DBili  x   /  AST  19  /  ALT  31  /  AlkPhos  107        Creatinine, Serum: 1.00 mg/dL (19 @ 08:17)  Creatinine, Serum: 1.40 mg/dL (19 @ 19:56)      Urinalysis Basic - ( 14 Mar 2019 02:05 )    Color: Yellow / Appearance: Slightly Turbid / S.015 / pH: x  Gluc: x / Ketone: Negative  / Bili: Negative / Urobili: Negative   Blood: x / Protein: 75 mg/dL / Nitrite: Positive   Leuk Esterase: Moderate / RBC: 0-2 /HPF / WBC >50   Sq Epi: x / Non Sq Epi: Occasional / Bacteria: TNTC      MICROBIOLOGY:    Culture - Other (01.15.19 @ 21:21)    -  Amikacin: S <=16    -  Amikacin: S <=16    -  Ampicillin: S <=8 These ampicillin results predict results for amoxicillin    -  Ampicillin: R >16 These ampicillin results predict results for amoxicillin    -  Ampicillin/Sulbactam: I 16/8    -  Ampicillin/Sulbactam: S <=8/4    -  Aztreonam: S <=4    -  Aztreonam: S <=4    -  Cefazolin: S <=8    -  Cefazolin: R >16    -  Cefepime: S <=4    -  Cefepime: S <=4    -  Cefoxitin: S <=8    -  Cefoxitin: S <=8    -  Ceftriaxone: S <=1 Enterobacter, Citrobacter, and Serratia may develop resistance during prolonged therapy    -  Ceftriaxone: S <=1 Enterobacter, Citrobacter, and Serratia may develop resistance during prolonged therapy    -  Ciprofloxacin: S <=1    -  Ciprofloxacin: S <=1    -  Ertapenem: S <=1    -  Ertapenem: S <=1    -  Gentamicin: S <=4    -  Gentamicin: S <=4    -  Imipenem: S <=1    -  Levofloxacin: S <=2    -  Levofloxacin: S <=2    -  Meropenem: S <=1    -  Meropenem: S <=1    -  Piperacillin/Tazobactam: S <=16    -  Piperacillin/Tazobactam: S <=16    -  Tobramycin: S <=4    -  Tobramycin: S <=4    -  Trimethoprim/Sulfamethoxazole: S <=2/38    -  Trimethoprim/Sulfamethoxazole: S <=2/38    Specimen Source: .Other    Culture Results:   Moderate Proteus mirabilis  Moderate Klebsiella oxytoca  Few Corynebacterium species  Moderate Alpha hemolytic strep    Organism Identification: Proteus mirabilis  Klebsiella oxytoca    Organism: Proteus mirabilis    Organism: Klebsiella oxytoca    Method Type: MOISE    Method Type: MOISE        RADIOLOGY & ADDITIONAL STUDIES:    --< from: NM Inflammatory Loc Limited Area, WBC (19 @ 12:05) >  EXAM:  NM MULTI DAY PROCEDURE                         EXAM:  NM INFLAMMATORY LOC WBC LTD                         EXAM:  NM BONE MARROW IMG LTD AREA                          PROCEDURE DATE:  2019          INTERPRETATION:  CLINICAL INFORMATION: 78 year-old female with cellulitis   and pain in the left foot, bilateral foot ulcer,  referred to evaluate   for osteomyelitis.    RADIOPHARMACEUTICAL: 520 microcuries In-111 labeled autologous   leukocytes, injected intravenously on 2019; 10.1 mCi 99mTc- sulfur   colloid, injected intravenously on 2019.    TECHNIQUE:  Static images of the feet and lower legs were obtained in the   anterior, posterior, and lateral projections approximately 24 hours   following administration of radiolabeled leukocytes. The patient was then   injected with 99mTc- sulfur colloid and images were repeated in the same   projections using dual isotope acquisition mode. Study is limited by   patient motion and inability to stay still for imaging (unable to obtain   dorsal and plantar feet).    COMPARISON: No previous Indium-labeled leukocyte/marrow scan.    FINDINGS: The study is limited by patient motion. There is congruent   uptake of radiolabeled leukocytes and 99mTc- sulfur colloid in the   visualized structures. There is mild diffuse increased tracer   accumulation in the left foot, likely related to cellulitis.    IMPRESSION: Limited combined Indium- labeled leukocyte study and marrow   scan demonstrates:    No scan evidence of osteomyelitis.       Mild diffuse increased uptake in the left foot, likely cellulitis.

## 2019-03-14 NOTE — PHYSICAL THERAPY INITIAL EVALUATION ADULT - PERTINENT HX OF CURRENT PROBLEM, REHAB EVAL
77 y/o female adm 3/13 with LLE cellulitis. Pt recently d/c from [facility]. Dopplers: negative DVT BLE. Maintain LLE as NWB. Negative for osteomyelitis

## 2019-03-14 NOTE — PROGRESS NOTE ADULT - SUBJECTIVE AND OBJECTIVE BOX
Patient is a 78y old  Female who presents with a chief complaint of cellulitis (14 Mar 2019 10:13)      INTERVAL HPI:  79 yo F PMH systolic and diastolic CHF w/ EF 30% with PPM, CAD, HTN, HLD, PVD presenting to ED after being sent in by Dr. Fletcher for cellulitis due to left foot wound. Patient was discharged from [facility], where she had been sent after discharge from [place] for LLE cellulitis. Patient was sent home from  on Saturday with Unna boot. Was seen by outside podiatrist today found to have weeping LLE wounds, sent to [facility] wound care center for evaluation. Patient complaining of severe pain in left foot, especially with weight bearing. Refusing to allow leg to be unwrapped and examined due to pain. Denies fever/chills, headache, dizziness, CP, SOB, abdominal pain, N/V/D. Was constipated while in  due to being on opioids since January, last BM was yesterday.  Patient was admitted to [place] in 2019 for LLE cellulitis concerning for osteomyelitis. Patient's pacemaker not MRI compatible, indium scan performed, negative for osteomyelitis. Wound cultures grew Proteus, Klebsiella, Corynebacterium and alpha hemolytic streptococcus. Patient was treated with Cefepime and vancomycin. Blood cultures negative. She was seen by vascular surgery at [place], but no intervention was performed during that admission. Patient unsure what kind of pacemaker she has, knows her pacemaker battery is [employer].   In ED VS T 98.3  /69 RR 16 O2 sat 99 on RA  12 lead EKG showed atrial sensed, ventricular paced rhythm at 96 bpm  Labs significant for BUN 48, creatinine 1.4 (baseline <1), GFR 36  CXR shows no active CP disease  Received azactam 1000 mg IV x1, vancomycin 1000 mg IV x1    3/14/19: Patient seen and examined at bedside. Patient still complains of leg pain with movement, states pain has improved with medication. Denies fevers, chills, numbness, tingling. Wound Care RN and Podiatry to see patient today.     OVERNIGHT EVENTS: NONE     Home Medications:  carvedilol 12.5 mg oral tablet: 1 tab(s) orally 2 times a day (13 Mar 2019 22:38)  furosemide 40 mg oral tablet: 1 tab(s) orally once a day (13 Mar 2019 22:38)  Plavix 75 mg oral tablet: 1 tab(s) orally once a day (13 Mar 2019 22:38)      MEDICATIONS  (STANDING):  carvedilol 12.5 milliGRAM(s) Oral every 12 hours  cefTRIAXone   IVPB      clopidogrel Tablet 75 milliGRAM(s) Oral daily  Dakins Solution - 1/2 Strength 1 Application(s) Topical two times a day  docusate sodium 100 milliGRAM(s) Oral three times a day  enoxaparin Injectable 40 milliGRAM(s) SubCutaneous daily  furosemide    Tablet 40 milliGRAM(s) Oral daily  lactobacillus acidophilus 1 Tablet(s) Oral three times a day with meals  senna 2 Tablet(s) Oral at bedtime    MEDICATIONS  (PRN):  oxyCODONE    5 mG/acetaminophen 325 mG 1 Tablet(s) Oral every 6 hours PRN Severe Pain (7 - 10)  traMADol 25 milliGRAM(s) Oral every 6 hours PRN Mild Pain (1 - 3)  traMADol 50 milliGRAM(s) Oral every 4 hours PRN Moderate Pain (4 - 6)      Allergies    aspirin (Unknown)  atorvastatin (Unknown)  penicillin (Unknown)    Intolerances        REVIEW OF SYSTEMS:  CONSTITUTIONAL: No fever, No chills, No fatigue, No myalgia, No Body ache, No Weakness  EYES: No eye pain,  No visual disturbances, No discharge, No Redness  ENMT: No ear pain, No nose bleed, No vertigo; No sinus or throat pain, No Congestion  NECK: No pain, No stiffness  RESPIRATORY: No cough, No wheezing, No hemoptysis, No shortness of breath  CARDIOVASCULAR: No chest pain, palpitations  GASTROINTESTINAL: No abdominal or epigastric pain. No nausea, No vomiting, No diarrhea or constipation; [  ] BM  GENITOURINARY: No dysuria, No frequency, No urgency, No hematuria or incontinence  NEUROLOGICAL: No headaches, No dizziness, No numbness, No tingling, No tremors, No weakness  EXT: + Swelling of LE, + LLE Pain  SKIN: [  ] No itching, burning, rashes, or lesions, patient with pain of LLE with cellulitis    MUSCULOSKELETAL: No joint pain or swelling; No muscle pain, No back pain, + LLE extremity pain  PSYCHIATRIC: No depression, anxiety, mood swings or difficulty sleeping at night  PAIN SCALE: [  ] None  [  ] Other-  ROS Unable to obtain due to: [  ] Dementia  [  ] Lethargy  [  ] Sedated  [  ]  non verbal  REST OF REVIEW OF SYSTEMS: [ x ] Normal     Vital Signs Last 24 Hrs  T(C): 37.2 (14 Mar 2019 04:45), Max: 37.2 (14 Mar 2019 04:45)  T(F): 98.9 (14 Mar 2019 04:45), Max: 98.9 (14 Mar 2019 04:45)  HR: 95 (14 Mar 2019 04:45) (92 - 106)  BP: 109/62 (14 Mar 2019 04:45) (106/55 - 130/69)  BP(mean): --  RR: 18 (14 Mar 2019 04:45) (16 - 18)  SpO2: 95% (14 Mar 2019 04:45) (95% - 99%)    Finger Stick        03-13 @ 07:01  -  03-14 @ 07:00  --------------------------------------------------------  IN: 170 mL / OUT: 0 mL / NET: 170 mL        PHYSICAL EXAM:  GENERAL:  [X  ] NAD, [ X ] Well appearing, [  ] Agitated, [  ] Drowsy, [  ] Lethargy, [  ] Confused   HEAD:  [ X ] Normal, [  ] Other  EYES:  [ X ] EOMI, [X  ] PERRLA, [X  ] Conjunctiva and sclera clear normal, [  ] Other, [  ] Pallor, [  ] Discharge  ENMT:  [ X ] Normal, [  ] Moist mucous membranes, [  ] Good dentition, [  ] No thrush  NECK:  [ X ] Supple, [  ] No JVD, [  ] Normal thyroid, [  ] Lymphadenopathy, [  ] Other  CHEST/LUNG:  [ X ] Clear to auscultation bilaterally, [  ] Breath Sounds equal OR decreased, [  ] No rales, [  ] No rhonchi, [  ] No wheezing  HEART:  [ X ] Regular rate and rhythm, [  ] Tachycardia, [  ] Bradycardia, [  ] Irregular, [  ] No murmurs, No rubs, No gallops, [  ] PPM in place (Mfr:  )  ABDOMEN:  [X  ] Soft, [ X ] Nontender, [ X ] Nondistended, [ X ] No mass, [X  ] Bowel sounds present, [  ] Obese  NERVOUS SYSTEM:  [X  ] Alert & Oriented x3, [  ] Nonfocal, [  ] Confusion, [  ] Encephalopathic, [  ] Sedated, [  ] Unable to assess, [  ] Other-  EXTREMITIES:  [  ] 2+ Peripheral Pulses, No clubbing, No cyanosis,  [  ] edema B/L lower EXT, [ X ] PVD stasis skin changes B/L lower EXT chronic LE changes, scaling skin, left foot with open area left ankle and wrapped foot  LYMPH:  No lymphadenopathy noted  SKIN:  [  ] No rashes or lesions, [ X ] Pressure ulcers, [  ] Ecchymosis, [  ] Skin tears, [  ] Other sacral ulcer     DIET: DASH/TLC     LABS:                        10.2   8.88  )-----------( 225      ( 14 Mar 2019 08:17 )             31.6     14 Mar 2019 08:17    139    |  106    |  35     ----------------------------<  90     3.7     |  25     |  1.00     Ca    8.9        14 Mar 2019 08:17    TPro  7.2    /  Alb  2.5    /  TBili  0.3    /  DBili  x      /  AST  19     /  ALT  31     /  AlkPhos  107    14 Mar 2019 08:17    PT/INR - ( 13 Mar 2019 19:56 )   PT: 13.1 sec;   INR: 1.15 ratio         PTT - ( 13 Mar 2019 19:56 )  PTT:34.6 sec  Urinalysis Basic - ( 14 Mar 2019 02:05 )    Color: Yellow / Appearance: Slightly Turbid / S.015 / pH: x  Gluc: x / Ketone: Negative  / Bili: Negative / Urobili: Negative   Blood: x / Protein: 75 mg/dL / Nitrite: Positive   Leuk Esterase: Moderate / RBC: 0-2 /HPF / WBC >50   Sq Epi: x / Non Sq Epi: Occasional / Bacteria: TNTC      RADIOLOGY & ADDITIONAL TESTS:    from: US Duplex Venous Lower Ext Complete, Bilateral (19 @ 00:05) >  IMPRESSION:   No acute findings. No evidence of deep vein thrombosis bilateral lower   extremities.    HEALTH ISSUES - PROBLEM Dx:    Consultant(s) Notes Reviewed:  [  ] YES     Care Discussed with [ x ] Consultants, [X  ] Patient, [  ] Family, [  ] , [  ] Social Service, [X  ] RN, [  ] Physical Therapy  DVT PPX: [  ] Lovenox, [  ] S C Heparin, [  ] Coumadin, [  ] Xarelto, [  ] Eliquis, [  ] Pradaxa, [  ] IV Heparin drip, [  ] SCD, [  ] Contraindication secondary to GI Bleed, [  ] Ambulation  Advanced Directive: [  ] None, [  ] DNR/DNI Patient is a 78y old  Female who presents with a chief complaint of cellulitis (14 Mar 2019 10:13)      INTERVAL HPI:  79 yo F PMH systolic and diastolic CHF w/ EF 30% with PPM, CAD, HTN, HLD, PVD presenting to ED after being sent in by Dr. Fletcher for cellulitis due to left foot wound. Patient was discharged from [facility], where she had been sent after discharge from [place] for LLE cellulitis. Patient was sent home from  on Saturday with Unna boot. Was seen by outside podiatrist today found to have weeping LLE wounds, sent to [facility] wound care center for evaluation. Patient complaining of severe pain in left foot, especially with weight bearing. Refusing to allow leg to be unwrapped and examined due to pain. Denies fever/chills, headache, dizziness, CP, SOB, abdominal pain, N/V/D. Was constipated while in  due to being on opioids since January, last BM was yesterday.  Patient was admitted to [place] in 2019 for LLE cellulitis concerning for osteomyelitis. Patient's pacemaker not MRI compatible, indium scan performed, negative for osteomyelitis. Wound cultures grew Proteus, Klebsiella, Corynebacterium and alpha hemolytic streptococcus. Patient was treated with Cefepime and vancomycin. Blood cultures negative. She was seen by vascular surgery at [place], but no intervention was performed during that admission. Patient unsure what kind of pacemaker she has, knows her pacemaker battery is [employer].   In ED VS T 98.3  /69 RR 16 O2 sat 99 on RA  12 lead EKG showed atrial sensed, ventricular paced rhythm at 96 bpm  Labs significant for BUN 48, creatinine 1.4 (baseline <1), GFR 36  CXR shows no active CP disease  Received azactam 1000 mg IV x1, vancomycin 1000 mg IV x1    3/14/19: Patient seen and examined at bedside. Patient still complains of leg pain with movement, states pain has improved with medication. Denies fevers, chills, numbness, tingling. Wound Care RN and Podiatry to see patient today.     OVERNIGHT EVENTS: NONE     Home Medications:  carvedilol 12.5 mg oral tablet: 1 tab(s) orally 2 times a day (13 Mar 2019 22:38)  furosemide 40 mg oral tablet: 1 tab(s) orally once a day (13 Mar 2019 22:38)  Plavix 75 mg oral tablet: 1 tab(s) orally once a day (13 Mar 2019 22:38)      MEDICATIONS  (STANDING):  carvedilol 12.5 milliGRAM(s) Oral every 12 hours  cefTRIAXone   IVPB      clopidogrel Tablet 75 milliGRAM(s) Oral daily  Dakins Solution - 1/2 Strength 1 Application(s) Topical two times a day  docusate sodium 100 milliGRAM(s) Oral three times a day  enoxaparin Injectable 40 milliGRAM(s) SubCutaneous daily  furosemide    Tablet 40 milliGRAM(s) Oral daily  lactobacillus acidophilus 1 Tablet(s) Oral three times a day with meals  senna 2 Tablet(s) Oral at bedtime    MEDICATIONS  (PRN):  oxyCODONE    5 mG/acetaminophen 325 mG 1 Tablet(s) Oral every 6 hours PRN Severe Pain (7 - 10)  traMADol 25 milliGRAM(s) Oral every 6 hours PRN Mild Pain (1 - 3)  traMADol 50 milliGRAM(s) Oral every 4 hours PRN Moderate Pain (4 - 6)      Allergies    aspirin (Unknown)  atorvastatin (Unknown)  penicillin (Unknown)    Intolerances        REVIEW OF SYSTEMS:  CONSTITUTIONAL: No fever, No chills, No fatigue, No myalgia, No Body ache, No Weakness  EYES: No eye pain,  No visual disturbances, No discharge, No Redness  ENMT: No ear pain, No nose bleed, No vertigo; No sinus or throat pain, No Congestion  NECK: No pain, No stiffness  RESPIRATORY: No cough, No wheezing, No hemoptysis, No shortness of breath  CARDIOVASCULAR: No chest pain, palpitations  GASTROINTESTINAL: No abdominal or epigastric pain. No nausea, No vomiting, No diarrhea or constipation; [  ] BM  GENITOURINARY: No dysuria, No frequency, No urgency, No hematuria or incontinence  NEUROLOGICAL: No headaches, No dizziness, No numbness, No tingling, No tremors, No weakness  EXT: + Swelling of LE, + LLE Pain  SKIN: [  ] No itching, burning, rashes, or lesions, patient with pain of LLE with cellulitis    MUSCULOSKELETAL: No joint pain or swelling; No muscle pain, No back pain, + LLE extremity pain  PSYCHIATRIC: No depression, anxiety, mood swings or difficulty sleeping at night  PAIN SCALE: [  ] None  [  ] Other-  ROS Unable to obtain due to: [  ] Dementia  [  ] Lethargy  [  ] Sedated  [  ]  non verbal  REST OF REVIEW OF SYSTEMS: [ x ] Normal     Vital Signs Last 24 Hrs  T(C): 37.2 (14 Mar 2019 04:45), Max: 37.2 (14 Mar 2019 04:45)  T(F): 98.9 (14 Mar 2019 04:45), Max: 98.9 (14 Mar 2019 04:45)  HR: 95 (14 Mar 2019 04:45) (92 - 106)  BP: 109/62 (14 Mar 2019 04:45) (106/55 - 130/69)  BP(mean): --  RR: 18 (14 Mar 2019 04:45) (16 - 18)  SpO2: 95% (14 Mar 2019 04:45) (95% - 99%)    Finger Stick        03-13 @ 07:01  -  03-14 @ 07:00  --------------------------------------------------------  IN: 170 mL / OUT: 0 mL / NET: 170 mL        PHYSICAL EXAM:  GENERAL:  [X  ] NAD, [ X ] Well appearing, [  ] Agitated, [  ] Drowsy, [  ] Lethargy, [  ] Confused   HEAD:  [ X ] Normal, [  ] Other  EYES:  [ X ] EOMI, [X  ] PERRLA, [X  ] Conjunctiva and sclera clear normal, [  ] Other, [  ] Pallor, [  ] Discharge  ENMT:  [ X ] Normal, [  ] Moist mucous membranes, [  ] Good dentition, [  ] No thrush  NECK:  [ X ] Supple, [  ] No JVD, [  ] Normal thyroid, [  ] Lymphadenopathy, [  ] Other  CHEST/LUNG:  [ X ] Clear to auscultation bilaterally, [  ] Breath Sounds equal OR decreased, [  ] No rales, [  ] No rhonchi, [  ] No wheezing  HEART:  [ X ] Regular rate and rhythm, [  ] Tachycardia, [  ] Bradycardia, [  ] Irregular, [  ] No murmurs, No rubs, No gallops, [  ] PPM in place (Mfr:  )  ABDOMEN:  [X  ] Soft, [ X ] Nontender, [ X ] Nondistended, [ X ] No mass, [X  ] Bowel sounds present, [  ] Obese  NERVOUS SYSTEM:  [X  ] Alert & Oriented x3, [  ] Nonfocal, [  ] Confusion, [  ] Encephalopathic, [  ] Sedated, [  ] Unable to assess, [  ] Other-  EXTREMITIES:  [  ] 2+ Peripheral Pulses, No clubbing, No cyanosis,  [  ] edema B/L lower EXT, [ X ] PVD stasis skin changes B/L lower EXT chronic LE changes, left foot venous stasis ulcer, and fibrogranular tissue   LYMPH:  No lymphadenopathy noted  SKIN:  [  ] No rashes or lesions, [ X ] Pressure ulcers, [  ] Ecchymosis, [  ] Skin tears, [  ] Other sacral ulcer     DIET: DASH/TLC     LABS:                        10.2   8.88  )-----------( 225      ( 14 Mar 2019 08:17 )             31.6     14 Mar 2019 08:17    139    |  106    |  35     ----------------------------<  90     3.7     |  25     |  1.00     Ca    8.9        14 Mar 2019 08:17    TPro  7.2    /  Alb  2.5    /  TBili  0.3    /  DBili  x      /  AST  19     /  ALT  31     /  AlkPhos  107    14 Mar 2019 08:17    PT/INR - ( 13 Mar 2019 19:56 )   PT: 13.1 sec;   INR: 1.15 ratio         PTT - ( 13 Mar 2019 19:56 )  PTT:34.6 sec  Urinalysis Basic - ( 14 Mar 2019 02:05 )    Color: Yellow / Appearance: Slightly Turbid / S.015 / pH: x  Gluc: x / Ketone: Negative  / Bili: Negative / Urobili: Negative   Blood: x / Protein: 75 mg/dL / Nitrite: Positive   Leuk Esterase: Moderate / RBC: 0-2 /HPF / WBC >50   Sq Epi: x / Non Sq Epi: Occasional / Bacteria: TNTC      RADIOLOGY & ADDITIONAL TESTS:    from: US Duplex Venous Lower Ext Complete, Bilateral (19 @ 00:05) >  IMPRESSION:   No acute findings. No evidence of deep vein thrombosis bilateral lower   extremities.    HEALTH ISSUES - PROBLEM Dx:    Consultant(s) Notes Reviewed:  [  ] YES     Care Discussed with [ x ] Consultants, [X  ] Patient, [  ] Family, [  ] , [  ] Social Service, [X  ] RN, [  ] Physical Therapy  DVT PPX: [  ] Lovenox, [  ] S C Heparin, [  ] Coumadin, [  ] Xarelto, [  ] Eliquis, [  ] Pradaxa, [  ] IV Heparin drip, [  ] SCD, [  ] Contraindication secondary to GI Bleed, [  ] Ambulation  Advanced Directive: [  ] None, [  ] DNR/DNI Patient is a 78y old  Female who presents with a chief complaint of cellulitis (14 Mar 2019 10:13)      INTERVAL HPI:  79 yo F PMH systolic and diastolic CHF w/ EF 30% with PPM, CAD, HTN, HLD, PVD presenting to ED after being sent in by Dr. Fletcher for cellulitis due to left foot wound. Patient was discharged from [facility], where she had been sent after discharge from [place] for LLE cellulitis. Patient was sent home from  on Saturday with Unna boot. Was seen by outside podiatrist today found to have weeping LLE wounds, sent to [facility] wound care center for evaluation. Patient complaining of severe pain in left foot, especially with weight bearing. Refusing to allow leg to be unwrapped and examined due to pain. Denies fever/chills, headache, dizziness, CP, SOB, abdominal pain, N/V/D. Was constipated while in  due to being on opioids since January, last BM was yesterday.  Patient was admitted to [place] in 2019 for LLE cellulitis concerning for osteomyelitis. Patient's pacemaker not MRI compatible, indium scan performed, negative for osteomyelitis. Wound cultures grew Proteus, Klebsiella, Corynebacterium and alpha hemolytic streptococcus. Patient was treated with Cefepime and vancomycin. Blood cultures negative. She was seen by vascular surgery at [place], but no intervention was performed during that admission. Patient unsure what kind of pacemaker she has, knows her pacemaker battery is [employer].   In ED VS T 98.3  /69 RR 16 O2 sat 99 on RA  12 lead EKG showed atrial sensed, ventricular paced rhythm at 96 bpm  Labs significant for BUN 48, creatinine 1.4 (baseline <1), GFR 36  CXR shows no active CP disease  Received azactam 1000 mg IV x1, vancomycin 1000 mg IV x1    3/14/19: Patient seen and examined at bedside. Patient still complains of leg pain with movement, states pain has improved with medication. Denies fevers, chills, numbness, tingling. Wound Care RN and Podiatry to see patient today. Podiatry - Dr Fletcher saw pt, Vascular DR Sun Ratliff called & case D/W at detail. ID Dr Mauricio changed to IV Rocephin daily    OVERNIGHT EVENTS: NONE     Home Medications:  carvedilol 12.5 mg oral tablet: 1 tab(s) orally 2 times a day (13 Mar 2019 22:38)  furosemide 40 mg oral tablet: 1 tab(s) orally once a day (13 Mar 2019 22:38)  Plavix 75 mg oral tablet: 1 tab(s) orally once a day (13 Mar 2019 22:38)      MEDICATIONS  (STANDING):  carvedilol 12.5 milliGRAM(s) Oral every 12 hours  cefTRIAXone   IVPB      clopidogrel Tablet 75 milliGRAM(s) Oral daily  Dakins Solution - 1/2 Strength 1 Application(s) Topical two times a day  docusate sodium 100 milliGRAM(s) Oral three times a day  enoxaparin Injectable 40 milliGRAM(s) SubCutaneous daily  furosemide    Tablet 40 milliGRAM(s) Oral daily  lactobacillus acidophilus 1 Tablet(s) Oral three times a day with meals  senna 2 Tablet(s) Oral at bedtime    MEDICATIONS  (PRN):  oxyCODONE    5 mG/acetaminophen 325 mG 1 Tablet(s) Oral every 6 hours PRN Severe Pain (7 - 10)  traMADol 25 milliGRAM(s) Oral every 6 hours PRN Mild Pain (1 - 3)  traMADol 50 milliGRAM(s) Oral every 4 hours PRN Moderate Pain (4 - 6)      Allergies    aspirin (Unknown)  atorvastatin (Unknown)  penicillin (Unknown)    Intolerances        REVIEW OF SYSTEMS:  CONSTITUTIONAL: No fever, No chills, No fatigue, No myalgia, No Body ache, No Weakness  EYES: No eye pain,  No visual disturbances, No discharge, No Redness  ENMT: No ear pain, No nose bleed, No vertigo; No sinus or throat pain, No Congestion  NECK: No pain, No stiffness  RESPIRATORY: No cough, No wheezing, No hemoptysis, No shortness of breath  CARDIOVASCULAR: No chest pain, palpitations  GASTROINTESTINAL: No abdominal or epigastric pain. No nausea, No vomiting, No diarrhea or constipation; [  ] BM  GENITOURINARY: No dysuria, No frequency, No urgency, No hematuria or incontinence  NEUROLOGICAL: No headaches, No dizziness, No numbness, No tingling, No tremors, No weakness  EXT: + Swelling of LE, + LLE Pain  SKIN: [  ] No itching, burning, rashes, or lesions, patient with pain of LLE with cellulitis ++  MUSCULOSKELETAL: No joint pain or swelling; No muscle pain, No back pain, + LLE extremity pain  PSYCHIATRIC: No depression, anxiety, mood swings or difficulty sleeping at night  PAIN SCALE: [  ] None  [x  ] Other-Left Foot pain  ROS Unable to obtain due to: [  ] Dementia  [  ] Lethargy  [  ] Sedated  [  ]  non verbal  REST OF REVIEW OF SYSTEMS: [ x ] Normal     Vital Signs Last 24 Hrs  T(C): 37.2 (14 Mar 2019 04:45), Max: 37.2 (14 Mar 2019 04:45)  T(F): 98.9 (14 Mar 2019 04:45), Max: 98.9 (14 Mar 2019 04:45)  HR: 95 (14 Mar 2019 04:45) (92 - 106)  BP: 109/62 (14 Mar 2019 04:45) (106/55 - 130/69)  BP(mean): --  RR: 18 (14 Mar 2019 04:45) (16 - 18)  SpO2: 95% (14 Mar 2019 04:45) (95% - 99%)    Finger Stick        03-13 @ 07:01  -  03-14 @ 07:00  --------------------------------------------------------  IN: 170 mL / OUT: 0 mL / NET: 170 mL        PHYSICAL EXAM:  GENERAL:  [X  ] NAD, [ X ] Well appearing, [  ] Agitated, [  ] Drowsy, [  ] Lethargy, [  ] Confused   HEAD:  [ X ] Normal, [  ] Other  EYES:  [ X ] EOMI, [X  ] PERRLA, [X  ] Conjunctiva and sclera clear normal, [  ] Other, [  ] Pallor, [  ] Discharge  ENMT:  [ X ] Normal, [ x ] Moist mucous membranes, [ x ] Good dentition, [x  ] No thrush  NECK:  [ X ] Supple, [ x ] No JVD, [ x ] Normal thyroid, [  ] Lymphadenopathy, [  ] Other  CHEST/LUNG:  [ X ] Clear to auscultation bilaterally, [  ] Breath Sounds equal B/L decreased, [ x ] No rales, [ x ] No rhonchi, [ x ] No wheezing  HEART:  [ X ] Regular rate and rhythm, [  ] Tachycardixa, [  ] Bradycardia, [  ] Irregular, [x  ] No murmurs, No rubs, No gallops, [  ] PPM in place (Mfr:  )  ABDOMEN:  [X  ] Soft, [ X ] Nontender, [ X ] Nondistended, [ X ] No mass, [X  ] Bowel sounds present, [x  ] Obese  NERVOUS SYSTEM:  [X  ] Alert & Oriented x3, [ x ] Nonfocal, [  ] Confusion, [  ] Encephalopathic, [  ] Sedated, [  ] Unable to assess, [  ] Other-  EXTREMITIES:  [x  ]Poor  Peripheral Pulses B/L Feet, No clubbing, No cyanosis,  [ x ] edema B/L lower EXT, [ X ] severe B/L   PVD stasis skin changes B/L lower EXT chronic LE changes, left foot venous stasis ulcer, Open skin with Yellow discharge and fibroglandular tissue   LYMPH:  No lymphadenopathy noted  SKIN:  [  ] No rashes or lesions, [ X ] Pressure ulcers- B/L Buttock /sacral, Excoriation, Redness+, [x  ] Ecchymosis B/L Lower EXT, scabs, scaling, [  ] Skin tears, [  ] Other sacral ulcer     DIET: DASH/TLC     LABS:                        10.2   8.88  )-----------( 225      ( 14 Mar 2019 08:17 )             31.6     14 Mar 2019 08:17    139    |  106    |  35     ----------------------------<  90     3.7     |  25     |  1.00     Ca    8.9        14 Mar 2019 08:17    TPro  7.2    /  Alb  2.5    /  TBili  0.3    /  DBili  x      /  AST  19     /  ALT  31     /  AlkPhos  107    14 Mar 2019 08:17    PT/INR - ( 13 Mar 2019 19:56 )   PT: 13.1 sec;   INR: 1.15 ratio         PTT - ( 13 Mar 2019 19:56 )  PTT:34.6 sec  Urinalysis Basic - ( 14 Mar 2019 02:05 )    Color: Yellow / Appearance: Slightly Turbid / S.015 / pH: x  Gluc: x / Ketone: Negative  / Bili: Negative / Urobili: Negative   Blood: x / Protein: 75 mg/dL / Nitrite: Positive   Leuk Esterase: Moderate / RBC: 0-2 /HPF / WBC >50   Sq Epi: x / Non Sq Epi: Occasional / Bacteria: TNTC      RADIOLOGY & ADDITIONAL TESTS:    from: US Duplex Venous Lower Ext Complete, Bilateral (19 @ 00:05) >  IMPRESSION:   No acute findings. No evidence of deep vein thrombosis bilateral lower   extremities.    HEALTH ISSUES - PROBLEM Dx:    Consultant(s) Notes Reviewed:  [ x ] YES     Care Discussed with [ x ] Consultants, [X  ] Patient, [  ] Family, [  ] , [  ] Social Service, [X  ] RN, [  ] Physical Therapy  DVT PPX: [ x ] Lovenox, [  ] S C Heparin, [  ] Coumadin, [  ] Xarelto, [  ] Eliquis, [  ] Pradaxa, [  ] IV Heparin drip, [  ] SCD, [  ] Contraindication secondary to GI Bleed, [  ] Ambulation  Advanced Directive: [ x ] None, [  ] DNR/DNI

## 2019-03-14 NOTE — DISCHARGE NOTE PROVIDER - HOSPITAL COURSE
79 yo F PMH systolic and diastolic CHF w/ EF 30% with PPM, CAD, HTN, HLD, PVD who presented  to ED after being seen by a podiatrist who found weeping LLE wounds.  Of note, patient, was discharge from [facility] recently after being discharge from [facility] for LLE cellulitis.  atient was discharged from [facility], with Unna boot, and was seen by outside podiatrist before admission. On this admission in the ED patient was started on Vancomycin and Aztreonam, due to penicillin allergy. Venous Dopplers of LE showed no evident of DVT.      ID consult, Dr. Mauricio followed patient throughout hospital stay, Antibiotics was changed to IV ceftriaxone. Patient was followed by podiatry consulted throughout hospital stay, wounds were cleansed, dressed with dakins, adaptic, DSJEMMA. Vascular was consulted ______________.  Wound care RN followed patient throughout hospital course for sacral ulcer. 79 yo F PMH systolic and diastolic CHF w/ EF 30% with PPM, CAD, HTN, HLD, PVD who presented  to ED after being seen by a podiatrist who found weeping LLE wounds.  Of note, patient, was discharge from [facility] recently after being discharge from [facility] for LLE cellulitis.  Patient was discharged from [facility], with Unna boot, and was seen by outside podiatrist before admission. On this admission in the ED patient was started on Vancomycin and Aztreonam, due to penicillin allergy. Venous Dopplers of LE showed no evident of DVT.      ID consult, Dr. Mauricio followed patient throughout hospital stay, Antibiotics was changed to IV ceftriaxone. Patient was followed by podiatry consulted throughout hospital stay, wounds were cleansed, dressed with dakins, adaptic, DSD. Vascular was consulted ______________.  Wound care RN followed patient  patient throughout hospital course for sacral ulcer. Patient had a VA physiol Exremity lower 3+ Bilateral that showed _____________________ 79 yo F PMH systolic and diastolic CHF w/ EF 30% with PPM, CAD, HTN, HLD, PVD who presented  to ED after being seen by a podiatrist who found weeping LLE wounds.  Of note, patient, was discharge from [facility] recently after being discharge from [facility] for LLE cellulitis.  Patient was discharged from [facility], with Unna boot, and was seen by outside podiatrist before admission. On this admission in the ED patient was started on Vancomycin and Aztreonam, due to penicillin allergy. Venous Dopplers of LE showed no evident of DVT.      ID consult, Dr. Mauricio followed patient throughout hospital stay, Antibiotics was changed to IV ceftriaxone. Patient was followed by podiatry consulted throughout hospital stay, wounds were cleansed, dressed with dakins, adaptic, DSD. Vascular was consulted (Dr. Rose) and followed patient throughout hospital course.  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      Wound care RN followed patient throughout hospital course for sacral ulcer. Blood cultures showed no growth during hospital stay. 77 yo F PMH systolic and diastolic CHF w/ EF 30% with PPM, CAD, HTN, HLD, PVD who presented  to ED after being seen by a podiatrist who found weeping LLE wounds.  Of note, patient, was discharge from [facility] recently after being discharge from [facility] for LLE cellulitis.  Patient was discharged from [facility], with Unna boot, and was seen by outside podiatrist before admission. On this admission in the ED patient was started on Vancomycin and Aztreonam, due to penicillin allergy. Venous Dopplers of LE showed no evident of DVT.      ID consult, Dr. Mauricio followed patient throughout hospital stay, Antibiotics was changed to IV ceftriaxone. ____________Blood cultures showed no growth during hospital stay, and wound culture showed few pseudomonas aeruginosa and proteus mirabilis. Patient was followed by podiatry consulted throughout hospital stay, LLE wounds were cleansed, dressed with dakins, adaptic, DSD. Vascular was consulted (Dr. Rose) and followed patient throughout hospital course.  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 As per Vascular Consult, BELINDA within normal range and consistent with absence of any hemodynamically significant arterial insufficiency.      Wound care RN followed patient throughout hospital course for sacral ulcer___________. Throughout stay, patient noted to have stable anemia, recommend outpatient anemia work-up with repeat CBC in rehab.          PT consult saw patient and recommended discharge to [facility]. On day of discharge patient stable for discharge to [facility] with follow-up with Podiatry (Dr. Fletcher) within one week of discharge, as well as anemia work-up outpatient. 77 yo F PMH systolic and diastolic CHF w/ EF 30% with PPM, CAD, HTN, HLD, PVD who presented  to ED after being seen by a podiatrist who found weeping LLE wounds.  Of note, patient, was discharge from [facility] recently after being discharge from [facility] for LLE cellulitis.  Patient was discharged from [facility], with Unna boot, and was seen by outside podiatrist before admission. On this admission in the ED patient was started on Vancomycin and Aztreonam, due to penicillin allergy. Venous Dopplers of LE showed no evident of DVT.      ID consult, Dr. Mauricio followed patient throughout hospital stay, Antibiotics was changed to IV ceftriaxone. ____________Blood cultures showed no growth during hospital stay, and wound culture showed few pseudomonas aeruginosa and proteus mirabilis. Patient was followed by podiatry consulted throughout hospital stay, LLE wounds were cleansed, dressed with dakins, adaptic, DSD. Vascular was consulted (Dr. Rose) and followed patient throughout hospital course.  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 As per Vascular Consult, BELINDA within normal range and consistent with absence of any hemodynamically significant arterial insufficiency.      Wound care RN followed patient throughout hospital course for sacral blanchable erythema to left and righ buttocks, treated with cavilon and critic-aide Q shift. Throughout stay, patient noted to have stable anemia, recommend outpatient anemia work-up with repeat CBC in rehab.          PT consult saw patient and recommended discharge to [facility]. On day of discharge patient stable for discharge to [facility] with follow-up with Podiatry (Dr. Fletcher) within one week of discharge, as well as anemia work-up outpatient. 79 yo F PMH systolic and diastolic CHF w/ EF 30% with PPM, CAD, HTN, HLD, PVD who presented  to ED after being seen by a podiatrist who found weeping LLE wounds.  Of note, patient, was discharge from [facility] recently after being discharge from [facility] for LLE cellulitis.  Patient was discharged from [facility], with Unna boot, and was seen by outside podiatrist before admission. On this admission in the ED patient was started on Vancomycin and Aztreonam, due to penicillin allergy. Venous Dopplers of LE showed no evident of DVT.      ID consult, Dr. Mauricio followed patient throughout hospital stay, Antibiotics was changed to IV ceftriaxone, and continued during hospital stay, no need for antibiotics on discharge, as per ID consult. Blood cultures showed no growth during hospital stay, and wound culture showed few pseudomonas aeruginosa and proteus mirabilis. Patient was followed by podiatry consulted throughout hospital stay, LLE wounds were cleansed, dressed with dakins, adaptic, DSD. On day of discharge, for LLE dressing now changed to: cleansed with saline, dakins and dressed with hydrogel. Vascular was consulted (Dr. Rose) and followed patient throughout hospital course.  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 As per Vascular Consult, BELINDA within normal range and consistent with absence of any hemodynamically significant arterial insufficiency.      Wound care RN followed patient throughout hospital course for sacral blanchable erythema to left and righ buttocks, treated with cavilon and critic-aide Q shift. Throughout stay, patient noted to have stable anemia, recommend outpatient anemia work-up with repeat CBC in rehab.          PT consult saw patient and recommended discharge to [facility]. On day of discharge patient stable for discharge to [facility] with follow-up with Podiatry (Dr. Fletcher) within one week of discharge, as well as anemia work-up outpatient. 77 yo F PMH systolic and diastolic CHF w/ EF 30% with PPM, CAD, HTN, HLD, PVD presenting to ED after being sent in by Dr. Fletcher for cellulitis due to left foot wound. Patient was discharged from [facility], where she had been sent after discharge from [facility] for LLE cellulitis. Patient was sent home from  on Saturday with Unna boot. Was seen by outside podiatrist today found to have weeping LLE wounds, sent to [facility] wound care center for evaluation. Patient complaining of severe pain in left foot, especially with weight bearing. Refusing to allow leg to be unwrapped and examined due to pain. Denies fever/chills, headache, dizziness, CP, SOB, abdominal pain, N/V/D. Was constipated while in  due to being on opioids since January, last BM was yesterday.    Patient was admitted to [facility] in January 2019 for LLE cellulitis concerning for osteomyelitis. Patient's pacemaker not MRI compatible, indium scan performed, negative for osteomyelitis. Wound cultures grew Proteus, Klebsiella, Corynebacterium and alpha hemolytic streptococcus. Patient was treated with Cefepime and vancomycin. Blood cultures negative. She was seen by vascular surgery at [facility], but no intervention was performed during that admission. Patient unsure what kind of pacemaker she has, knows her pacemaker battery is [employer].     In ED VS T 98.3  /69 RR 16 O2 sat 99 on RA    12 lead EKG showed atrial sensed, ventricular paced rhythm at 96 bpm    Labs significant for BUN 48, creatinine 1.4 (baseline <1), GFR 36    CXR shows no active CP disease, Venous Dopplers of LE showed no evident of DVT.         Received azactam 1000 mg IV x1, vancomycin 1000 mg IV x1        ID consult, Dr. Mauricio followed patient throughout hospital stay, Antibiotics was changed to IV ceftriaxone, and continued during hospital stay,  Blood cultures showed no growth during hospital stay, and wound culture showed few pseudomonas aeruginosa and proteus mirabilis. Patient was followed by podiatry  Dr Fletcher throughout hospital stay, LLE wounds were cleansed, dressed with dakins, adaptic, DSD.. Vascular was consulted (Dr. Rose) and followed patient throughout hospital course.  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 As per Vascular Consult, BELINDA within normal range and consistent with absence of any hemodynamically significant arterial insufficiency. As per vascular Sx -No surgical intervention, pt needs local wound care, Pt 's wound on left foot improved with daily dressing , pt seen by PT----> [facility],     Wound care RN followed patient throughout hospital course for sacral blanchable erythema to left and righ buttocks, treated with cavilon and critic-aide Q shift. Throughout stay, patient noted to have stable chronic anemia, recommend outpatient anemia work-up with repeat CBC in rehab.  On day of discharge, for LLE dressing now changed to: cleansed with saline, dakins and dressed with hydrogel by DR Fletcher/DR Cabrera, Pt has been refusing any scrubbing /detail cleaning wound, pain meds continued for her pain ,Pt started on Rx for  Constipation, refusing enema , AR suppository & other PO meds, case D/W Vascular Dr Sun Ratliff, Podiatry & ID MD's -No need for IV/Po Abx, continue Local wound care for left foot wound,         PT consult saw patient and recommended discharge to [facility]. On day of discharge patient stable for discharge to [facility] with follow-up with Podiatry (Dr. Fletcher) within one week of discharge, as well as anemia work-up outpatient.

## 2019-03-14 NOTE — DISCHARGE NOTE PROVIDER - PROVIDER TOKENS
PROVIDER:[TOKEN:[[id number]]],PROVIDER:[TOKEN:[[id number]]] PROVIDER:[TOKEN:[[id number]]],PROVIDER:[TOKEN:[[id number]]],PROVIDER:[TOKEN:[[id number]]]

## 2019-03-14 NOTE — CONSULT NOTE ADULT - ATTENDING COMMENTS
Patient seen and evaluated with Dr. Fletcher  Labs and imaging studies reviewed  Wound cleansed, dressed with dakins, adaptic, DSD  ID recommendations appreciated  Recommend vascular consult    Wound care instructions  1. cleanse wound with normal saline, squirt with dakins solution  2. apply adaptic non-adherent dressing and secure with gauze and kyle  3. change daily

## 2019-03-14 NOTE — CONSULT NOTE ADULT - ASSESSMENT
79 yo F PMH systolic and diastolic CHF w/ EF 30% with PPM, CAD, HTN, HLD, PVD presenting to ED after being sent in by Dr. Fletcher for cellulitis due to left foot wound.    Patient's pacemaker not MRI compatible, indium scan performed January 2019, negative for osteomyelitis.     Wound cultures grew Proteus, Klebsiella, Corynebacterium and alpha hemolytic streptococcus. Patient was treated with Cefepime and vancomycin.     1-Cellulitis:  At this point it appears to be a superficial infection but will coordinate with Dr Fletcher to evaluate and determine if further imaging is required as last NM scan was in January. Reported PCN allergy was when patient was a teenager and was a reported localized swelling at PCN injection site so low suspicion for true allergy and will modify abx.    Thank you for consulting us and involving us in the management of this most interesting and challenging case.     We will follow along in the care of this patient.
79yo female with venous stasis ulcer left foot, cellulitis
78 year old female with hx of CHF and chronic venous stasis in BLE, with chronic L foot wounds x2 months, not responding to UB therapy per patient, sent in for possible infection.   At this time, patient refuses exam of her foot.  LLE venous duplex - No DVT/SVT  Per podiatry note, with absent pedal pulses and possible PAD, although on contralateral R side, she has bounding pedal pulses and diminished L sided pedal pulses may be secondary to overlying edema. At this, she again, refuses exam    Plan:  Can come back when patient amenable to physical exam and full vascular assessment  Should check LLE arterial duplex to assess for any evidence of macrovascular arterial disease  Can coordinate dressing change with ultrasound tomorrow  Continue wound care as per podiatry

## 2019-03-14 NOTE — PHYSICAL THERAPY INITIAL EVALUATION ADULT - LEVEL OF INDEPENDENCE: GAIT, REHAB EVAL
pt is currently NWB LLE as per infectious disease- Dr. Mauricio and unable to perform swing to gait at this time/unable to perform

## 2019-03-14 NOTE — DISCHARGE NOTE PROVIDER - CARE PROVIDER_API CALL
Shaheed Fletcher (DPM)  Podiatric Medicine and Surgery  [address] [place] [address]  Phone: [phone]  Fax: [phone]  Follow Up Time:     [place]o Park)  Cardiovascular Disease; Interventional Cardiology  [address] [place] [address]  Phone: [phone]  Fax: [phone]  Follow Up Time: Shaheed Fletcher (DPM)  Podiatric Medicine and Surgery  [address] [place] [address]  Phone: [phone]  Fax: [phone]  Follow Up Time:     Dangelo Park)  Cardiovascular Disease; Interventional Cardiology  [address]  [place] [address]  Phone: [phone]  Fax: [phone]  Follow Up Time:     Cayetano Hemphill)  Surgery; Vascular Surgery  [address] [place] [address]  Phone: [phone]  Fax: [phone]  Follow Up Time:

## 2019-03-15 LAB
ANION GAP SERPL CALC-SCNC: 7 MMOL/L — SIGNIFICANT CHANGE UP (ref 5–17)
BUN SERPL-MCNC: 22 MG/DL — SIGNIFICANT CHANGE UP (ref 7–23)
CALCIUM SERPL-MCNC: 9.1 MG/DL — SIGNIFICANT CHANGE UP (ref 8.5–10.1)
CHLORIDE SERPL-SCNC: 107 MMOL/L — SIGNIFICANT CHANGE UP (ref 96–108)
CO2 SERPL-SCNC: 26 MMOL/L — SIGNIFICANT CHANGE UP (ref 22–31)
CREAT SERPL-MCNC: 0.79 MG/DL — SIGNIFICANT CHANGE UP (ref 0.5–1.3)
GLUCOSE SERPL-MCNC: 89 MG/DL — SIGNIFICANT CHANGE UP (ref 70–99)
HCT VFR BLD CALC: 31.4 % — LOW (ref 34.5–45)
HGB BLD-MCNC: 9.9 G/DL — LOW (ref 11.5–15.5)
MCHC RBC-ENTMCNC: 29 PG — SIGNIFICANT CHANGE UP (ref 27–34)
MCHC RBC-ENTMCNC: 31.5 GM/DL — LOW (ref 32–36)
MCV RBC AUTO: 92.1 FL — SIGNIFICANT CHANGE UP (ref 80–100)
NRBC # BLD: 0 /100 WBCS — SIGNIFICANT CHANGE UP (ref 0–0)
PLATELET # BLD AUTO: 201 K/UL — SIGNIFICANT CHANGE UP (ref 150–400)
POTASSIUM SERPL-MCNC: 3.9 MMOL/L — SIGNIFICANT CHANGE UP (ref 3.5–5.3)
POTASSIUM SERPL-SCNC: 3.9 MMOL/L — SIGNIFICANT CHANGE UP (ref 3.5–5.3)
RBC # BLD: 3.41 M/UL — LOW (ref 3.8–5.2)
RBC # FLD: 14.4 % — SIGNIFICANT CHANGE UP (ref 10.3–14.5)
SODIUM SERPL-SCNC: 140 MMOL/L — SIGNIFICANT CHANGE UP (ref 135–145)
WBC # BLD: 5.95 K/UL — SIGNIFICANT CHANGE UP (ref 3.8–10.5)
WBC # FLD AUTO: 5.95 K/UL — SIGNIFICANT CHANGE UP (ref 3.8–10.5)

## 2019-03-15 PROCEDURE — 93923 UPR/LXTR ART STDY 3+ LVLS: CPT | Mod: 26

## 2019-03-15 RX ORDER — CARVEDILOL PHOSPHATE 80 MG/1
1 CAPSULE, EXTENDED RELEASE ORAL
Qty: 0 | Refills: 0 | COMMUNITY

## 2019-03-15 RX ORDER — SOD,AMMONIUM,POTASSIUM LACTATE
1 CREAM (GRAM) TOPICAL ONCE
Qty: 0 | Refills: 0 | Status: COMPLETED | OUTPATIENT
Start: 2019-03-15 | End: 2019-03-15

## 2019-03-15 RX ORDER — NYSTATIN CREAM 100000 [USP'U]/G
1 CREAM TOPICAL
Qty: 0 | Refills: 0 | Status: DISCONTINUED | OUTPATIENT
Start: 2019-03-15 | End: 2019-03-19

## 2019-03-15 RX ORDER — SODIUM HYPOCHLORITE 0.125 %
1 SOLUTION, NON-ORAL MISCELLANEOUS
Qty: 3 | Refills: 0 | OUTPATIENT
Start: 2019-03-15 | End: 2019-03-28

## 2019-03-15 RX ORDER — CLOPIDOGREL BISULFATE 75 MG/1
1 TABLET, FILM COATED ORAL
Qty: 0 | Refills: 0 | COMMUNITY

## 2019-03-15 RX ADMIN — Medication 40 MILLIGRAM(S): at 13:05

## 2019-03-15 RX ADMIN — Medication 100 MILLIGRAM(S): at 13:05

## 2019-03-15 RX ADMIN — OXYCODONE AND ACETAMINOPHEN 1 TABLET(S): 5; 325 TABLET ORAL at 16:42

## 2019-03-15 RX ADMIN — OXYCODONE AND ACETAMINOPHEN 1 TABLET(S): 5; 325 TABLET ORAL at 23:50

## 2019-03-15 RX ADMIN — OXYCODONE AND ACETAMINOPHEN 1 TABLET(S): 5; 325 TABLET ORAL at 15:42

## 2019-03-15 RX ADMIN — SENNA PLUS 2 TABLET(S): 8.6 TABLET ORAL at 21:29

## 2019-03-15 RX ADMIN — OXYCODONE AND ACETAMINOPHEN 1 TABLET(S): 5; 325 TABLET ORAL at 09:30

## 2019-03-15 RX ADMIN — OXYCODONE AND ACETAMINOPHEN 1 TABLET(S): 5; 325 TABLET ORAL at 22:44

## 2019-03-15 RX ADMIN — Medication 1 TABLET(S): at 17:38

## 2019-03-15 RX ADMIN — Medication 1 APPLICATION(S): at 19:45

## 2019-03-15 RX ADMIN — CEFTRIAXONE 100 GRAM(S): 500 INJECTION, POWDER, FOR SOLUTION INTRAMUSCULAR; INTRAVENOUS at 11:28

## 2019-03-15 RX ADMIN — ENOXAPARIN SODIUM 40 MILLIGRAM(S): 100 INJECTION SUBCUTANEOUS at 11:28

## 2019-03-15 RX ADMIN — CLOPIDOGREL BISULFATE 75 MILLIGRAM(S): 75 TABLET, FILM COATED ORAL at 11:28

## 2019-03-15 RX ADMIN — Medication 100 MILLIGRAM(S): at 05:35

## 2019-03-15 RX ADMIN — OXYCODONE AND ACETAMINOPHEN 1 TABLET(S): 5; 325 TABLET ORAL at 10:30

## 2019-03-15 RX ADMIN — Medication 1 APPLICATION(S): at 17:38

## 2019-03-15 RX ADMIN — Medication 1 TABLET(S): at 13:05

## 2019-03-15 RX ADMIN — NYSTATIN CREAM 1 APPLICATION(S): 100000 CREAM TOPICAL at 17:38

## 2019-03-15 RX ADMIN — Medication 1 TABLET(S): at 09:30

## 2019-03-15 RX ADMIN — Medication 100 MILLIGRAM(S): at 21:29

## 2019-03-15 NOTE — PROGRESS NOTE ADULT - SUBJECTIVE AND OBJECTIVE BOX
79yo female seen bedside during AM podiatry rounds, states severe pain to her foot, would not like a dressing change at this time.     Vital Signs Last 24 Hrs  T(C): 36.9 (15 Mar 2019 04:55), Max: 37.3 (14 Mar 2019 21:14)  T(F): 98.4 (15 Mar 2019 04:55), Max: 99.2 (14 Mar 2019 21:14)  HR: 83 (15 Mar 2019 04:55) (83 - 103)  BP: 107/65 (15 Mar 2019 04:55) (94/50 - 119/71)  BP(mean): --  RR: 17 (15 Mar 2019 04:55) (17 - 17)  SpO2: 93% (15 Mar 2019 04:55) (91% - 94%)    03-15    140  |  107  |  22  ----------------------------<  89  3.9   |  26  |  0.79    Ca    9.1      15 Mar 2019 08:08    TPro  7.2  /  Alb  2.5<L>  /  TBili  0.3  /  DBili  x   /  AST  19  /  ALT  31  /  AlkPhos  107  03-14    CBC Full  -  ( 15 Mar 2019 08:08 )  WBC Count : 5.95 K/uL  Hemoglobin : 9.9 g/dL  Hematocrit : 31.4 %  Platelet Count - Automated : 201 K/uL  Mean Cell Volume : 92.1 fl  Mean Cell Hemoglobin : 29.0 pg  Mean Cell Hemoglobin Concentration : 31.5 gm/dL  Auto Neutrophil # : x  Auto Lymphocyte # : x  Auto Monocyte # : x  Auto Eosinophil # : x  Auto Basophil # : x  Auto Neutrophil % : x  Auto Lymphocyte % : x  Auto Monocyte % : x  Auto Eosinophil % : x  Auto Basophil % : x      Objective from previous exam on 3/14/19  Vasc: DP and PT non-palpable bilaterally; cap refill time delayed to all digits; temp gradient warm to cool; no edema noted  Derm:  -dorsal left foot wound noted to the level of subcutaneous tissue measuring approximately 7cm x 4cm x 0.2cm covered with fibro-granular tissue (40:60), with mild serous drainage, pain on palpable; mild malodor, periwound skin intact  Neuro: epicritic sensation intact to all digits bilaterally  Ortho: pain on palpation to entire left foot, ankle and leg

## 2019-03-15 NOTE — PROGRESS NOTE ADULT - SUBJECTIVE AND OBJECTIVE BOX
Chart re-reviewed and pt had an arterial duplex performed in January 2019 in [facility], which demonstrated patent macrovasculature on the L with inline flow to the foot and 2 vessel runoff via AT/PT. Peroneal not seen and profunda not seen however.    As such, patient does not need repeat arterial duplex, as she is unlikely to demonstrate any changes from this recent study. She should however, have BLE BELINDA/PVR for physiologic assessment of her flow into her foot. Study ordered and will follow results

## 2019-03-15 NOTE — PROGRESS NOTE ADULT - SUBJECTIVE AND OBJECTIVE BOX
[facility], Division of Infectious Diseases  RAIMUNDO Hunt A. Lee  [phone]    Name: LESLY DELCID  Age: 78y  Gender: Female  MRN: [id number]    Interval History--  Notes reviewed. Feels ok. Pain not an issue. S/P BELINDA testing.    Past Medical History--  Pacemaker  Essential hypertension  HLD (hyperlipidemia)  CHF (congestive heart failure)  S/P tonsillectomy  Artificial pacemaker      For details regarding the patient's social history, family history, and other miscellaneous elements, please refer the initial infectious diseases consultation and/or the admitting history and physical examination for this admission.    Allergies    aspirin (Unknown)  atorvastatin (Unknown)  penicillin (Unknown)    Intolerances        Medications--  Antibiotics:  cefTRIAXone   IVPB 1 Gram(s) IV Intermittent every 24 hours  cefTRIAXone   IVPB        Immunologic:    Other:  carvedilol  clopidogrel Tablet  Dakins Solution - 1/2 Strength  docusate sodium  enoxaparin Injectable  furosemide    Tablet  lactobacillus acidophilus  oxyCODONE    5 mG/acetaminophen 325 mG PRN  senna  traMADol PRN  traMADol PRN      Review of Systems--  A 10-point review of systems was obtained.   Review of systems otherwise negative except as previously noted.    Physical Examination--  Vital Signs: T(F): 98.4 (03-15-19 @ 04:55), Max: 99.2 (03-14-19 @ 21:14)  HR: 83 (03-15-19 @ 04:55)  BP: 107/65 (03-15-19 @ 04:55)  RR: 17 (03-15-19 @ 04:55)  SpO2: 93% (03-15-19 @ 04:55)  Wt(kg): --  General: Nontoxic-appearing Female in no acute distress.  HEENT: AT/NC. Anicteric. Conjunctiva pink and moist. Oropharynx clear. Dentition poor.  Neck: Not rigid. No sense of mass.  Nodes: None palpable.  Lungs: Diminished breath sounds bilaterally without rales, wheezing or rhonchi  Heart: Regular rate and rhythm. No Murmur. No rub. No gallop. No palpable thrill.  Abdomen: Bowel sounds present and normoactive. Soft. Nondistended. Nontender. No sense of mass. No organomegaly. Obese.   Back: No spinal tenderness. No costovertebral angle tenderness.   Extremities: No cyanosis or clubbing. Venous stasis changes B RIVER. Declines to have her foot examined at this time.   Skin: Warm. Dry. Good turgor. No rash. No vasculitic stigmata.  Psychiatric: Appropriate affect and mood for situation.         Laboratory Studies--  CBC                        9.9    5.95  )-----------( 201      ( 15 Mar 2019 08:08 )             31.4       Chemistries  03-15    140  |  107  |  22  ----------------------------<  89  3.9   |  26  |  0.79    Ca    9.1      15 Mar 2019 08:08    TPro  7.2  /  Alb  2.5<L>  /  TBili  0.3  /  DBili  x   /  AST  19  /  ALT  31  /  AlkPhos  107  03-14      Culture Data    Culture - Blood (collected 14 Mar 2019 01:31)  Source: .Blood Blood  Preliminary Report (15 Mar 2019 02:00):    No growth to date.    Culture - Blood (collected 14 Mar 2019 01:31)  Source: .Blood Blood  Preliminary Report (15 Mar 2019 02:00):    No growth to date.

## 2019-03-15 NOTE — PROGRESS NOTE ADULT - SUBJECTIVE AND OBJECTIVE BOX
Patient is a 78y old  Female who presents with a chief complaint of chronic L foot wounds (15 Mar 2019 09:41)    INTERVAL HPI:  77 yo F PMH systolic and diastolic CHF w/ EF 30% with PPM, CAD, HTN, HLD, PVD presenting to ED after being sent in by Dr. Fletcher for cellulitis due to left foot wound. Patient was discharged from [facility], where she had been sent after discharge from [place] for LLE cellulitis. Patient was sent home from  on Saturday with Unna boot. Was seen by outside podiatrist today found to have weeping LLE wounds, sent to [facility] wound care center for evaluation. Patient complaining of severe pain in left foot, especially with weight bearing. Refusing to allow leg to be unwrapped and examined due to pain. Denies fever/chills, headache, dizziness, CP, SOB, abdominal pain, N/V/D. Was constipated while in  due to being on opioids since January, last BM was yesterday.  Patient was admitted to [place] in 2019 for LLE cellulitis concerning for osteomyelitis. Patient's pacemaker not MRI compatible, indium scan performed, negative for osteomyelitis. Wound cultures grew Proteus, Klebsiella, Corynebacterium and alpha hemolytic streptococcus. Patient was treated with Cefepime and vancomycin. Blood cultures negative. She was seen by vascular surgery at [place], but no intervention was performed during that admission. Patient unsure what kind of pacemaker she has, knows her pacemaker battery is [employer].   In ED VS T 98.3  /69 RR 16 O2 sat 99 on RA  12 lead EKG showed atrial sensed, ventricular paced rhythm at 96 bpm  Labs significant for BUN 48, creatinine 1.4 (baseline <1), GFR 36  CXR shows no active CP disease  Received azactam 1000 mg IV x1, vancomycin 1000 mg IV x1    3/14/19: Patient seen and examined at bedside. Patient still complains of leg pain with movement, states pain has improved with medication. Denies fevers, chills, numbness, tingling. Wound Care RN and Podiatry to see patient today. Podiatry - Dr Fletcher saw pt, Vascular DR Sun Ratliff called & case D/W at detail. ID Dr Mauricio changed to IV Rocephin daily    2/15/19: Patient seen and examined at bedside. Patient still complains of leg pain with movement, states pain has improved with medication. Denies fevers, chills, numbness, tingling. Wound Care RN and Podiatry to see patient today. Seen by Vascular consult, patient for BLE BELINDA/PVR for physiologic assessment. I IV Rocephin day 2.    OVERNIGHT EVENTS: NONE     Home Medications:  carvedilol 12.5 mg oral tablet: 1 tab(s) orally 2 times a day (13 Mar 2019 22:38)  furosemide 40 mg oral tablet: 1 tab(s) orally once a day (13 Mar 2019 22:38)  Plavix 75 mg oral tablet: 1 tab(s) orally once a day (13 Mar 2019 22:38)      MEDICATIONS  (STANDING):  carvedilol 12.5 milliGRAM(s) Oral every 12 hours  cefTRIAXone   IVPB 1 Gram(s) IV Intermittent every 24 hours  cefTRIAXone   IVPB      clopidogrel Tablet 75 milliGRAM(s) Oral daily  Dakins Solution - 1/2 Strength 1 Application(s) Topical two times a day  docusate sodium 100 milliGRAM(s) Oral three times a day  enoxaparin Injectable 40 milliGRAM(s) SubCutaneous daily  furosemide    Tablet 40 milliGRAM(s) Oral daily  lactobacillus acidophilus 1 Tablet(s) Oral three times a day with meals  senna 2 Tablet(s) Oral at bedtime    MEDICATIONS  (PRN):  oxyCODONE    5 mG/acetaminophen 325 mG 1 Tablet(s) Oral every 6 hours PRN Severe Pain (7 - 10)  traMADol 25 milliGRAM(s) Oral every 6 hours PRN Mild Pain (1 - 3)  traMADol 50 milliGRAM(s) Oral every 4 hours PRN Moderate Pain (4 - 6)      Allergies    aspirin (Unknown)  atorvastatin (Unknown)  penicillin (Unknown)    Intolerances    REVIEW OF SYSTEMS:  CONSTITUTIONAL: No fever, No chills, No fatigue, No myalgia, No Body ache, No Weakness  EYES: No eye pain,  No visual disturbances, No discharge, No Redness  ENMT: No ear pain, No nose bleed, No vertigo; No sinus or throat pain, No Congestion  NECK: No pain, No stiffness  RESPIRATORY: No cough, No wheezing, No hemoptysis, No shortness of breath  CARDIOVASCULAR: No chest pain, palpitations  GASTROINTESTINAL: No abdominal or epigastric pain. No nausea, No vomiting, No diarrhea or constipation; [ 0 ] BM  GENITOURINARY: No dysuria, No frequency, No urgency, No hematuria or incontinence  NEUROLOGICAL: No headaches, No dizziness, No numbness, No tingling, No tremors, No weakness  EXT: + Swelling of LE, + LLE Pain  SKIN: [  ] No itching, burning, rashes, or lesions, patient with pain of LLE with cellulitis ++  Vital Signs Last 24 Hrs  T(C): 36.9 (15 Mar 2019 04:55), Max: 37.3 (14 Mar 2019 21:14)  T(F): 98.4 (15 Mar 2019 04:55), Max: 99.2 (14 Mar 2019 21:14)  HR: 83 (15 Mar 2019 04:55) (83 - 103)  BP: 107/65 (15 Mar 2019 04:55) (94/50 - 119/71)  BP(mean): --  RR: 17 (15 Mar 2019 04:55) (17 - 17)  SpO2: 93% (15 Mar 2019 04:55) (91% - 94%)      PHYSICAL EXAM:  GENERAL:  [X  ] NAD, [ X ] Well appearing, [  ] Agitated, [  ] Drowsy, [  ] Lethargy, [  ] Confused   HEAD:  [ X ] Normal, [  ] Other  EYES:  [ X ] EOMI, [X  ] PERRLA, [X  ] Conjunctiva and sclera clear normal, [  ] Other, [  ] Pallor, [  ] Discharge  ENMT:  [ X ] Normal, [ x ] Moist mucous membranes, [ x ] Good dentition, [x  ] No thrush  NECK:  [ X ] Supple, [ x ] No JVD, [ x ] Normal thyroid, [  ] Lymphadenopathy, [  ] Other  CHEST/LUNG:  [ X ] Clear to auscultation bilaterally, [  ] Breath Sounds equal B/L decreased, [ x ] No rales, [ x ] No rhonchi, [ x ] No wheezing  HEART:  [ X ] Regular rate and rhythm, [  ] Tachycardixa, [  ] Bradycardia, [  ] Irregular, [x  ] No murmurs, No rubs, No gallops, [  ] PPM in place (Mfr:  )  ABDOMEN:  [X  ] Soft, [ X ] Nontender, [ X ] Nondistended, [ X ] No mass, [X  ] Bowel sounds present, [x  ] Obese  NERVOUS SYSTEM:  [X  ] Alert & Oriented x3, [ x ] Nonfocal, [  ] Confusion, [  ] Encephalopathic, [  ] Sedated, [  ] Unable to assess, [  ] Other-  EXTREMITIES:  [x  ]Poor  Peripheral Pulses B/L Feet, No clubbing, No cyanosis,  [ x ] edema B/L lower EXT, [ X ] severe B/L   PVD stasis skin changes B/L lower EXT chronic LE changes, left foot venous stasis ulcer, wrapped by podiatry   LYMPH:  No lymphadenopathy noted  SKIN:  [  ] No rashes or lesions, [ X ] Pressure ulcers- B/L Buttock /sacral, Excoriation, Redness+, [x     DIET: DASH/TLC     LABS:                        9.9    5.95  )-----------( 201      ( 15 Mar 2019 08:08 )             31.4     15 Mar 2019 08:08    140    |  107    |  22     ----------------------------<  89     3.9     |  26     |  0.79     Ca    9.1        15 Mar 2019 08:08      PT/INR - ( 13 Mar 2019 19:56 )   PT: 13.1 sec;   INR: 1.15 ratio         PTT - ( 13 Mar 2019 19:56 )  PTT:34.6 sec  Urinalysis Basic - ( 14 Mar 2019 02:05 )    Color: Yellow / Appearance: Slightly Turbid / S.015 / pH: x  Gluc: x / Ketone: Negative  / Bili: Negative / Urobili: Negative   Blood: x / Protein: 75 mg/dL / Nitrite: Positive   Leuk Esterase: Moderate / RBC: 0-2 /HPF / WBC >50   Sq Epi: x / Non Sq Epi: Occasional / Bacteria: TNTC        Culture Results:   No growth to date. ( @ 01:31)  Culture Results:   No growth to date. ( @ 01:31)      culture blood  -- .Blood Blood  @ 01:31    culture urine  --   @ 01:31      Culture - Blood (collected 14 Mar 2019 01:31)  Source: .Blood Blood  Preliminary Report (15 Mar 2019 02:00):    No growth to date.    Culture - Blood (collected 14 Mar 2019 01:31)  Source: .Blood Blood  Preliminary Report (15 Mar 2019 02:00):    No growth to date.                       RADIOLOGY & ADDITIONAL TESTS: NONE     HEALTH ISSUES - PROBLEM Dx:  Need for prophylactic measure  HTN (hypertension)  Heart failure  Constipation  JULIA (acute kidney injury)  Sacral ulcer  Cellulitis of leg          Consultant(s) Notes Reviewed:  [  ] YES     Care Discussed with [ x ] Consultants, [X  ] Patient, [  ] Family, [  ] , [  ] Social Service, [  ] RN, [  ] Physical Therapy  DVT PPX: [  ] Lovenox, [  ] S C Heparin, [  ] Coumadin, [  ] Xarelto, [  ] Eliquis, [  ] Pradaxa, [  ] IV Heparin drip, [  ] SCD, [  ] Contraindication secondary to GI Bleed, [  ] Ambulation  Advanced Directive: [  ] None, [  ] DNR/DNI Patient is a 78y old  Female who presents with a chief complaint of chronic L foot wounds (15 Mar 2019 09:41)    INTERVAL HPI:  79 yo F PMH systolic and diastolic CHF w/ EF 30% with PPM, CAD, HTN, HLD, PVD presenting to ED after being sent in by Dr. Fletcher for cellulitis due to left foot wound. Patient was discharged from [facility], where she had been sent after discharge from [place] for LLE cellulitis. Patient was sent home from  on Saturday with Unna boot. Was seen by outside podiatrist today found to have weeping LLE wounds, sent to [facility] wound care center for evaluation. Patient complaining of severe pain in left foot, especially with weight bearing. Refusing to allow leg to be unwrapped and examined due to pain. Denies fever/chills, headache, dizziness, CP, SOB, abdominal pain, N/V/D. Was constipated while in  due to being on opioids since January, last BM was yesterday.  Patient was admitted to [place] in 2019 for LLE cellulitis concerning for osteomyelitis. Patient's pacemaker not MRI compatible, indium scan performed, negative for osteomyelitis. Wound cultures grew Proteus, Klebsiella, Corynebacterium and alpha hemolytic streptococcus. Patient was treated with Cefepime and vancomycin. Blood cultures negative. She was seen by vascular surgery at [place], but no intervention was performed during that admission. Patient unsure what kind of pacemaker she has, knows her pacemaker battery is [employer].   In ED VS T 98.3  /69 RR 16 O2 sat 99 on RA  12 lead EKG showed atrial sensed, ventricular paced rhythm at 96 bpm  Labs significant for BUN 48, creatinine 1.4 (baseline <1), GFR 36  CXR shows no active CP disease  Received azactam 1000 mg IV x1, vancomycin 1000 mg IV x1    3/14/19: Patient seen and examined at bedside. Patient still complains of leg pain with movement, states pain has improved with medication. Denies fevers, chills, numbness, tingling. Wound Care RN and Podiatry to see patient today. Podiatry - Dr Fletcher saw pt, Vascular DR uSn Ratliff called & case D/W at detail. ID Dr Mauricio changed to IV Rocephin daily    2/15/19: Patient seen and examined at bedside. Patient still complains of leg pain with movement, states pain has improved with medication. Denies fevers, chills, numbness, tingling. Wound Care RN and Podiatry to see patient today. Seen by Vascular consult, patient for BLE BELINDA/PVR for physiologic assessment. I IV Rocephin day 2. pt seen, by Vascular Sx -BELINDA ordered, Low H/H,  Anemia    OVERNIGHT EVENTS: NONE     Home Medications:  carvedilol 12.5 mg oral tablet: 1 tab(s) orally 2 times a day (13 Mar 2019 22:38)  furosemide 40 mg oral tablet: 1 tab(s) orally once a day (13 Mar 2019 22:38)  Plavix 75 mg oral tablet: 1 tab(s) orally once a day (13 Mar 2019 22:38)      MEDICATIONS  (STANDING):  carvedilol 12.5 milliGRAM(s) Oral every 12 hours  cefTRIAXone   IVPB 1 Gram(s) IV Intermittent every 24 hours  cefTRIAXone   IVPB      clopidogrel Tablet 75 milliGRAM(s) Oral daily  Dakins Solution - 1/2 Strength 1 Application(s) Topical two times a day  docusate sodium 100 milliGRAM(s) Oral three times a day  enoxaparin Injectable 40 milliGRAM(s) SubCutaneous daily  furosemide    Tablet 40 milliGRAM(s) Oral daily  lactobacillus acidophilus 1 Tablet(s) Oral three times a day with meals  senna 2 Tablet(s) Oral at bedtime    MEDICATIONS  (PRN):  oxyCODONE    5 mG/acetaminophen 325 mG 1 Tablet(s) Oral every 6 hours PRN Severe Pain (7 - 10)  traMADol 25 milliGRAM(s) Oral every 6 hours PRN Mild Pain (1 - 3)  traMADol 50 milliGRAM(s) Oral every 4 hours PRN Moderate Pain (4 - 6)      Allergies    aspirin (Unknown)  atorvastatin (Unknown)  penicillin (Unknown)    Intolerances    REVIEW OF SYSTEMS:  CONSTITUTIONAL: No fever, No chills, No fatigue, No myalgia, No Body ache, No Weakness  EYES: No eye pain,  No visual disturbances, No discharge, No Redness  ENMT: No ear pain, No nose bleed, No vertigo; No sinus or throat pain, No Congestion  NECK: No pain, No stiffness  RESPIRATORY: No cough, No wheezing, No hemoptysis, No shortness of breath  CARDIOVASCULAR: No chest pain, palpitations  GASTROINTESTINAL: No abdominal or epigastric pain. No nausea, No vomiting, No diarrhea or constipation; [ 0 ] BM  GENITOURINARY: No dysuria, No frequency, No urgency, No hematuria or incontinence  NEUROLOGICAL: No headaches, No dizziness, No numbness, No tingling, No tremors, No weakness  EXT: + Swelling of LE, + LLE Pain  SKIN: [  ] No itching, burning, rashes, or lesions, patient with pain of LLE with cellulitis ++  Vital Signs Last 24 Hrs  T(C): 36.9 (15 Mar 2019 04:55), Max: 37.3 (14 Mar 2019 21:14)  T(F): 98.4 (15 Mar 2019 04:55), Max: 99.2 (14 Mar 2019 21:14)  HR: 83 (15 Mar 2019 04:55) (83 - 103)  BP: 107/65 (15 Mar 2019 04:55) (94/50 - 119/71)  BP(mean): --  RR: 17 (15 Mar 2019 04:55) (17 - 17)  SpO2: 93% (15 Mar 2019 04:55) (91% - 94%)      PHYSICAL EXAM:  GENERAL:  [X  ] NAD, [ X ] Well appearing, [  ] Agitated, [  ] Drowsy, [  ] Lethargy, [  ] Confused   HEAD:  [ X ] Normal, [  ] Other  EYES:  [ X ] EOMI, [X  ] PERRLA, [X  ] Conjunctiva and sclera clear normal, [  ] Other, [  ] Pallor, [  ] Discharge  ENMT:  [ X ] Normal, [ x ] Moist mucous membranes, [ x ] Good dentition, [x  ] No thrush  NECK:  [ X ] Supple, [ x ] No JVD, [ x ] Normal thyroid, [  ] Lymphadenopathy, [  ] Other  CHEST/LUNG:  [ X ] Clear to auscultation bilaterally, [ x ] Breath Sounds equal B/L decreased, [ x ] No rales, [ x ] No rhonchi, [ x ] No wheezing  HEART:  [ X ] Regular rate and rhythm, [  ] Tachycardia [  ] Bradycardia, [  ] Irregular, [x  ] No murmurs, No rubs, No gallops, [  ] PPM in place (Mfr:  )  ABDOMEN:  [X  ] Soft, [ X ] Nontender, [ X ] Nondistended, [ X ] No mass, [X  ] Bowel sounds present, [x  ] Obese  NERVOUS SYSTEM:  [X  ] Alert & Oriented x3, [ x ] Nonfocal, [  ] Confusion, [  ] Encephalopathic, [  ] Sedated, [  ] Unable to assess, [  ] Other-  EXTREMITIES:  [x ]Poor  Peripheral Pulses B/L Feet, No clubbing, No cyanosis,  [ x ] edema B/L lower EXT, [ X ] severe B/L   PVD stasis skin changes B/L lower EXT chronic LE changes, left foot venous stasis ulcer open wound on left foot, wrapped by podiatry   LYMPH:  No lymphadenopathy noted  SKIN:  [  ] No rashes or lesions, [ X ] Pressure ulcers- Inner thigh , Excoriation, Redness+, [x ]- Severe PVD stasis skin changes    DIET: DASH/TLC     LABS:                        9.9    5.95  )-----------( 201      ( 15 Mar 2019 08:08 )             31.4     15 Mar 2019 08:08    140    |  107    |  22     ----------------------------<  89     3.9     |  26     |  0.79     Ca    9.1        15 Mar 2019 08:08      PT/INR - ( 13 Mar 2019 19:56 )   PT: 13.1 sec;   INR: 1.15 ratio         PTT - ( 13 Mar 2019 19:56 )  PTT:34.6 sec  Urinalysis Basic - ( 14 Mar 2019 02:05 )    Color: Yellow / Appearance: Slightly Turbid / S.015 / pH: x  Gluc: x / Ketone: Negative  / Bili: Negative / Urobili: Negative   Blood: x / Protein: 75 mg/dL / Nitrite: Positive   Leuk Esterase: Moderate / RBC: 0-2 /HPF / WBC >50   Sq Epi: x / Non Sq Epi: Occasional / Bacteria: TNTC        Culture Results:   No growth to date. ( @ 01:31)  Culture Results:   No growth to date. ( @ 01:31)      culture blood  -- .Blood Blood  @ 01:31    culture urine  --   @ 01:31      Culture - Blood (collected 14 Mar 2019 01:31)  Source: .Blood Blood  Preliminary Report (15 Mar 2019 02:00):    No growth to date.    Culture - Blood (collected 14 Mar 2019 01:31)  Source: .Blood Blood  Preliminary Report (15 Mar 2019 02:00):    No growth to date.         RADIOLOGY & ADDITIONAL TESTS: NONE     HEALTH ISSUES - PROBLEM Dx:  Need for prophylactic measure  HTN (hypertension)  Heart failure  Constipation  JULIA (acute kidney injury)  Sacral ulcer  Cellulitis of leg          Consultant(s) Notes Reviewed:  [ x ] YES     Care Discussed with [ x ] Consultants, [X  ] Patient, [  ] Family, [  ] , [  ] Social Service, [ x ] RN, [  ] Physical Therapy  DVT PPX: [  ] Lovenox, [  ] S C Heparin, [  ] Coumadin, [  ] Xarelto, [  ] Eliquis, [  ] Pradaxa, [  ] IV Heparin drip, [  ] SCD, [  ] Contraindication secondary to GI Bleed, [  ] Ambulation  Advanced Directive: [x  ] None, [  ] DNR/DNI

## 2019-03-15 NOTE — PROGRESS NOTE ADULT - ASSESSMENT
77 yo F PMH systolic and diastolic CHF w/ EF 30% with PPM, CAD, HTN, HLD, PVD presenting to ED after being sent in by Dr. Fletcher for cellulitis due to left foot wound.    Patient's pacemaker not MRI compatible, indium scan performed January 2019, negative for osteomyelitis.    1. Cellulitis  Difficult to assess progress if patient does not allow exam.  Continue antibiotics    2. PVD  Prior vascular w/u noted, BELINDA and vascular surgery follow up pending    Call if ID input needed over the weekend, Dr. Annette Anne is on call.    Benton Cabrera MD  [phone]

## 2019-03-15 NOTE — PROVIDER CONTACT NOTE (CHANGE IN STATUS NOTIFICATION) - ASSESSMENT
Patient is a 77yo F presents with blanchable erythema to the left and right buttocks and dry skin and friction and shear at groin left foot cared for by POD

## 2019-03-15 NOTE — PROGRESS NOTE ADULT - ASSESSMENT
79 yo F [facility] systolic and diastolic CHF w/ EF 30% with PPM, CAD, HTN, PVD admitted for cellulitis of LLE after being discharged from [facility]. Day 2 of Cefazolin. Pending PA Physiol Extremity Lower 3+, Level, BL     1. Cellulitis:  -Venous stasis ulcers worse on LLE   - Patient admitted to [place] for cellulitis of LLE, indium scan negative for osteomyelitis in January   -afebrile, no WBC this AM   -Continue with ceftriaxone 1gm  only (Day 2), YOGI Mauricio following   - Wound cleansed, dressed with dakins, adaptic, DSD on 3/14, Podiatry following, (Dr. Fletcher), f/u recs   - ID  (Dr. Mauricio) following, f/u recs   - prelim blood cultures negative   Vascular Eval Dr Garsia-Hicks following, patient is for PA Physiol Extremity Lower 3+, Level, BL, f/u recs   - Continue tramadol 25 mg Q6 PRN for mild pain, tramadol 50 mg Q4 PRN for moderate pain, percocet 5-25 mg Q6 PRN for severe pain  - continue Bacid  - PT evalulated- JOHN     2. Sacral ulcer  - patient recently discharged from  after spending over 1 month there, minimally ambulatory due to LE wounds  - wound care RN consulted, f/u recs   - frequent position changes, Q3    3. Constipation  - while in rehab, last BM 3-4 days ago as per patient   - percocet only opioid for severe pain  - continue colace (3x/day) and senna (2 tabs at bedtime)    5. HF, Stable Systolic/ Diastolic CHF  - with EF approximately 30 %, on carvedilol, Lasix 40 mg daily  - will continue carvedilol 12.5 mg BID  - continue Lasix 40 mg po QD  - patient taken off enalapril by PMD  - continue to monitor for signs of fluid overload  -patient's pacemaker is not MRI compatible, patient has [employer] pacemaker.     6. PVD  - continue Plavix    7. HTN  - continue carvedilol with hold parameters  - DASH diet    8. Need for prophylactic measure    - Improve score: 1   - Lovenox 40 daily for DVT PPX  - PT evaluated today: recommend JOHN   - out of bed with assistance  - fall precautions  - bedside commode   - frequent position changes for decubitus ulcer 79 yo F [place] systolic and diastolic CHF w/ EF 30% with PPM, CAD, HTN, PVD admitted for cellulitis of LLE after being discharged from [facility]. Day 2 of Cefazolin. Pending PA Physiol Extremity Lower 3+, Level, BL     1. Cellulitis:  -Venous stasis ulcers worse on LLE , severe PVD  - Patient admitted to [place] for cellulitis of LLE, indium scan negative for osteomyelitis in January   -afebrile, no WBC this AM   -Continue with ceftriaxone 1gm  only (Day 2), YOGI Mauricio following   - Wound cleansed, dressed with dakins, adaptic, DSD on 3/14, Podiatry following, (Dr. Fletcher), f/u recs   - ID  (Dr. Mauricio) following, f/u recs   - prelim blood cultures negative   Vascular Eval Dr Garsia-Kurt following, patient is for PA Physiol Extremity Lower 3+, Level, BL, f/u recs   - Continue tramadol 25 mg Q6 PRN for mild pain, tramadol 50 mg Q4 PRN for moderate pain, percocet 5-25 mg Q6 PRN for severe pain  - continue Bacid  - PT evalulated- JOHN     2. Skin Care- Excoriation   - patient recently discharged from  after spending over 1 month there, minimally ambulatory due to LE wounds  - wound care RN consulted, f/u recs   - frequent position changes, Q3, Nursing skin care    3. Constipation  - while in rehab, last BM 3-4 days ago as per patient   - percocet only opioid for severe pain  - continue colace (3x/day) and senna (2 tabs at bedtime)    5. HF, Stable Systolic/ Diastolic CHF  - with EF approximately 30 %, on carvedilol, Lasix 40 mg daily  - will continue carvedilol 12.5 mg BID  - continue Lasix 40 mg po QD  - patient taken off enalapril by PMD  - continue to monitor for signs of fluid overload  -patient's pacemaker is not MRI compatible, patient has [employer] pacemaker.     6. PVD  - continue Plavix    7. HTN  - continue carvedilol with hold parameters  - DASH diet    8.-Anemia - Chronic with Cellulitis, ACD likely   -CBC daily    9. Need for prophylactic measure    - Improve score: 1   - Lovenox 40 daily for DVT PPX  - PT evaluated today: recommend JOHN   - out of bed with assistance  - fall precautions  - bedside commode   - frequent position changes for decubitus ulcer

## 2019-03-16 LAB
-  AMIKACIN: SIGNIFICANT CHANGE UP
-  AMPICILLIN/SULBACTAM: SIGNIFICANT CHANGE UP
-  AMPICILLIN/SULBACTAM: SIGNIFICANT CHANGE UP
-  AMPICILLIN: SIGNIFICANT CHANGE UP
-  AMPICILLIN: SIGNIFICANT CHANGE UP
-  AZTREONAM: SIGNIFICANT CHANGE UP
-  CEFAZOLIN: SIGNIFICANT CHANGE UP
-  CEFAZOLIN: SIGNIFICANT CHANGE UP
-  CEFEPIME: SIGNIFICANT CHANGE UP
-  CEFOXITIN: SIGNIFICANT CHANGE UP
-  CEFOXITIN: SIGNIFICANT CHANGE UP
-  CEFTAZIDIME: SIGNIFICANT CHANGE UP
-  CEFTRIAXONE: SIGNIFICANT CHANGE UP
-  CEFTRIAXONE: SIGNIFICANT CHANGE UP
-  CIPROFLOXACIN: SIGNIFICANT CHANGE UP
-  ERTAPENEM: SIGNIFICANT CHANGE UP
-  ERTAPENEM: SIGNIFICANT CHANGE UP
-  GENTAMICIN: SIGNIFICANT CHANGE UP
-  IMIPENEM: SIGNIFICANT CHANGE UP
-  IMIPENEM: SIGNIFICANT CHANGE UP
-  LEVOFLOXACIN: SIGNIFICANT CHANGE UP
-  MEROPENEM: SIGNIFICANT CHANGE UP
-  NITROFURANTOIN: SIGNIFICANT CHANGE UP
-  PIPERACILLIN/TAZOBACTAM: SIGNIFICANT CHANGE UP
-  TIGECYCLINE: SIGNIFICANT CHANGE UP
-  TOBRAMYCIN: SIGNIFICANT CHANGE UP
-  TRIMETHOPRIM/SULFAMETHOXAZOLE: SIGNIFICANT CHANGE UP
-  TRIMETHOPRIM/SULFAMETHOXAZOLE: SIGNIFICANT CHANGE UP
ANION GAP SERPL CALC-SCNC: 8 MMOL/L — SIGNIFICANT CHANGE UP (ref 5–17)
BUN SERPL-MCNC: 17 MG/DL — SIGNIFICANT CHANGE UP (ref 7–23)
CALCIUM SERPL-MCNC: 8.8 MG/DL — SIGNIFICANT CHANGE UP (ref 8.5–10.1)
CHLORIDE SERPL-SCNC: 105 MMOL/L — SIGNIFICANT CHANGE UP (ref 96–108)
CO2 SERPL-SCNC: 27 MMOL/L — SIGNIFICANT CHANGE UP (ref 22–31)
CREAT SERPL-MCNC: 0.86 MG/DL — SIGNIFICANT CHANGE UP (ref 0.5–1.3)
CULTURE RESULTS: SIGNIFICANT CHANGE UP
CULTURE RESULTS: SIGNIFICANT CHANGE UP
GLUCOSE SERPL-MCNC: 136 MG/DL — HIGH (ref 70–99)
HCT VFR BLD CALC: 30.3 % — LOW (ref 34.5–45)
HGB BLD-MCNC: 9.8 G/DL — LOW (ref 11.5–15.5)
MCHC RBC-ENTMCNC: 29.7 PG — SIGNIFICANT CHANGE UP (ref 27–34)
MCHC RBC-ENTMCNC: 32.3 GM/DL — SIGNIFICANT CHANGE UP (ref 32–36)
MCV RBC AUTO: 91.8 FL — SIGNIFICANT CHANGE UP (ref 80–100)
METHOD TYPE: SIGNIFICANT CHANGE UP
NRBC # BLD: 0 /100 WBCS — SIGNIFICANT CHANGE UP (ref 0–0)
ORGANISM # SPEC MICROSCOPIC CNT: SIGNIFICANT CHANGE UP
PLATELET # BLD AUTO: 198 K/UL — SIGNIFICANT CHANGE UP (ref 150–400)
POTASSIUM SERPL-MCNC: 3.8 MMOL/L — SIGNIFICANT CHANGE UP (ref 3.5–5.3)
POTASSIUM SERPL-SCNC: 3.8 MMOL/L — SIGNIFICANT CHANGE UP (ref 3.5–5.3)
RBC # BLD: 3.3 M/UL — LOW (ref 3.8–5.2)
RBC # FLD: 14.4 % — SIGNIFICANT CHANGE UP (ref 10.3–14.5)
SODIUM SERPL-SCNC: 140 MMOL/L — SIGNIFICANT CHANGE UP (ref 135–145)
SPECIMEN SOURCE: SIGNIFICANT CHANGE UP
SPECIMEN SOURCE: SIGNIFICANT CHANGE UP
WBC # BLD: 4.59 K/UL — SIGNIFICANT CHANGE UP (ref 3.8–10.5)
WBC # FLD AUTO: 4.59 K/UL — SIGNIFICANT CHANGE UP (ref 3.8–10.5)

## 2019-03-16 RX ADMIN — Medication 100 MILLIGRAM(S): at 13:24

## 2019-03-16 RX ADMIN — OXYCODONE AND ACETAMINOPHEN 1 TABLET(S): 5; 325 TABLET ORAL at 18:19

## 2019-03-16 RX ADMIN — OXYCODONE AND ACETAMINOPHEN 1 TABLET(S): 5; 325 TABLET ORAL at 06:50

## 2019-03-16 RX ADMIN — CARVEDILOL PHOSPHATE 12.5 MILLIGRAM(S): 80 CAPSULE, EXTENDED RELEASE ORAL at 17:16

## 2019-03-16 RX ADMIN — NYSTATIN CREAM 1 APPLICATION(S): 100000 CREAM TOPICAL at 05:41

## 2019-03-16 RX ADMIN — CLOPIDOGREL BISULFATE 75 MILLIGRAM(S): 75 TABLET, FILM COATED ORAL at 11:02

## 2019-03-16 RX ADMIN — OXYCODONE AND ACETAMINOPHEN 1 TABLET(S): 5; 325 TABLET ORAL at 13:10

## 2019-03-16 RX ADMIN — Medication 40 MILLIGRAM(S): at 05:40

## 2019-03-16 RX ADMIN — Medication 100 MILLIGRAM(S): at 05:40

## 2019-03-16 RX ADMIN — OXYCODONE AND ACETAMINOPHEN 1 TABLET(S): 5; 325 TABLET ORAL at 12:13

## 2019-03-16 RX ADMIN — Medication 1 TABLET(S): at 17:16

## 2019-03-16 RX ADMIN — CARVEDILOL PHOSPHATE 12.5 MILLIGRAM(S): 80 CAPSULE, EXTENDED RELEASE ORAL at 05:40

## 2019-03-16 RX ADMIN — CEFTRIAXONE 100 GRAM(S): 500 INJECTION, POWDER, FOR SOLUTION INTRAMUSCULAR; INTRAVENOUS at 10:54

## 2019-03-16 RX ADMIN — ENOXAPARIN SODIUM 40 MILLIGRAM(S): 100 INJECTION SUBCUTANEOUS at 11:01

## 2019-03-16 RX ADMIN — NYSTATIN CREAM 1 APPLICATION(S): 100000 CREAM TOPICAL at 18:20

## 2019-03-16 RX ADMIN — Medication 1 APPLICATION(S): at 17:15

## 2019-03-16 RX ADMIN — Medication 1 TABLET(S): at 12:08

## 2019-03-16 RX ADMIN — OXYCODONE AND ACETAMINOPHEN 1 TABLET(S): 5; 325 TABLET ORAL at 05:47

## 2019-03-16 RX ADMIN — Medication 1 TABLET(S): at 08:40

## 2019-03-16 NOTE — PROGRESS NOTE ADULT - SUBJECTIVE AND OBJECTIVE BOX
[facility], Division of Infectious Diseases  RAIMUNDO Hunt A. Lee  [phone]    Name: LESLY DELCID  Age: 78y  Gender: Female  MRN: [id number]    Interval History--  Notes reviewed  left leg pain movement      Past Medical History--  Pacemaker  Essential hypertension  HLD (hyperlipidemia)  CHF (congestive heart failure)  S/P tonsillectomy  Artificial pacemaker      For details regarding the patient's social history, family history, and other miscellaneous elements, please refer the initial infectious diseases consultation and/or the admitting history and physical examination for this admission.    Allergies    aspirin (Unknown)  atorvastatin (Unknown)  penicillin (Unknown)    Intolerances        Medications--  Antibiotics:  cefTRIAXone   IVPB 1 Gram(s) IV Intermittent every 24 hours  cefTRIAXone   IVPB        Immunologic:    Other:  carvedilol  clopidogrel Tablet  Dakins Solution - 1/2 Strength  docusate sodium  enoxaparin Injectable  furosemide    Tablet  lactobacillus acidophilus  nystatin Cream  oxyCODONE    5 mG/acetaminophen 325 mG PRN  senna  traMADol PRN  traMADol PRN      Review of Systems--  A 10-point review of systems was obtained.     Pertinent positives and negatives--  Constitutional: No fevers. No Chills. No Rigors.   Cardiovascular: No chest pain. No palpitations.  Respiratory: No shortness of breath. No cough.  Gastrointestinal: No nausea or vomiting. No diarrhea or constipation.   Psychiatric: no depression    Review of systems otherwise negative except as previously noted.    Physical Examination--  Vital Signs: T(F): 98 (03-16-19 @ 04:48), Max: 99.1 (03-15-19 @ 21:26)  HR: 80 (03-16-19 @ 04:48)  BP: 121/77 (03-16-19 @ 04:48)  RR: 18 (03-16-19 @ 04:48)  SpO2: 96% (03-16-19 @ 04:48)  Wt(kg): --  General: Nontoxic-appearing Female in no acute distress.  HEENT: AT/NC.  Anicteric. Conjunctiva pink and moist. Oropharynx clear. Dentition fair.  Neck: Not rigid. No sense of mass.  Nodes: None palpable.  Lungs: Clear bilaterally without rales, wheezing or rhonchi  Heart: Regular rate and rhythm. No Murmur. No rub.  Abdomen: Bowel sounds present and normoactive. Soft. Nondistended. Nontender.   Extremities: No cyanosis or clubbing. lle dermatitis erythema  wrapped  Skin: Warm. Dry. Good turgor. No rash. No vasculitic stigmata.  Psychiatric: Appropriate affect and mood for situation.         Laboratory Studies--  CBC                        9.8    4.59  )-----------( 198      ( 16 Mar 2019 09:10 )             30.3       Chemistries  03-16    140  |  105  |  17  ----------------------------<  136<H>  3.8   |  27  |  0.86    Ca    8.8      16 Mar 2019 09:10        Culture Data    Culture - Other (collected 14 Mar 2019 21:43)  Source: .Other left foot wound  Preliminary Report (15 Mar 2019 18:02):    Few Gram Negative Rods    Culture - Urine (collected 14 Mar 2019 09:40)  Source: .Urine Clean Catch (Midstream)  Preliminary Report (15 Mar 2019 17:26):    >100,000 CFU/ml Gram Negative Rods    <10,000 CFU/ml Normal Urogenital chayo present    Culture - Blood (collected 14 Mar 2019 01:31)  Source: .Blood Blood  Preliminary Report (15 Mar 2019 02:00):    No growth to date.    Culture - Blood (collected 14 Mar 2019 01:31)  Source: .Blood Blood  Preliminary Report (15 Mar 2019 02:00):    No growth to date.          < from: VA Physiol Extremity Lower 3+ Level, BI (03.15.19 @ 14:15) >    EXAM:  PHYSIOL EXTREM LOW 3+ LEV BI                            PROCEDURE DATE:  03/15/2019          INTERPRETATION:  History:Chronic left foot nonhealing ulcers. Diminished   pulses. Prior smoker with hypertension. Bilateral lower extremity   claudication    Technique: Bilateral BELINDA/PVR    Comparison: None     Findings:     RIGHT  BELINDA: 1.29  Significant Pressure Drop between the Calf and Ankle.  Flow study: Pulsatile waveforms from the high thigh through the great toe    LEFT  BELINDA: 1.31 Significant Pressure Drop between the Calf and Ankle.    Flow study: Pulsatile waveforms from the high thigh through the foot      IMPRESSION:     FINDINGS ARE COMPATIBLE WITH CALCIFIED LOWER EXTREMITY ARTERIES.    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        < end of copied text >      < from: US Duplex Venous Lower Ext Complete, Bilateral (03.14.19 @ 00:05) >    EXAM:  US DPLX LWR EXT VEINS COMPL BI                            PROCEDURE DATE:  03/14/2019          INTERPRETATION:  [facility] RADIOLOGIST PRELIMINARY REPORT    EXAM:    US Duplex Bilateral Lower Extremity Veins     EXAM DATE/TIME:    3/13/2019 11:48 PM     CLINICAL HISTORY:    78 years old, female; Signs and symptoms; Swelling (edema) of limb and   other:   Cellulitis; Lower extremity, bilateral     TECHNIQUE:    Real-time duplex ultrasound of the Bilateral Lower Extremities with 2-D   gray   scale, color Doppler flow and spectral waveform analysis. Complete exam   focused   on the bilateral lower extremity veins.     COMPARISON:    No relevant prior studies available.     FINDINGS:    Right deep veins:  The common femoral, femoral, proximal profunda   femoral and   popliteal veins are patent without thrombus. Normal Doppler waveforms.   Normal   compressibility and/or augmentation response.  Posterior tibial vein   patent.   Right superficial veins: Saphenofemoral junction is patent without   thrombus.      Left deep veins:  The common femoral, femoral, proximal profunda femoral   and   popliteal veins are patent without thrombus. Normal Doppler waveforms.   Normal   compressibility and/or augmentation response.  Calf veins not visualized   well.   Left superficial veins: Saphenofemoral junction is patent without   thrombus.    Soft tissues: Unremarkable.     IMPRESSION:   No acute findings. No evidence of deep vein thrombosis bilateral lower   extremities.    Official report:    CLINICAL STATEMENT: Swelling leg.    TECHNIQUE: Ultrasound of bilateral lower extremity deep venous system.    COMPARISON: None.    FINDINGS:  There is color and spectral flow, compression and augmentation of the   common femoral, superficial femoraland popliteal veins.    There is flow in the posterior tibial vein.    IMPRESSION:  No evidence of DVT.       < end of copied text >

## 2019-03-16 NOTE — PROGRESS NOTE ADULT - SUBJECTIVE AND OBJECTIVE BOX
Patient is a 78y old  Female who presents with a chief complaint of cellulitis B/L Lower EXt (16 Mar 2019 11:35)      INTERVAL HPI:  77 yo F PMH systolic and diastolic CHF w/ EF 30% with PPM, CAD, HTN, HLD, PVD presenting to ED after being sent in by Dr. Fletcher for cellulitis due to left foot wound. Patient was discharged from [facility], where she had been sent after discharge from [place] for LLE cellulitis. Patient was sent home from  on Saturday with Unna boot. Was seen by outside podiatrist today found to have weeping LLE wounds, sent to [facility] wound care center for evaluation. Patient complaining of severe pain in left foot, especially with weight bearing. Refusing to allow leg to be unwrapped and examined due to pain. Denies fever/chills, headache, dizziness, CP, SOB, abdominal pain, N/V/D. Was constipated while in  due to being on opioids since January, last BM was yesterday.  Patient was admitted to [facility] in January 2019 for LLE cellulitis concerning for osteomyelitis. Patient's pacemaker not MRI compatible, indium scan performed, negative for osteomyelitis. Wound cultures grew Proteus, Klebsiella, Corynebacterium and alpha hemolytic streptococcus. Patient was treated with Cefepime and vancomycin. Blood cultures negative. She was seen by vascular surgery at [place], but no intervention was performed during that admission. Patient unsure what kind of pacemaker she has, knows her pacemaker battery is [employer].   In ED VS T 98.3  /69 RR 16 O2 sat 99 on RA  12 lead EKG showed atrial sensed, ventricular paced rhythm at 96 bpm  Labs significant for BUN 48, creatinine 1.4 (baseline <1), GFR 36  CXR shows no active CP disease  Received azactam 1000 mg IV x1, vancomycin 1000 mg IV x1    3/14/19: Patient seen and examined at bedside. Patient still complains of leg pain with movement, states pain has improved with medication. Denies fevers, chills, numbness, tingling. Wound Care RN and Podiatry to see patient today. Podiatry - Dr Fletcher saw pt, Vascular DR Sun Ratliff called & case D/W at detail. ID Dr Mauricio changed to IV Rocephin daily    3/15/19: Patient seen and examined at bedside. Patient still complains of leg pain with movement, states pain has improved with medication. Denies fevers, chills, numbness, tingling. Wound Care RN and Podiatry to see patient today. Seen by Vascular consult, patient for BLE BELINDA/PVR for physiologic assessment. I IV Rocephin day 2. pt seen, by Vascular Sx -BELINDA ordered, Low H/H,  Anemia.    3/16/19:Pt seen, examined C/O left foot pain with wound, on IV Abx , Low stable H/H.    OVERNIGHT EVENTS: NONE    Home Medications:  carvedilol 12.5 mg oral tablet: 1 tab(s) orally 2 times a day (15 Mar 2019 15:11)  furosemide 40 mg oral tablet: 1 tab(s) orally once a day (15 Mar 2019 15:13)  Plavix 75 mg oral tablet: 1 tab(s) orally once a day (15 Mar 2019 15:11)      MEDICATIONS  (STANDING):  carvedilol 12.5 milliGRAM(s) Oral every 12 hours  cefTRIAXone   IVPB 1 Gram(s) IV Intermittent every 24 hours  cefTRIAXone   IVPB      clopidogrel Tablet 75 milliGRAM(s) Oral daily  Dakins Solution - 1/2 Strength 1 Application(s) Topical two times a day  docusate sodium 100 milliGRAM(s) Oral three times a day  enoxaparin Injectable 40 milliGRAM(s) SubCutaneous daily  furosemide    Tablet 40 milliGRAM(s) Oral daily  lactobacillus acidophilus 1 Tablet(s) Oral three times a day with meals  nystatin Cream 1 Application(s) Topical two times a day  senna 2 Tablet(s) Oral at bedtime    MEDICATIONS  (PRN):  oxyCODONE    5 mG/acetaminophen 325 mG 1 Tablet(s) Oral every 6 hours PRN Severe Pain (7 - 10)  traMADol 25 milliGRAM(s) Oral every 6 hours PRN Mild Pain (1 - 3)  traMADol 50 milliGRAM(s) Oral every 4 hours PRN Moderate Pain (4 - 6)      Allergies    aspirin (Unknown)  atorvastatin (Unknown)  penicillin (Unknown)    Intolerances        REVIEW OF SYSTEMS:  CONSTITUTIONAL: No fever, No chills, No fatigue, No myalgia, No Body ache, No Weakness  EYES: No eye pain,  No visual disturbances, No discharge, NO Redness  ENMT:  No ear pain, No nose bleed, No vertigo; No sinus or throat pain, No Congestion  NECK: No pain, No stiffness  RESPIRATORY: No cough, wheezing, No  hemoptysis, No shortness of breath  CARDIOVASCULAR: No chest pain, palpitations  GASTROINTESTINAL: No abdominal or epigastric pain. No nausea, No vomiting; No diarrhea or constipation. [  ] BM  GENITOURINARY: No dysuria, No frequency, No urgency, No hematuria, or incontinence  NEUROLOGICAL: No headaches, No dizziness, No numbness, No tingling, No tremors, No weakness  EXT: No Swelling, No Pain, No Edema  SKIN:  [ x ] No itching, burning, rashes, or lesions ,++ Wound left foot  MUSCULOSKELETAL: No joint pain or swelling; No muscle pain, No back pain, No extremity pain  PSYCHIATRIC: No depression, anxiety, mood swings or difficulty sleeping at night  PAIN SCALE: [  ] None  [ x ] Other-Left foot pain 5/10  ROS Unable to obtain due to - [  ] Dementia  [  ] Lethargy  [  ] Sedated [  ] non verbal  REST OF REVIEW Of SYSTEM - [ x ] Normal     Vital Signs Last 24 Hrs  T(C): 36.7 (16 Mar 2019 04:48), Max: 37.3 (15 Mar 2019 21:26)  T(F): 98 (16 Mar 2019 04:48), Max: 99.1 (15 Mar 2019 21:26)  HR: 80 (16 Mar 2019 04:48) (80 - 91)  BP: 121/77 (16 Mar 2019 04:48) (100/58 - 121/77)  BP(mean): --  RR: 18 (16 Mar 2019 04:48) (17 - 18)  SpO2: 96% (16 Mar 2019 04:48) (95% - 96%)  Finger Stick      PHYSICAL EXAM:  GENERAL:  [ x ] NAD , [ x ] well appearing, [  ] Agitated, [  ] Drowsy,  [  ] Lethargy, [  ] confused   HEAD:  [ x ] Normal, [  ] Other  EYES:  [x  ] EOMI, [ x ] PERRLA, [  ] conjunctiva and sclera clear normal, [  ] Other,  [ x ] Pallor,[  ] Discharge  ENMT:  [ x ] Normal, [ x ] Moist mucous membranes, [ x ] Good dentition, [ x ] No Thrush  NECK:  [ x ] Supple, [ x ] No JVD, [ x ] Normal thyroid, [  ] Lymphadenopathy [  ] Other  CHEST/LUNG:  [x  ] Clear to auscultation bilaterally, [x  ] Breath Sounds equal B/L Decrease,  [ x ] No rales, [x  ] No rhonchi  [ x ]  No wheezing  HEART:  [ x ] Regular rate and rhythm, [  ] tachycardia, [  ] Bradycardia,  [  ] irregular  [ x ] No murmurs, No rubs, No gallops, [  ] PPM in place (Mfr:  )  ABDOMEN:  [x  ] Soft, [ x ] Nontender, [ x ] Nondistended, [ x ] No mass, [ x ] Bowel sounds present, [ x ] obese  NERVOUS SYSTEM:  [x  ] Alert & Oriented X3, [ x ] Nonfocal  [  ] Confusion  [  ] Encephalopathic [  ] Sedated [  ] Unable to assess, [  ] Other-  EXTREMITIES: [ x ] unable to feel Peripheral Pulses, No clubbing, No cyanosis,  [  ] edema B/L lower EXT. [ x ] severe PVD stasis skin changes B/L Lower EXT  LYMPH: No lymphadenopathy noted  SKIN:  [  ] No rashes or lesions, [ x ] Pressure Ulcers- thigh, [  ] ecchymosis, [  ] Skin Tears, [ x ] Other- scabs b/l lower EXT, wound on left foot with with dressing    DIET: Regular    LABS:                        9.8    4.59  )-----------( 198      ( 16 Mar 2019 09:10 )             30.3     16 Mar 2019 09:10    140    |  105    |  17     ----------------------------<  136    3.8     |  27     |  0.86     Ca    8.8        16 Mar 2019 09:10            Culture Results:   Few Gram Negative Rods (03-14 @ 21:43)  Culture Results:   >100,000 CFU/ml Gram Negative Rods  <10,000 CFU/ml Normal Urogenital chayo present (03-14 @ 09:40)  Culture Results:   No growth to date. (03-14 @ 01:31)  Culture Results:   No growth to date. (03-14 @ 01:31)      culture blood  -- .Other left foot wound 03-14 @ 21:43    culture urine  --  03-14 @ 21:43              Culture - Other (collected 14 Mar 2019 21:43)  Source: .Other left foot wound  Preliminary Report (15 Mar 2019 18:02):    Few Gram Negative Rods    Culture - Urine (collected 14 Mar 2019 09:40)  Source: .Urine Clean Catch (Midstream)  Preliminary Report (15 Mar 2019 17:26):    >100,000 CFU/ml Gram Negative Rods    <10,000 CFU/ml Normal Urogenital chayo present    Culture - Blood (collected 14 Mar 2019 01:31)  Source: .Blood Blood  Preliminary Report (15 Mar 2019 02:00):    No growth to date.    Culture - Blood (collected 14 Mar 2019 01:31)  Source: .Blood Blood  Preliminary Report (15 Mar 2019 02:00):    No growth to date.      RADIOLOGY & ADDITIONAL TESTS:   NONE    HEALTH ISSUES - PROBLEM Dx:  Need for prophylactic measure  HTN (hypertension)  Heart failure  Constipation  JULIA (acute kidney injury)  Sacral ulcer  Cellulitis of leg        Consultant(s) Notes Reviewed:  [ x ] YES     Care Discussed with [X] Consultants  [x  ] Patient  [  ] Family  [  ]   [  ] Social Service  [x ] RN, [  ] Physical Therapy  DVT PPX: [ x ] Lovenox, [  ] S C Heparin, [  ] Coumadin, [  ] Xarelto, [  ] Eliquis, [  ] Pradaxa, [  ] IV Heparin drip, [  ] SCD [  ] Contraindication 2 to GI Bleed,[  ] Ambulation  Advanced directive: [  ] None, [  ] DNR/DNI

## 2019-03-16 NOTE — PROGRESS NOTE ADULT - ASSESSMENT
77 yo F PMH systolic and diastolic CHF w/ EF 30% with PPM, CAD, HTN, HLD, PVD presenting to ED after being sent in by Dr. Fletcher for cellulitis due to left foot wound.    Patient's pacemaker not MRI compatible, indium scan performed January 2019, negative for osteomyelitis.    1. Cellulitis  Difficult to assess progress if patient does not allow exam.  Continue antibiotics  cont local care      2. PVD  Prior vascular w/u noted, BELINDA and vascular surgery follow up pending

## 2019-03-16 NOTE — PROGRESS NOTE ADULT - ASSESSMENT
77 yo F PM systolic and diastolic CHF w/ EF 30% with PPM, CAD, HTN, PVD admitted for cellulitis of LLE after being discharged from [facility]. Day 2 of Cefazolin. Pending PA Physiol Extremity Lower 3+, Level, BL     1. Cellulitis:  -Venous stasis ulcers worse on LLE , severe PVD with chronic venous stasis ulcer & skin changes  - Patient admitted to [facility] in Jan 2019 for cellulitis of LLE, indium scan negative for osteomyelitis in January   -afebrile, no WBC this AM   -Continue with ceftriaxone 1gm  only (Day 2),   - Wound cleansed, dressed with Dakins adaptic, DSD on 3/14, Podiatry following, (Dr. Fletcher), f/u recs   - ID  (Dr. Mauricio) following, f/u recs   - prelim blood cultures negative, Wound Culture to follow   Vascular Eval Dr Parsons following, patient is for PA Physiol Extremity Lower 3+, Level, BL, f/u recs   - Continue tramadol 25 mg Q6 PRN for mild pain, tramadol 50 mg Q4 PRN for moderate pain, percocet 5-25 mg Q6 PRN for severe pain  - continue Bacid  - PT evaluated JOHN   - Lac Hydrin lotion for Lower EXT for dry skin /scabs     2. Skin Care- Excoriation thigh  - patient recently discharged from  after spending over 1 month there, minimally ambulatory due to LE wounds  - wound care RN consulted, f/u recs   - frequent position changes, Q3, Nursing skin care, Nystatin cream to affected area    3. Constipation  - while in rehab, last BM 3-4 days ago as per patient   - percocet only opioid for severe pain  - continue colace (3x/day) and senna (2 tabs at bedtime)    5. HF, Stable Systolic/ Diastolic CHF-stable  - with EF approximately 30 %, on carvedilol, Lasix 40 mg daily  - will continue carvedilol 12.5 mg BID  - continue Lasix 40 mg po QD  - patient taken off enalapril by PMD  - continue to monitor for signs of fluid overload  -patient's pacemaker is not MRI compatible, patient has [employer] pacemaker.     6. PVD  - continue Plavix daily    7. HTN  - continue carvedilol & Lasix   - DASH diet    8.-Anemia - Chronic with Cellulitis, ACD likely   -CBC daily    9. Need for prophylactic measure    - Improve score: 1   - Lovenox 40 daily for DVT PPX  - PT evaluated today: recommend JOHN   - out of bed with assistance  - fall precautions  - bedside commode   - frequent position changes .

## 2019-03-17 LAB
ALBUMIN SERPL ELPH-MCNC: 2.3 G/DL — LOW (ref 3.3–5)
ALP SERPL-CCNC: 87 U/L — SIGNIFICANT CHANGE UP (ref 40–120)
ALT FLD-CCNC: 27 U/L — SIGNIFICANT CHANGE UP (ref 12–78)
ANION GAP SERPL CALC-SCNC: 7 MMOL/L — SIGNIFICANT CHANGE UP (ref 5–17)
AST SERPL-CCNC: 22 U/L — SIGNIFICANT CHANGE UP (ref 15–37)
BILIRUB SERPL-MCNC: 0.3 MG/DL — SIGNIFICANT CHANGE UP (ref 0.2–1.2)
BUN SERPL-MCNC: 15 MG/DL — SIGNIFICANT CHANGE UP (ref 7–23)
CALCIUM SERPL-MCNC: 8.7 MG/DL — SIGNIFICANT CHANGE UP (ref 8.5–10.1)
CHLORIDE SERPL-SCNC: 105 MMOL/L — SIGNIFICANT CHANGE UP (ref 96–108)
CO2 SERPL-SCNC: 29 MMOL/L — SIGNIFICANT CHANGE UP (ref 22–31)
CREAT SERPL-MCNC: 0.74 MG/DL — SIGNIFICANT CHANGE UP (ref 0.5–1.3)
GLUCOSE SERPL-MCNC: 86 MG/DL — SIGNIFICANT CHANGE UP (ref 70–99)
HCT VFR BLD CALC: 30.6 % — LOW (ref 34.5–45)
HGB BLD-MCNC: 9.7 G/DL — LOW (ref 11.5–15.5)
MCHC RBC-ENTMCNC: 29.4 PG — SIGNIFICANT CHANGE UP (ref 27–34)
MCHC RBC-ENTMCNC: 31.7 GM/DL — LOW (ref 32–36)
MCV RBC AUTO: 92.7 FL — SIGNIFICANT CHANGE UP (ref 80–100)
NRBC # BLD: 0 /100 WBCS — SIGNIFICANT CHANGE UP (ref 0–0)
PLATELET # BLD AUTO: 191 K/UL — SIGNIFICANT CHANGE UP (ref 150–400)
POTASSIUM SERPL-MCNC: 3.7 MMOL/L — SIGNIFICANT CHANGE UP (ref 3.5–5.3)
POTASSIUM SERPL-SCNC: 3.7 MMOL/L — SIGNIFICANT CHANGE UP (ref 3.5–5.3)
PROT SERPL-MCNC: 6.7 G/DL — SIGNIFICANT CHANGE UP (ref 6–8.3)
RBC # BLD: 3.3 M/UL — LOW (ref 3.8–5.2)
RBC # FLD: 14.3 % — SIGNIFICANT CHANGE UP (ref 10.3–14.5)
SODIUM SERPL-SCNC: 141 MMOL/L — SIGNIFICANT CHANGE UP (ref 135–145)
WBC # BLD: 4.95 K/UL — SIGNIFICANT CHANGE UP (ref 3.8–10.5)
WBC # FLD AUTO: 4.95 K/UL — SIGNIFICANT CHANGE UP (ref 3.8–10.5)

## 2019-03-17 RX ORDER — POLYETHYLENE GLYCOL 3350 17 G/17G
17 POWDER, FOR SOLUTION ORAL DAILY
Qty: 0 | Refills: 0 | Status: DISCONTINUED | OUTPATIENT
Start: 2019-03-17 | End: 2019-03-19

## 2019-03-17 RX ADMIN — NYSTATIN CREAM 1 APPLICATION(S): 100000 CREAM TOPICAL at 19:32

## 2019-03-17 RX ADMIN — OXYCODONE AND ACETAMINOPHEN 1 TABLET(S): 5; 325 TABLET ORAL at 21:16

## 2019-03-17 RX ADMIN — Medication 100 MILLIGRAM(S): at 00:27

## 2019-03-17 RX ADMIN — OXYCODONE AND ACETAMINOPHEN 1 TABLET(S): 5; 325 TABLET ORAL at 08:18

## 2019-03-17 RX ADMIN — Medication 1 TABLET(S): at 12:58

## 2019-03-17 RX ADMIN — OXYCODONE AND ACETAMINOPHEN 1 TABLET(S): 5; 325 TABLET ORAL at 22:33

## 2019-03-17 RX ADMIN — Medication 1 TABLET(S): at 18:36

## 2019-03-17 RX ADMIN — OXYCODONE AND ACETAMINOPHEN 1 TABLET(S): 5; 325 TABLET ORAL at 09:22

## 2019-03-17 RX ADMIN — ENOXAPARIN SODIUM 40 MILLIGRAM(S): 100 INJECTION SUBCUTANEOUS at 12:58

## 2019-03-17 RX ADMIN — Medication 1 APPLICATION(S): at 05:46

## 2019-03-17 RX ADMIN — OXYCODONE AND ACETAMINOPHEN 1 TABLET(S): 5; 325 TABLET ORAL at 15:03

## 2019-03-17 RX ADMIN — Medication 100 MILLIGRAM(S): at 21:16

## 2019-03-17 RX ADMIN — SENNA PLUS 2 TABLET(S): 8.6 TABLET ORAL at 00:27

## 2019-03-17 RX ADMIN — Medication 100 MILLIGRAM(S): at 05:45

## 2019-03-17 RX ADMIN — NYSTATIN CREAM 1 APPLICATION(S): 100000 CREAM TOPICAL at 05:46

## 2019-03-17 RX ADMIN — Medication 40 MILLIGRAM(S): at 05:45

## 2019-03-17 RX ADMIN — CEFTRIAXONE 100 GRAM(S): 500 INJECTION, POWDER, FOR SOLUTION INTRAMUSCULAR; INTRAVENOUS at 13:43

## 2019-03-17 RX ADMIN — Medication 1 TABLET(S): at 08:19

## 2019-03-17 RX ADMIN — OXYCODONE AND ACETAMINOPHEN 1 TABLET(S): 5; 325 TABLET ORAL at 00:27

## 2019-03-17 RX ADMIN — OXYCODONE AND ACETAMINOPHEN 1 TABLET(S): 5; 325 TABLET ORAL at 16:00

## 2019-03-17 RX ADMIN — Medication 100 MILLIGRAM(S): at 13:02

## 2019-03-17 RX ADMIN — OXYCODONE AND ACETAMINOPHEN 1 TABLET(S): 5; 325 TABLET ORAL at 01:43

## 2019-03-17 RX ADMIN — CARVEDILOL PHOSPHATE 12.5 MILLIGRAM(S): 80 CAPSULE, EXTENDED RELEASE ORAL at 05:46

## 2019-03-17 RX ADMIN — CLOPIDOGREL BISULFATE 75 MILLIGRAM(S): 75 TABLET, FILM COATED ORAL at 12:58

## 2019-03-17 NOTE — PROGRESS NOTE ADULT - SUBJECTIVE AND OBJECTIVE BOX
Patient is a 78y old  Female who presents with a chief complaint of cellulitis B/L Lower EXt (16 Mar 2019 12:40)      INTERVAL HPI:  79 yo F PMH systolic and diastolic CHF w/ EF 30% with PPM, CAD, HTN, HLD, PVD presenting to ED after being sent in by Dr. Fletcher for cellulitis due to left foot wound. Patient was discharged from [facility], where she had been sent after discharge from [place] for LLE cellulitis. Patient was sent home from  on Saturday with Unna boot. Was seen by outside podiatrist today found to have weeping LLE wounds, sent to [facility] wound care center for evaluation. Patient complaining of severe pain in left foot, especially with weight bearing. Refusing to allow leg to be unwrapped and examined due to pain. Denies fever/chills, headache, dizziness, CP, SOB, abdominal pain, N/V/D. Was constipated while in  due to being on opioids since January, last BM was yesterday.  Patient was admitted to [place] in January 2019 for LLE cellulitis concerning for osteomyelitis. Patient's pacemaker not MRI compatible, indium scan performed, negative for osteomyelitis. Wound cultures grew Proteus, Klebsiella, Corynebacterium and alpha hemolytic streptococcus. Patient was treated with Cefepime and vancomycin. Blood cultures negative. She was seen by vascular surgery at [place], but no intervention was performed during that admission. Patient unsure what kind of pacemaker she has, knows her pacemaker battery is [employer]   In ED VS T 98.3  /69 RR 16 O2 sat 99 on RA  12 lead EKG showed atrial sensed, ventricular paced rhythm at 96 bpm  Labs significant for BUN 48, creatinine 1.4 (baseline <1), GFR 36  CXR shows no active CP disease  Received Azactam 1000 mg IV x1, vancomycin 1000 mg IV x1    3/14/19: Patient seen and examined at bedside. Patient still complains of leg pain with movement, states pain has improved with medication. Denies fevers, chills, numbness, tingling. Wound Care RN and Podiatry to see patient today. Podiatry - Dr Fletcher saw pt, Vascular DR Sun Ratliff called & case D/W at detail. ID Dr Mauricio changed to IV Rocephin daily    3/15/19: Patient seen and examined at bedside. Patient still complains of leg pain with movement, states pain has improved with medication. Denies fevers, chills, numbness, tingling. Wound Care RN and Podiatry to see patient today. Seen by Vascular consult, patient for BLE BELINDA/PVR for physiologic assessment. I IV Rocephin day 2. pt seen, by Vascular Sx -BELINDA ordered, Low H/H,  Anemia.    3/16/19:Pt seen, examined C/O left foot pain with wound, on IV Abx , Low stable H/H.  3/17/19: Pt seen, examined, wants to be left alone , feels OK, On IV Abx, Anemia stable.    OVERNIGHT EVENTS: NONE    Home Medications:  carvedilol 12.5 mg oral tablet: 1 tab(s) orally 2 times a day (15 Mar 2019 15:11)  furosemide 40 mg oral tablet: 1 tab(s) orally once a day (15 Mar 2019 15:13)  Plavix 75 mg oral tablet: 1 tab(s) orally once a day (15 Mar 2019 15:11)      MEDICATIONS  (STANDING):  carvedilol 12.5 milliGRAM(s) Oral every 12 hours  cefTRIAXone   IVPB 1 Gram(s) IV Intermittent every 24 hours  cefTRIAXone   IVPB      clopidogrel Tablet 75 milliGRAM(s) Oral daily  Dakins Solution - 1/2 Strength 1 Application(s) Topical two times a day  docusate sodium 100 milliGRAM(s) Oral three times a day  enoxaparin Injectable 40 milliGRAM(s) SubCutaneous daily  furosemide    Tablet 40 milliGRAM(s) Oral daily  lactobacillus acidophilus 1 Tablet(s) Oral three times a day with meals  nystatin Cream 1 Application(s) Topical two times a day  polyethylene glycol 3350 17 Gram(s) Oral daily  senna 2 Tablet(s) Oral at bedtime    MEDICATIONS  (PRN):  oxyCODONE    5 mG/acetaminophen 325 mG 1 Tablet(s) Oral every 6 hours PRN Severe Pain (7 - 10)  traMADol 25 milliGRAM(s) Oral every 6 hours PRN Mild Pain (1 - 3)  traMADol 50 milliGRAM(s) Oral every 4 hours PRN Moderate Pain (4 - 6)      Allergies    aspirin (Unknown)  atorvastatin (Unknown)  penicillin (Unknown)    Intolerances        REVIEW OF SYSTEMS: Feels OK,   CONSTITUTIONAL: No fever, No chills, No fatigue, No myalgia, No Body ache, No Weakness  EYES: No eye pain,  No visual disturbances, No discharge, NO Redness  ENMT:  No ear pain, No nose bleed, No vertigo; No sinus or throat pain, No Congestion  NECK: No pain, No stiffness  RESPIRATORY: No cough, wheezing, No  hemoptysis, No shortness of breath  CARDIOVASCULAR: No chest pain, palpitations  GASTROINTESTINAL: No abdominal or epigastric pain. No nausea, No vomiting; No diarrhea + constipation. [  ] BM  GENITOURINARY: No dysuria, No frequency, No urgency, No hematuria, or incontinence  NEUROLOGICAL: No headaches, No dizziness, No numbness, No tingling, No tremors, No weakness  EXT: No Swelling, No Pain, No Edema  SKIN:  [ x ] No itching, burning, rashes, or lesions   MUSCULOSKELETAL: No joint pain or swelling; No muscle pain, No back pain, No extremity pain  PSYCHIATRIC: No depression, anxiety, mood swings or difficulty sleeping at night  PAIN SCALE: [  ] None  [ x ] Other-  ROS Unable to obtain due to - [  ] Dementia  [  ] Lethargy  [  ] Sedated [  ] non verbal  REST OF REVIEW Of SYSTEM - [ x ] Normal     Vital Signs Last 24 Hrs  T(C): 36.6 (17 Mar 2019 12:28), Max: 37.3 (16 Mar 2019 22:04)  T(F): 97.8 (17 Mar 2019 12:28), Max: 99.1 (16 Mar 2019 22:04)  HR: 72 (17 Mar 2019 12:28) (72 - 88)  BP: 108/66 (17 Mar 2019 12:28) (108/66 - 121/76)  BP(mean): --  RR: 18 (17 Mar 2019 12:28) (18 - 18)  SpO2: 95% (17 Mar 2019 12:28) (95% - 96%)  Finger Stick          PHYSICAL EXAM:  GENERAL:  [ x ] NAD , [ x ] well appearing, [  ] Agitated, [  ] Drowsy,  [  ] Lethargy, [  ] confused   HEAD:  [x  ] Normal, [  ] Other  EYES:  [ x ] EOMI, [ x ] PERRLA, [ x ] conjunctiva and sclera clear normal, [  ] Other,  [  ] Pallor,[  ] Discharge  ENMT:  [x  ] Normal, [ x ] Moist mucous membranes, [  ] Good dentition, [x  ] No Thrush  NECK:  [ x ] Supple, [x  ] No JVD, [ x ] Normal thyroid, [x  ] Lymphadenopathy [  ] Other  CHEST/LUNG:  [ x ] Clear to auscultation bilaterally, [x  ] Breath Sounds equal B/L,  [x  ] No rales, [ x ] No rhonchi  [ x ]  No wheezing  HEART:  [x  ] Regular rate and rhythm, [  ] tachycardia, [  ] Bradycardia,  [  ] irregular  [ x ] No murmurs, No rubs, No gallops, [  ] PPM in place (Mfr:  )  ABDOMEN:  [ x ] Soft, [ x ] Nontender, [x ] Nondistended, [ x ] No mass, [  x] Bowel sounds present, [ x ] obese  NERVOUS SYSTEM:  [ x ] Alert & Oriented X3, [x  ] Nonfocal  [  ] Confusion  [  ] Encephalopathic [  ] Sedated [  ] Unable to assess, [  ] Other-  EXTREMITIES: [x  ] very poor  Peripheral Pulses, difficult to feel, No clubbing, No cyanosis,  [  ] edema B/L lower EXT. [ x ] severe PVD stasis skin changes B/L Lower EXT, Left foot Dressing+  LYMPH: No lymphadenopathy noted  SKIN:  [  ] No rashes or lesions, [  ] Pressure Ulcers, [  ] ecchymosis, [  ] Skin Tears, [ x ] Other- Scabs on left leg    DIET: Regular    LABS:                        9.7    4.95  )-----------( 191      ( 17 Mar 2019 08:05 )             30.6     17 Mar 2019 08:05    141    |  105    |  15     ----------------------------<  86     3.7     |  29     |  0.74     Ca    8.7        17 Mar 2019 08:05    TPro  6.7    /  Alb  2.3    /  TBili  0.3    /  DBili  x      /  AST  22     /  ALT  27     /  AlkPhos  87     17 Mar 2019 08:05    Culture Results:   Few Pseudomonas aeruginosa  Rare Proteus mirabilis (03-14 @ 21:43)  Culture Results:   >100,000 CFU/ml Escherichia coli  <10,000 CFU/ml Normal Urogenital chayo present (03-14 @ 09:40)  Culture Results:   No growth to date. (03-14 @ 01:31)  Culture Results:   No growth to date. (03-14 @ 01:31)    Culture - Other (collected 14 Mar 2019 21:43)  Source: .Other left foot wound  Final Report (16 Mar 2019 20:19):    Few Pseudomonas aeruginosa    Rare Proteus mirabilis  Organism: Pseudomonas aeruginosa  Proteus mirabilis (16 Mar 2019 20:19)  Organism: Proteus mirabilis (16 Mar 2019 20:19)  Organism: Pseudomonas aeruginosa (16 Mar 2019 20:19)    Culture - Urine (collected 14 Mar 2019 09:40)  Source: .Urine Clean Catch (Midstream)  Final Report (16 Mar 2019 17:45):    >100,000 CFU/ml Escherichia coli    <10,000 CFU/ml Normal Urogenital chayo present  Organism: Escherichia coli (16 Mar 2019 17:45)  Organism: Escherichia coli (16 Mar 2019 17:45)    Culture - Blood (collected 14 Mar 2019 01:31)  Source: .Blood Blood  Preliminary Report (15 Mar 2019 02:00):    No growth to date.    Culture - Blood (collected 14 Mar 2019 01:31)  Source: .Blood Blood  Preliminary Report (15 Mar 2019 02:00):    No growth to date.       RADIOLOGY & ADDITIONAL TESTS:NONE      HEALTH ISSUES - PROBLEM Dx:  Need for prophylactic measure  HTN (hypertension)  Heart failure  Constipation  JULIA (acute kidney injury)  Sacral ulcer  Cellulitis of leg      Consultant(s) Notes Reviewed:  [ x ] YES     Care Discussed with [X] Consultants  [x  ] Patient  [  ] Family  [  ]   [  ] Social Service  [x  ] RN, [  ] Physical Therapy  DVT PPX: [ x ] Lovenox, [  ] S C Heparin, [  ] Coumadin, [  ] Xarelto, [  ] Eliquis, [  ] Pradaxa, [  ] IV Heparin drip, [  ] SCD [  ] Contraindication 2 to GI Bleed,[  ] Ambulation  Advanced directive: [ x ] None, [  ] DNR/DNI

## 2019-03-17 NOTE — PROGRESS NOTE ADULT - ASSESSMENT
77 yo F PM systolic and diastolic CHF w/ EF 30% with PPM, CAD, HTN, PVD admitted for cellulitis of LLE after being discharged from [facility]. Day 2 of Cefazolin. Pending PA Physiol Extremity Lower 3+, Level, BL     1. Cellulitis: Improving  -Venous stasis ulcers/ wounds  worse on LLE , severe PVD with chronic venous stasis ulcer & skin changes  - Patient admitted to [place] in Jan 2019 for cellulitis of LLE, indium scan negative for osteomyelitis in January   -afebrile, no WBC this AM   -Continue with ceftriaxone 1gm  only (Day 2),   - Wound cleansed, dressed with Dakins adaptic, DSD on 3/14, Podiatry following, (Dr. Fletcher), f/u recs   - ID  (Dr. Mauricio) following, f/u recs   - prelim blood cultures negative, Wound Culture to follow   Vascular Eval Dr Parsons following, patient is for PA Physiol Extremity Lower 3+, Level, BL, f/u recs   - Continue tramadol 25 mg Q6 PRN for mild pain, tramadol 50 mg Q4 PRN for moderate pain, percocet 5-25 mg Q6 PRN for severe pain  - continue Bacid  - PT evaluated JOHN   - Lac Hydrin lotion for Lower EXT for dry skin /scabs     2. Skin Care- Excoriation thigh  - patient recently discharged from  after spending over 1 month there, minimally ambulatory due to LE wounds  - wound care RN consulted, f/u recs   - frequent position changes, Q3, Nursing skin care, Nystatin cream to affected area    3. Constipation  - while in rehab, last BM 3-4 days ago as per patient   -Add Miralax daily,   - continue colace (3x/day) and senna (2 tabs at bedtime)    5. HF, Stable Systolic/ Diastolic CHF-stable  - with EF approximately 30 %, on carvedilol, Lasix 40 mg daily  - will continue carvedilol 12.5 mg BID  - continue Lasix 40 mg po QD  - patient taken off enalapril by PMD  - continue to monitor for signs of fluid overload  -patient's pacemaker is not MRI compatible, patient has [employer] pacemaker.     6. PVD  - continue Plavix daily    7. HTN  - continue carvedilol & Lasix   - DASH diet    8.-Anemia - Chronic with Cellulitis, ACD likely   -CBC daily    9. Need for prophylactic measure    - Improve score: 1   - Lovenox 40 daily for DVT PPX  - PT evaluated today: recommend JOHN   - out of bed with assistance  - fall precautions  - bedside commode   - frequent position changes .

## 2019-03-18 LAB
ALBUMIN SERPL ELPH-MCNC: 2.3 G/DL — LOW (ref 3.3–5)
ALP SERPL-CCNC: 89 U/L — SIGNIFICANT CHANGE UP (ref 40–120)
ALT FLD-CCNC: 29 U/L — SIGNIFICANT CHANGE UP (ref 12–78)
ANION GAP SERPL CALC-SCNC: 5 MMOL/L — SIGNIFICANT CHANGE UP (ref 5–17)
AST SERPL-CCNC: 16 U/L — SIGNIFICANT CHANGE UP (ref 15–37)
BILIRUB SERPL-MCNC: 0.2 MG/DL — SIGNIFICANT CHANGE UP (ref 0.2–1.2)
BUN SERPL-MCNC: 16 MG/DL — SIGNIFICANT CHANGE UP (ref 7–23)
CALCIUM SERPL-MCNC: 8.9 MG/DL — SIGNIFICANT CHANGE UP (ref 8.5–10.1)
CHLORIDE SERPL-SCNC: 104 MMOL/L — SIGNIFICANT CHANGE UP (ref 96–108)
CO2 SERPL-SCNC: 32 MMOL/L — HIGH (ref 22–31)
CREAT SERPL-MCNC: 0.83 MG/DL — SIGNIFICANT CHANGE UP (ref 0.5–1.3)
GLUCOSE SERPL-MCNC: 89 MG/DL — SIGNIFICANT CHANGE UP (ref 70–99)
HCT VFR BLD CALC: 32.3 % — LOW (ref 34.5–45)
HGB BLD-MCNC: 10.1 G/DL — LOW (ref 11.5–15.5)
MCHC RBC-ENTMCNC: 29.1 PG — SIGNIFICANT CHANGE UP (ref 27–34)
MCHC RBC-ENTMCNC: 31.3 GM/DL — LOW (ref 32–36)
MCV RBC AUTO: 93.1 FL — SIGNIFICANT CHANGE UP (ref 80–100)
NRBC # BLD: 0 /100 WBCS — SIGNIFICANT CHANGE UP (ref 0–0)
PLATELET # BLD AUTO: 203 K/UL — SIGNIFICANT CHANGE UP (ref 150–400)
POTASSIUM SERPL-MCNC: 4.3 MMOL/L — SIGNIFICANT CHANGE UP (ref 3.5–5.3)
POTASSIUM SERPL-SCNC: 4.3 MMOL/L — SIGNIFICANT CHANGE UP (ref 3.5–5.3)
PROT SERPL-MCNC: 6.8 G/DL — SIGNIFICANT CHANGE UP (ref 6–8.3)
RBC # BLD: 3.47 M/UL — LOW (ref 3.8–5.2)
RBC # FLD: 14.5 % — SIGNIFICANT CHANGE UP (ref 10.3–14.5)
SODIUM SERPL-SCNC: 141 MMOL/L — SIGNIFICANT CHANGE UP (ref 135–145)
WBC # BLD: 5.49 K/UL — SIGNIFICANT CHANGE UP (ref 3.8–10.5)
WBC # FLD AUTO: 5.49 K/UL — SIGNIFICANT CHANGE UP (ref 3.8–10.5)

## 2019-03-18 RX ORDER — POLYETHYLENE GLYCOL 3350 17 G/17G
17 POWDER, FOR SOLUTION ORAL
Qty: 0 | Refills: 0 | COMMUNITY
Start: 2019-03-18

## 2019-03-18 RX ORDER — DOCUSATE SODIUM 100 MG
1 CAPSULE ORAL
Qty: 0 | Refills: 0 | DISCHARGE
Start: 2019-03-18

## 2019-03-18 RX ORDER — SENNA PLUS 8.6 MG/1
2 TABLET ORAL
Qty: 0 | Refills: 0 | DISCHARGE
Start: 2019-03-18

## 2019-03-18 RX ORDER — SENNA PLUS 8.6 MG/1
2 TABLET ORAL
Qty: 0 | Refills: 0 | COMMUNITY
Start: 2019-03-18

## 2019-03-18 RX ORDER — DOCUSATE SODIUM 100 MG
1 CAPSULE ORAL
Qty: 0 | Refills: 0 | COMMUNITY
Start: 2019-03-18

## 2019-03-18 RX ADMIN — NYSTATIN CREAM 1 APPLICATION(S): 100000 CREAM TOPICAL at 05:31

## 2019-03-18 RX ADMIN — ENOXAPARIN SODIUM 40 MILLIGRAM(S): 100 INJECTION SUBCUTANEOUS at 11:22

## 2019-03-18 RX ADMIN — Medication 40 MILLIGRAM(S): at 05:31

## 2019-03-18 RX ADMIN — Medication 1 APPLICATION(S): at 05:30

## 2019-03-18 RX ADMIN — CEFTRIAXONE 100 GRAM(S): 500 INJECTION, POWDER, FOR SOLUTION INTRAMUSCULAR; INTRAVENOUS at 11:20

## 2019-03-18 RX ADMIN — Medication 100 MILLIGRAM(S): at 21:37

## 2019-03-18 RX ADMIN — SENNA PLUS 2 TABLET(S): 8.6 TABLET ORAL at 21:37

## 2019-03-18 RX ADMIN — POLYETHYLENE GLYCOL 3350 17 GRAM(S): 17 POWDER, FOR SOLUTION ORAL at 11:22

## 2019-03-18 RX ADMIN — CARVEDILOL PHOSPHATE 12.5 MILLIGRAM(S): 80 CAPSULE, EXTENDED RELEASE ORAL at 05:31

## 2019-03-18 RX ADMIN — OXYCODONE AND ACETAMINOPHEN 1 TABLET(S): 5; 325 TABLET ORAL at 06:16

## 2019-03-18 RX ADMIN — Medication 1 TABLET(S): at 08:17

## 2019-03-18 RX ADMIN — Medication 1 TABLET(S): at 17:11

## 2019-03-18 RX ADMIN — OXYCODONE AND ACETAMINOPHEN 1 TABLET(S): 5; 325 TABLET ORAL at 18:46

## 2019-03-18 RX ADMIN — CARVEDILOL PHOSPHATE 12.5 MILLIGRAM(S): 80 CAPSULE, EXTENDED RELEASE ORAL at 17:11

## 2019-03-18 RX ADMIN — Medication 1 TABLET(S): at 12:03

## 2019-03-18 RX ADMIN — Medication 100 MILLIGRAM(S): at 05:31

## 2019-03-18 RX ADMIN — NYSTATIN CREAM 1 APPLICATION(S): 100000 CREAM TOPICAL at 17:12

## 2019-03-18 RX ADMIN — OXYCODONE AND ACETAMINOPHEN 1 TABLET(S): 5; 325 TABLET ORAL at 12:03

## 2019-03-18 RX ADMIN — OXYCODONE AND ACETAMINOPHEN 1 TABLET(S): 5; 325 TABLET ORAL at 19:45

## 2019-03-18 RX ADMIN — OXYCODONE AND ACETAMINOPHEN 1 TABLET(S): 5; 325 TABLET ORAL at 13:00

## 2019-03-18 RX ADMIN — Medication 1 APPLICATION(S): at 17:12

## 2019-03-18 RX ADMIN — OXYCODONE AND ACETAMINOPHEN 1 TABLET(S): 5; 325 TABLET ORAL at 05:35

## 2019-03-18 RX ADMIN — CLOPIDOGREL BISULFATE 75 MILLIGRAM(S): 75 TABLET, FILM COATED ORAL at 11:22

## 2019-03-18 RX ADMIN — Medication 100 MILLIGRAM(S): at 13:26

## 2019-03-18 NOTE — PROGRESS NOTE ADULT - SUBJECTIVE AND OBJECTIVE BOX
77yo female seen bedside during AM podiatry rounds, states she is going to rehab. Does not want her dressing changed at this time.    Vital Signs Last 24 Hrs  T(C): 36.3 (18 Mar 2019 08:30), Max: 36.9 (17 Mar 2019 20:55)  T(F): 97.3 (18 Mar 2019 08:30), Max: 98.4 (17 Mar 2019 20:55)  HR: 73 (18 Mar 2019 08:30) (72 - 97)  BP: 102/60 (18 Mar 2019 08:30) (102/60 - 123/66)  BP(mean): --  RR: 16 (18 Mar 2019 08:30) (16 - 18)  SpO2: 93% (18 Mar 2019 08:30) (93% - 95%)    03-18    141  |  104  |  16  ----------------------------<  89  4.3   |  32<H>  |  0.83    Ca    8.9      18 Mar 2019 08:14    TPro  6.8  /  Alb  2.3<L>  /  TBili  0.2  /  DBili  x   /  AST  16  /  ALT  29  /  AlkPhos  89  03-18                          10.1   5.49  )-----------( 203      ( 18 Mar 2019 08:14 )             32.3     Objective from previous exam on 3/14/19  Vasc: DP and PT non-palpable bilaterally; cap refill time delayed to all digits; temp gradient warm to cool; no edema noted  Derm:  -dorsal left foot wound noted to the level of subcutaneous tissue measuring approximately 7cm x 4cm x 0.2cm covered with fibro-granular tissue (40:60), with mild serous drainage, pain on palpable; mild malodor, periwound skin intact  Neuro: epicritic sensation intact to all digits bilaterally  Ortho: pain on palpation to entire left foot, ankle and leg

## 2019-03-18 NOTE — PROGRESS NOTE ADULT - ASSESSMENT
77 yo F [facility] systolic and diastolic CHF w/ EF 30% with PPM, CAD, HTN, PVD admitted for cellulitis of LLE after being discharged from [facility]. Day 2 of Cefazolin. Pending PA Physiol Extremity Lower 3+, Level, BL     1. Cellulitis: Improving  -Venous stasis ulcers/ wounds  worse on LLE , severe PVD with chronic venous stasis ulcer & skin changes  - Patient admitted to [place] in Jan 2019 for cellulitis of LLE, indium scan negative for osteomyelitis in January   -afebrile, no WBC this AM   -Continue with ceftriaxone 1gm  only (Day 5),   - Wound cleansed, dressed with Dakins adaptic, DSD, Podiatry following, (Dr. Fletcher), f/u recs   - ID  (Dr. Mauricio) following, f/u recs   -  blood cultures NGTD, Wound Culture: few Pseudomonas aeruginosa, rare proteus mirabilis  -PA Physiol Extremity Lower 3+, Level, BL: calcified LE arteries, likley b/l tibial artery disease    -Vascular Eval Dr. Rose following, follow-up recs   - Continue tramadol 25 mg Q6 PRN for mild pain, tramadol 50 mg Q4 PRN for moderate pain, percocet 5-25 mg Q6 PRN for severe pain  - continue Bacid  - PT evaluated JOHN   - Lac Hydrin lotion for Lower EXT for dry skin /scabs     2. Skin Care- Excoriation thigh  - patient recently discharged from  after spending over 1 month there, minimally ambulatory due to LE wounds  - wound care RN consulted, f/u recs   - frequent position changes, Q3, Nursing skin care, Nystatin cream to affected area    3. Constipation  - while in rehab, last BM prior to admission    -Continue  Miralax daily,  colace (3x/day) and senna (2 tabs at bedtime)    5. HF, Stable Systolic/ Diastolic CHF-stable  - with EF approximately 30 %, on carvedilol, Lasix 40 mg daily  - will continue carvedilol 12.5 mg BID  - continue Lasix 40 mg po QD  - patient taken off enalapril by PMD  - continue to monitor for signs of fluid overload  -patient's pacemaker is not MRI compatible, patient has [employer] pacemaker.     6. PVD  - continue Plavix daily    7. HTN  - continue carvedilol & Lasix   - DASH diet    8.-Anemia - Chronic with Cellulitis, ACD likely   -CBC daily    9. Need for prophylactic measure    - Improve score: 1   - Lovenox 40 daily for DVT PPX  - PT evaluated today: recommend JOHN   - out of bed with assistance  - fall precautions  - bedside commode   - frequent position changes .

## 2019-03-18 NOTE — PROGRESS NOTE ADULT - SUBJECTIVE AND OBJECTIVE BOX
Patient is a 78y old  Female who presents with a chief complaint of cellulitis B/L Lower EXt (17 Mar 2019 15:03)      INTERVAL HPI:  77 yo F PMH systolic and diastolic CHF w/ EF 30% with PPM, CAD, HTN, HLD, PVD presenting to ED after being sent in by Dr. Fletcher for cellulitis due to left foot wound. Patient was discharged from [facility], where she had been sent after discharge from [place] for LLE cellulitis. Patient was sent home from  on Saturday with Unna boot. Was seen by outside podiatrist today found to have weeping LLE wounds, sent to [facility] wound care center for evaluation. Patient complaining of severe pain in left foot, especially with weight bearing. Refusing to allow leg to be unwrapped and examined due to pain. Denies fever/chills, headache, dizziness, CP, SOB, abdominal pain, N/V/D. Was constipated while in  due to being on opioids since January, last BM was yesterday.  Patient was admitted to [facility] in January 2019 for LLE cellulitis concerning for osteomyelitis. Patient's pacemaker not MRI compatible, indium scan performed, negative for osteomyelitis. Wound cultures grew Proteus, Klebsiella, Corynebacterium and alpha hemolytic streptococcus. Patient was treated with Cefepime and vancomycin. Blood cultures negative. She was seen by vascular surgery at [facility], but no intervention was performed during that admission. Patient unsure what kind of pacemaker she has, knows her pacemaker battery is [employer].   In ED VS T 98.3  /69 RR 16 O2 sat 99 on RA  12 lead EKG showed atrial sensed, ventricular paced rhythm at 96 bpm  Labs significant for BUN 48, creatinine 1.4 (baseline <1), GFR 36  CXR shows no active CP disease  Received Azactam 1000 mg IV x1, vancomycin 1000 mg IV x1    3/14/19: Patient seen and examined at bedside. Patient still complains of leg pain with movement, states pain has improved with medication. Denies fevers, chills, numbness, tingling. Wound Care RN and Podiatry to see patient today. Podiatry - Dr Fletcher saw pt, Vascular DR Sun Ratliff called & case D/W at detail. ID Dr Mauricio changed to IV Rocephin daily    3/15/19: Patient seen and examined at bedside. Patient still complains of leg pain with movement, states pain has improved with medication. Denies fevers, chills, numbness, tingling. Wound Care RN and Podiatry to see patient today. Seen by Vascular consult, patient for BLE BELINDA/PVR for physiologic assessment. I IV Rocephin day 2. pt seen, by Vascular Sx -BELINDA ordered, Low H/H,  Anemia.    3/16/19:Pt seen, examined C/O left foot pain with wound, on IV Abx , Low stable H/H.  3/17/19: Pt seen, examined, wants to be left alone , feels OK, On IV Abx, Anemia stable.  3/18/19: Patient seen and examined at bedside. Denies any current complaints. On Ceftriaxone (Day 5).  Anemia stable.  No BM since admission, on bowel regimen.    OVERNIGHT EVENTS: NONE    Home Medications:  carvedilol 12.5 mg oral tablet: 1 tab(s) orally 2 times a day (15 Mar 2019 15:11)  furosemide 40 mg oral tablet: 1 tab(s) orally once a day (15 Mar 2019 15:13)  Plavix 75 mg oral tablet: 1 tab(s) orally once a day (15 Mar 2019 15:11)      MEDICATIONS  (STANDING):  carvedilol 12.5 milliGRAM(s) Oral every 12 hours  cefTRIAXone   IVPB 1 Gram(s) IV Intermittent every 24 hours  cefTRIAXone   IVPB      clopidogrel Tablet 75 milliGRAM(s) Oral daily  Dakins Solution - 1/2 Strength 1 Application(s) Topical two times a day  docusate sodium 100 milliGRAM(s) Oral three times a day  enoxaparin Injectable 40 milliGRAM(s) SubCutaneous daily  furosemide    Tablet 40 milliGRAM(s) Oral daily  lactobacillus acidophilus 1 Tablet(s) Oral three times a day with meals  nystatin Cream 1 Application(s) Topical two times a day  polyethylene glycol 3350 17 Gram(s) Oral daily  senna 2 Tablet(s) Oral at bedtime    MEDICATIONS  (PRN):  oxyCODONE    5 mG/acetaminophen 325 mG 1 Tablet(s) Oral every 6 hours PRN Severe Pain (7 - 10)  traMADol 25 milliGRAM(s) Oral every 6 hours PRN Mild Pain (1 - 3)  traMADol 50 milliGRAM(s) Oral every 4 hours PRN Moderate Pain (4 - 6)      Allergies    aspirin (Unknown)  atorvastatin (Unknown)  penicillin (Unknown)    Intolerances        REVIEW OF SYSTEMS:  CONSTITUTIONAL: No fever, No chills, No fatigue, No myalgia, No Body ache, No Weakness  EYES: No eye pain,  No visual disturbances, No discharge, NO Redness  ENMT:  No ear pain, No nose bleed, No vertigo; No sinus or throat pain, No Congestion  NECK: No pain, No stiffness  RESPIRATORY: No cough, wheezing, No  hemoptysis, No shortness of breath  CARDIOVASCULAR: No chest pain, palpitations  GASTROINTESTINAL: No abdominal or epigastric pain. No nausea, No vomiting; No diarrhea + constipation. [  ] BM  GENITOURINARY: No dysuria, No frequency, No urgency, No hematuria, or incontinence  NEUROLOGICAL: No headaches, No dizziness, No numbness, No tingling, No tremors, No weakness  EXT: No Swelling, No Pain, No Edema  SKIN:  [ x ] No itching, burning, rashes, or lesions   MUSCULOSKELETAL: No joint pain or swelling; No muscle pain, No back pain, No extremity pain  PSYCHIATRIC: No depression, anxiety, mood swings or difficulty sleeping at night  PAIN SCALE: [  ] None  [ x ] Other-  ROS Unable to obtain due to - [  ] Dementia  [  ] Lethargy  [  ] Sedated [  ] non verbal  REST OF REVIEW Of SYSTEM - [ x ] Normal   Vital Signs Last 24 Hrs  T(C): 36.8 (18 Mar 2019 04:52), Max: 36.9 (17 Mar 2019 20:55)  T(F): 98.3 (18 Mar 2019 04:52), Max: 98.4 (17 Mar 2019 20:55)  HR: 83 (18 Mar 2019 04:52) (72 - 97)  BP: 123/66 (18 Mar 2019 04:52) (108/66 - 123/66)  BP(mean): --  RR: 17 (18 Mar 2019 04:52) (17 - 18)  SpO2: 94% (18 Mar 2019 04:52) (94% - 95%)    Finger Stick          PHYSICAL EXAM:  GENERAL:  [ x ] NAD , [ x ] well appearing, [  ] Agitated, [  ] Drowsy,  [  ] Lethargy, [  ] confused   HEAD:  [x  ] Normal, [  ] Other  EYES:  [ x ] EOMI, [ x ] PERRLA, [ x ] conjunctiva and sclera clear normal, [  ] Other,  [  ] Pallor,[  ] Discharge  ENMT:  [x  ] Normal, [ x ] Moist mucous membranes, [  ] Good dentition, [x  ] No Thrush  NECK:  [ x ] Supple, [x  ] No JVD, [ x ] Normal thyroid, [x  ] Lymphadenopathy [  ] Other  CHEST/LUNG:  [ x ] Clear to auscultation bilaterally, [x  ] Breath Sounds equal B/L,  [x  ] No rales, [ x ] No rhonchi  [ x ]  No wheezing  HEART:  [x  ] Regular rate and rhythm, [  ] tachycardia, [  ] Bradycardia,  [  ] irregular  [ x ] No murmurs, No rubs, No gallops, [  ] PPM in place (Mfr:  )  ABDOMEN:  [ x ] Soft, [ x ] Nontender, [x ] Nondistended, [ x ] No mass, [  x] Bowel sounds present, [ x ] obese  NERVOUS SYSTEM:  [ x ] Alert & Oriented X3, [x  ] Nonfocal  [  ] Confusion  [  ] Encephalopathic [  ] Sedated [  ] Unable to assess, [  ] Other-  EXTREMITIES: [x  ] very poor  Peripheral Pulses, difficult to feel, No clubbing, No cyanosis,  [  ] edema B/L lower EXT. [ x ] severe PVD stasis skin changes B/L Lower EXT, Left foot Dressing+  LYMPH: No lymphadenopathy noted  SKIN:  [  ] No rashes or lesions, [  ] Pressure Ulcers, [  ] ecchymosis, [  ] Skin Tears, [ x ] Other- Scabs on left leg      DIET:     LABS:      Ca    8.7        17 Mar 2019 08:05    Culture Results:   Few Pseudomonas aeruginosa  Rare Proteus mirabilis (03-14 @ 21:43)  Culture Results:   >100,000 CFU/ml Escherichia coli  <10,000 CFU/ml Normal Urogenital chayo present (03-14 @ 09:40)  Culture Results:   No growth to date. (03-14 @ 01:31)  Culture Results:   No growth to date. (03-14 @ 01:31)      Culture - Other (collected 14 Mar 2019 21:43)  Source: .Other left foot wound  Final Report (16 Mar 2019 20:19):    Few Pseudomonas aeruginosa    Rare Proteus mirabilis  Organism: Pseudomonas aeruginosa  Proteus mirabilis (16 Mar 2019 20:19)  Organism: Proteus mirabilis (16 Mar 2019 20:19)  Organism: Pseudomonas aeruginosa (16 Mar 2019 20:19)    Culture - Urine (collected 14 Mar 2019 09:40)  Source: .Urine Clean Catch (Midstream)  Final Report (16 Mar 2019 17:45):    >100,000 CFU/ml Escherichia coli    <10,000 CFU/ml Normal Urogenital chayo present  Organism: Escherichia coli (16 Mar 2019 17:45)  Organism: Escherichia coli (16 Mar 2019 17:45)    Culture - Blood (collected 14 Mar 2019 01:31)  Source: .Blood Blood  Preliminary Report (15 Mar 2019 02:00):    No growth to date.    Culture - Blood (collected 14 Mar 2019 01:31)  Source: .Blood Blood  Preliminary Report (15 Mar 2019 02:00):    No growth to date.    RADIOLOGY & ADDITIONAL TESTS: NONE     HEALTH ISSUES - PROBLEM Dx:  Need for prophylactic measure  HTN (hypertension)  Heart failure  Constipation  JULIA (acute kidney injury)  Sacral ulcer  Cellulitis of leg    Consultant(s) Notes Reviewed:  [X  ] YES     Care Discussed with [ x ] Consultants, [X  ] Patient, [  ] Family, [  ] , [  ] Social Service, [  ] RN, [  ] Physical Therapy  DVT PPX: [  ] Lovenox, [ X ] S C Heparin, [  ] Coumadin, [  ] Xarelto, [  ] Eliquis, [  ] Pradaxa, [  ] IV Heparin drip, [  ] SCD, [  ] Contraindication secondary to GI Bleed, [  ] Ambulation  Advanced Directive: [  ] None, [  ] DNR/DNI Patient is a 78y old  Female who presents with a chief complaint of cellulitis B/L Lower EXt (17 Mar 2019 15:03)      INTERVAL HPI:  79 yo F PMH systolic and diastolic CHF w/ EF 30% with PPM, CAD, HTN, HLD, PVD presenting to ED after being sent in by Dr. Fletcher for cellulitis due to left foot wound. Patient was discharged from [facility], where she had been sent after discharge from [place] for LLE cellulitis. Patient was sent home from  on Saturday with Unna boot. Was seen by outside podiatrist today found to have weeping LLE wounds, sent to [facility] wound care center for evaluation. Patient complaining of severe pain in left foot, especially with weight bearing. Refusing to allow leg to be unwrapped and examined due to pain. Denies fever/chills, headache, dizziness, CP, SOB, abdominal pain, N/V/D. Was constipated while in  due to being on opioids since January, last BM was yesterday.  Patient was admitted to [facility] in January 2019 for LLE cellulitis concerning for osteomyelitis. Patient's pacemaker not MRI compatible, indium scan performed, negative for osteomyelitis. Wound cultures grew Proteus, Klebsiella, Corynebacterium and alpha hemolytic streptococcus. Patient was treated with Cefepime and vancomycin. Blood cultures negative. She was seen by vascular surgery at [facility], but no intervention was performed during that admission. Patient unsure what kind of pacemaker she has, knows her pacemaker battery is [employer].   In ED VS T 98.3  /69 RR 16 O2 sat 99 on RA  12 lead EKG showed atrial sensed, ventricular paced rhythm at 96 bpm  Labs significant for BUN 48, creatinine 1.4 (baseline <1), GFR 36  CXR shows no active CP disease  Received Azactam 1000 mg IV x1, vancomycin 1000 mg IV x1    3/14/19: Patient seen and examined at bedside. Patient still complains of leg pain with movement, states pain has improved with medication. Denies fevers, chills, numbness, tingling. Wound Care RN and Podiatry to see patient today. Podiatry - Dr Fletcher saw pt, Vascular DR Sun Ratliff called & case D/W at detail. ID Dr Mauricio changed to IV Rocephin daily    3/15/19: Patient seen and examined at bedside. Patient still complains of leg pain with movement, states pain has improved with medication. Denies fevers, chills, numbness, tingling. Wound Care RN and Podiatry to see patient today. Seen by Vascular consult, patient for BLE BELINDA/PVR for physiologic assessment. I IV Rocephin day 2. pt seen, by Vascular Sx -BELINDA ordered, Low H/H,  Anemia.    3/16/19:Pt seen, examined C/O left foot pain with wound, on IV Abx , Low stable H/H.  3/17/19: Pt seen, examined, wants to be left alone , feels OK, On IV Abx, Anemia stable.  3/18/19: Patient seen and examined at bedside. Denies any current complaints. On Ceftriaxone (Day 5).  Anemia stable.  No BM since admission, on bowel regimen.    OVERNIGHT EVENTS: NONE    Home Medications:  carvedilol 12.5 mg oral tablet: 1 tab(s) orally 2 times a day (15 Mar 2019 15:11)  furosemide 40 mg oral tablet: 1 tab(s) orally once a day (15 Mar 2019 15:13)  Plavix 75 mg oral tablet: 1 tab(s) orally once a day (15 Mar 2019 15:11)      MEDICATIONS  (STANDING):  carvedilol 12.5 milliGRAM(s) Oral every 12 hours  cefTRIAXone   IVPB 1 Gram(s) IV Intermittent every 24 hours  cefTRIAXone   IVPB      clopidogrel Tablet 75 milliGRAM(s) Oral daily  Dakins Solution - 1/2 Strength 1 Application(s) Topical two times a day  docusate sodium 100 milliGRAM(s) Oral three times a day  enoxaparin Injectable 40 milliGRAM(s) SubCutaneous daily  furosemide    Tablet 40 milliGRAM(s) Oral daily  lactobacillus acidophilus 1 Tablet(s) Oral three times a day with meals  nystatin Cream 1 Application(s) Topical two times a day  polyethylene glycol 3350 17 Gram(s) Oral daily  senna 2 Tablet(s) Oral at bedtime    MEDICATIONS  (PRN):  oxyCODONE    5 mG/acetaminophen 325 mG 1 Tablet(s) Oral every 6 hours PRN Severe Pain (7 - 10)  traMADol 25 milliGRAM(s) Oral every 6 hours PRN Mild Pain (1 - 3)  traMADol 50 milliGRAM(s) Oral every 4 hours PRN Moderate Pain (4 - 6)      Allergies    aspirin (Unknown)  atorvastatin (Unknown)  penicillin (Unknown)    Intolerances        REVIEW OF SYSTEMS:  CONSTITUTIONAL: No fever, No chills, No fatigue, No myalgia, No Body ache, No Weakness  EYES: No eye pain,  No visual disturbances, No discharge, NO Redness  ENMT:  No ear pain, No nose bleed, No vertigo; No sinus or throat pain, No Congestion  NECK: No pain, No stiffness  RESPIRATORY: No cough, wheezing, No  hemoptysis, No shortness of breath  CARDIOVASCULAR: No chest pain, palpitations  GASTROINTESTINAL: No abdominal or epigastric pain. No nausea, No vomiting; No diarrhea + constipation. [  ] BM  GENITOURINARY: No dysuria, No frequency, No urgency, No hematuria, or incontinence  NEUROLOGICAL: No headaches, No dizziness, No numbness, No tingling, No tremors, No weakness  EXT: No Swelling, No Pain, No Edema  SKIN:  [ x ] No itching, burning, rashes, or lesions   MUSCULOSKELETAL: No joint pain or swelling; No muscle pain, No back pain, No extremity pain  PSYCHIATRIC: No depression, anxiety, mood swings or difficulty sleeping at night  PAIN SCALE: [  ] None  [ x ] Other-left foot/ lower EXT  ROS Unable to obtain due to - [  ] Dementia  [  ] Lethargy  [  ] Sedated [  ] non verbal  REST OF REVIEW Of SYSTEM - [ x ] Normal   Vital Signs Last 24 Hrs  T(C): 36.8 (18 Mar 2019 04:52), Max: 36.9 (17 Mar 2019 20:55)  T(F): 98.3 (18 Mar 2019 04:52), Max: 98.4 (17 Mar 2019 20:55)  HR: 83 (18 Mar 2019 04:52) (72 - 97)  BP: 123/66 (18 Mar 2019 04:52) (108/66 - 123/66)  BP(mean): --  RR: 17 (18 Mar 2019 04:52) (17 - 18)  SpO2: 94% (18 Mar 2019 04:52) (94% - 95%)    Finger Stick          PHYSICAL EXAM:  GENERAL:  [ x ] NAD , [ x ] well appearing, [  ] Agitated, [  ] Drowsy,  [  ] Lethargy, [  ] confused   HEAD:  [x  ] Normal, [  ] Other  EYES:  [ x ] EOMI, [ x ] PERRLA, [ x ] conjunctiva and sclera clear normal, [  ] Other,  [  ] Pallor,[  ] Discharge  ENMT:  [x  ] Normal, [ x ] Moist mucous membranes, [  ] Good dentition, [x  ] No Thrush  NECK:  [ x ] Supple, [x  ] No JVD, [ x ] Normal thyroid, [x  ] Lymphadenopathy [  ] Other  CHEST/LUNG:  [ x ] Clear to auscultation bilaterally, [x  ] Breath Sounds equal B/L,  [x  ] No rales, [ x ] No rhonchi  [ x ]  No wheezing  HEART:  [x  ] Regular rate and rhythm, [  ] tachycardia, [  ] Bradycardia,  [  ] irregular  [ x ] No murmurs, No rubs, No gallops, [  ] PPM in place (Mfr:  )  ABDOMEN:  [ x ] Soft, [ x ] Nontender, [x ] Nondistended, [ x ] No mass, [  x] Bowel sounds present, [ x ] obese  NERVOUS SYSTEM:  [ x ] Alert & Oriented X3, [x  ] Nonfocal  [  ] Confusion  [  ] Encephalopathic [  ] Sedated [  ] Unable to assess, [  ] Other-  EXTREMITIES: [x  ] very poor  Peripheral Pulses, difficult to feel, No clubbing, No cyanosis,  [  ] edema B/L lower EXT. [ x ] severe PVD stasis skin changes B/L Lower EXT, Left foot Dressing+ superficial ulcers   LYMPH: No lymphadenopathy noted  SKIN:  [  ] No rashes or lesions, [  ] Pressure Ulcers, [  ] ecchymosis, [  ] Skin Tears, [ x ] Other- Scabs on left leg      DIET: Regular    LABS:      Ca    8.7        17 Mar 2019 08:05    Culture Results:   Few Pseudomonas aeruginosa  Rare Proteus mirabilis (03-14 @ 21:43)  Culture Results:   >100,000 CFU/ml Escherichia coli  <10,000 CFU/ml Normal Urogenital chayo present (03-14 @ 09:40)  Culture Results:   No growth to date. (03-14 @ 01:31)  Culture Results:   No growth to date. (03-14 @ 01:31)      Culture - Other (collected 14 Mar 2019 21:43)  Source: .Other left foot wound  Final Report (16 Mar 2019 20:19):    Few Pseudomonas aeruginosa    Rare Proteus mirabilis  Organism: Pseudomonas aeruginosa  Proteus mirabilis (16 Mar 2019 20:19)  Organism: Proteus mirabilis (16 Mar 2019 20:19)  Organism: Pseudomonas aeruginosa (16 Mar 2019 20:19)    Culture - Urine (collected 14 Mar 2019 09:40)  Source: .Urine Clean Catch (Midstream)  Final Report (16 Mar 2019 17:45):    >100,000 CFU/ml Escherichia coli    <10,000 CFU/ml Normal Urogenital chayo present  Organism: Escherichia coli (16 Mar 2019 17:45)  Organism: Escherichia coli (16 Mar 2019 17:45)    Culture - Blood (collected 14 Mar 2019 01:31)  Source: .Blood Blood  Preliminary Report (15 Mar 2019 02:00):    No growth to date.    Culture - Blood (collected 14 Mar 2019 01:31)  Source: .Blood Blood  Preliminary Report (15 Mar 2019 02:00):    No growth to date.    RADIOLOGY & ADDITIONAL TESTS: NONE     HEALTH ISSUES - PROBLEM Dx:  Need for prophylactic measure  HTN (hypertension)  Heart failure  Constipation  JULIA (acute kidney injury)  Sacral ulcer  Cellulitis of leg    Consultant(s) Notes Reviewed:  [X  ] YES     Care Discussed with [ x ] Consultants, [X  ] Patient, [  ] Family, [  ] , [  ] Social Service, [  ] RN, [  ] Physical Therapy  DVT PPX: [  ] Lovenox, [ X ] S C Heparin, [  ] Coumadin, [  ] Xarelto, [  ] Eliquis, [  ] Pradaxa, [  ] IV Heparin drip, [  ] SCD, [  ] Contraindication secondary to GI Bleed, [  ] Ambulation  Advanced Directive: [  ] None, [  ] DNR/DNI

## 2019-03-18 NOTE — PROGRESS NOTE ADULT - SUBJECTIVE AND OBJECTIVE BOX
BELINDA/PVR reviewed and with normal triphasic waveforms at all levels bilaterally  BELINDA within normal range  c/w absence of any hemodynamically significant arterial insufficiency    Plan:  defer local wound care to podiatry  signing off  f/u as needed

## 2019-03-18 NOTE — PROGRESS NOTE ADULT - ASSESSMENT
77 yo F PMH systolic and diastolic CHF w/ EF 30% with PPM, CAD, HTN, HLD, PVD presenting to ED after being sent in by Dr. Fletcher for cellulitis due to left foot wound.  Patient's pacemaker not MRI compatible, indium scan performed January 2019, negative for osteomyelitis.    1. Cellulitis  Again difficult to assess progress when she does not want her foot examined.  Surface cx isolates of limited significance in this setting.   Ceftriaxone lacks activity vs. Pseudomonas       2. PVD  Prior vascular w/u noted  Awaiting vascular attending follow up    Benton Cabrera MD  [phone]

## 2019-03-18 NOTE — PROGRESS NOTE ADULT - SUBJECTIVE AND OBJECTIVE BOX
HOSPITAL DAY #: 6    SUBJECTIVE:  Patient seen and examined at bedside.  No overnight events.  Patient with no new complaints at this time, reports left foot pain only with movement or to palpation.  Patient is refusing physical exam at this time.  Patient denies any fevers, chills, chest pain, shortness of breath, abdominal pain, nausea, vomiting or diarrhea.    Vital Signs Last 24 Hrs  T(C): 36.3 (18 Mar 2019 08:30), Max: 36.9 (17 Mar 2019 20:55)  T(F): 97.3 (18 Mar 2019 08:30), Max: 98.4 (17 Mar 2019 20:55)  HR: 73 (18 Mar 2019 08:30) (73 - 97)  BP: 102/60 (18 Mar 2019 08:30) (102/60 - 123/66)  BP(mean): --  RR: 16 (18 Mar 2019 08:30) (16 - 18)  SpO2: 93% (18 Mar 2019 08:30) (93% - 94%)    PHYSICAL EXAM  GENERAL:  Well-nourished, well-developed female sitting comfortably in bed in NAD  HEAD:  Normocephalic, atraumatic  EXTREMITIES: Patient refused  NEURO:  A&O x 3    MEDICATIONS  (STANDING):  carvedilol 12.5 milliGRAM(s) Oral every 12 hours  cefTRIAXone   IVPB 1 Gram(s) IV Intermittent every 24 hours  cefTRIAXone   IVPB      clopidogrel Tablet 75 milliGRAM(s) Oral daily  Dakins Solution - 1/2 Strength 1 Application(s) Topical two times a day  docusate sodium 100 milliGRAM(s) Oral three times a day  enoxaparin Injectable 40 milliGRAM(s) SubCutaneous daily  furosemide    Tablet 40 milliGRAM(s) Oral daily  lactobacillus acidophilus 1 Tablet(s) Oral three times a day with meals  nystatin Cream 1 Application(s) Topical two times a day  polyethylene glycol 3350 17 Gram(s) Oral daily  senna 2 Tablet(s) Oral at bedtime    MEDICATIONS  (PRN):  oxyCODONE    5 mG/acetaminophen 325 mG 1 Tablet(s) Oral every 6 hours PRN Severe Pain (7 - 10)  traMADol 25 milliGRAM(s) Oral every 6 hours PRN Mild Pain (1 - 3)  traMADol 50 milliGRAM(s) Oral every 4 hours PRN Moderate Pain (4 - 6)      LABS:                        10.1   5.49  )-----------( 203      ( 18 Mar 2019 08:14 )             32.3     03-18    141  |  104  |  16  ----------------------------<  89  4.3   |  32<H>  |  0.83    Ca    8.9      18 Mar 2019 08:14    TPro  6.8  /  Alb  2.3<L>  /  TBili  0.2  /  DBili  x   /  AST  16  /  ALT  29  /  AlkPhos  89  03-18    RADIOLOGY & ADDITIONAL STUDIES:  < from: [facility] Physiol Extremity Lower 3+ Level, BI (03.15.19 @ 14:15) >  RIGHT  BELINDA: 1.29  Significant Pressure Drop between the Calf and Ankle.  Flow study: Pulsatile waveforms from the high thigh through the great toe    LEFT  BELINDA: 1.31 Significant Pressure Drop between the Calf and Ankle.    Flow study: Pulsatile waveforms from the high thigh through the foot    IMPRESSION:     FINDINGS ARE COMPATIBLE WITH CALCIFIED LOWER EXTREMITY ARTERIES.    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    < end of copied text >      ASSESSMENT & PLAN  78 year old female with PMH of HTN, heart failure, venous insufficiency, admitted for cellulitis of bilateral lower extremities and chronic nonhealing LLE ulcers.  Arterial study with right BELINDA 1.29, left BELINDA 1.31, bilateral LE arterial disease.  Patient afebrile, no white count.  Wound culture with pneudomonas/proteus noted.    - Continue antibiotics, as per ID  - Dressing changes  - Podiatry note appreciated  - DVT prophylaxis with lovenox  - OOB, pain control  - Case to be discussed with Dr. Rose

## 2019-03-18 NOTE — PROGRESS NOTE ADULT - SUBJECTIVE AND OBJECTIVE BOX
[facility], Division of Infectious Diseases  RAIMUNDO Hunt A. Lee  [phone]    Name: LESLY DELCID  Age: 78y  Gender: Female  MRN: [id number]    Interval History--  Notes reviewed. No new issues. Intermittent pain in foot. No fevers, chills, or rigors. States has not had a BM since admission. Does not want laxative.  Vas PA note reviewed, awaiting attending input.    Past Medical History--  Pacemaker  Essential hypertension  HLD (hyperlipidemia)  CHF (congestive heart failure)  S/P tonsillectomy  Artificial pacemaker      For details regarding the patient's social history, family history, and other miscellaneous elements, please refer the initial infectious diseases consultation and/or the admitting history and physical examination for this admission.    Allergies    aspirin (Unknown)  atorvastatin (Unknown)  penicillin (Unknown)    Intolerances        Medications--  Antibiotics:  cefTRIAXone   IVPB 1 Gram(s) IV Intermittent every 24 hours  cefTRIAXone   IVPB        Immunologic:    Other:  carvedilol  clopidogrel Tablet  Dakins Solution - 1/2 Strength  docusate sodium  enoxaparin Injectable  furosemide    Tablet  lactobacillus acidophilus  nystatin Cream  oxyCODONE    5 mG/acetaminophen 325 mG PRN  polyethylene glycol 3350  senna  traMADol PRN  traMADol PRN      Review of Systems--  A 10-point review of systems was obtained.   Review of systems otherwise unchanged compared to prior visit except as previously noted.    Physical Examination--  Vital Signs: T(F): 98 (03-18-19 @ 13:41), Max: 98.4 (03-17-19 @ 20:55)  HR: 82 (03-18-19 @ 13:41)  BP: 121/68 (03-18-19 @ 13:41)  RR: 16 (03-18-19 @ 13:41)  SpO2: 93% (03-18-19 @ 13:41)  Wt(kg): --  General: Nontoxic-appearing Female in no acute distress.  HEENT: AT/NC. Anicteric. Conjunctiva pink and moist. Oropharynx clear. Dentition poor.  Neck: Not rigid. No sense of mass.  Nodes: None palpable.  Lungs: Diminished breath sounds bilaterally without rales, wheezing or rhonchi  Heart: Regular rate and rhythm. No Murmur. No rub. No gallop. No palpable thrill.  Abdomen: Bowel sounds present and normoactive. Soft. Nondistended. Nontender. No sense of mass. No organomegaly. Obese.   Extremities: No cyanosis or clubbing. Venous stasis changes RODRIGO SUÁREZ Again does not want to have her foot examined at this time.   Skin: Warm. Dry. Good turgor. No rash. No vasculitic stigmata.  Psychiatric: Appropriate affect and mood for situation.         Laboratory Studies--  CBC                        10.1   5.49  )-----------( 203      ( 18 Mar 2019 08:14 )             32.3       Chemistries  03-18    141  |  104  |  16  ----------------------------<  89  4.3   |  32<H>  |  0.83    Ca    8.9      18 Mar 2019 08:14    TPro  6.8  /  Alb  2.3<L>  /  TBili  0.2  /  DBili  x   /  AST  16  /  ALT  29  /  AlkPhos  89  03-18      Culture Data  Culture - Other (03.14.19 @ 21:43)    -  Amikacin: S <=16    -  Amikacin: S <=16    -  Ampicillin: S <=8 These ampicillin results predict results for amoxicillin    -  Ampicillin/Sulbactam: S <=8/4    -  Aztreonam: S <=4    -  Aztreonam: S <=4    -  Cefazolin: S <=8    -  Cefepime: S <=4    -  Cefepime: S <=4    -  Cefoxitin: S <=8    -  Ceftazidime: S 4    -  Ciprofloxacin: S <=1    -  Ciprofloxacin: S <=1    -  Ceftriaxone: S <=1 Enterobacter, Citrobacter, and Serratia may develop resistance during prolonged therapy    -  Ertapenem: S <=1    -  Gentamicin: S <=4    -  Gentamicin: S <=4    -  Imipenem: S <=1    -  Levofloxacin: S <=2    -  Levofloxacin: S <=2    -  Meropenem: S <=1    -  Meropenem: S <=1    -  Piperacillin/Tazobactam: S <=16    -  Piperacillin/Tazobactam: S <=16    -  Tobramycin: S <=4    -  Tobramycin: S <=4    -  Trimethoprim/Sulfamethoxazole: S <=2/38    Specimen Source: .Other left foot wound    Culture Results:   Few Pseudomonas aeruginosa  Rare Proteus mirabilis    Organism Identification: Pseudomonas aeruginosa  Proteus mirabilis    Organism: Pseudomonas aeruginosa    Organism: Proteus mirabilis    Method Type: MOISE    Method Type: MOISE        Culture - Urine (collected 14 Mar 2019 09:40)  Source: .Urine Clean Catch (Midstream)  Final Report (16 Mar 2019 17:45):    >100,000 CFU/ml Escherichia coli    <10,000 CFU/ml Normal Urogenital chayo present  Organism: Escherichia coli (16 Mar 2019 17:45)  Organism: Escherichia coli (16 Mar 2019 17:45)    Culture - Blood (collected 14 Mar 2019 01:31)  Source: .Blood Blood  Preliminary Report (15 Mar 2019 02:00):    No growth to date.    Culture - Blood (collected 14 Mar 2019 01:31)  Source: .Blood Blood  Preliminary Report (15 Mar 2019 02:00):    No growth to date.

## 2019-03-19 ENCOUNTER — TRANSCRIPTION ENCOUNTER (OUTPATIENT)
Age: 79
End: 2019-03-19

## 2019-03-19 VITALS
SYSTOLIC BLOOD PRESSURE: 127 MMHG | DIASTOLIC BLOOD PRESSURE: 72 MMHG | TEMPERATURE: 98 F | RESPIRATION RATE: 16 BRPM | HEART RATE: 79 BPM | OXYGEN SATURATION: 93 %

## 2019-03-19 LAB
ANION GAP SERPL CALC-SCNC: 6 MMOL/L — SIGNIFICANT CHANGE UP (ref 5–17)
BUN SERPL-MCNC: 15 MG/DL — SIGNIFICANT CHANGE UP (ref 7–23)
CALCIUM SERPL-MCNC: 9.2 MG/DL — SIGNIFICANT CHANGE UP (ref 8.5–10.1)
CHLORIDE SERPL-SCNC: 104 MMOL/L — SIGNIFICANT CHANGE UP (ref 96–108)
CO2 SERPL-SCNC: 32 MMOL/L — HIGH (ref 22–31)
CREAT SERPL-MCNC: 0.65 MG/DL — SIGNIFICANT CHANGE UP (ref 0.5–1.3)
CULTURE RESULTS: SIGNIFICANT CHANGE UP
CULTURE RESULTS: SIGNIFICANT CHANGE UP
GLUCOSE SERPL-MCNC: 87 MG/DL — SIGNIFICANT CHANGE UP (ref 70–99)
HCT VFR BLD CALC: 32.1 % — LOW (ref 34.5–45)
HGB BLD-MCNC: 10 G/DL — LOW (ref 11.5–15.5)
MCHC RBC-ENTMCNC: 29.2 PG — SIGNIFICANT CHANGE UP (ref 27–34)
MCHC RBC-ENTMCNC: 31.2 GM/DL — LOW (ref 32–36)
MCV RBC AUTO: 93.6 FL — SIGNIFICANT CHANGE UP (ref 80–100)
NRBC # BLD: 0 /100 WBCS — SIGNIFICANT CHANGE UP (ref 0–0)
PLATELET # BLD AUTO: 209 K/UL — SIGNIFICANT CHANGE UP (ref 150–400)
POTASSIUM SERPL-MCNC: 4.1 MMOL/L — SIGNIFICANT CHANGE UP (ref 3.5–5.3)
POTASSIUM SERPL-SCNC: 4.1 MMOL/L — SIGNIFICANT CHANGE UP (ref 3.5–5.3)
RBC # BLD: 3.43 M/UL — LOW (ref 3.8–5.2)
RBC # FLD: 14.6 % — HIGH (ref 10.3–14.5)
SODIUM SERPL-SCNC: 142 MMOL/L — SIGNIFICANT CHANGE UP (ref 135–145)
SPECIMEN SOURCE: SIGNIFICANT CHANGE UP
SPECIMEN SOURCE: SIGNIFICANT CHANGE UP
WBC # BLD: 4.74 K/UL — SIGNIFICANT CHANGE UP (ref 3.8–10.5)
WBC # FLD AUTO: 4.74 K/UL — SIGNIFICANT CHANGE UP (ref 3.8–10.5)

## 2019-03-19 PROCEDURE — 36415 COLL VENOUS BLD VENIPUNCTURE: CPT

## 2019-03-19 PROCEDURE — 83605 ASSAY OF LACTIC ACID: CPT

## 2019-03-19 PROCEDURE — 87040 BLOOD CULTURE FOR BACTERIA: CPT

## 2019-03-19 PROCEDURE — 97530 THERAPEUTIC ACTIVITIES: CPT

## 2019-03-19 PROCEDURE — 71045 X-RAY EXAM CHEST 1 VIEW: CPT

## 2019-03-19 PROCEDURE — 80048 BASIC METABOLIC PNL TOTAL CA: CPT

## 2019-03-19 PROCEDURE — 93005 ELECTROCARDIOGRAM TRACING: CPT

## 2019-03-19 PROCEDURE — 87070 CULTURE OTHR SPECIMN AEROBIC: CPT

## 2019-03-19 PROCEDURE — 81001 URINALYSIS AUTO W/SCOPE: CPT

## 2019-03-19 PROCEDURE — 93923 UPR/LXTR ART STDY 3+ LVLS: CPT

## 2019-03-19 PROCEDURE — 85730 THROMBOPLASTIN TIME PARTIAL: CPT

## 2019-03-19 PROCEDURE — G0463: CPT

## 2019-03-19 PROCEDURE — 97161 PT EVAL LOW COMPLEX 20 MIN: CPT

## 2019-03-19 PROCEDURE — 93970 EXTREMITY STUDY: CPT

## 2019-03-19 PROCEDURE — 99285 EMERGENCY DEPT VISIT HI MDM: CPT | Mod: 25

## 2019-03-19 PROCEDURE — 87086 URINE CULTURE/COLONY COUNT: CPT

## 2019-03-19 PROCEDURE — 87186 SC STD MICRODIL/AGAR DIL: CPT

## 2019-03-19 PROCEDURE — 85610 PROTHROMBIN TIME: CPT

## 2019-03-19 PROCEDURE — 85027 COMPLETE CBC AUTOMATED: CPT

## 2019-03-19 PROCEDURE — 96365 THER/PROPH/DIAG IV INF INIT: CPT

## 2019-03-19 PROCEDURE — 80053 COMPREHEN METABOLIC PANEL: CPT

## 2019-03-19 RX ORDER — POLYETHYLENE GLYCOL 3350 17 G/17G
17 POWDER, FOR SOLUTION ORAL
Qty: 0 | Refills: 0 | DISCHARGE
Start: 2019-03-19

## 2019-03-19 RX ORDER — POLYETHYLENE GLYCOL 3350 17 G/17G
17 POWDER, FOR SOLUTION ORAL
Qty: 0 | Refills: 0 | Status: DISCONTINUED | OUTPATIENT
Start: 2019-03-19 | End: 2019-03-19

## 2019-03-19 RX ORDER — CLOPIDOGREL BISULFATE 75 MG/1
1 TABLET, FILM COATED ORAL
Qty: 0 | Refills: 0 | COMMUNITY

## 2019-03-19 RX ORDER — SODIUM HYPOCHLORITE 0.125 %
1 SOLUTION, NON-ORAL MISCELLANEOUS
Qty: 3 | Refills: 0 | OUTPATIENT
Start: 2019-03-19 | End: 2019-04-01

## 2019-03-19 RX ORDER — NYSTATIN CREAM 100000 [USP'U]/G
1 CREAM TOPICAL
Qty: 0 | Refills: 0 | DISCHARGE
Start: 2019-03-19

## 2019-03-19 RX ORDER — TRAMADOL HYDROCHLORIDE 50 MG/1
1 TABLET ORAL
Qty: 0 | Refills: 0 | DISCHARGE
Start: 2019-03-19

## 2019-03-19 RX ORDER — CARVEDILOL PHOSPHATE 80 MG/1
1 CAPSULE, EXTENDED RELEASE ORAL
Qty: 0 | Refills: 0 | COMMUNITY

## 2019-03-19 RX ORDER — NYSTATIN CREAM 100000 [USP'U]/G
1 CREAM TOPICAL
Qty: 0 | Refills: 0 | COMMUNITY
Start: 2019-03-19

## 2019-03-19 RX ORDER — MAGNESIUM HYDROXIDE 400 MG/1
30 TABLET, CHEWABLE ORAL ONCE
Qty: 0 | Refills: 0 | Status: COMPLETED | OUTPATIENT
Start: 2019-03-19 | End: 2019-03-19

## 2019-03-19 RX ORDER — LACTOBACILLUS ACIDOPHILUS 100MM CELL
1 CAPSULE ORAL
Qty: 0 | Refills: 0 | DISCHARGE
Start: 2019-03-19

## 2019-03-19 RX ORDER — ENOXAPARIN SODIUM 100 MG/ML
40 INJECTION SUBCUTANEOUS
Qty: 0 | Refills: 0 | DISCHARGE
Start: 2019-03-19

## 2019-03-19 RX ORDER — TRAMADOL HYDROCHLORIDE 50 MG/1
0.5 TABLET ORAL
Qty: 0 | Refills: 0 | DISCHARGE
Start: 2019-03-19

## 2019-03-19 RX ADMIN — NYSTATIN CREAM 1 APPLICATION(S): 100000 CREAM TOPICAL at 06:06

## 2019-03-19 RX ADMIN — Medication 10 MILLIGRAM(S): at 12:48

## 2019-03-19 RX ADMIN — POLYETHYLENE GLYCOL 3350 17 GRAM(S): 17 POWDER, FOR SOLUTION ORAL at 11:17

## 2019-03-19 RX ADMIN — Medication 100 MILLIGRAM(S): at 13:33

## 2019-03-19 RX ADMIN — ENOXAPARIN SODIUM 40 MILLIGRAM(S): 100 INJECTION SUBCUTANEOUS at 11:16

## 2019-03-19 RX ADMIN — CEFTRIAXONE 100 GRAM(S): 500 INJECTION, POWDER, FOR SOLUTION INTRAMUSCULAR; INTRAVENOUS at 09:59

## 2019-03-19 RX ADMIN — Medication 100 MILLIGRAM(S): at 06:06

## 2019-03-19 RX ADMIN — Medication 1 TABLET(S): at 07:59

## 2019-03-19 RX ADMIN — Medication 1 APPLICATION(S): at 06:13

## 2019-03-19 RX ADMIN — OXYCODONE AND ACETAMINOPHEN 1 TABLET(S): 5; 325 TABLET ORAL at 13:30

## 2019-03-19 RX ADMIN — Medication 1 TABLET(S): at 11:37

## 2019-03-19 RX ADMIN — OXYCODONE AND ACETAMINOPHEN 1 TABLET(S): 5; 325 TABLET ORAL at 06:09

## 2019-03-19 RX ADMIN — CARVEDILOL PHOSPHATE 12.5 MILLIGRAM(S): 80 CAPSULE, EXTENDED RELEASE ORAL at 06:06

## 2019-03-19 RX ADMIN — OXYCODONE AND ACETAMINOPHEN 1 TABLET(S): 5; 325 TABLET ORAL at 14:23

## 2019-03-19 RX ADMIN — CLOPIDOGREL BISULFATE 75 MILLIGRAM(S): 75 TABLET, FILM COATED ORAL at 11:16

## 2019-03-19 RX ADMIN — Medication 40 MILLIGRAM(S): at 06:06

## 2019-03-19 NOTE — PROGRESS NOTE ADULT - ATTENDING COMMENTS
Patient seen and evaluated   Attempted to change dressing, but patient refusing at this time  Final wound culture and sensitivities noted  ID and vascular recommendations appreciated  Please have patient follow up with Dr. Fletcher in wound care clinic within 1 week of discharge    Wound care instructions  1. cleanse wound with normal saline, squirt with dakins solution  2. apply adaptic non-adherent dressing and secure with gauze and kyle  3. change daily.
Patient seen and evaluated   Attempted to change dressing, multiple times, even after patient received her pain medications but patient consistently refused  ID and vascular recommendations appreciated    Wound care instructions  1. cleanse wound with normal saline, squirt with dakins solution  2. apply adaptic non-adherent dressing and secure with gauze and kyle  3. change daily.
Pt seen, Examined, Case & care plan d/w pt  at detail  Dressing change by Podiatry/RN.  AM labs
Pt seen, Examined, Case & care plan d/w pt  at detail, pt does NOT want Laxative.  Dressing change by Podiatry/RN.  AM labs
Pt seen, Examined, case & care plan d/w pt, residents & surgical PA at detail.  D/W Social service for d/c planning
Pt seen, Examined, Case & care plan d/w pt , residents at detail  Dressing change by Podiatry.  AM labs
Pt seen, Examined, case & care plan d/w pt, residents at detail.  Case D/W DR Fletcher & Dr Cabrera at detail.  stable for d/c to [facility] today with local wound care.  Total D/c care time is 50 minutes.
Pt seen, examined, case & care plan d/w pt, residents at detail.  D/W DR Fletcher- Podiatry, Dr Mauricio- ID & Vascular -DR Cevallos at detail.

## 2019-03-19 NOTE — DISCHARGE NOTE NURSING/CASE MANAGEMENT/SOCIAL WORK - NSDCDPATPORTLINK_GEN_ALL_CORE
You can access the [facility] Portal, offered by [facility], by registering with the following website: [email or web address]

## 2019-03-19 NOTE — PROGRESS NOTE ADULT - SUBJECTIVE AND OBJECTIVE BOX
Patient is a 78y old  Female who presents with a chief complaint of cellulitis B/L Lower EXt (18 Mar 2019 17:02)      INTERVAL HPI:  79 yo F PMH systolic and diastolic CHF w/ EF 30% with PPM, CAD, HTN, HLD, PVD presenting to ED after being sent in by Dr. Fletcher for cellulitis due to left foot wound. Patient was discharged from [facility], where she had been sent after discharge from [place] for LLE cellulitis. Patient was sent home from  on Saturday with Unna boot. Was seen by outside podiatrist today found to have weeping LLE wounds, sent to [facility] wound care center for evaluation. Patient complaining of severe pain in left foot, especially with weight bearing. Refusing to allow leg to be unwrapped and examined due to pain. Denies fever/chills, headache, dizziness, CP, SOB, abdominal pain, N/V/D. Was constipated while in  due to being on opioids since January, last BM was yesterday.  Patient was admitted to [place] in January 2019 for LLE cellulitis concerning for osteomyelitis. Patient's pacemaker not MRI compatible, indium scan performed, negative for osteomyelitis. Wound cultures grew Proteus, Klebsiella, Corynebacterium and alpha hemolytic streptococcus. Patient was treated with Cefepime and vancomycin. Blood cultures negative. She was seen by vascular surgery at [place], but no intervention was performed during that admission. Patient unsure what kind of pacemaker she has, knows her pacemaker battery is [employer].   In ED VS T 98.3  /69 RR 16 O2 sat 99 on RA  12 lead EKG showed atrial sensed, ventricular paced rhythm at 96 bpm  Labs significant for BUN 48, creatinine 1.4 (baseline <1), GFR 36  CXR shows no active CP disease  Received Azactam 1000 mg IV x1, vancomycin 1000 mg IV x1    3/14/19: Patient seen and examined at bedside. Patient still complains of leg pain with movement, states pain has improved with medication. Denies fevers, chills, numbness, tingling. Wound Care RN and Podiatry to see patient today. Podiatry - Dr Fletcher saw pt, Vascular DR Sun Ratliff called & case D/W at detail. ID Dr Mauricio changed to IV Rocephin daily    3/15/19: Patient seen and examined at bedside. Patient still complains of leg pain with movement, states pain has improved with medication. Denies fevers, chills, numbness, tingling. Wound Care RN and Podiatry to see patient today. Seen by Vascular consult, patient for BLE BELINDA/PVR for physiologic assessment. I IV Rocephin day 2. pt seen, by Vascular Sx -BELINDA ordered, Low H/H,  Anemia.    3/16/19:Pt seen, examined C/O left foot pain with wound, on IV Abx , Low stable H/H.  3/17/19: Pt seen, examined, wants to be left alone , feels OK, On IV Abx, Anemia stable.  3/18/19: Patient seen and examined at bedside. Denies any current complaints. On Ceftriaxone (Day 5).  Anemia stable.  No BM since admission, on bowel regimen.    3/19/19: Patient seen and examined at bedside. Patient states feels LLE pain has improved. On Ceftriaxone (Day 6). No BM since admission, on bowel regimen.         OVERNIGHT EVENTS: NONE.      Home Medications:  carvedilol 12.5 mg oral tablet: 1 tab(s) orally 2 times a day (15 Mar 2019 15:11)  docusate sodium 100 mg oral capsule: 1 cap(s) orally 3 times a day (18 Mar 2019 10:20)  furosemide 40 mg oral tablet: 1 tab(s) orally once a day (15 Mar 2019 15:13)  Plavix 75 mg oral tablet: 1 tab(s) orally once a day (15 Mar 2019 15:11)  polyethylene glycol 3350 oral powder for reconstitution: 17 gram(s) orally once a day (18 Mar 2019 10:20)  senna oral tablet: 2 tab(s) orally once a day (at bedtime) (18 Mar 2019 10:20)      MEDICATIONS  (STANDING):  carvedilol 12.5 milliGRAM(s) Oral every 12 hours  cefTRIAXone   IVPB 1 Gram(s) IV Intermittent every 24 hours  cefTRIAXone   IVPB      clopidogrel Tablet 75 milliGRAM(s) Oral daily  Dakins Solution - 1/2 Strength 1 Application(s) Topical two times a day  docusate sodium 100 milliGRAM(s) Oral three times a day  enoxaparin Injectable 40 milliGRAM(s) SubCutaneous daily  furosemide    Tablet 40 milliGRAM(s) Oral daily  lactobacillus acidophilus 1 Tablet(s) Oral three times a day with meals  nystatin Cream 1 Application(s) Topical two times a day  polyethylene glycol 3350 17 Gram(s) Oral daily  senna 2 Tablet(s) Oral at bedtime    MEDICATIONS  (PRN):  oxyCODONE    5 mG/acetaminophen 325 mG 1 Tablet(s) Oral every 6 hours PRN Severe Pain (7 - 10)  traMADol 25 milliGRAM(s) Oral every 6 hours PRN Mild Pain (1 - 3)  traMADol 50 milliGRAM(s) Oral every 4 hours PRN Moderate Pain (4 - 6)      Allergies    aspirin (Unknown)  atorvastatin (Unknown)  penicillin (Unknown)    Intolerances    REVIEW OF SYSTEMS:  CONSTITUTIONAL: No fever, No chills, No fatigue, No myalgia, No Body ache, No Weakness  EYES: No eye pain,  No visual disturbances, No discharge, NO Redness  ENMT:  No ear pain, No nose bleed, No vertigo; No sinus or throat pain, No Congestion  NECK: No pain, No stiffness  RESPIRATORY: No cough, wheezing, No  hemoptysis, No shortness of breath  CARDIOVASCULAR: No chest pain, palpitations  GASTROINTESTINAL: No abdominal or epigastric pain. No nausea, No vomiting; No diarrhea + constipation. [  ] BM  GENITOURINARY: No dysuria, No frequency, No urgency, No hematuria, or incontinence  NEUROLOGICAL: No headaches, No dizziness, No numbness, No tingling, No tremors, No weakness  EXT: No Swelling, No Edema, + LLE pain (improving).   SKIN:  [ x ] No itching, burning, rashes, or lesions (+LLE ulcer, chronic changes)   MUSCULOSKELETAL: No joint pain or swelling; No muscle pain, No back pain, No extremity pain  PSYCHIATRIC: No depression, anxiety, mood swings or difficulty sleeping at night  PAIN SCALE: [  ] None  [ x ] Other-left foot/ lower EXT  ROS Unable to obtain due to - [  ] Dementia  [  ] Lethargy  [  ] Sedated [  ] non verbal  REST OF REVIEW Of SYSTEM - [ x ] Normal     Vital Signs Last 24 Hrs  T(C): 36.9 (19 Mar 2019 05:04), Max: 36.9 (19 Mar 2019 05:04)  T(F): 98.5 (19 Mar 2019 05:04), Max: 98.5 (19 Mar 2019 05:04)  HR: 87 (19 Mar 2019 05:04) (82 - 91)  BP: 128/77 (19 Mar 2019 05:04) (121/68 - 135/77)  BP(mean): --  RR: 17 (19 Mar 2019 05:04) (16 - 17)  SpO2: 93% (19 Mar 2019 05:04) (93% - 94%)    Finger Stick    PHYSICAL EXAM:  GENERAL:  [ x ] NAD , [ x ] well appearing, [  ] Agitated, [  ] Drowsy,  [  ] Lethargy, [  ] confused   HEAD:  [x  ] Normal, [  ] Other  EYES:  [ x ] EOMI, [ x ] PERRLA, [ x ] conjunctiva and sclera clear normal, [  ] Other,  [  ] Pallor,[  ] Discharge  ENMT:  [x  ] Normal, [ x ] Moist mucous membranes, [  ] Good dentition, [x  ] No Thrush  NECK:  [ x ] Supple, [x  ] No JVD, [ x ] Normal thyroid, [  ] Lymphadenopathy [  ] Other  CHEST/LUNG:  [ x ] Clear to auscultation bilaterally, [x  ] Breath Sounds equal B/L,  [x  ] No rales, [ x ] No rhonchi  [ x ]  No wheezing  HEART:  [x  ] Regular rate and rhythm, [  ] tachycardia, [  ] Bradycardia,  [  ] irregular  [ x ] No murmurs, No rubs, No gallops, [  ] PPM in place (Mfr:  )  ABDOMEN:  [ x ] Soft, [ x ] Nontender, [x ] Nondistended, [ x ] No mass, [  x] Bowel sounds present, [ x ] obese  NERVOUS SYSTEM:  [ x ] Alert & Oriented X3, [x  ] Nonfocal  [  ] Confusion  [  ] Encephalopathic [  ] Sedated [  ] Unable to assess, [  ] Other-  EXTREMITIES: [x  ] very poor  Peripheral Pulses, difficult to feel, No clubbing, No cyanosis,  [  ] edema B/L lower EXT. [ x ] severe PVD stasis skin changes B/L Lower EXT, Left foot Dressing+ superficial ulcers   LYMPH: No lymphadenopathy noted  SKIN:  [  ] No rashes or lesions, [  ] Pressure Ulcers, [  ] ecchymosis, [  ] Skin Tears, [ x ] Other- Scabs on left leg      DIET: REGULAR     LABS:      Ca    8.9        18 Mar 2019 08:14            Culture Results:   Few Pseudomonas aeruginosa  Rare Proteus mirabilis (03-14 @ 21:43)  Culture Results:   >100,000 CFU/ml Escherichia coli  <10,000 CFU/ml Normal Urogenital chayo present (03-14 @ 09:40)  Culture Results:   No growth at 5 days. (03-14 @ 01:31)  Culture Results:   No growth at 5 days. (03-14 @ 01:31)      Culture - Other (collected 14 Mar 2019 21:43)  Source: .Other left foot wound  Final Report (16 Mar 2019 20:19):    Few Pseudomonas aeruginosa    Rare Proteus mirabilis  Organism: Pseudomonas aeruginosa  Proteus mirabilis (16 Mar 2019 20:19)  Organism: Proteus mirabilis (16 Mar 2019 20:19)  Organism: Pseudomonas aeruginosa (16 Mar 2019 20:19)    Culture - Urine (collected 14 Mar 2019 09:40)  Source: .Urine Clean Catch (Midstream)  Final Report (16 Mar 2019 17:45):    >100,000 CFU/ml Escherichia coli    <10,000 CFU/ml Normal Urogenital chayo present  Organism: Escherichia coli (16 Mar 2019 17:45)  Organism: Escherichia coli (16 Mar 2019 17:45)    Culture - Blood (collected 14 Mar 2019 01:31)  Source: .Blood Blood  Final Report (19 Mar 2019 02:00):    No growth at 5 days.    Culture - Blood (collected 14 Mar 2019 01:31)  Source: .Blood Blood  Final Report (19 Mar 2019 02:00):    No growth at 5 days.      RADIOLOGY & ADDITIONAL TESTS: NONE     HEALTH ISSUES - PROBLEM Dx:  Need for prophylactic measure  HTN (hypertension)  Heart failure  Constipation  JULIA (acute kidney injury)  Sacral ulcer  Cellulitis of leg      Consultant(s) Notes Reviewed:  [X  ] YES     Care Discussed with [ x ] Consultants, [X  ] Patient, [  ] Family, [  ] , [  ] Social Service, [  ] RN, [  ] Physical Therapy  DVT PPX: [X  ] Lovenox, [  ] S C Heparin, [  ] Coumadin, [  ] Xarelto, [  ] Eliquis, [  ] Pradaxa, [  ] IV Heparin drip, [  ] SCD, [  ] Contraindication secondary to GI Bleed, [  ] Ambulation  Advanced Directive: [  ] None, [  ] DNR/DNI Patient is a 78y old  Female who presents with a chief complaint of cellulitis B/L Lower EXt (18 Mar 2019 17:02)      INTERVAL HPI:  79 yo F PMH systolic and diastolic CHF w/ EF 30% with PPM, CAD, HTN, HLD, PVD presenting to ED after being sent in by Dr. Fletcher for cellulitis due to left foot wound. Patient was discharged from [facility], where she had been sent after discharge from [place] for LLE cellulitis. Patient was sent home from  on Saturday with Unna boot. Was seen by outside podiatrist today found to have weeping LLE wounds, sent to [facility] wound care center for evaluation. Patient complaining of severe pain in left foot, especially with weight bearing. Refusing to allow leg to be unwrapped and examined due to pain. Denies fever/chills, headache, dizziness, CP, SOB, abdominal pain, N/V/D. Was constipated while in  due to being on opioids since January, last BM was yesterday.  Patient was admitted to [place] in January 2019 for LLE cellulitis concerning for osteomyelitis. Patient's pacemaker not MRI compatible, indium scan performed, negative for osteomyelitis. Wound cultures grew Proteus, Klebsiella, Corynebacterium and alpha hemolytic streptococcus. Patient was treated with Cefepime and vancomycin. Blood cultures negative. She was seen by vascular surgery at [place], but no intervention was performed during that admission. Patient unsure what kind of pacemaker she has, knows her pacemaker battery is [employer].   In ED VS T 98.3  /69 RR 16 O2 sat 99 on RA  12 lead EKG showed atrial sensed, ventricular paced rhythm at 96 bpm  Labs significant for BUN 48, creatinine 1.4 (baseline <1), GFR 36  CXR shows no active CP disease  Received Azactam 1000 mg IV x1, vancomycin 1000 mg IV x1    3/14/19: Patient seen and examined at bedside. Patient still complains of leg pain with movement, states pain has improved with medication. Denies fevers, chills, numbness, tingling. Wound Care RN and Podiatry to see patient today. Podiatry - Dr Fletcher saw pt, Vascular DR Sun Ratliff called & case D/W at detail. ID Dr Mauricio changed to IV Rocephin daily    3/15/19: Patient seen and examined at bedside. Patient still complains of leg pain with movement, states pain has improved with medication. Denies fevers, chills, numbness, tingling. Wound Care RN and Podiatry to see patient today. Seen by Vascular consult, patient for BLE BELINDA/PVR for physiologic assessment. I IV Rocephin day 2. pt seen, by Vascular Sx -BELINDA ordered, Low H/H,  Anemia.    3/16/19:Pt seen, examined C/O left foot pain with wound, on IV Abx , Low stable H/H.  3/17/19: Pt seen, examined, wants to be left alone , feels OK, On IV Abx, Anemia stable.  3/18/19: Patient seen and examined at bedside. Denies any current complaints. On Ceftriaxone (Day 5).  Anemia stable.  No BM since admission, on bowel regimen.    3/19/19: Patient seen and examined at bedside. Patient states feels LLE pain has improved. On Ceftriaxone (Day 6). No BM since admission, on bowel regimen. Will do Stat dulcolax suppository.      OVERNIGHT EVENTS: NONE.      Home Medications:  carvedilol 12.5 mg oral tablet: 1 tab(s) orally 2 times a day (15 Mar 2019 15:11)  docusate sodium 100 mg oral capsule: 1 cap(s) orally 3 times a day (18 Mar 2019 10:20)  furosemide 40 mg oral tablet: 1 tab(s) orally once a day (15 Mar 2019 15:13)  Plavix 75 mg oral tablet: 1 tab(s) orally once a day (15 Mar 2019 15:11)  polyethylene glycol 3350 oral powder for reconstitution: 17 gram(s) orally once a day (18 Mar 2019 10:20)  senna oral tablet: 2 tab(s) orally once a day (at bedtime) (18 Mar 2019 10:20)      MEDICATIONS  (STANDING):  carvedilol 12.5 milliGRAM(s) Oral every 12 hours  cefTRIAXone   IVPB 1 Gram(s) IV Intermittent every 24 hours  cefTRIAXone   IVPB      clopidogrel Tablet 75 milliGRAM(s) Oral daily  Dakins Solution - 1/2 Strength 1 Application(s) Topical two times a day  docusate sodium 100 milliGRAM(s) Oral three times a day  enoxaparin Injectable 40 milliGRAM(s) SubCutaneous daily  furosemide    Tablet 40 milliGRAM(s) Oral daily  lactobacillus acidophilus 1 Tablet(s) Oral three times a day with meals  nystatin Cream 1 Application(s) Topical two times a day  polyethylene glycol 3350 17 Gram(s) Oral daily  senna 2 Tablet(s) Oral at bedtime    MEDICATIONS  (PRN):  oxyCODONE    5 mG/acetaminophen 325 mG 1 Tablet(s) Oral every 6 hours PRN Severe Pain (7 - 10)  traMADol 25 milliGRAM(s) Oral every 6 hours PRN Mild Pain (1 - 3)  traMADol 50 milliGRAM(s) Oral every 4 hours PRN Moderate Pain (4 - 6)      Allergies    aspirin (Unknown)  atorvastatin (Unknown)  penicillin (Unknown)    Intolerances    REVIEW OF SYSTEMS:  CONSTITUTIONAL: No fever, No chills, No fatigue, No myalgia, No Body ache, No Weakness  EYES: No eye pain,  No visual disturbances, No discharge, NO Redness  ENMT:  No ear pain, No nose bleed, No vertigo; No sinus or throat pain, No Congestion  NECK: No pain, No stiffness  RESPIRATORY: No cough, wheezing, No  hemoptysis, No shortness of breath  CARDIOVASCULAR: No chest pain, palpitations  GASTROINTESTINAL: No abdominal or epigastric pain. No nausea, No vomiting; No diarrhea + constipation. [  ] BM  GENITOURINARY: No dysuria, No frequency, No urgency, No hematuria, or incontinence  NEUROLOGICAL: No headaches, No dizziness, No numbness, No tingling, No tremors, No weakness  EXT: No Swelling, No Edema, + LLE pain (improving).   SKIN:  [ x ] No itching, burning, rashes, or lesions (+LLE ulcer, chronic changes)   MUSCULOSKELETAL: No joint pain or swelling; No muscle pain, No back pain, No extremity pain  PSYCHIATRIC: No depression, anxiety, mood swings or difficulty sleeping at night  PAIN SCALE: [  ] None  [ x ] Other-left foot/ lower EXT  ROS Unable to obtain due to - [  ] Dementia  [  ] Lethargy  [  ] Sedated [  ] non verbal  REST OF REVIEW Of SYSTEM - [ x ] Normal     Vital Signs Last 24 Hrs  T(C): 36.9 (19 Mar 2019 05:04), Max: 36.9 (19 Mar 2019 05:04)  T(F): 98.5 (19 Mar 2019 05:04), Max: 98.5 (19 Mar 2019 05:04)  HR: 87 (19 Mar 2019 05:04) (82 - 91)  BP: 128/77 (19 Mar 2019 05:04) (121/68 - 135/77)  BP(mean): --  RR: 17 (19 Mar 2019 05:04) (16 - 17)  SpO2: 93% (19 Mar 2019 05:04) (93% - 94%)    Finger Stick    PHYSICAL EXAM:  GENERAL:  [ x ] NAD , [ x ] well appearing, [  ] Agitated, [  ] Drowsy,  [  ] Lethargy, [  ] confused   HEAD:  [x  ] Normal, [  ] Other  EYES:  [ x ] EOMI, [ x ] PERRLA, [ x ] conjunctiva and sclera clear normal, [  ] Other,  [  ] Pallor,[  ] Discharge  ENMT:  [x  ] Normal, [ x ] Moist mucous membranes, [  ] Good dentition, [x  ] No Thrush  NECK:  [ x ] Supple, [x  ] No JVD, [ x ] Normal thyroid, [  ] Lymphadenopathy [  ] Other  CHEST/LUNG:  [ x ] Clear to auscultation bilaterally, [x  ] Breath Sounds equal B/L,  [x  ] No rales, [ x ] No rhonchi  [ x ]  No wheezing  HEART:  [x  ] Regular rate and rhythm, [  ] tachycardia, [  ] Bradycardia,  [  ] irregular  [ x ] No murmurs, No rubs, No gallops, [  ] PPM in place (Mfr:  )  ABDOMEN:  [ x ] Soft, [ x ] Nontender, [x ] Nondistended, [ x ] No mass, [  x] Bowel sounds present, [ x ] obese  NERVOUS SYSTEM:  [ x ] Alert & Oriented X3, [x  ] Nonfocal  [  ] Confusion  [  ] Encephalopathic [  ] Sedated [  ] Unable to assess, [  ] Other-  EXTREMITIES: [x  ] very poor  Peripheral Pulses, difficult to feel, No clubbing, No cyanosis,  [  ] edema B/L lower EXT. [ x ] severe PVD stasis skin changes B/L Lower EXT, Left foot Dressing+ superficial ulcers   LYMPH: No lymphadenopathy noted  SKIN:  [  ] No rashes or lesions, [  ] Pressure Ulcers, [  ] ecchymosis, [  ] Skin Tears, [ x ] Other- Scabs on left leg      DIET: REGULAR     LABS:      Ca    8.9        18 Mar 2019 08:14            Culture Results:   Few Pseudomonas aeruginosa  Rare Proteus mirabilis (03-14 @ 21:43)  Culture Results:   >100,000 CFU/ml Escherichia coli  <10,000 CFU/ml Normal Urogenital chayo present (03-14 @ 09:40)  Culture Results:   No growth at 5 days. (03-14 @ 01:31)  Culture Results:   No growth at 5 days. (03-14 @ 01:31)      Culture - Other (collected 14 Mar 2019 21:43)  Source: .Other left foot wound  Final Report (16 Mar 2019 20:19):    Few Pseudomonas aeruginosa    Rare Proteus mirabilis  Organism: Pseudomonas aeruginosa  Proteus mirabilis (16 Mar 2019 20:19)  Organism: Proteus mirabilis (16 Mar 2019 20:19)  Organism: Pseudomonas aeruginosa (16 Mar 2019 20:19)    Culture - Urine (collected 14 Mar 2019 09:40)  Source: .Urine Clean Catch (Midstream)  Final Report (16 Mar 2019 17:45):    >100,000 CFU/ml Escherichia coli    <10,000 CFU/ml Normal Urogenital chayo present  Organism: Escherichia coli (16 Mar 2019 17:45)  Organism: Escherichia coli (16 Mar 2019 17:45)    Culture - Blood (collected 14 Mar 2019 01:31)  Source: .Blood Blood  Final Report (19 Mar 2019 02:00):    No growth at 5 days.    Culture - Blood (collected 14 Mar 2019 01:31)  Source: .Blood Blood  Final Report (19 Mar 2019 02:00):    No growth at 5 days.      RADIOLOGY & ADDITIONAL TESTS: NONE     HEALTH ISSUES - PROBLEM Dx:  Need for prophylactic measure  HTN (hypertension)  Heart failure  Constipation  JULIA (acute kidney injury)  Sacral ulcer  Cellulitis of leg      Consultant(s) Notes Reviewed:  [X  ] YES     Care Discussed with [ x ] Consultants, [X  ] Patient, [  ] Family, [  ] , [  ] Social Service, [  ] RN, [  ] Physical Therapy  DVT PPX: [X  ] Lovenox, [  ] S C Heparin, [  ] Coumadin, [  ] Xarelto, [  ] Eliquis, [  ] Pradaxa, [  ] IV Heparin drip, [  ] SCD, [  ] Contraindication secondary to GI Bleed, [  ] Ambulation  Advanced Directive: [  ] None, [  ] DNR/DNI Patient is a 78y old  Female who presents with a chief complaint of cellulitis B/L Lower EXt (18 Mar 2019 17:02)      INTERVAL HPI:  79 yo F PMH systolic and diastolic CHF w/ EF 30% with PPM, CAD, HTN, HLD, PVD presenting to ED after being sent in by Dr. Fletcher for cellulitis due to left foot wound. Patient was discharged from [facility], where she had been sent after discharge from [place] for LLE cellulitis. Patient was sent home from  on Saturday with Unna boot. Was seen by outside podiatrist today found to have weeping LLE wounds, sent to [facility] wound care center for evaluation. Patient complaining of severe pain in left foot, especially with weight bearing. Refusing to allow leg to be unwrapped and examined due to pain. Denies fever/chills, headache, dizziness, CP, SOB, abdominal pain, N/V/D. Was constipated while in  due to being on opioids since January, last BM was yesterday.  Patient was admitted to [place] in January 2019 for LLE cellulitis concerning for osteomyelitis. Patient's pacemaker not MRI compatible, indium scan performed, negative for osteomyelitis. Wound cultures grew Proteus, Klebsiella, Corynebacterium and alpha hemolytic streptococcus. Patient was treated with Cefepime and vancomycin. Blood cultures negative. She was seen by vascular surgery at [place], but no intervention was performed during that admission. Patient unsure what kind of pacemaker she has, knows her pacemaker battery is [employer].   In ED VS T 98.3  /69 RR 16 O2 sat 99 on RA  12 lead EKG showed atrial sensed, ventricular paced rhythm at 96 bpm  Labs significant for BUN 48, creatinine 1.4 (baseline <1), GFR 36  CXR shows no active CP disease  Received Azactam 1000 mg IV x1, vancomycin 1000 mg IV x1    3/14/19: Patient seen and examined at bedside. Patient still complains of leg pain with movement, states pain has improved with medication. Denies fevers, chills, numbness, tingling. Wound Care RN and Podiatry to see patient today. Podiatry - Dr Fletcher saw pt, Vascular DR Sun Ratliff called & case D/W at detail. ID Dr Mauricio changed to IV Rocephin daily    3/15/19: Patient seen and examined at bedside. Patient still complains of leg pain with movement, states pain has improved with medication. Denies fevers, chills, numbness, tingling. Wound Care RN and Podiatry to see patient today. Seen by Vascular consult, patient for BLE BELINDA/PVR for physiologic assessment. I IV Rocephin day 2. pt seen, by Vascular Sx -BELINDA ordered, Low H/H,  Anemia.    3/16/19:Pt seen, examined C/O left foot pain with wound, on IV Abx , Low stable H/H.  3/17/19: Pt seen, examined, wants to be left alone , feels OK, On IV Abx, Anemia stable.  3/18/19: Patient seen and examined at bedside. Denies any current complaints. On Ceftriaxone (Day 5).  Anemia stable.  No BM since admission, on bowel regimen.    3/19/19: Patient seen and examined at bedside. Patient states feels LLE pain has improved. On Ceftriaxone (Day 6). No BM since admission, on bowel regimen. Will do Stat dulcolax suppository. Pt is refusing enema & MOM, refused dulcolax UT suppository earlier, Wound checked by Dr Washington & Dr Fletcher , change dressing  to Hydrogel daily. Stable for d/c from ID & Podiatry stand point, No Abx as per ID. case was d/w DR Sun nael -Vascular on 3/18/19: No surgical intervention ,Local wound care.    OVERNIGHT EVENTS: NONE.      Home Medications:  carvedilol 12.5 mg oral tablet: 1 tab(s) orally 2 times a day (15 Mar 2019 15:11)  docusate sodium 100 mg oral capsule: 1 cap(s) orally 3 times a day (18 Mar 2019 10:20)  furosemide 40 mg oral tablet: 1 tab(s) orally once a day (15 Mar 2019 15:13)  Plavix 75 mg oral tablet: 1 tab(s) orally once a day (15 Mar 2019 15:11)  polyethylene glycol 3350 oral powder for reconstitution: 17 gram(s) orally once a day (18 Mar 2019 10:20)  senna oral tablet: 2 tab(s) orally once a day (at bedtime) (18 Mar 2019 10:20)      MEDICATIONS  (STANDING):  carvedilol 12.5 milliGRAM(s) Oral every 12 hours  cefTRIAXone   IVPB 1 Gram(s) IV Intermittent every 24 hours  cefTRIAXone   IVPB      clopidogrel Tablet 75 milliGRAM(s) Oral daily  Dakins Solution - 1/2 Strength 1 Application(s) Topical two times a day  docusate sodium 100 milliGRAM(s) Oral three times a day  enoxaparin Injectable 40 milliGRAM(s) SubCutaneous daily  furosemide    Tablet 40 milliGRAM(s) Oral daily  lactobacillus acidophilus 1 Tablet(s) Oral three times a day with meals  nystatin Cream 1 Application(s) Topical two times a day  polyethylene glycol 3350 17 Gram(s) Oral daily  senna 2 Tablet(s) Oral at bedtime    MEDICATIONS  (PRN):  oxyCODONE    5 mG/acetaminophen 325 mG 1 Tablet(s) Oral every 6 hours PRN Severe Pain (7 - 10)  traMADol 25 milliGRAM(s) Oral every 6 hours PRN Mild Pain (1 - 3)  traMADol 50 milliGRAM(s) Oral every 4 hours PRN Moderate Pain (4 - 6)      Allergies    aspirin (Unknown)  atorvastatin (Unknown)  penicillin (Unknown)    Intolerances    REVIEW OF SYSTEMS: Feels OK  CONSTITUTIONAL: No fever, No chills, No fatigue, No myalgia, No Body ache, No Weakness  EYES: No eye pain,  No visual disturbances, No discharge, NO Redness  ENMT:  No ear pain, No nose bleed, No vertigo; No sinus or throat pain, No Congestion  NECK: No pain, No stiffness  RESPIRATORY: No cough, wheezing, No  hemoptysis, No shortness of breath  CARDIOVASCULAR: No chest pain, palpitations  GASTROINTESTINAL: No abdominal or epigastric pain. No nausea, No vomiting; No diarrhea + constipation. [  ] BM  GENITOURINARY: No dysuria, No frequency, No urgency, No hematuria, or incontinence  NEUROLOGICAL: No headaches, No dizziness, No numbness, No tingling, No tremors, No weakness  EXT: No Swelling, No Edema, + LLE pain (improving).   SKIN:  [ x ] No itching, burning, rashes, or lesions (+LLE ulcer, chronic changes)   MUSCULOSKELETAL: No joint pain or swelling; No muscle pain, No back pain, No extremity pain  PSYCHIATRIC: No depression, anxiety, mood swings or difficulty sleeping at night  PAIN SCALE: [  ] None  [ x ] Other-left foot/ lower EXT  ROS Unable to obtain due to - [  ] Dementia  [  ] Lethargy  [  ] Sedated [  ] non verbal  REST OF REVIEW Of SYSTEM - [ x ] Normal     Vital Signs Last 24 Hrs  T(C): 36.9 (19 Mar 2019 05:04), Max: 36.9 (19 Mar 2019 05:04)  T(F): 98.5 (19 Mar 2019 05:04), Max: 98.5 (19 Mar 2019 05:04)  HR: 87 (19 Mar 2019 05:04) (82 - 91)  BP: 128/77 (19 Mar 2019 05:04) (121/68 - 135/77)  BP(mean): --  RR: 17 (19 Mar 2019 05:04) (16 - 17)  SpO2: 93% (19 Mar 2019 05:04) (93% - 94%)    Finger Stick    PHYSICAL EXAM:  GENERAL:  [ x ] NAD , [ x ] well appearing, [  ] Agitated, [  ] Drowsy,  [  ] Lethargy, [  ] confused   HEAD:  [x  ] Normal, [  ] Other  EYES:  [ x ] EOMI, [ x ] PERRLA, [ x ] conjunctiva and sclera clear normal, [  ] Other,  [  ] Pallor,[  ] Discharge  ENMT:  [x  ] Normal, [ x ] Moist mucous membranes, [  ] Good dentition, [x  ] No Thrush  NECK:  [ x ] Supple, [x  ] No JVD, [ x ] Normal thyroid, [  ] Lymphadenopathy [  ] Other  CHEST/LUNG:  [ x ] Clear to auscultation bilaterally, [x  ] Breath Sounds equal B/L,  [x  ] No rales, [ x ] No rhonchi  [ x ]  No wheezing  HEART:  [x  ] Regular rate and rhythm, [  ] tachycardia, [  ] Bradycardia,  [  ] irregular  [ x ] No murmurs, No rubs, No gallops, [  ] PPM in place (Mfr:  )  ABDOMEN:  [ x ] Soft, [ x ] Nontender, [x ] Nondistended, [ x ] No mass, [  x] Bowel sounds present, [ x ] obese  NERVOUS SYSTEM:  [ x ] Alert & Oriented X3, [x  ] Nonfocal  [  ] Confusion  [  ] Encephalopathic [  ] Sedated [  ] Unable to assess, [  ] Other-  EXTREMITIES: [x  ] very poor  Peripheral Pulses, difficult to feel, No clubbing, No cyanosis,  [  ] edema B/L lower EXT. [ x ] severe PVD stasis skin changes B/L Lower EXT, Left foot Dressing+ superficial ulcers with Dry skin & scabs on distal left foot & wound on 2/3  toes, No Necrotic tissues, No drainage  LYMPH: No lymphadenopathy noted  SKIN:  [  ] No rashes or lesions, [  ] Pressure Ulcers, [  ] ecchymosis, [  ] Skin Tears, [ x ] Other- Scabs on left leg, Rt Foot      DIET: REGULAR     LABS:      Ca    8.9        18 Mar 2019 08:14      Culture Results:   Few Pseudomonas aeruginosa  Rare Proteus mirabilis (03-14 @ 21:43)  Culture Results:   >100,000 CFU/ml Escherichia coli  <10,000 CFU/ml Normal Urogenital chayo present (03-14 @ 09:40)  Culture Results:   No growth at 5 days. (03-14 @ 01:31)  Culture Results:   No growth at 5 days. (03-14 @ 01:31)      Culture - Other (collected 14 Mar 2019 21:43)  Source: .Other left foot wound  Final Report (16 Mar 2019 20:19):    Few Pseudomonas aeruginosa    Rare Proteus mirabilis  Organism: Pseudomonas aeruginosa  Proteus mirabilis (16 Mar 2019 20:19)  Organism: Proteus mirabilis (16 Mar 2019 20:19)  Organism: Pseudomonas aeruginosa (16 Mar 2019 20:19)    Culture - Urine (collected 14 Mar 2019 09:40)  Source: .Urine Clean Catch (Midstream)  Final Report (16 Mar 2019 17:45):    >100,000 CFU/ml Escherichia coli    <10,000 CFU/ml Normal Urogenital chayo present  Organism: Escherichia coli (16 Mar 2019 17:45)  Organism: Escherichia coli (16 Mar 2019 17:45)    Culture - Blood (collected 14 Mar 2019 01:31)  Source: .Blood Blood  Final Report (19 Mar 2019 02:00):    No growth at 5 days.    Culture - Blood (collected 14 Mar 2019 01:31)  Source: .Blood Blood  Final Report (19 Mar 2019 02:00):    No growth at 5 days.      RADIOLOGY & ADDITIONAL TESTS: NONE     HEALTH ISSUES - PROBLEM Dx:  Need for prophylactic measure  HTN (hypertension)  Heart failure  Constipation  JULIA (acute kidney injury)  Sacral ulcer  Cellulitis of leg      Consultant(s) Notes Reviewed:  [X  ] YES     Care Discussed with [ x ] Consultants, [X  ] Patient, [  ] Family, [  ] , [ x ] Social Service, [ x ] RN, [  ] Physical Therapy  DVT PPX: [X  ] Lovenox, [  ] S C Heparin, [  ] Coumadin, [  ] Xarelto, [  ] Eliquis, [  ] Pradaxa, [  ] IV Heparin drip, [  ] SCD, [  ] Contraindication secondary to GI Bleed, [  ] Ambulation  Advanced Directive: [ x ] None, [  ] DNR/DNI

## 2019-03-19 NOTE — DISCHARGE NOTE NURSING/CASE MANAGEMENT/SOCIAL WORK - NSDCPEPT PROEDHF_GEN_ALL_CORE
Call primary care provider for follow up after discharge/Activities as tolerated/Monitor weight daily/Report signs and symptoms to primary care provider/Low salt diet

## 2019-03-19 NOTE — PROGRESS NOTE ADULT - REASON FOR ADMISSION
cellulitis
cellulitis B/L Lower EXt
chronic L foot wounds
cellulitis B/L Lower EXt

## 2019-03-19 NOTE — PROGRESS NOTE ADULT - SUBJECTIVE AND OBJECTIVE BOX
[facility], Division of Infectious Diseases  RAIMUNDO Hunt A. Lee  [phone]    Name: LESLY DELCID  Age: 78y  Gender: Female  MRN: [id number]    Interval History--  Notes reviewed. No new complaints.    Past Medical History--  Pacemaker  Essential hypertension  HLD (hyperlipidemia)  CHF (congestive heart failure)  S/P tonsillectomy  Artificial pacemaker      For details regarding the patient's social history, family history, and other miscellaneous elements, please refer the initial infectious diseases consultation and/or the admitting history and physical examination for this admission.    Allergies    aspirin (Unknown)  atorvastatin (Unknown)  penicillin (Unknown)    Intolerances        Medications--  Antibiotics:  cefTRIAXone   IVPB 1 Gram(s) IV Intermittent every 24 hours  cefTRIAXone   IVPB        Immunologic:    Other:  carvedilol  clopidogrel Tablet  Dakins Solution - 1/2 Strength  docusate sodium  enoxaparin Injectable  furosemide    Tablet  lactobacillus acidophilus  nystatin Cream  oxyCODONE    5 mG/acetaminophen 325 mG PRN  polyethylene glycol 3350  senna  traMADol PRN  traMADol PRN      Review of Systems--  A 10-point review of systems was obtained.   Review of systems otherwise unchanged compared to prior visit except as previously noted.    Physical Examination--  Vital Signs: T(F): 98.5 (03-19-19 @ 05:04), Max: 98.5 (03-19-19 @ 05:04)  HR: 87 (03-19-19 @ 05:04)  BP: 128/77 (03-19-19 @ 05:04)  RR: 17 (03-19-19 @ 05:04)  SpO2: 93% (03-19-19 @ 05:04)  Wt(kg): --  General: Nontoxic-appearing Female in no acute distress.  HEENT: AT/NC. Anicteric. Conjunctiva pink and moist. Oropharynx clear. Dentition poor.  Neck: Not rigid. No sense of mass.  Nodes: None palpable.  Lungs: Diminished breath sounds bilaterally without rales, wheezing or rhonchi  Heart: Regular rate and rhythm. No Murmur. No rub. No gallop. No palpable thrill.  Abdomen: Bowel sounds present and normoactive. Soft. Nondistended. Nontender. No sense of mass. No organomegaly. Obese.   Extremities: No cyanosis or clubbing. Venous stasis changes B LE. Patient will allow wound check later today,  Skin: Warm. Dry. Good turgor. No rash. No vasculitic stigmata.  Psychiatric: Appropriate affect and mood for situation.       Laboratory Studies--  CBC                        10.0   4.74  )-----------( 209      ( 19 Mar 2019 08:28 )             32.1       Chemistries  03-19    142  |  104  |  15  ----------------------------<  87  4.1   |  32<H>  |  0.65    Ca    9.2      19 Mar 2019 08:28    TPro  6.8  /  Alb  2.3<L>  /  TBili  0.2  /  DBili  x   /  AST  16  /  ALT  29  /  AlkPhos  89  03-18      Culture Data    Culture - Other (collected 14 Mar 2019 21:43)  Source: .Other left foot wound  Final Report (16 Mar 2019 20:19):    Few Pseudomonas aeruginosa    Rare Proteus mirabilis  Organism: Pseudomonas aeruginosa  Proteus mirabilis (16 Mar 2019 20:19)  Organism: Proteus mirabilis (16 Mar 2019 20:19)  Organism: Pseudomonas aeruginosa (16 Mar 2019 20:19)    Culture - Urine (collected 14 Mar 2019 09:40)  Source: .Urine Clean Catch (Midstream)  Final Report (16 Mar 2019 17:45):    >100,000 CFU/ml Escherichia coli    <10,000 CFU/ml Normal Urogenital chayo present  Organism: Escherichia coli (16 Mar 2019 17:45)  Organism: Escherichia coli (16 Mar 2019 17:45)    Culture - Blood (collected 14 Mar 2019 01:31)  Source: .Blood Blood  Final Report (19 Mar 2019 02:00):    No growth at 5 days.    Culture - Blood (collected 14 Mar 2019 01:31)  Source: .Blood Blood  Final Report (19 Mar 2019 02:00):    No growth at 5 days.

## 2019-03-19 NOTE — PROGRESS NOTE ADULT - PROVIDER SPECIALTY LIST ADULT
Infectious Disease
Internal Medicine
Podiatry
Podiatry
Vascular Surgery
Infectious Disease

## 2019-03-19 NOTE — PROGRESS NOTE ADULT - ASSESSMENT
79 yo F PMH systolic and diastolic CHF w/ EF 30% with PPM, CAD, HTN, PVD admitted for cellulitis of LLE after being discharged from [facility]. Day 6 of ceftriaxone.     1. Cellulitis: Improving  -Venous stasis ulcers/ wounds  worse on LLE , severe PVD with chronic venous stasis ulcer & skin changes  - Patient admitted to [place] in Jan 2019 for cellulitis of LLE, indium scan negative for osteomyelitis in January   -afebrile, no WBC this AM   -Continue with ceftriaxone 1gm  only (Day 6),   - Wound cleansed, dressed with Dakins adaptic, DSD, Podiatry following, (Dr. Fletcher), f/u recs, changed this AM by RN   - ID  (Dr. Mauricio) following, f/u recs   -  blood cultures NGTD, Wound Culture: few Pseudomonas aeruginosa, rare proteus mirabilis  -PA Physiol Extremity Lower 3+, Level, BL: calcified LE arteries, likley b/l tibial artery disease    -BELINDA within normal range as per Vascular ( Dr. Rose), absence of any hemodynamically significant arterial insufficiency   - Continue tramadol 25 mg Q6 PRN for mild pain, tramadol 50 mg Q4 PRN for moderate pain, percocet 5-25 mg Q6 PRN for severe pain  - continue Bacid  - PT evaluated JOHN   - Lac Hydrin lotion for Lower EXT for dry skin /scabs     2. Skin Care- Excoriation thigh  - patient recently discharged from  after spending over 1 month there, minimally ambulatory due to LE wounds  - wound care RN consulted, f/u recs   - frequent position changes, Q3, Nursing skin care, Nystatin cream to affected area    3. Constipation  - while in rehab, last BM prior to admission    -Continue  Miralax daily,  colace (3x/day) and senna (2 tabs at bedtime)    5. HF, Stable Systolic/ Diastolic CHF-stable  - with EF approximately 30 %, on carvedilol, Lasix 40 mg daily  - will continue carvedilol 12.5 mg BID  - continue Lasix 40 mg po QD  - patient taken off enalapril by PMD  - continue to monitor for signs of fluid overload  -patient's pacemaker is not MRI compatible, patient has [employer]tronic pacemaker.     6. PVD  - continue Plavix daily    7. HTN  - continue carvedilol & Lasix   - DASH diet    8.-Anemia - Chronic with Cellulitis, ACD likely   -CBC daily    9. Need for prophylactic measure    - Improve score: 1   - Lovenox 40 daily for DVT PPX  - PT evaluated today: recommend JOHN   - out of bed with assistance  - fall precautions  - bedside commode   - frequent position changes . 77 yo F [facility] systolic and diastolic CHF w/ EF 30% with PPM, CAD, HTN, PVD admitted for cellulitis of LLE after being discharged from [facility]. Day 6 of ceftriaxone.     1. Cellulitis: Improving  -Venous stasis ulcers/ wounds  worse on LLE , severe PVD with chronic venous stasis ulcer & skin changes  - Patient admitted to [facility] in Jan 2019 for cellulitis of LLE, indium scan negative for osteomyelitis in January   -afebrile, no WBC this AM   -Continue with ceftriaxone 1gm  only (Day 6),   - Wound cleansed, dressed with Dakins adaptic, DSD, Podiatry following, (Dr. Fletcher), f/u recs, changed this AM by RN   - ID  (Dr. Mauricio) following, f/u recs   -  blood cultures NGTD, Wound Culture: few Pseudomonas aeruginosa, rare proteus mirabilis  -PA Physiol Extremity Lower 3+, Level, BL: calcified LE arteries, likley b/l tibial artery disease    -BELINDA within normal range as per Vascular ( Dr. Rose), absence of any hemodynamically significant arterial insufficiency   - Continue tramadol 25 mg Q6 PRN for mild pain, tramadol 50 mg Q4 PRN for moderate pain, percocet 5-25 mg Q6 PRN for severe pain  - continue Bacid  - PT evaluated JOHN   - Lac Hydrin lotion for Lower EXT for dry skin /scabs     2. Skin Care- Excoriation thigh  - patient recently discharged from  after spending over 1 month there, minimally ambulatory due to LE wounds  - wound care RN consulted, f/u recs   - frequent position changes, Q3, Nursing skin care, Nystatin cream to affected area    3. Constipation  - while in rehab, last BM prior to admission    -Continue  Miralax daily,  colace (3x/day) and senna (2 tabs at bedtime)  -Dulcolax suppository ordered stat today.     5. HF, Stable Systolic/ Diastolic CHF-stable  - with EF approximately 30 %, on carvedilol, Lasix 40 mg daily  - will continue carvedilol 12.5 mg BID  - continue Lasix 40 mg po QD  - patient taken off enalapril by PMD  - continue to monitor for signs of fluid overload  -patient's pacemaker is not MRI compatible, patient has Medtronic pacemaker.     6. PVD  - continue Plavix daily    7. HTN  - continue carvedilol & Lasix   - DASH diet    8.-Anemia - Chronic with Cellulitis, ACD likely   -CBC daily    9. Need for prophylactic measure    - Improve score: 1   - Lovenox 40 daily for DVT PPX  - PT evaluated today: recommend JOHN   - out of bed with assistance  - fall precautions  - bedside commode   - frequent position changes . 79 yo F PMH systolic and diastolic CHF w/ EF 30% with PPM, CAD, HTN, PVD admitted for cellulitis of LLE after being discharged from [facility]. Day 6 of ceftriaxone.     1. Cellulitis: Improving  -Venous stasis ulcers/ wounds  on LLE ,  Improved with local wound care ,  -severe PVD with chronic venous stasis ulcer & skin changes  - Patient admitted to [place] in Jan 2019 for cellulitis of LLE, indium scan negative for osteomyelitis in January   -afebrile, no WBC this AM   -Continue with ceftriaxone 1gm  only (Day 6), D/W Dr cabrera - NO Abx   - Wound cleansed, dressed with Hydrogel , DSD, Podiatry following, (Dr. Fletcher), f/u recs, changed this AM by RN , Dressing done again by Dr Fletcher / with Dr Cabrera  -  blood cultures NGTD, Wound Culture: few Pseudomonas aeruginosa, rare proteus mirabilis  -PA Physiol Extremity Lower 3+, Level, BL: calcified LE arteries, likley b/l tibial artery disease    -BELINDA within normal range as per Vascular ( Dr. Rose), absence of any hemodynamically significant arterial insufficiency   - Continue tramadol 25 mg Q6 PRN for mild pain, tramadol 50 mg Q4 PRN for moderate pain, percocet 5-25 mg Q6 PRN for severe pain  - continue Bacid  - PT evaluated JOHN   - Lac Hydrin lotion for Lower EXT for dry skin /scabs     2. Skin Care- Excoriation thigh  - patient recently discharged from  after spending over 1 month there, minimally ambulatory due to LE wounds  - wound care RN consulted, f/u recs   - frequent position changes, Q3, Nursing skin care, Nystatin cream to affected area    3. Constipation- chronic  -  last BM prior to admission    -Continue  Miralax daily,  colace (3x/day) and senna (2 tabs at bedtime)  -Dulcolax suppository ordered stat today. MOM  -Pt is refusing enema & MOM & states She does NOT want any intervention  & NO ather meds for constipation as she is fine & she will have BM at some point    5. HF, Stable Systolic/ Diastolic CHF-stable  - with EF approximately 30 %, on carvedilol, Lasix 40 mg daily  - will continue carvedilol 12.5 mg BID  - continue Lasix 40 mg po QD  - patient taken off enalapril by PMD  - continue to monitor for signs of fluid overload  -patient's pacemaker is not MRI compatible, patient has Medtronic pacemaker.     6. PVD  - continue Plavix daily, follow up with Vascular Sx as out pt    7. HTN  - continue carvedilol & Lasix   - DASH diet    8.-Anemia - Chronic with Cellulitis, ACD likely   -CBC daily, Out pt Anemia work up.    9. Need for prophylactic measure    - Improve score: 1   - Lovenox 40 daily for DVT PPX  - PT evaluated today: recommend JOHN   - out of bed with assistance  - fall precautions  - bedside commode   - frequent position changes .  -D/C to JOHN today.

## 2019-03-19 NOTE — PROGRESS NOTE ADULT - ASSESSMENT
79 yo F PMH systolic and diastolic CHF w/ EF 30% with PPM, CAD, HTN, HLD, PVD presenting to ED after being sent in by Dr. Fletcher for cellulitis due to left foot wound.  Patient's pacemaker not MRI compatible, indium scan performed January 2019, negative for osteomyelitis.    1. Cellulitis  Will assess foot later this PM  Surface cx isolates of limited significance in this setting.      2. PVD  No interventions planned at this time    D/W Dr. ISAÍAS Serrano.    Benton Cabrera MD  [phone]

## 2019-03-19 NOTE — PROGRESS NOTE ADULT - NSICDXPROBLEM_GEN_ALL_CORE_FT
PROBLEM DIAGNOSES  Problem: Need for prophylactic measure  Assessment and Plan:     Problem: HTN (hypertension)  Assessment and Plan:     Problem: Heart failure  Assessment and Plan:     Problem: Constipation  Assessment and Plan:     Problem: JULIA (acute kidney injury)  Assessment and Plan:     Problem: Sacral ulcer  Assessment and Plan:     Problem: Cellulitis of leg  Assessment and Plan:
PROBLEM DIAGNOSES  Problem: Need for prophylactic measure  Assessment and Plan:     Problem: HTN (hypertension)  Assessment and Plan:     Problem: Heart failure  Assessment and Plan:     Problem: Constipation  Assessment and Plan:     Problem: Sacral ulcer  Assessment and Plan:     Problem: Cellulitis of leg  Assessment and Plan:

## 2019-03-25 ENCOUNTER — APPOINTMENT (OUTPATIENT)
Dept: VASCULAR SURGERY | Facility: CLINIC | Age: 79
End: 2019-03-25
Payer: COMMERCIAL

## 2019-03-25 VITALS
DIASTOLIC BLOOD PRESSURE: 58 MMHG | SYSTOLIC BLOOD PRESSURE: 95 MMHG | TEMPERATURE: 98.5 F | HEART RATE: 73 BPM | OXYGEN SATURATION: 94 %

## 2019-03-25 PROCEDURE — 99203 OFFICE O/P NEW LOW 30 MIN: CPT

## 2019-03-25 NOTE — ASSESSMENT
[FreeTextEntry1] : 78 year old female with chronic venous insufficiency and recurrent left leg ulcers.\par Unna boot applied to left leg. Recommend weekly change until ulcers are healed\par Recommend daily ace bandage wraps to right leg\par Will transition to compression stockings and possibly a pneumatic boot as leg swelling improves\par Will also obtain reflux study when ulceration improves

## 2019-03-25 NOTE — PHYSICAL EXAM
[JVD] : no jugular venous distention  [Carotid Bruits] : no carotid bruits [Normal Heart Sounds] : normal heart sounds [2+] : left 2+ [Ankle Swelling (On Exam)] : present [Ankle Swelling Bilaterally] : severe [] : bilaterally [Abdomen Masses] : No abdominal masses [No Rash or Lesion] : No rash or lesion [Alert] : alert [Oriented to Person] : oriented to person [Oriented to Place] : oriented to place [Oriented to Time] : oriented to time [de-identified] : Well appearing, NAD [FreeTextEntry1] : 3cm x 2cm  left medial medial malleolar superficial ulceration.\par Left plantar poorly defined area of superficial epidermal ulceration\par Non infected

## 2019-03-25 NOTE — HISTORY OF PRESENT ILLNESS
[FreeTextEntry1] : 78 year old female with a protracted history bilateral lower extremity chronic venous insufficiency with recurrent venous ulcers and several recent hospital admissions for cellulitis. Symptoms and ulcerations are usually worse in her left leg. She denies previous DVTs.\par She was most recently admitted to [facility] for infected left leg ulcers and has now been discharged to rehab. She does not use compression dressings or an Unna boot.\par Recent BELINAD/PVR performed last month was negative.\par Patient has a h/op CHF and is on lasix

## 2019-04-01 ENCOUNTER — APPOINTMENT (OUTPATIENT)
Dept: VASCULAR SURGERY | Facility: CLINIC | Age: 79
End: 2019-04-01
Payer: COMMERCIAL

## 2019-04-01 VITALS
DIASTOLIC BLOOD PRESSURE: 70 MMHG | OXYGEN SATURATION: 97 % | TEMPERATURE: 97.9 F | SYSTOLIC BLOOD PRESSURE: 112 MMHG | HEART RATE: 91 BPM

## 2019-04-01 DIAGNOSIS — Z83.511 FAMILY HISTORY OF GLAUCOMA: ICD-10-CM

## 2019-04-01 DIAGNOSIS — Z82.61 FAMILY HISTORY OF ARTHRITIS: ICD-10-CM

## 2019-04-01 DIAGNOSIS — L03.116 CELLULITIS OF LEFT LOWER LIMB: ICD-10-CM

## 2019-04-01 DIAGNOSIS — I50.9 HEART FAILURE, UNSPECIFIED: ICD-10-CM

## 2019-04-01 DIAGNOSIS — K21.9 GASTRO-ESOPHAGEAL REFLUX DISEASE W/OUT ESOPHAGITIS: ICD-10-CM

## 2019-04-01 DIAGNOSIS — Z86.79 PERSONAL HISTORY OF OTHER DISEASES OF THE CIRCULATORY SYSTEM: ICD-10-CM

## 2019-04-01 DIAGNOSIS — N17.9 ACUTE KIDNEY FAILURE, UNSPECIFIED: ICD-10-CM

## 2019-04-01 DIAGNOSIS — D64.9 ANEMIA, UNSPECIFIED: ICD-10-CM

## 2019-04-01 DIAGNOSIS — M62.81 MUSCLE WEAKNESS (GENERALIZED): ICD-10-CM

## 2019-04-01 DIAGNOSIS — Z95.0 PRESENCE OF CARDIAC PACEMAKER: ICD-10-CM

## 2019-04-01 DIAGNOSIS — I10 ESSENTIAL (PRIMARY) HYPERTENSION: ICD-10-CM

## 2019-04-01 DIAGNOSIS — Z87.891 PERSONAL HISTORY OF NICOTINE DEPENDENCE: ICD-10-CM

## 2019-04-01 DIAGNOSIS — Z86.39 PERSONAL HISTORY OF OTHER ENDOCRINE, NUTRITIONAL AND METABOLIC DISEASE: ICD-10-CM

## 2019-04-01 DIAGNOSIS — Z87.39 PERSONAL HISTORY OF OTHER DISEASES OF THE MUSCULOSKELETAL SYSTEM AND CONNECTIVE TISSUE: ICD-10-CM

## 2019-04-01 DIAGNOSIS — K59.00 CONSTIPATION, UNSPECIFIED: ICD-10-CM

## 2019-04-01 PROCEDURE — 29580 STRAPPING UNNA BOOT: CPT | Mod: LT

## 2019-04-01 RX ORDER — OMEPRAZOLE 20 MG/1
20 CAPSULE, DELAYED RELEASE ORAL
Refills: 0 | Status: ACTIVE | COMMUNITY

## 2019-04-01 RX ORDER — OXYCODONE HYDROCHLORIDE AND ACETAMINOPHEN 5; 325 MG/1; MG/1
5-325 TABLET ORAL
Refills: 0 | Status: ACTIVE | COMMUNITY
Start: 2019-04-01

## 2019-04-01 RX ORDER — NYSTATIN 100000 [USP'U]/G
100000 CREAM TOPICAL
Refills: 0 | Status: ACTIVE | COMMUNITY
Start: 2019-04-01

## 2019-04-01 RX ORDER — POTASSIUM CHLORIDE 1500 MG/1
20 TABLET, EXTENDED RELEASE ORAL
Qty: 30 | Refills: 0 | Status: DISCONTINUED | COMMUNITY
Start: 2018-08-03 | End: 2019-04-01

## 2019-04-01 RX ORDER — ENALAPRIL MALEATE 10 MG/1
10 TABLET ORAL
Qty: 30 | Refills: 0 | Status: DISCONTINUED | COMMUNITY
Start: 2018-08-03 | End: 2019-04-01

## 2019-04-01 RX ORDER — L. ACIDOPHILUS/L.BULGARICUS 1MM CELL
TABLET ORAL
Refills: 0 | Status: ACTIVE | COMMUNITY
Start: 2019-04-01

## 2019-04-01 RX ORDER — DOCUSATE SODIUM 100 MG/1
100 CAPSULE, LIQUID FILLED ORAL TWICE DAILY
Qty: 60 | Refills: 5 | Status: ACTIVE | COMMUNITY
Start: 2019-04-01

## 2019-04-01 RX ORDER — CLOPIDOGREL 75 MG/1
75 TABLET, FILM COATED ORAL
Refills: 0 | Status: ACTIVE | COMMUNITY

## 2019-04-01 RX ORDER — FUROSEMIDE 40 MG/1
40 TABLET ORAL
Refills: 0 | Status: ACTIVE | COMMUNITY

## 2019-04-01 RX ORDER — CARVEDILOL 12.5 MG/1
12.5 TABLET, FILM COATED ORAL
Refills: 0 | Status: ACTIVE | COMMUNITY

## 2019-04-01 RX ORDER — ENOXAPARIN SODIUM 100 MG/ML
100 INJECTION SUBCUTANEOUS
Refills: 0 | Status: ACTIVE | COMMUNITY
Start: 2019-04-01

## 2019-04-01 RX ORDER — POLYETHYLENE GLYCOL 3350 17 G/17G
17 POWDER, FOR SOLUTION ORAL
Refills: 0 | Status: ACTIVE | COMMUNITY
Start: 2019-04-01

## 2019-04-01 NOTE — REASON FOR VISIT
[Follow-Up: _____] : a [unfilled] follow-up visit [FreeTextEntry1] : Venous insufficiency and venous ulcers to LLE.

## 2019-04-01 NOTE — HISTORY OF PRESENT ILLNESS
[FreeTextEntry1] : 78 year old female with a protracted history bilateral lower extremity chronic venous insufficiency with recurrent venous ulcers and several recent hospital admissions for cellulitis. Symptoms and ulcerations are usually worse in her left leg. She denies previous DVTs.\par She was most recently admitted to [facility] for infected left leg ulcers and has now been discharged to rehab. She does not use compression dressings or an Unna boot.\par Recent BELINDA/PVR performed last month was negative.\par Patient has a h/o CHF and is on Lasix [de-identified] : 79 y/o female with venous insufficiency and venous ulcers to LLE. She residers at [facility], has PT daily, but her sister who accompanies her states the order was not specific for weight bearing and she was unable to stand all week. She reports tenderness when foot is touched. Skin is very dry, superficial ulcers continue to resolve. there is no weeping or drainage to foot of leg. She has been wearing an Ace to RLE. No fever or chills reported.

## 2019-04-01 NOTE — PHYSICAL EXAM
[Normal Heart Sounds] : normal heart sounds [2+] : left 2+ [Ankle Swelling (On Exam)] : present [Ankle Swelling Bilaterally] : severe [] : bilaterally [No Rash or Lesion] : No rash or lesion [Alert] : alert [Oriented to Person] : oriented to person [Oriented to Place] : oriented to place [Oriented to Time] : oriented to time [Calm] : calm [JVD] : no jugular venous distention  [Carotid Bruits] : no carotid bruits [Abdomen Masses] : No abdominal masses [de-identified] : WD, WN, NAD. Awake, alert, interactive.  [de-identified] : ELIS, PERFRANCOL [de-identified] : supple [FreeTextEntry1] : 2 cm x 1 cm  left medial medial malleolar superficial ulceration, closed\par Left plantar poorly defined area of superficial epidermal ulceration\par No erythema, no exudate or weeping. [de-identified] : no cyanosis or deformity. full ROM, MS 5/5\par

## 2019-04-01 NOTE — ASSESSMENT
[FreeTextEntry1] : 78 year old female with chronic venous insufficiency and recurrent left leg ulcers. Will continue with Unna Boot, Coban and Ace to LLE. She may continue to participate in PT, full weight bearing without restriction. She should have Unna Boot removed the day before coming to the office and shower. She should continue with daily ace bandage wraps to right leg to assist with reduction of edema. Continue with Lasix, Lovenox and Plavix. Will transition to compression stockings and possibly a pneumatic boot as leg swelling improves. I will obtain reflux study when ulceration improves. RTC in 1 week.

## 2019-04-08 ENCOUNTER — APPOINTMENT (OUTPATIENT)
Age: 79
End: 2019-04-08
Payer: COMMERCIAL

## 2019-04-08 VITALS
TEMPERATURE: 98.3 F | DIASTOLIC BLOOD PRESSURE: 56 MMHG | RESPIRATION RATE: 14 BRPM | OXYGEN SATURATION: 97 % | SYSTOLIC BLOOD PRESSURE: 111 MMHG | HEART RATE: 87 BPM

## 2019-04-08 PROCEDURE — 29580 STRAPPING UNNA BOOT: CPT | Mod: 50

## 2019-04-08 NOTE — REVIEW OF SYSTEMS
[As Noted in HPI] : as noted in HPI [Negative] : Psychiatric [de-identified] : feet with sensitivity to touch

## 2019-04-08 NOTE — HISTORY OF PRESENT ILLNESS
[FreeTextEntry1] : 78 year old female with a protracted history bilateral lower extremity chronic venous insufficiency with recurrent venous ulcers and several recent hospital admissions for cellulitis. Symptoms and ulcerations are usually worse in her left leg. She denies previous DVTs.\par She was most recently admitted to [facility] for infected left leg ulcers and has now been discharged to rehab. She does not use compression dressings or an Unna boot.\par Recent BELINDA/PVR performed last month was negative.\par Patient has a h/o CHF and is on Lasix [de-identified] : 79 y/o female with venous insufficiency and venous ulcers to LLE. She residers at [facility], has PT daily.. She reports tenderness when foot is touched. Skin is very dry, superficial ulcers continue to resolve. there is no weeping or drainage to foot of leg. She has been wearing an Ace to RLE, however there is a small 0.5 cm superficial ulcer to dorsal aspect of right foot. No fever or chills reported.

## 2019-04-08 NOTE — PHYSICAL EXAM
[Normal Heart Sounds] : normal heart sounds [2+] : left 2+ [Ankle Swelling (On Exam)] : present [Ankle Swelling Bilaterally] : severe [] : bilaterally [No Rash or Lesion] : No rash or lesion [Alert] : alert [Oriented to Person] : oriented to person [Oriented to Place] : oriented to place [Oriented to Time] : oriented to time [Calm] : calm [JVD] : no jugular venous distention  [Carotid Bruits] : no carotid bruits [Abdomen Masses] : No abdominal masses [de-identified] : WD, WN, NAD. Awake, alert, interactive. Ambulates by wheelchair. [de-identified] : ELIS, PERFRANCOL [de-identified] : supple [FreeTextEntry1] : Left plantar poorly defined area of superficial epidermal ulceration\par 2nd Toe LLE with superficial excoriation.\par No erythema, no exudate or weeping.\par R dorsal foot with new superficial ulcer 0.5 cm. [de-identified] : no cyanosis or deformity. full ROM, MS 5/5\par  [de-identified] : Chronic venous stasis hyperpigmentation and dermatitis.

## 2019-04-08 NOTE — ASSESSMENT
[FreeTextEntry1] : 78 year old female with chronic venous insufficiency and recurrent LLE ulcers and new RLE dorsal foot ulcer.  Will apply BLE Unna Boot, Coban and Ace for mild compression. She may continue to participate in PT, full weight bearing without restriction. She should have Unna Boot removed the day before coming to the office and shower. Continue with Lasix, Lovenox and Plavix. Will transition to compression stockings and possibly a pneumatic boot as leg swelling improves. I will obtain reflux study when ulceration improves. RTC in 1 week.

## 2019-04-15 ENCOUNTER — APPOINTMENT (OUTPATIENT)
Age: 79
End: 2019-04-15
Payer: COMMERCIAL

## 2019-04-15 VITALS
HEART RATE: 94 BPM | DIASTOLIC BLOOD PRESSURE: 69 MMHG | SYSTOLIC BLOOD PRESSURE: 108 MMHG | OXYGEN SATURATION: 94 % | TEMPERATURE: 97.3 F

## 2019-04-15 PROCEDURE — 29580 STRAPPING UNNA BOOT: CPT | Mod: 50

## 2019-04-15 NOTE — ASSESSMENT
[FreeTextEntry1] : 78 year old female with chronic venous insufficiency and recurrent ulcers BLE which have almost resolved. Soaked and cleaned feet. I applied Santyl to wounds followed by Sourav Burt and Ace for mild compression. She may continue to participate in PT, full weight bearing without restriction. She should have Unna Boot removed the day before coming to the office and shower. Continue with Lasix, Lovenox and Plavix. Will transition to compression stockings and possibly a pneumatic boot as leg swelling improves. I will obtain reflux study when ulceration improves. RTC in 1 week.

## 2019-04-15 NOTE — HISTORY OF PRESENT ILLNESS
[FreeTextEntry1] : 78 year old female with a protracted history bilateral lower extremity chronic venous insufficiency with recurrent venous ulcers and several recent hospital admissions for cellulitis. Symptoms and ulcerations are usually worse in her left leg. She denies previous DVTs.\par She was most recently admitted to [facility] for infected left leg ulcers and has now been discharged to rehab. She does not use compression dressings or an Unna boot.\par Recent BELINDA/PVR performed last month was negative.\par Patient has a h/o CHF and is on Lasix [de-identified] : 77 y/o female with venous insufficiency and venous ulcers to [facility]. She residers at [facility], presents with an aide. She has PT daily.. She reports tenderness when foot is touched, but improved from last week. Skin is very dry,itchy and  superficial ulcers have almost resolved. There is no weeping or drainage to wounds. She has tolerated BLE Unna Boots without difficulty. There are small 0.2 cm superficial ulcer to dorsal aspect of B/L feet and left 2nd toe. No erythema or drainage. No fever or chills reported.

## 2019-04-15 NOTE — REVIEW OF SYSTEMS
[As Noted in HPI] : as noted in HPI [Negative] : Psychiatric [de-identified] : feet with sensitivity to touch

## 2019-04-15 NOTE — PHYSICAL EXAM
[Normal Heart Sounds] : normal heart sounds [2+] : left 2+ [Ankle Swelling (On Exam)] : present [Ankle Swelling Bilaterally] : severe [] : bilaterally [No Rash or Lesion] : No rash or lesion [Alert] : alert [Oriented to Person] : oriented to person [Oriented to Place] : oriented to place [Oriented to Time] : oriented to time [Calm] : calm [JVD] : no jugular venous distention  [Carotid Bruits] : no carotid bruits [Abdomen Masses] : No abdominal masses [de-identified] : ELIS, PERFRANCOL [de-identified] : supple [de-identified] : WD, WN, NAD. Awake, alert, interactive. Ambulates by wheelchair. [de-identified] : no cyanosis or deformity. full ROM, MS 5/5\par  [de-identified] : Chronic venous stasis hyperpigmentation and dermatitis. Skin is very dry,itchy and  superficial ulcers have almost resolved. There is no weeping or drainage to wounds. She has tolerated BLE Unna Boots without difficulty. There are small 0.2 cm superficial ulcer to dorsal aspect of B/L feet and left 2nd toe

## 2019-04-24 ENCOUNTER — APPOINTMENT (OUTPATIENT)
Age: 79
End: 2019-04-24
Payer: COMMERCIAL

## 2019-04-24 VITALS
SYSTOLIC BLOOD PRESSURE: 97 MMHG | HEART RATE: 77 BPM | DIASTOLIC BLOOD PRESSURE: 61 MMHG | OXYGEN SATURATION: 95 % | TEMPERATURE: 97.9 F

## 2019-04-24 DIAGNOSIS — L97.929 VARICOSE VEINS OF LEFT LOWER EXTREMITY WITH ULCER OF UNSPECIFIED SITE: ICD-10-CM

## 2019-04-24 DIAGNOSIS — I83.029 VARICOSE VEINS OF LEFT LOWER EXTREMITY WITH ULCER OF UNSPECIFIED SITE: ICD-10-CM

## 2019-04-24 DIAGNOSIS — I83.019 VARICOSE VEINS OF RIGHT LOWER EXTREMITY WITH ULCER OF UNSPECIFIED SITE: ICD-10-CM

## 2019-04-24 DIAGNOSIS — L97.919 VARICOSE VEINS OF RIGHT LOWER EXTREMITY WITH ULCER OF UNSPECIFIED SITE: ICD-10-CM

## 2019-04-24 DIAGNOSIS — L97.929 VARICOSE VEINS OF RIGHT LOWER EXTREMITY WITH ULCER OF UNSPECIFIED SITE: ICD-10-CM

## 2019-04-24 DIAGNOSIS — I83.029 VARICOSE VEINS OF RIGHT LOWER EXTREMITY WITH ULCER OF UNSPECIFIED SITE: ICD-10-CM

## 2019-04-24 PROCEDURE — 29580 STRAPPING UNNA BOOT: CPT | Mod: LT

## 2019-04-24 NOTE — ASSESSMENT
[FreeTextEntry1] : 78 year old female with chronic venous insufficiency and recurrent ulcers BLE which have resolved. I will continue with moisturizer, followed by and Sourav Panda and Ace to LLE and an Ace to RLE. She should see if the Nursing home can purchase Compression stockings for her to use and bring them with her next week. She should continue PT daily, full weight bearing without restriction. Continue with Lasix, Lovenox and Plavix. I will obtain reflux study when ulceration improves. RTC in 1 week.

## 2019-04-24 NOTE — REVIEW OF SYSTEMS
[As Noted in HPI] : as noted in HPI [Joint Pain] : joint pain [Joint Stiffness] : joint stiffness [Limb Pain] : no limb pain [Limb Swelling] : no limb swelling [Skin Wound] : no skin wound [Negative] : Neurological [de-identified] : s

## 2019-04-24 NOTE — HISTORY OF PRESENT ILLNESS
[FreeTextEntry1] : 78 year old female with a protracted history bilateral lower extremity chronic venous insufficiency with recurrent venous ulcers and several recent hospital admissions for cellulitis. Symptoms and ulcerations are usually worse in her left leg. She denies previous DVTs.\par She was most recently admitted to [facility] for infected left leg ulcers and has now been discharged to rehab. She does not use compression dressings or an Unna boot.\par Recent BELINDA/PVR performed last month was negative.\par Patient has a h/o CHF and is on Lasix [de-identified] : 79 y/o female with venous insufficiency and venous ulcers to [facility]. She residers at [facility], presents with an aide. She has PT daily. There is minimal tenderness to legs, no longer has pain with palpation. The left toe ulcer has healed as well as the ulcers to E. There remains a superficial layer of irritation where dry skin is peeling off. There is no erythema, weeping or drainage to legs. No fever or chills reported.

## 2019-04-24 NOTE — PHYSICAL EXAM
[Normal Heart Sounds] : normal heart sounds [2+] : right 2+ [Ankle Swelling (On Exam)] : present [] : bilaterally [No Rash or Lesion] : No rash or lesion [Oriented to Person] : oriented to person [Alert] : alert [Oriented to Place] : oriented to place [Oriented to Time] : oriented to time [Calm] : calm [JVD] : no jugular venous distention  [Carotid Bruits] : no carotid bruits [Ankle Swelling Bilaterally] : severe [Abdomen Masses] : No abdominal masses [de-identified] : WD, WN, NAD. Awake, alert, interactive. Ambulates by wheelchair. [de-identified] : supple [de-identified] : ELIS, PERFRANCOL [de-identified] : no cyanosis or deformity. full ROM, MS 5/5\par  [de-identified] : Chronic venous stasis hyperpigmentation and dermatitis. Skin is very dry with patches of dry skin over new skin. Superficial ulcers have resolved. There is no weeping or drainage to legs. No s/s of infection.

## 2019-05-03 ENCOUNTER — APPOINTMENT (OUTPATIENT)
Dept: VASCULAR SURGERY | Facility: CLINIC | Age: 79
End: 2019-05-03
Payer: COMMERCIAL

## 2019-05-03 VITALS
DIASTOLIC BLOOD PRESSURE: 71 MMHG | SYSTOLIC BLOOD PRESSURE: 119 MMHG | OXYGEN SATURATION: 96 % | TEMPERATURE: 98 F | HEART RATE: 98 BPM

## 2019-05-03 PROCEDURE — 29580 STRAPPING UNNA BOOT: CPT | Mod: LT

## 2019-05-03 NOTE — PHYSICAL EXAM
[Normal Heart Sounds] : normal heart sounds [Ankle Swelling (On Exam)] : present [2+] : left 2+ [] : present [Ankle Swelling Bilaterally] : severe [Oriented to Person] : oriented to person [Alert] : alert [No Rash or Lesion] : No rash or lesion [Oriented to Time] : oriented to time [Oriented to Place] : oriented to place [Calm] : calm [JVD] : no jugular venous distention  [Carotid Bruits] : no carotid bruits [Abdomen Masses] : No abdominal masses [de-identified] : WD, WN, NAD. Awake, alert, interactive. Ambulates by wheelchair. [de-identified] : ELIS, PERFRANCOL [de-identified] : supple [de-identified] : no cyanosis or deformity. full ROM, MS 5/5\par  [de-identified] : Chronic venous stasis hyperpigmentation and dermatitis. Skin is very dry with patches of dry skin over new skin. Superficial ulcers have resolved. There is no weeping or drainage to legs. No s/s of infection.

## 2019-05-03 NOTE — REVIEW OF SYSTEMS
[Joint Pain] : joint pain [Joint Stiffness] : joint stiffness [As Noted in HPI] : as noted in HPI [Negative] : Psychiatric [Limb Pain] : no limb pain [Limb Swelling] : no limb swelling [Skin Wound] : no skin wound

## 2019-05-03 NOTE — HISTORY OF PRESENT ILLNESS
[FreeTextEntry1] : 78 year old female with a protracted history bilateral lower extremity chronic venous insufficiency with recurrent venous ulcers and several recent hospital admissions for cellulitis. Symptoms and ulcerations are usually worse in her left leg. She denies previous DVTs.\par She was most recently admitted to [facility] for infected left leg ulcers and has now been discharged to rehab. She does not use compression dressings or an Unna boot.\par Recent BELINDA/PVR performed last month was negative.\par Patient has a h/o CHF and is on Lasix [de-identified] : 79 y/o female with venous insufficiency and venous ulcers to [facility]. She resides at [facility], presents with an aide. She has PT daily. There is minimal tenderness to legs, no longer has pain with palpation. The left toe ulcer has healed as well as the ulcers to E. Superficial layer of dry peeling skin remains. There is no erythema, weeping or drainage to legs. No fever or chills reported.

## 2019-05-08 ENCOUNTER — APPOINTMENT (OUTPATIENT)
Dept: VASCULAR SURGERY | Facility: CLINIC | Age: 79
End: 2019-05-08
Payer: COMMERCIAL

## 2019-05-08 VITALS
SYSTOLIC BLOOD PRESSURE: 106 MMHG | OXYGEN SATURATION: 97 % | TEMPERATURE: 98.3 F | HEART RATE: 79 BPM | RESPIRATION RATE: 14 BRPM | DIASTOLIC BLOOD PRESSURE: 59 MMHG

## 2019-05-08 DIAGNOSIS — L02.429 FURUNCLE OF LIMB, UNSPECIFIED: ICD-10-CM

## 2019-05-08 PROCEDURE — 29580 STRAPPING UNNA BOOT: CPT | Mod: LT

## 2019-05-08 NOTE — ASSESSMENT
[FreeTextEntry1] : 78 year old female with chronic venous insufficiency and recurrent ulcers BLE which have resolved. I applied an Unna Boot to LLE and an Ace wrap to RLE until provided with compression stockings by Nursing Facility. I applied Bacitracin Zinc to knee wound, DSD and Tegaderm. This wound should be further evaluated by ID or wound care at the nursing facility and they should continue with daily wound care. She should continue PT daily, full weight bearing without restriction. Continue with Lasix, Lovenox and Plavix. I will obtain reflux study at next visit. RTC in 1 week.

## 2019-05-08 NOTE — HISTORY OF PRESENT ILLNESS
[FreeTextEntry1] : 78 year old female with a protracted history bilateral lower extremity chronic venous insufficiency with recurrent venous ulcers and several recent hospital admissions for cellulitis. Symptoms and ulcerations are usually worse in her left leg. She denies previous DVTs.\par She was most recently admitted to [facility] for infected left leg ulcers and has now been discharged to rehab. She does not use compression dressings or an Unna boot.\par Recent BELINDA/PVR performed last month was negative.\par Patient has a h/o CHF and is on Lasix [de-identified] : 79 y/o female with venous insufficiency and venous ulcers to [facility]. She resides at [facility], presents with an aide. She has PT/OT daily. There is minimal tenderness to legs, no longer has pain with palpation. TAll previous ulcers have healed on BLE. Today she states she had a fever last night and a boil just under her left knee, medially. She was given Tylenol, but no one looked at her new wound. She states the wound opened and purulent drainage came out. There is edema and mild erythema and tenderness to 3 cm surrounding wound. No drainage noted.  No current fever or chills reported.

## 2019-05-08 NOTE — PHYSICAL EXAM
Parveen Borrero is 8 y.o. girl with nearly 1 year of progressive gaseous abdominal distention and postprandial dyspepsia. Parveen Borrero has also had premature graying of the hair and excessive and unexplained weight gain. I am pleased that Parveen Borrero will see the pediatric endocrinologist just after this visit, and we will add to Nani's evaluation given her pervasive gastrointestinal complaints. Parveen Borrero has postprandial stool urgency and abdominal pain. I would like to assess the stool pattern to see if this is related to colonic stool impaction, as a bowel regimen would very quickly yield improvement. The premature graying of the hair brings to mind vitamin B12 deficiency and iron deficiency, which we will assess for with lab evaluation today. In addition to completing the lab evaluation for inflammatory bowel disease with inflammatory markers, I would also obtain a celiac disease screen today. This may be added to the suggested lab evaluation at the endocrinology office. As Dais bowel pattern could be related to fecal impaction, it is important to definitively exclude this possibility with an abdominal film today. If the abdominal film is negative for constipation, I would seek to treat irritable bowel syndrome with Bentyl antispasmodic while we wait for the lab results. If there has been no improvement with IBS treatment or the lab results are concerning for celiac or Crohn's disease, there would be a role for endoscopy and colonoscopy with biopsy. [Normal Heart Sounds] : normal heart sounds [2+] : left 2+ [Ankle Swelling (On Exam)] : present [Ankle Swelling Bilaterally] : severe [] : present [No Rash or Lesion] : No rash or lesion [Alert] : alert [Oriented to Person] : oriented to person [Oriented to Place] : oriented to place [Oriented to Time] : oriented to time [Calm] : calm [JVD] : no jugular venous distention  [Carotid Bruits] : no carotid bruits [Abdomen Masses] : No abdominal masses [de-identified] : WD, WN, NAD. Awake, alert, interactive. Ambulates by wheelchair. [de-identified] : ELIS, PERFRANCOL [de-identified] : supple [de-identified] : no cyanosis or deformity. full ROM, MS 5/5\par  [de-identified] : Chronic venous stasis hyperpigmentation and dermatitis. Skin is dry. All previous wounds have healed. New wound to left knee, medially, looks like a boil. No drainage, mild erythema and tenderness.

## 2019-05-15 ENCOUNTER — APPOINTMENT (OUTPATIENT)
Dept: VASCULAR SURGERY | Facility: CLINIC | Age: 79
End: 2019-05-15
Payer: COMMERCIAL

## 2019-05-15 VITALS
OXYGEN SATURATION: 98 % | SYSTOLIC BLOOD PRESSURE: 126 MMHG | HEART RATE: 116 BPM | TEMPERATURE: 97.9 F | DIASTOLIC BLOOD PRESSURE: 76 MMHG

## 2019-05-15 DIAGNOSIS — I87.2 VENOUS INSUFFICIENCY (CHRONIC) (PERIPHERAL): ICD-10-CM

## 2019-05-15 DIAGNOSIS — I73.9 PERIPHERAL VASCULAR DISEASE, UNSPECIFIED: ICD-10-CM

## 2019-05-15 PROCEDURE — 99214 OFFICE O/P EST MOD 30 MIN: CPT

## 2019-05-15 PROCEDURE — 93970 EXTREMITY STUDY: CPT

## 2019-05-15 NOTE — PHYSICAL EXAM
[Normal Heart Sounds] : normal heart sounds [Ankle Swelling (On Exam)] : present [Ankle Swelling Bilaterally] : severe [] : bilaterally [No Rash or Lesion] : No rash or lesion [Alert] : alert [Oriented to Person] : oriented to person [Oriented to Place] : oriented to place [Oriented to Time] : oriented to time [Calm] : calm [JVD] : no jugular venous distention  [Carotid Bruits] : no carotid bruits [2+] : left 2+ [de-identified] : WD, WN, NAD. Awake, alert, interactive. Ambulates by wheelchair. [Abdomen Masses] : No abdominal masses [de-identified] : ELIS, PERFRANCOL [de-identified] : supple [FreeTextEntry1] : Cap refill < 2 sec.\par Chronic venous stasis hyperpigmentation and dermatitis. \par Skin is dry. All previous wounds have healed. \par Wound to distal left knee, medially, improved, now 3 mm, no drainage, erythema or tenderness. \par  [de-identified] : no cyanosis or deformity. full ROM, MS 5/5\par  [de-identified] : ANNETTA

## 2019-05-15 NOTE — ASSESSMENT
[FreeTextEntry1] : 78 year old female with chronic venous insufficiency and recurrent ulcers BLE which have resolved. I applied an Unna Boot to LLE and an Ace wrap to RLE until provided with compression stockings by Nursing Facility. I applied Bacitracin Zinc to knee wound, DSD and Tegaderm. This wound should be further evaluated by ID or wound care at the nursing facility and they should continue with daily wound care. She should continue PT daily, full weight bearing without restriction. Continue with Lasix, Lovenox and Plavix. I will obtain reflux study at next visit. RTC in 2 weeks.

## 2019-05-15 NOTE — REVIEW OF SYSTEMS
[Joint Stiffness] : joint stiffness [Joint Pain] : joint pain [As Noted in HPI] : as noted in HPI [Negative] : Psychiatric [Limb Pain] : no limb pain [Skin Wound] : no skin wound [Limb Swelling] : no limb swelling

## 2019-05-29 ENCOUNTER — APPOINTMENT (OUTPATIENT)
Dept: VASCULAR SURGERY | Facility: CLINIC | Age: 79
End: 2019-05-29

## 2019-10-29 NOTE — ED ADULT NURSE NOTE - CHPI ED NUR SYMPTOMS NEG
Counseled patient that cough can continue for 4 to 6 weeks following acute illness  Counseled on alarm symptoms such as recurrent fevers, trouble breathing or respiratory distress  We will provide patient with Dextromethorphan, prescription sent to pharmacy  If symptoms worsen or fail to improve follow-up in 1 week  Patient voiced understanding of all instructions.  
no rectal pain/no chills/no vomiting/no bleeding at site/no blood in mucus/no fever

## 2019-12-11 ENCOUNTER — TRANSCRIPTION ENCOUNTER (OUTPATIENT)
Age: 79
End: 2019-12-11

## 2019-12-11 ENCOUNTER — INPATIENT (INPATIENT)
Facility: HOSPITAL | Age: 79
LOS: 4 days | Discharge: HOME | DRG: 264 | End: 2019-12-16
Attending: GENERAL ACUTE CARE HOSPITAL | Admitting: INTERNAL MEDICINE
Payer: MEDICARE

## 2019-12-11 VITALS
HEART RATE: 113 BPM | RESPIRATION RATE: 20 BRPM | SYSTOLIC BLOOD PRESSURE: 129 MMHG | TEMPERATURE: 97 F | OXYGEN SATURATION: 97 % | DIASTOLIC BLOOD PRESSURE: 74 MMHG

## 2019-12-11 DIAGNOSIS — Z90.89 ACQUIRED ABSENCE OF OTHER ORGANS: Chronic | ICD-10-CM

## 2019-12-11 DIAGNOSIS — Z95.0 PRESENCE OF CARDIAC PACEMAKER: Chronic | ICD-10-CM

## 2019-12-11 DIAGNOSIS — I73.9 PERIPHERAL VASCULAR DISEASE, UNSPECIFIED: ICD-10-CM

## 2019-12-11 LAB
ALBUMIN SERPL ELPH-MCNC: 3 G/DL — LOW (ref 3.3–5.2)
ALP SERPL-CCNC: 130 U/L — HIGH (ref 40–120)
ALT FLD-CCNC: 21 U/L — SIGNIFICANT CHANGE UP
ANION GAP SERPL CALC-SCNC: 13 MMOL/L — SIGNIFICANT CHANGE UP (ref 5–17)
ANION GAP SERPL CALC-SCNC: 13 MMOL/L — SIGNIFICANT CHANGE UP (ref 5–17)
APTT BLD: 30.1 SEC — SIGNIFICANT CHANGE UP (ref 27.5–36.3)
AST SERPL-CCNC: 28 U/L — SIGNIFICANT CHANGE UP
BASOPHILS # BLD AUTO: 0.04 K/UL — SIGNIFICANT CHANGE UP (ref 0–0.2)
BASOPHILS NFR BLD AUTO: 0.5 % — SIGNIFICANT CHANGE UP (ref 0–2)
BILIRUB SERPL-MCNC: 0.8 MG/DL — SIGNIFICANT CHANGE UP (ref 0.4–2)
BUN SERPL-MCNC: 27 MG/DL — HIGH (ref 8–20)
BUN SERPL-MCNC: 28 MG/DL — HIGH (ref 8–20)
CALCIUM SERPL-MCNC: 9.5 MG/DL — SIGNIFICANT CHANGE UP (ref 8.6–10.2)
CALCIUM SERPL-MCNC: 9.9 MG/DL — SIGNIFICANT CHANGE UP (ref 8.6–10.2)
CHLORIDE SERPL-SCNC: 95 MMOL/L — LOW (ref 98–107)
CHLORIDE SERPL-SCNC: 98 MMOL/L — SIGNIFICANT CHANGE UP (ref 98–107)
CO2 SERPL-SCNC: 21 MMOL/L — LOW (ref 22–29)
CO2 SERPL-SCNC: 22 MMOL/L — SIGNIFICANT CHANGE UP (ref 22–29)
CREAT SERPL-MCNC: 0.77 MG/DL — SIGNIFICANT CHANGE UP (ref 0.5–1.3)
CREAT SERPL-MCNC: 0.97 MG/DL — SIGNIFICANT CHANGE UP (ref 0.5–1.3)
CRP SERPL-MCNC: 5.5 MG/DL — HIGH (ref 0–0.4)
EOSINOPHIL # BLD AUTO: 0.04 K/UL — SIGNIFICANT CHANGE UP (ref 0–0.5)
EOSINOPHIL NFR BLD AUTO: 0.5 % — SIGNIFICANT CHANGE UP (ref 0–6)
ERYTHROCYTE [SEDIMENTATION RATE] IN BLOOD: 42 MM/HR — HIGH (ref 0–20)
GLUCOSE SERPL-MCNC: 115 MG/DL — SIGNIFICANT CHANGE UP (ref 70–115)
GLUCOSE SERPL-MCNC: 90 MG/DL — SIGNIFICANT CHANGE UP (ref 70–115)
HCT VFR BLD CALC: 43.1 % — SIGNIFICANT CHANGE UP (ref 34.5–45)
HGB BLD-MCNC: 12.9 G/DL — SIGNIFICANT CHANGE UP (ref 11.5–15.5)
IMM GRANULOCYTES NFR BLD AUTO: 0.4 % — SIGNIFICANT CHANGE UP (ref 0–1.5)
INR BLD: 1.23 RATIO — HIGH (ref 0.88–1.16)
LACTATE BLDV-MCNC: 1.7 MMOL/L — SIGNIFICANT CHANGE UP (ref 0.5–2)
LACTATE SERPL-SCNC: 1.3 MMOL/L — SIGNIFICANT CHANGE UP (ref 0.5–2)
LYMPHOCYTES # BLD AUTO: 0.49 K/UL — LOW (ref 1–3.3)
LYMPHOCYTES # BLD AUTO: 6.6 % — LOW (ref 13–44)
MCHC RBC-ENTMCNC: 27.6 PG — SIGNIFICANT CHANGE UP (ref 27–34)
MCHC RBC-ENTMCNC: 29.9 GM/DL — LOW (ref 32–36)
MCV RBC AUTO: 92.3 FL — SIGNIFICANT CHANGE UP (ref 80–100)
MONOCYTES # BLD AUTO: 0.55 K/UL — SIGNIFICANT CHANGE UP (ref 0–0.9)
MONOCYTES NFR BLD AUTO: 7.4 % — SIGNIFICANT CHANGE UP (ref 2–14)
NEUTROPHILS # BLD AUTO: 6.32 K/UL — SIGNIFICANT CHANGE UP (ref 1.8–7.4)
NEUTROPHILS NFR BLD AUTO: 84.6 % — HIGH (ref 43–77)
PLATELET # BLD AUTO: 248 K/UL — SIGNIFICANT CHANGE UP (ref 150–400)
POTASSIUM SERPL-MCNC: 5 MMOL/L — SIGNIFICANT CHANGE UP (ref 3.5–5.3)
POTASSIUM SERPL-MCNC: 6 MMOL/L — HIGH (ref 3.5–5.3)
POTASSIUM SERPL-SCNC: 5 MMOL/L — SIGNIFICANT CHANGE UP (ref 3.5–5.3)
POTASSIUM SERPL-SCNC: 6 MMOL/L — HIGH (ref 3.5–5.3)
PROT SERPL-MCNC: 7.8 G/DL — SIGNIFICANT CHANGE UP (ref 6.6–8.7)
PROTHROM AB SERPL-ACNC: 14.2 SEC — HIGH (ref 10–12.9)
RBC # BLD: 4.67 M/UL — SIGNIFICANT CHANGE UP (ref 3.8–5.2)
RBC # FLD: 16.6 % — HIGH (ref 10.3–14.5)
SODIUM SERPL-SCNC: 130 MMOL/L — LOW (ref 135–145)
SODIUM SERPL-SCNC: 132 MMOL/L — LOW (ref 135–145)
WBC # BLD: 7.47 K/UL — SIGNIFICANT CHANGE UP (ref 3.8–10.5)
WBC # FLD AUTO: 7.47 K/UL — SIGNIFICANT CHANGE UP (ref 3.8–10.5)

## 2019-12-11 PROCEDURE — 99221 1ST HOSP IP/OBS SF/LOW 40: CPT

## 2019-12-11 PROCEDURE — 99285 EMERGENCY DEPT VISIT HI MDM: CPT

## 2019-12-11 PROCEDURE — 71045 X-RAY EXAM CHEST 1 VIEW: CPT | Mod: 26

## 2019-12-11 PROCEDURE — 99222 1ST HOSP IP/OBS MODERATE 55: CPT

## 2019-12-11 PROCEDURE — 99223 1ST HOSP IP/OBS HIGH 75: CPT | Mod: AI

## 2019-12-11 PROCEDURE — 93010 ELECTROCARDIOGRAM REPORT: CPT

## 2019-12-11 RX ORDER — HEPARIN SODIUM 5000 [USP'U]/ML
5000 INJECTION INTRAVENOUS; SUBCUTANEOUS EVERY 12 HOURS
Refills: 0 | Status: DISCONTINUED | OUTPATIENT
Start: 2019-12-11 | End: 2019-12-12

## 2019-12-11 RX ORDER — POTASSIUM CHLORIDE 20 MEQ
1 PACKET (EA) ORAL
Qty: 0 | Refills: 0 | DISCHARGE

## 2019-12-11 RX ORDER — VANCOMYCIN HCL 1 G
1000 VIAL (EA) INTRAVENOUS EVERY 12 HOURS
Refills: 0 | Status: DISCONTINUED | OUTPATIENT
Start: 2019-12-11 | End: 2019-12-11

## 2019-12-11 RX ORDER — VANCOMYCIN HCL 1 G
750 VIAL (EA) INTRAVENOUS EVERY 12 HOURS
Refills: 0 | Status: DISCONTINUED | OUTPATIENT
Start: 2019-12-11 | End: 2019-12-11

## 2019-12-11 RX ORDER — SODIUM CHLORIDE 9 MG/ML
1000 INJECTION, SOLUTION INTRAVENOUS
Refills: 0 | Status: DISCONTINUED | OUTPATIENT
Start: 2019-12-12 | End: 2019-12-12

## 2019-12-11 RX ORDER — VANCOMYCIN HCL 1 G
1000 VIAL (EA) INTRAVENOUS ONCE
Refills: 0 | Status: DISCONTINUED | OUTPATIENT
Start: 2019-12-11 | End: 2019-12-11

## 2019-12-11 RX ORDER — FUROSEMIDE 40 MG
40 TABLET ORAL DAILY
Refills: 0 | Status: DISCONTINUED | OUTPATIENT
Start: 2019-12-11 | End: 2019-12-12

## 2019-12-11 RX ORDER — CARVEDILOL PHOSPHATE 80 MG/1
12.5 CAPSULE, EXTENDED RELEASE ORAL EVERY 12 HOURS
Refills: 0 | Status: DISCONTINUED | OUTPATIENT
Start: 2019-12-11 | End: 2019-12-12

## 2019-12-11 RX ORDER — SODIUM CHLORIDE 9 MG/ML
1000 INJECTION INTRAMUSCULAR; INTRAVENOUS; SUBCUTANEOUS ONCE
Refills: 0 | Status: COMPLETED | OUTPATIENT
Start: 2019-12-11 | End: 2019-12-11

## 2019-12-11 RX ORDER — SODIUM POLYSTYRENE SULFONATE 4.1 MEQ/G
30 POWDER, FOR SUSPENSION ORAL ONCE
Refills: 0 | Status: COMPLETED | OUTPATIENT
Start: 2019-12-11 | End: 2019-12-11

## 2019-12-11 RX ORDER — ACETAMINOPHEN 500 MG
650 TABLET ORAL EVERY 6 HOURS
Refills: 0 | Status: DISCONTINUED | OUTPATIENT
Start: 2019-12-11 | End: 2019-12-12

## 2019-12-11 RX ORDER — CARVEDILOL PHOSPHATE 80 MG/1
12.5 CAPSULE, EXTENDED RELEASE ORAL
Refills: 0 | Status: DISCONTINUED | OUTPATIENT
Start: 2019-12-11 | End: 2019-12-11

## 2019-12-11 RX ORDER — PIPERACILLIN AND TAZOBACTAM 4; .5 G/20ML; G/20ML
3.38 INJECTION, POWDER, LYOPHILIZED, FOR SOLUTION INTRAVENOUS EVERY 8 HOURS
Refills: 0 | Status: DISCONTINUED | OUTPATIENT
Start: 2019-12-11 | End: 2019-12-12

## 2019-12-11 RX ORDER — PIPERACILLIN AND TAZOBACTAM 4; .5 G/20ML; G/20ML
3.38 INJECTION, POWDER, LYOPHILIZED, FOR SOLUTION INTRAVENOUS ONCE
Refills: 0 | Status: DISCONTINUED | OUTPATIENT
Start: 2019-12-11 | End: 2019-12-11

## 2019-12-11 RX ORDER — CEFEPIME 1 G/1
2000 INJECTION, POWDER, FOR SOLUTION INTRAMUSCULAR; INTRAVENOUS ONCE
Refills: 0 | Status: COMPLETED | OUTPATIENT
Start: 2019-12-11 | End: 2019-12-11

## 2019-12-11 RX ORDER — CLOPIDOGREL BISULFATE 75 MG/1
75 TABLET, FILM COATED ORAL DAILY
Refills: 0 | Status: DISCONTINUED | OUTPATIENT
Start: 2019-12-11 | End: 2019-12-12

## 2019-12-11 RX ORDER — CEFEPIME 1 G/1
2000 INJECTION, POWDER, FOR SOLUTION INTRAMUSCULAR; INTRAVENOUS EVERY 8 HOURS
Refills: 0 | Status: DISCONTINUED | OUTPATIENT
Start: 2019-12-11 | End: 2019-12-11

## 2019-12-11 RX ADMIN — Medication 650 MILLIGRAM(S): at 18:05

## 2019-12-11 RX ADMIN — SODIUM CHLORIDE 1000 MILLILITER(S): 9 INJECTION INTRAMUSCULAR; INTRAVENOUS; SUBCUTANEOUS at 18:06

## 2019-12-11 RX ADMIN — Medication 650 MILLIGRAM(S): at 19:01

## 2019-12-11 RX ADMIN — HEPARIN SODIUM 5000 UNIT(S): 5000 INJECTION INTRAVENOUS; SUBCUTANEOUS at 18:05

## 2019-12-11 RX ADMIN — CLOPIDOGREL BISULFATE 75 MILLIGRAM(S): 75 TABLET, FILM COATED ORAL at 18:05

## 2019-12-11 RX ADMIN — PIPERACILLIN AND TAZOBACTAM 25 GRAM(S): 4; .5 INJECTION, POWDER, LYOPHILIZED, FOR SOLUTION INTRAVENOUS at 21:52

## 2019-12-11 RX ADMIN — CEFEPIME 100 MILLIGRAM(S): 1 INJECTION, POWDER, FOR SOLUTION INTRAMUSCULAR; INTRAVENOUS at 09:00

## 2019-12-11 RX ADMIN — Medication 900 MILLIGRAM(S): at 10:00

## 2019-12-11 RX ADMIN — Medication 100 MILLIGRAM(S): at 09:45

## 2019-12-11 RX ADMIN — CEFEPIME 2000 MILLIGRAM(S): 1 INJECTION, POWDER, FOR SOLUTION INTRAMUSCULAR; INTRAVENOUS at 09:30

## 2019-12-11 NOTE — H&P ADULT - ASSESSMENT
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 Pt denies LOC, hitting her head, vomiting, neck pain, chest pain, SOB. EMS was called on patient's medical alert bracelet and encouraged her to come in after noticing b/l legs weeping, malodorous. Pt states that her legs do not cause her any pain unless they are touched, but notes they cause her significant difficulty with ADLs. She recalls being treated for a similar problem in her legs last spring and noticed about 3 months ago the malodor and weeping returned. In the ED patient received one dose of clindamycin and cefepime. Will admit to medicine for further medical management.     1) Chronic PVD with b/l gangrene  -Admit to medicine  -s/p one dose clindamycin and cefepime  -surgery consult  -blood cultures pending  -UA pending    2)Hyperkalemia  -EKG pending collection    3)HTN  -    4)HLD  -    5)CHF with EF <30%  -monitor daily weights  -strict I&Os 80y/o Female with PMHx of systolic and diastolic CHF w/ EF 30% with PPM, CAD, HTN, HLD, PVD, BIBEMS s/p mechanical fall 12/11/19. Pt denies LOC, hitting her head, vomiting, neck pain, chest pain, SOB. EMS was called on patient's medical alert bracelet and encouraged her to come in after noticing b/l legs weeping, malodorous. Pt states that her legs do not cause her any pain unless they are touched, but notes they cause her significant difficulty with ADLs. She recalls being treated for a similar problem in her legs last spring and noticed about 3 months ago the malodor and weeping returned. In the ED patient received one dose of clindamycin and cefepime. Will admit to medicine for further medical management.     1) Chronic PVD with b/l gangrene  -Admit to medicine  -s/p one dose clindamycin and cefepime  -surgery consult pending  -blood cultures pending  -UA pending  -Lactate WNL 1.7    2)Hyperkalemia  -EKG pending collection    3)HTN  -    4)HLD  -    5)CHF with EF <30%  -monitor daily weights  -strict I&Os 78y/o Female with PMHx of systolic and diastolic CHF w/ EF 30% with PPM, CAD, HTN, HLD, PVD, BIBEMS s/p mechanical fall 12/11/19. Pt denies LOC, hitting her head, vomiting, neck pain, chest pain, SOB. EMS was called on patient's medical alert bracelet and encouraged her to come in after noticing b/l legs weeping, malodorous. Pt states that her legs do not cause her any pain unless they are touched, but notes they cause her significant difficulty with ADLs. She recalls being treated for a similar problem in her legs last spring and noticed about 3 months ago the malodor and weeping returned. In the ED patient received one dose of clindamycin and cefepime. Will admit to medicine for further medical management.     1) Chronic PVD with b/l gangrene  -Admit to medicine  -s/p one dose clindamycin and cefepime  -vascular surgery consult pending  -podiatry consult pending  -infectious disease consult pending  -blood cultures pending  -UA pending  -Lactate WNL 1.7    2)Hyperkalemia  -EKG pending collection  -d/c Klor-Con    3)HTN  -continue enalapril  -continue coreg    4)CHF with EF <30%  -monitor daily weights  -strict I&Os  -Furosemide 40mg qd    5) Prophylactic Measures  -DVT Prophylaxis- Heparin Sq 80y/o Female with PMHx of systolic and diastolic CHF w/ EF 30% with PPM, CAD, HTN, HLD, PVD, BIBEMS s/p mechanical fall 12/11/19. Pt denies LOC, hitting her head, vomiting, neck pain, chest pain, SOB. EMS was called on patient's medical alert bracelet and encouraged her to come in after noticing b/l legs weeping, malodorous. Pt states that her legs do not cause her any pain unless they are touched, but notes they cause her significant difficulty with ADLs. She recalls being treated for a similar problem in her legs last spring and noticed about 3 months ago the malodor and weeping returned. In the ED patient received one dose of clindamycin and cefepime. Will admit to medicine for further medical management.     1) Chronic PVD with b/l LE gangrene  -Admit to medicine  -s/p one dose clindamycin and cefepime, will continue cefepime and start vancomycin  -consult ID   -vascular surgery consulted  -podiatry consulted   -blood cultures sent pending results  -UA pending  -Lactate WNL 1.7    2)Hyperkalemia  -EKG ordered stat   -hold home dose Klor-Con and enalapril  -Kayexalate 30gm ordered now  -repeat bmp 5pm     3)HTN  -continue coreg  -hold enalapril due to hyperkalemia   -monitor BP    4)Chronic Systolic CHF with EF <30%  -clinically euvolemic  - continue Furosemide 40mg qd and coreg    5) Prophylactic Measures  -DVT Prophylaxis- Heparin Sq

## 2019-12-11 NOTE — ED ADULT NURSE REASSESSMENT NOTE - NS ED NURSE REASSESS COMMENT FT1
Patient given full bed bath and cleaned with soap and water, linens changed. Allveyn placed to patients buttocks and moisture wicking material placed under patients dejah Patient given full bed bath and cleaned with soap and water, linens changed. Allveyn placed to patients buttocks and moisture wicking material placed under patients breasts. Linens changed and patient placed in a gown.

## 2019-12-11 NOTE — H&P ADULT - NSICDXFAMILYHX_GEN_ALL_CORE_FT
FAMILY HISTORY:  FH: Alzheimers disease FAMILY HISTORY:  FH: Alzheimers disease, Patient unable to recall in which family member

## 2019-12-11 NOTE — H&P ADULT - HISTORY OF PRESENT ILLNESS
Pt slipped and fell on back.   Intermittent twinge R shoulder blade  Denies LOC,   Pt has medical alert bracelet  Denies chest pain, SOB, fevers, chills, fatigue    Legs: chronic peripheral vascular disease --> gangrene x 3 months.   Pt wants to let legs soak, resistant to people touching them.     Pt is a poor historian 78y/o Female with PMHx of systolic and diastolic CHF w/ EF 30% with PPM, CAD, HTN, HLD, PVD, BIBEMS s/p mechanical fall 12/11/19. Pt denies LOC, hitting her head, vomiting, neck pain, chest pain, SOB. EMS was called on patient's medical alert bracelet and encouraged her to come in after noticing b/l legs weeping, malodorous. Pt states that her legs do not cause her any pain unless they are touched, but notes they cause her significant difficulty with ADLs. She recalls being treated for a similar problem in her legs last spring and noticed about 3 months ago the malodor and weeping returned. In the ED patient received one dose of clindamycin and cefepime. Will admit to medicine for further medical management. 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 Pt denies LOC, hitting her head, vomiting, neck pain, chest pain, SOB. EMS was called on patient's medical alert bracelet and encouraged her to come in after noticing b/l legs weeping, malodorous. Pt states that her legs do not cause her any pain unless they are touched, but notes they cause her significant difficulty with ADLs. She recalls being treated for a similar problem in her legs last spring and noticed about 3 months ago the malodor and weeping returned. In the ED patient received one dose of clindamycin and cefepime. Pt denies any sob, chest pain, cough, fevers n/v or abd pain. Will admit to medicine for further medical management.

## 2019-12-11 NOTE — ED ADULT NURSE NOTE - NSIMPLEMENTINTERV_GEN_ALL_ED
Implemented All Fall Risk Interventions:  [place] to call system. Call bell, personal items and telephone within reach. Instruct patient to call for assistance. Room bathroom lighting operational. Non-slip footwear when patient is off stretcher. Physically safe environment: no spills, clutter or unnecessary equipment. Stretcher in lowest position, wheels locked, appropriate side rails in place. Provide visual cue, wrist band, yellow gown, etc. Monitor gait and stability. Monitor for mental status changes and reorient to person, place, and time. Review medications for side effects contributing to fall risk. Reinforce activity limits and safety measures with patient and family.

## 2019-12-11 NOTE — ED PROVIDER NOTE - OBJECTIVE STATEMENT
78 yo F PMH systolic and diastolic CHF w/ EF 30% with PPM, CAD, HTN, HLD, PVD, biba with wounds/malodor to b/l LE. Pt stastes slipped and fell at home, difficulty getting up. Denies hitting head, no loc, no neck pain, no cp/sob or abd pain. States when ems came told her to come in because of her legs. Legs weeping, malodorous, states have been bothering her for some time. Follows with vascular. No f/c, no other symptoms.     ROS: No fever/chills. No eye pain/changes in vision, No ear pain/sore throat/dysphagia, No chest pain/palpitations. No SOB/cough/. No abdominal pain, N/V/D, no black/bloody bm. No dysuria/frequency/discharge, No headache. No Dizziness.    No numbness/tingling/weakness.

## 2019-12-11 NOTE — ED PROVIDER NOTE - CLINICAL SUMMARY MEDICAL DECISION MAKING FREE TEXT BOX
pt with pvd, with weeping and wounds/malodor to lower extremities. labs, abx, vascular and podiatry consults. reassess.

## 2019-12-11 NOTE — CONSULT NOTE ADULT - ATTENDING COMMENTS
79 year old female with severe bilateral lower extremity venous stasis changes and foul smelling ulcerations.  Has not received any wound care in months  Unable to tolerate dressing change or debridement at bedside  Will plan for OR debridement and wash out.  Plan discussed with patient. All questions answered
I reviewed the above assessment and documentation completed by the resident physician.  I verbally discussed the evaluation and treatment plan with resident.  The podiatry team will continue to follow patient while in house.

## 2019-12-11 NOTE — H&P ADULT - ATTENDING COMMENTS
PHYSICAL EXAM:  Vital Signs Last 24 Hrs  T(C): 36.3 (11 Dec 2019 06:59), Max: 36.3 (11 Dec 2019 06:59)  T(F): 97.4 (11 Dec 2019 06:59), Max: 97.4 (11 Dec 2019 06:59)  HR: 113 (11 Dec 2019 06:59) (113 - 113)  BP: 129/74 (11 Dec 2019 06:59) (129/74 - 129/74)  BP(mean): --  RR: 20 (11 Dec 2019 06:59) (20 - 20)  SpO2: 97% (11 Dec 2019 06:59) (97% - 97%)    GENERAL: NAD, well-groomed, well-developed  HEAD:  Atraumatic, Normocephalic  EYES: EOMI, PERRLA, conjunctiva and sclera clear  ENMT: No tonsillar erythema, exudates, or enlargement; Moist mucous membranes  NERVOUS SYSTEM:  Alert & Oriented X3, Good concentration; Motor Strength 5/5 B/L upper and lower extremities  CHEST/LUNG: Clear to auscultation bilaterally; No rales, rhonchi, wheezing  HEART: Regular rate and rhythm; No murmurs  ABDOMEN: Soft, Nontender, Nondistended; Bowel sounds present  EXTREMITIES: b/l lower extremity dry skin with gangrene toes, B/l LE malodorous, weeping, scaling, thickened. Mild erythema around weeping tissue    Lower ext gangrene   - admit to medicine  - iv abx  - ID, vascular and pod consult  - Kayexalate for hyperkalemia and repeat level  - agree with above assessment and plan

## 2019-12-11 NOTE — H&P ADULT - NSHPPHYSICALEXAM_GEN_ALL_CORE
Physical Exam:    Vital Signs Last 24 Hrs  T(C): 36.3 (11 Dec 2019 06:59), Max: 36.3 (11 Dec 2019 06:59)  T(F): 97.4 (11 Dec 2019 06:59), Max: 97.4 (11 Dec 2019 06:59)  HR: 113 (11 Dec 2019 06:59) (113 - 113)  BP: 129/74 (11 Dec 2019 06:59) (129/74 - 129/74)  BP(mean): --  RR: 20 (11 Dec 2019 06:59) (20 - 20)  SpO2: 97% (11 Dec 2019 06:59) (97% - 97%)    GENERAL: NAD, lying in bed comfortably  HEAD:  Atraumatic, Normocephalic  EYES: EOMI, PERRL, conjunctiva and sclera clear  ENT: Moist mucous membranes  NECK: Supple  CHEST/LUNG: Diminished breath sounds; No rales, rhonchi, wheezing, or rubs  HEART: Regular rate and rhythm; No murmurs, rubs, or gallops  ABDOMEN: Soft, Nontender, Nondistended   EXTREMITIES:  b/l LE malodorous, weeping, scaling, thickened. Mild erythema around weeping tissue. Areas of dark black tissue b/l toes.    NERVOUS SYSTEM:  Alert & Oriented X3, speech clear. Answers questions appropriately. Full and equal strength. Physical Exam:    Vital Signs Last 24 Hrs  T(C): 36.3 (11 Dec 2019 06:59), Max: 36.3 (11 Dec 2019 06:59)  T(F): 97.4 (11 Dec 2019 06:59), Max: 97.4 (11 Dec 2019 06:59)  HR: 113 (11 Dec 2019 06:59) (113 - 113)  BP: 129/74 (11 Dec 2019 06:59) (129/74 - 129/74)  BP(mean): --  RR: 20 (11 Dec 2019 06:59) (20 - 20)  SpO2: 97% (11 Dec 2019 06:59) (97% - 97%)    GENERAL: NAD, lying in bed comfortably  HEAD:  Atraumatic, Normocephalic  EYES: EOMI, PERRL, conjunctiva and sclera clear  ENT: Moist mucous membranes EOMI  NECK: Supple  CHEST/LUNG: Diminished breath sounds; No rales, rhonchi, wheezing, or rubs  HEART: Regular rate and rhythm; No murmurs, rubs, or gallops  ABDOMEN: Soft, Nontender, Nondistended   EXTREMITIES:  b/l LE malodorous, weeping, scaling, thickened. Mild erythema around weeping tissue. Areas of dark black tissue b/l toes.    NERVOUS SYSTEM:  Alert & Oriented X3, speech clear. Answers questions appropriately. Full and equal strength.

## 2019-12-11 NOTE — H&P ADULT - NSHPLABSRESULTS_GEN_ALL_CORE
12.9   7.47  )-----------( 248      ( 11 Dec 2019 08:24 )             43.1   12-11    132<L>  |  98  |  27.0<H>  ----------------------------<  90  6.0<H>   |  21.0<L>  |  0.77    Ca    9.9      11 Dec 2019 10:30    TPro  7.8  /  Alb  3.0<L>  /  TBili  0.8  /  DBili  x   /  AST  28  /  ALT  21  /  AlkPhos  130<H>  12-11      Xray Chest 1 View (12.11.19)    Left ICD reidentified in position. No change heart mediastinum. Left   basal consolidation/atelectasis with volume loss and elevation left   hemidiaphragm similar to prior possibly all chronic.. Difficult to   exclude an acute component in the appropriate setting. Small left pleural   effusion/pleural scarring similar to prior.    Impression: As above    LUH IGLESIAS M.D., ATTENDING RADIOLOGIST  This document has been electronically signed. Dec 11 2019  1:06PM

## 2019-12-11 NOTE — ED ADULT NURSE NOTE - CHIEF COMPLAINT QUOTE
EMS states pt fell but has no complaints.  EMS encouraged patient to come to ED due to what pt describes as "weeping edema" pt with wet gangrene to b/l feet and LE.

## 2019-12-11 NOTE — ED ADULT TRIAGE NOTE - CHIEF COMPLAINT QUOTE
EMS states pt fell but has no complaints.  EMS encouraged patient to come to ED due to what pt describes as "weeping edema" pt with wet gangrene to b/l feet. EMS states pt fell but has no complaints.  EMS encouraged patient to come to ED due to what pt describes as "weeping edema" pt with wet gangrene to b/l feet and LE.

## 2019-12-11 NOTE — ED PROVIDER NOTE - PHYSICAL EXAMINATION
Gen: No acute distress, non toxic  HEENT: Mucous membranes moist, pink conjunctivae, EOMI  CV: RRR, nl s1/s2.  Resp: CTAB, normal rate and effort  GI: Abdomen soft, NT, ND. No rebound, no guarding  : No CVAT  Neuro: A&O x 3, moving all 4 extremities  MSK: No spine or joint tenderness to palpation  Skin: b/l LE with malodor, erythema, weeping, with scaled soggy tissue, b/l feet with areas of dark/black tissue diffusely across toes.

## 2019-12-11 NOTE — H&P ADULT - NSHPSOCIALHISTORY_GEN_ALL_CORE
Patient lives at home alone. States all family lives in [place].    Admits to 40 pack year history of smoking, quit at age 57.   Denies ETOH, illegal drugs

## 2019-12-11 NOTE — ED ADULT NURSE REASSESSMENT NOTE - NS ED NURSE REASSESS COMMENT FT1
Received pt from Susan ELIZALDE. PT resting comfortably in stretcher at this time. PT denies pain. NAD noted at this time

## 2019-12-11 NOTE — ED ADULT NURSE NOTE - OBJECTIVE STATEMENT
79 year old female, PMHx of CHF, HTN, CAD with pacemaker and HLD, presents to the ED from home s/p fall. Patient states that she was walking into her kitchen and tripped and fell, patient denies any pain from the fall. Per EMS patient home is "unlivable" and patient has necrotic lesions that are open and weeping to bilateral lower extremities. Patients legs are malodorous and patient has poor hygiene noted. Pt states that she has not been able to bath herself in the last 4 weeks due to difficulty getting around. She denies any fevers, chills, CP, SOB, abdominal pain, or N/V

## 2019-12-12 LAB
ABO RH CONFIRMATION: SIGNIFICANT CHANGE UP
ANION GAP SERPL CALC-SCNC: 13 MMOL/L — SIGNIFICANT CHANGE UP (ref 5–17)
BLD GP AB SCN SERPL QL: SIGNIFICANT CHANGE UP
BUN SERPL-MCNC: 24 MG/DL — HIGH (ref 8–20)
CALCIUM SERPL-MCNC: 9 MG/DL — SIGNIFICANT CHANGE UP (ref 8.6–10.2)
CHLORIDE SERPL-SCNC: 100 MMOL/L — SIGNIFICANT CHANGE UP (ref 98–107)
CO2 SERPL-SCNC: 19 MMOL/L — LOW (ref 22–29)
CREAT SERPL-MCNC: 0.94 MG/DL — SIGNIFICANT CHANGE UP (ref 0.5–1.3)
GLUCOSE SERPL-MCNC: 97 MG/DL — SIGNIFICANT CHANGE UP (ref 70–115)
HCT VFR BLD CALC: 35.3 % — SIGNIFICANT CHANGE UP (ref 34.5–45)
HGB BLD-MCNC: 10.8 G/DL — LOW (ref 11.5–15.5)
MAGNESIUM SERPL-MCNC: 2.1 MG/DL — SIGNIFICANT CHANGE UP (ref 1.6–2.6)
MCHC RBC-ENTMCNC: 27.2 PG — SIGNIFICANT CHANGE UP (ref 27–34)
MCHC RBC-ENTMCNC: 30.6 GM/DL — LOW (ref 32–36)
MCV RBC AUTO: 88.9 FL — SIGNIFICANT CHANGE UP (ref 80–100)
PHOSPHATE SERPL-MCNC: 3.3 MG/DL — SIGNIFICANT CHANGE UP (ref 2.4–4.7)
PLATELET # BLD AUTO: 200 K/UL — SIGNIFICANT CHANGE UP (ref 150–400)
POTASSIUM SERPL-MCNC: 4.6 MMOL/L — SIGNIFICANT CHANGE UP (ref 3.5–5.3)
POTASSIUM SERPL-SCNC: 4.6 MMOL/L — SIGNIFICANT CHANGE UP (ref 3.5–5.3)
RBC # BLD: 3.97 M/UL — SIGNIFICANT CHANGE UP (ref 3.8–5.2)
RBC # FLD: 16 % — HIGH (ref 10.3–14.5)
SODIUM SERPL-SCNC: 132 MMOL/L — LOW (ref 135–145)
WBC # BLD: 6.83 K/UL — SIGNIFICANT CHANGE UP (ref 3.8–10.5)
WBC # FLD AUTO: 6.83 K/UL — SIGNIFICANT CHANGE UP (ref 3.8–10.5)

## 2019-12-12 PROCEDURE — 99232 SBSQ HOSP IP/OBS MODERATE 35: CPT

## 2019-12-12 PROCEDURE — 11042 DBRDMT SUBQ TIS 1ST 20SQCM/<: CPT

## 2019-12-12 PROCEDURE — 99233 SBSQ HOSP IP/OBS HIGH 50: CPT

## 2019-12-12 PROCEDURE — 11045 DBRDMT SUBQ TISS EACH ADDL: CPT

## 2019-12-12 PROCEDURE — 93306 TTE W/DOPPLER COMPLETE: CPT | Mod: 26

## 2019-12-12 RX ORDER — ONDANSETRON 8 MG/1
4 TABLET, FILM COATED ORAL ONCE
Refills: 0 | Status: DISCONTINUED | OUTPATIENT
Start: 2019-12-12 | End: 2019-12-12

## 2019-12-12 RX ORDER — SODIUM CHLORIDE 9 MG/ML
1000 INJECTION, SOLUTION INTRAVENOUS
Refills: 0 | Status: DISCONTINUED | OUTPATIENT
Start: 2019-12-12 | End: 2019-12-12

## 2019-12-12 RX ORDER — ACETAMINOPHEN 500 MG
650 TABLET ORAL EVERY 6 HOURS
Refills: 0 | Status: DISCONTINUED | OUTPATIENT
Start: 2019-12-12 | End: 2019-12-16

## 2019-12-12 RX ORDER — CLOPIDOGREL BISULFATE 75 MG/1
75 TABLET, FILM COATED ORAL DAILY
Refills: 0 | Status: DISCONTINUED | OUTPATIENT
Start: 2019-12-12 | End: 2019-12-16

## 2019-12-12 RX ORDER — FENTANYL CITRATE 50 UG/ML
50 INJECTION INTRAVENOUS
Refills: 0 | Status: DISCONTINUED | OUTPATIENT
Start: 2019-12-12 | End: 2019-12-12

## 2019-12-12 RX ORDER — PIPERACILLIN AND TAZOBACTAM 4; .5 G/20ML; G/20ML
3.38 INJECTION, POWDER, LYOPHILIZED, FOR SOLUTION INTRAVENOUS EVERY 8 HOURS
Refills: 0 | Status: DISCONTINUED | OUTPATIENT
Start: 2019-12-12 | End: 2019-12-16

## 2019-12-12 RX ORDER — NYSTATIN CREAM 100000 [USP'U]/G
1 CREAM TOPICAL
Refills: 0 | Status: DISCONTINUED | OUTPATIENT
Start: 2019-12-12 | End: 2019-12-12

## 2019-12-12 RX ORDER — CARVEDILOL PHOSPHATE 80 MG/1
12.5 CAPSULE, EXTENDED RELEASE ORAL EVERY 12 HOURS
Refills: 0 | Status: DISCONTINUED | OUTPATIENT
Start: 2019-12-12 | End: 2019-12-16

## 2019-12-12 RX ORDER — HEPARIN SODIUM 5000 [USP'U]/ML
5000 INJECTION INTRAVENOUS; SUBCUTANEOUS EVERY 8 HOURS
Refills: 0 | Status: DISCONTINUED | OUTPATIENT
Start: 2019-12-12 | End: 2019-12-16

## 2019-12-12 RX ORDER — FUROSEMIDE 40 MG
40 TABLET ORAL DAILY
Refills: 0 | Status: DISCONTINUED | OUTPATIENT
Start: 2019-12-12 | End: 2019-12-16

## 2019-12-12 RX ADMIN — PIPERACILLIN AND TAZOBACTAM 25 GRAM(S): 4; .5 INJECTION, POWDER, LYOPHILIZED, FOR SOLUTION INTRAVENOUS at 21:15

## 2019-12-12 RX ADMIN — PIPERACILLIN AND TAZOBACTAM 25 GRAM(S): 4; .5 INJECTION, POWDER, LYOPHILIZED, FOR SOLUTION INTRAVENOUS at 12:33

## 2019-12-12 RX ADMIN — FENTANYL CITRATE 50 MICROGRAM(S): 50 INJECTION INTRAVENOUS at 20:33

## 2019-12-12 RX ADMIN — FENTANYL CITRATE 50 MICROGRAM(S): 50 INJECTION INTRAVENOUS at 19:33

## 2019-12-12 RX ADMIN — FENTANYL CITRATE 50 MICROGRAM(S): 50 INJECTION INTRAVENOUS at 18:30

## 2019-12-12 RX ADMIN — HEPARIN SODIUM 5000 UNIT(S): 5000 INJECTION INTRAVENOUS; SUBCUTANEOUS at 21:15

## 2019-12-12 RX ADMIN — PIPERACILLIN AND TAZOBACTAM 25 GRAM(S): 4; .5 INJECTION, POWDER, LYOPHILIZED, FOR SOLUTION INTRAVENOUS at 05:56

## 2019-12-12 RX ADMIN — CLOPIDOGREL BISULFATE 75 MILLIGRAM(S): 75 TABLET, FILM COATED ORAL at 12:22

## 2019-12-12 RX ADMIN — FENTANYL CITRATE 50 MICROGRAM(S): 50 INJECTION INTRAVENOUS at 19:00

## 2019-12-12 RX ADMIN — Medication 40 MILLIGRAM(S): at 05:56

## 2019-12-12 RX ADMIN — CARVEDILOL PHOSPHATE 12.5 MILLIGRAM(S): 80 CAPSULE, EXTENDED RELEASE ORAL at 05:56

## 2019-12-12 NOTE — PROGRESS NOTE ADULT - SUBJECTIVE AND OBJECTIVE BOX
LESLY DELCID    [id number]    79y      Female    Patient is a 79y old  Female who presents with a chief complaint of Gangrene (11 Dec 2019 16:59)      INTERVAL HPI/OVERNIGHT EVENTS:    Patient is feeling some pain in her legs, denies SOB, chest pain, nausea, vomiting, fever, chills.     REVIEW OF SYSTEMS:    CONSTITUTIONAL: No fever, some fatigue  RESPIRATORY: No cough, No shortness of breath  CARDIOVASCULAR: No chest pain, palpitations  GASTROINTESTINAL: No abdominal, No nausea, vomiting  NEUROLOGICAL: No headaches,  loss of strength.  MISCELLANEOUS: leg pain      Vital Signs Last 24 Hrs  T(C): 36.9 (12 Dec 2019 12:15), Max: 38.4 (11 Dec 2019 17:38)  T(F): 98.5 (12 Dec 2019 12:15), Max: 101.2 (11 Dec 2019 17:38)  HR: 82 (12 Dec 2019 12:15) (82 - 114)  BP: 91/55 (12 Dec 2019 07:05) (91/55 - 116/58)  RR: 19 (12 Dec 2019 12:15) (17 - 19)  SpO2: 98% (12 Dec 2019 12:15) (96% - 99%)    PHYSICAL EXAM:    GENERAL: Elderly female looking comfortable   HEENT: PERRL, +EOMI  NECK: soft, Supple, No JVD,   CHEST/LUNG: Clear to auscultate bilaterally; No wheezing  HEART: S1S2+, Regular rate and rhythm; No murmurs  ABDOMEN: Soft, Nontender, Nondistended; Bowel sounds present  EXTREMITIES:  b/l lower extremities with crust and redness   SKIN: Redness, crust and blistering on the both lower extremities   NEURO: AAOX3  PSYCH: normal mood      LABS:                        10.8   6.83  )-----------( 200      ( 12 Dec 2019 07:46 )             35.3     12-12    132<L>  |  100  |  24.0<H>  ----------------------------<  97  4.6   |  19.0<L>  |  0.94    Ca    9.0      12 Dec 2019 07:46  Phos  3.3     12-12  Mg     2.1     12-12    TPro  7.8  /  Alb  3.0<L>  /  TBili  0.8  /  DBili  x   /  AST  28  /  ALT  21  /  AlkPhos  130<H>  12-11    PT/INR - ( 11 Dec 2019 10:28 )   PT: 14.2 sec;   INR: 1.23 ratio         PTT - ( 11 Dec 2019 10:28 )  PTT:30.1 sec        I&O's Summary      MEDICATIONS  (STANDING):  carvedilol 12.5 milliGRAM(s) Oral every 12 hours  clopidogrel Tablet 75 milliGRAM(s) Oral daily  furosemide    Tablet 40 milliGRAM(s) Oral daily  heparin  Injectable 5000 Unit(s) SubCutaneous every 12 hours  lactated ringers. 1000 milliLiter(s) (50 mL/Hr) IV Continuous <Continuous>  nystatin Powder 1 Application(s) Topical two times a day  piperacillin/tazobactam IVPB.. 3.375 Gram(s) IV Intermittent every 8 hours    MEDICATIONS  (PRN):  acetaminophen   Tablet .. 650 milliGRAM(s) Oral every 6 hours PRN Temp greater or equal to 38C (100.4F)

## 2019-12-12 NOTE — PROGRESS NOTE ADULT - ASSESSMENT
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 Pt denies LOC, hitting her head, vomiting, neck pain, chest pain, SOB. EMS was called on patient's medical alert bracelet and encouraged her to come in after noticing b/l legs weeping, malodorous. Pt states that her legs do not cause her any pain unless they are touched, but notes they cause her significant difficulty with ADLs. She recalls being treated for a similar problem in her legs last spring and noticed about 3 months ago the malodor and weeping returned. In the ED patient received one dose of clindamycin and cefepime. Pt denies any sob, chest pain, cough, fevers n/v or abd pain.  Patient seen and examined.  Report tenderness in the feet.    She was found with bilateral cellulitis of leg, devitalized/ hyperkeratotic skin.      Impression:  bilateral leg celluliitis  no evidence of gangrene  POOR personal hygiene  candida infection of flexural skin  history of penicillin injection site pruritis; not allergy      Plan:  - Continue zosyn 3.375 grams Q 8H for empiric pseudomonal coverage  - no vancomycin for now - no hx of MRSA on culture x 3 years back  - NYSTATIN powder to FLEXURAL skin     - for OR debridement of feet/ legs today      NO contraindications from ID perspective for OR debridement.       - will follow

## 2019-12-12 NOTE — PROGRESS NOTE ADULT - SUBJECTIVE AND OBJECTIVE BOX
[facility]  INFECTIOUS DISEASES AND INTERNAL MEDICINE at [place]  =======================================================  Car Ordonez MD  Diplomates American Board of Internal Medicine and Infectious Diseases  Telephone [phone]  Fax            [phone]  =======================================================    [id number]  LESLY DELCID   follow up:  lower leg cellulitis,     no new complaints.    vital showed fevers, T max 101.2    I have personally reviewed the labs and data; pertinent labs and data are listed in this note; please see below.   =======================================================  REVIEW OF SYSTEMS:  CONSTITUTIONAL:  No Fever or chills  HEENT:  No diplopia or blurred vision.  No earache, sore throat or runny nose.  CARDIOVASCULAR:  No pressure, squeezing, strangling, tightness, heaviness or aching about the chest, neck, axilla or epigastrium.  RESPIRATORY:  No cough, shortness of breath  GASTROINTESTINAL:  No nausea, vomiting or diarrhea.  GENITOURINARY:  No dysuria, frequency or urgency. No Blood in urine  MUSCULOSKELETAL:  no joint aches, no muscle pain  SKIN:  No change in skin, hair or nails.  NEUROLOGIC:  No Headaches, seizures or weakness.  PSYCHIATRIC:  No disorder of thought or mood.  ENDOCRINE:  No heat or cold intolerance  HEMATOLOGICAL:  No easy bruising or bleeding.   =======================================================  Allergies  aspirin (Unknown)  atorvastatin (Unknown)  penicillin (Unknown)  - INjection site itching     Antibiotics:  piperacillin/tazobactam IVPB.. 3.375 Gram(s) IV Intermittent every 8 hours    Other medications:  carvedilol 12.5 milliGRAM(s) Oral every 12 hours  clopidogrel Tablet 75 milliGRAM(s) Oral daily  furosemide    Tablet 40 milliGRAM(s) Oral daily  heparin  Injectable 5000 Unit(s) SubCutaneous every 12 hours  lactated ringers. 1000 milliLiter(s) IV Continuous <Continuous>  nystatin Powder 1 Application(s) Topical two times a day    ======================================================  Physical Exam:  ============  T(F): 98 (12 Dec 2019 07:05), Max: 101.2 (11 Dec 2019 17:38)  HR: 86 (12 Dec 2019 07:05)  BP: 91/55 (12 Dec 2019 07:05)  RR: 19 (12 Dec 2019 07:05)  SpO2: 98% (12 Dec 2019 07:05) (96% - 99%)  temp max in last 48H T(F): , Max: 101.2 (12-11-19 @ 17:38)    General:  No acute distress. FRAIL, DISHEVELED  Eye: Pupils are equal, round and reactive to light, Extraocular movements are intact, Normal conjunctiva.  HENT: Normocephalic, Oral mucosa is DRY; POOR DENTITION  Neck: Supple, No lymphadenopathy.  Respiratory: Lungs are clear anteriorly  Cardiovascular: Normal rate, Regular rhythm,   Gastrointestinal: Soft, Non-tender, Non-distended, Normal bowel sounds.  Genitourinary: No costovertebral angle tenderness.  Lymphatics: No lymphadenopathy neck,   Musculoskeletal: Normal range of motion, Normal strength.  Integumentary:  HYPERTROPHIC KERATOTIC SKIN ON THE ANTERIOR LOWER LEGS.  WRINKLING OF THE SKIN BILATERALLY.   SKIN AT BASES APPEARS ERYTHEMATOUS HYPEREMIC.  TENDER TO PALPATION.   "necrotic appearance" is devitalized dried skin.     ISLANDS of DRIED adherent skin at hairline  AT INGUINAL SKIN FOLD increased erythema with macerated skin  VERY long toe nails bilaterally.   Neurologic: Alert,  No focal deficits, Cranial Nerves II-XII are grossly intact.  HARD OF HEARING  Psychiatric: Appropriate mood & affect.    =======================================================  Labs:                        10.8   6.83  )-----------( 200      ( 12 Dec 2019 07:46 )             35.3       WBC Count: 6.83 K/uL (12-12-19 @ 07:46)  WBC Count: 7.47 K/uL (12-11-19 @ 08:24)      12-11    130<L>  |  95<L>  |  28.0<H>  ----------------------------<  115  5.0   |  22.0  |  0.97    Ca    9.5      11 Dec 2019 17:50    TPro  7.8  /  Alb  3.0<L>  /  TBili  0.8  /  DBili  x   /  AST  28  /  ALT  21  /  AlkPhos  130<H>  12-11

## 2019-12-12 NOTE — PROGRESS NOTE ADULT - ASSESSMENT
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 Pt denied LOC, hitting her head, vomiting, neck pain, chest pain, SOB. EMS was called on patient's medical alert bracelet and encouraged her to come in after noticing b/l legs weeping, malodorous. Pt states that her legs do not cause her any pain unless they are touched, but notes they cause her significant difficulty with ADLs. She recalls being treated for a similar problem in her legs last spring and noticed about 3 months ago the malodor and weeping returned. In the ED patient received one dose of clindamycin and cefepime. admited to medicine for further medical management of cellulitis r/o b/l lower extremity gangrene.       1) Chronic PVD with b/l LE gangrene  -Admited to medicine  -s/p one dose clindamycin and cefepime, started on cefepime and started on vancomycin  -consult ID appreciated they recommended zosyn 3.375 grams Q 8H for empiric pseudomonal coverage  - no vancomycin for now - no hx of MRSA on culture x 3 years back, vascular surgery consult appreciated, they are planing to take her to or for the debridement  -blood cultures sent pending results  -UA pending  -Lactate WNL 1.7  Patient has Hx of systolic CHF, she looks euvolemic, denies SOB, has Hx of CAD, but no chest pain, she denies Hx of any stroke or CKD, she has no personal or family Hx of bleeding, she follows with Dr Monzon who is her cardiologist, as per him she had cardiac cath done in 2018 and was ok, her EKG shows paced ventricular rhythm, TTE done showed    Severely decreased global left ventricular systolic function, Left ventricular ejection fraction, by visual estimation, is 25 to 30%, Spectral Doppler shows impaired relaxation pattern of left ventricular myocardial filling (Grade I diastolic dysfunction), Small pericardial effusion without tamponade physiology, her labs and vital reviewed, she is at moderate risk for perioperative cardiovascular complication and is optimized for planned procedure today.     2)Hyperkalemia: she was on potassium supplement that was held and continue with Kayexalate 30gm, her potassium level is better now.       3)HTN  -continue coreg  -will resume her enalapril as potassium level is ok now.     4)Chronic Systolic CHF with EF <30%  clinically euvolemic, continue Furosemide 40mg qd and coreg    5) Prophylactic Measures  -DVT Prophylaxis- Heparin Sq

## 2019-12-12 NOTE — CHART NOTE - NSCHARTNOTEFT_GEN_A_CORE
POST-OPERATIVE NOTE    Subjective:  Patient is s/p debridement of b/l extremities. Recovering appropriately. Ace bandage in place. Central pulses intact b/l. Peripheral pulses in b/l extremities not appreciated secondary to compression dressing. Lower extremities elevated. Denies pain in legs. No CP, SOB, nausea, vomit.     Vital Signs Last 24 Hrs  T(C): 36.7 (12 Dec 2019 21:48), Max: 37 (12 Dec 2019 20:00)  T(F): 98 (12 Dec 2019 21:48), Max: 98.6 (12 Dec 2019 20:00)  HR: 84 (12 Dec 2019 21:48) (77 - 99)  BP: 111/70 (12 Dec 2019 21:48) (86/46 - 111/70)  BP(mean): --  RR: 18 (12 Dec 2019 21:48) (14 - 86)  SpO2: 96% (12 Dec 2019 21:48) (94% - 100%)  I&O's Detail        Physical Exam:  General: NAD, resting comfortably in bed  Pulmonary: Nonlabored breathing, no respiratory distress  Cardiovascular: NSR  Abdominal: soft, NT/ND  Extremities: b/l unna boots in place. [place] over ace bandage on LLE. Clean, dry, intact.         Assessment:  The patient is a 79y Female who is now several hours post-op from bilateral debridement of the lower extremities for venous stasis ulcers. Recovering appropriately.     Plan:  - Pain control as needed  - DVT ppx  - NWB B/L LE x24hrs  -Keep LE elevated  -Unna boots to remain in place  - F/u AM labs

## 2019-12-13 LAB
APPEARANCE UR: CLEAR — SIGNIFICANT CHANGE UP
BACTERIA # UR AUTO: ABNORMAL
BILIRUB UR-MCNC: NEGATIVE — SIGNIFICANT CHANGE UP
COLOR SPEC: YELLOW — SIGNIFICANT CHANGE UP
COMMENT - URINE: SIGNIFICANT CHANGE UP
DIFF PNL FLD: NEGATIVE — SIGNIFICANT CHANGE UP
EPI CELLS # UR: SIGNIFICANT CHANGE UP
GLUCOSE UR QL: NEGATIVE MG/DL — SIGNIFICANT CHANGE UP
KETONES UR-MCNC: ABNORMAL
LEUKOCYTE ESTERASE UR-ACNC: NEGATIVE — SIGNIFICANT CHANGE UP
NITRITE UR-MCNC: NEGATIVE — SIGNIFICANT CHANGE UP
PH UR: 5 — SIGNIFICANT CHANGE UP (ref 5–8)
PROT UR-MCNC: 15 MG/DL
RBC CASTS # UR COMP ASSIST: SIGNIFICANT CHANGE UP /HPF (ref 0–4)
SP GR SPEC: 1.02 — SIGNIFICANT CHANGE UP (ref 1.01–1.02)
UROBILINOGEN FLD QL: NEGATIVE MG/DL — SIGNIFICANT CHANGE UP
WBC UR QL: SIGNIFICANT CHANGE UP

## 2019-12-13 PROCEDURE — 99232 SBSQ HOSP IP/OBS MODERATE 35: CPT

## 2019-12-13 RX ORDER — SACCHAROMYCES BOULARDII 250 MG
250 POWDER IN PACKET (EA) ORAL
Refills: 0 | Status: DISCONTINUED | OUTPATIENT
Start: 2019-12-13 | End: 2019-12-16

## 2019-12-13 RX ADMIN — HEPARIN SODIUM 5000 UNIT(S): 5000 INJECTION INTRAVENOUS; SUBCUTANEOUS at 05:12

## 2019-12-13 RX ADMIN — PIPERACILLIN AND TAZOBACTAM 25 GRAM(S): 4; .5 INJECTION, POWDER, LYOPHILIZED, FOR SOLUTION INTRAVENOUS at 21:04

## 2019-12-13 RX ADMIN — HEPARIN SODIUM 5000 UNIT(S): 5000 INJECTION INTRAVENOUS; SUBCUTANEOUS at 21:05

## 2019-12-13 RX ADMIN — Medication 10 MILLIGRAM(S): at 05:12

## 2019-12-13 RX ADMIN — Medication 250 MILLIGRAM(S): at 17:43

## 2019-12-13 RX ADMIN — HEPARIN SODIUM 5000 UNIT(S): 5000 INJECTION INTRAVENOUS; SUBCUTANEOUS at 17:43

## 2019-12-13 RX ADMIN — PIPERACILLIN AND TAZOBACTAM 25 GRAM(S): 4; .5 INJECTION, POWDER, LYOPHILIZED, FOR SOLUTION INTRAVENOUS at 05:12

## 2019-12-13 RX ADMIN — Medication 40 MILLIGRAM(S): at 05:12

## 2019-12-13 RX ADMIN — PIPERACILLIN AND TAZOBACTAM 25 GRAM(S): 4; .5 INJECTION, POWDER, LYOPHILIZED, FOR SOLUTION INTRAVENOUS at 14:42

## 2019-12-13 RX ADMIN — CLOPIDOGREL BISULFATE 75 MILLIGRAM(S): 75 TABLET, FILM COATED ORAL at 17:44

## 2019-12-13 RX ADMIN — CARVEDILOL PHOSPHATE 12.5 MILLIGRAM(S): 80 CAPSULE, EXTENDED RELEASE ORAL at 17:44

## 2019-12-13 RX ADMIN — CARVEDILOL PHOSPHATE 12.5 MILLIGRAM(S): 80 CAPSULE, EXTENDED RELEASE ORAL at 05:12

## 2019-12-13 NOTE — PROGRESS NOTE ADULT - ASSESSMENT
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 Pt denied LOC, hitting her head, vomiting, neck pain, chest pain, SOB. EMS was called on patient's medical alert bracelet and encouraged her to come in after noticing b/l legs weeping, malodorous. Pt states that her legs do not cause her any pain unless they are touched, but notes they cause her significant difficulty with ADLs. She recalls being treated for a similar problem in her legs last spring and noticed about 3 months ago the malodor and weeping returned. In the ED patient received one dose of clindamycin and cefepime. admited to medicine for further medical management of cellulitis r/o b/l lower extremity gangrene.       1) Chronic PVD with b/l LE gangrene  -Admited to medicine  -s/p one dose clindamycin and cefepime, started on cefepime and started on vancomycin  -consult ID appreciated they recommended zosyn 3.375 grams Q 8H for empiric pseudomonal coverage  - no vancomycin for now - no hx of MRSA on culture x 3 years back, vascular surgery consult appreciated, they are planing to take her to or for the debridement  -blood cultures sent pending results  -UA pending  -Lactate WNL 1.7  Patient has Hx of systolic CHF, she looks euvolemic, denies SOB, has Hx of CAD, but no chest pain, she denies Hx of any stroke or CKD, she has no personal or family Hx of bleeding, she follows with Dr Monzon who is her cardiologist, as per him she had cardiac cath done in 2018 and was ok, her EKG shows paced ventricular rhythm, TTE done showed    Severely decreased global left ventricular systolic function, Left ventricular ejection fraction, by visual estimation, is 25 to 30%, Spectral Doppler shows impaired relaxation pattern of left ventricular myocardial filling (Grade I diastolic dysfunction), Small pericardial effusion without tamponade physiology, her labs and vital reviewed, she is at moderate risk for perioperative cardiovascular complication and is optimized for planned procedure today.     2)Hyperkalemia: she was on potassium supplement that was held and continue with Kayexalate 30gm, her potassium level is better now.       3)HTN  -continue coreg  -will resume her enalapril as potassium level is ok now.     4)Chronic Systolic CHF with EF <30%  clinically euvolemic, continue Furosemide 40mg qd and coreg    5) Prophylactic Measures  -DVT Prophylaxis- Heparin Sq 78y/o Female with PMHx of systolic and diastolic CHF w/ EF 30% with PPM, CAD, HTN, HLD, PVD, BIBEMS s/p mechanical fall 12/11/19. Pt denied LOC, hitting her head, vomiting, neck pain, chest pain, SOB. EMS was called on patient's medical alert bracelet and encouraged her to come in after noticing b/l legs weeping, malodorous. Pt states that her legs do not cause her any pain unless they are touched, but notes they cause her significant difficulty with ADLs. She recalls being treated for a similar problem in her legs last spring and noticed about 3 months ago the malodor and weeping returned. In the ED patient received one dose of clindamycin and cefepime. admited to medicine for further medical management of cellulitis r/o b/l lower extremity gangrene.       1) Chronic PVD with b/l LE gangrene:   -Admited to medicine s/p one dose clindamycin and cefepime, started on cefepime and vancomycin, consulted ID appreciated they recommended zosyn 3.375 grams Q 8H for empiric pseudomonal coverage, no vancomycin for now - no hx of MRSA on culture x 3 years back, vascular surgery consult appreciated,  she is s/p debridement and unna boat placement, blood cultures sent pending results, will follow ID and vascular surgery.   podiatry consult pending.       2)Hyperkalemia: she was on potassium supplement that was held and continue with Kayexalate 30gm, her potassium level is better now.       3)HTN: Continue coreg, enalapril with parameters.     4)Chronic Systolic CHF with EF <30%, clinically euvolemic, continue Furosemide 40mg qd and coreg, TTE done showed Severely decreased global left ventricular systolic function, Left ventricular ejection fraction, by visual estimation, is 25 to 30%, Spectral Doppler shows impaired relaxation pattern of left ventricular myocardial filling (Grade I diastolic dysfunction), Small pericardial effusion without tamponade physiology.     5) Prophylactic Measures  -DVT Prophylaxis- Heparin Sq      Dispo: 48 to 72 hours once clinically better, PT consult, might need JOHN placement.

## 2019-12-13 NOTE — PROGRESS NOTE ADULT - ASSESSMENT
79F PMHx Alzheimer's Afib, HTN, CHF, PPM admit s/p fall at home, brought in by EMS due to weeping, malodorous b/l LE now POD#1 s/p debridement    - 1st Dressing change later today  - Podiatry to follow up for b/l foot care  - suggest Social Work consult as pt. most likely not able to care for herself and will need placement 79F PMHx Alzheimer's Afib, HTN, CHF, PPM admit s/p fall at home, brought in by EMS due to weeping, malodorous b/l LE now POD#1 s/p debridement    - Post-op dressings intact, Unna Boot in place, to change POD#3  - Podiatry to follow up for b/l foot care  - suggest Social Work consult as pt. most likely not able to care for herself and will need placement

## 2019-12-13 NOTE — PROGRESS NOTE ADULT - SUBJECTIVE AND OBJECTIVE BOX
[facility]  INFECTIOUS DISEASES AND INTERNAL MEDICINE at [place]  =======================================================  Car Ordonez MD  Diplomates American Board of Internal Medicine and Infectious Diseases  Telephone [phone]  Fax            [phone]  =======================================================    [facility]-[id number]  LESLY DELCID   follow up:  lower leg cellulitis,     s/p debridement of legs in OR  c/o pain in the legs    I have personally reviewed the labs and data; pertinent labs and data are listed in this note; please see below.   =======================================================  REVIEW OF SYSTEMS:  CONSTITUTIONAL:  No Fever or chills  HEENT:  No diplopia or blurred vision.  No earache, sore throat or runny nose.  CARDIOVASCULAR:  No pressure, squeezing, strangling, tightness, heaviness or aching about the chest, neck, axilla or epigastrium.  RESPIRATORY:  No cough, shortness of breath  GASTROINTESTINAL:  No nausea, vomiting or diarrhea.  GENITOURINARY:  No dysuria, frequency or urgency. No Blood in urine  MUSCULOSKELETAL:  no joint aches, no muscle pain  SKIN:  as noted in HPI  NEUROLOGIC:  No Headaches, seizures or weakness.  PSYCHIATRIC:  No disorder of thought or mood.  ENDOCRINE:  No heat or cold intolerance  HEMATOLOGICAL:  No easy bruising or bleeding.   =======================================================  Allergies  aspirin (Unknown)  atorvastatin (Unknown)  penicillin (Unknown)  - INjection site itching     Antibiotics:  piperacillin/tazobactam IVPB.. 3.375 Gram(s) IV Intermittent every 8 hours        ======================================================  Physical Exam:  ============  T(F): 98 (13 Dec 2019 09:49), Max: 98.6 (12 Dec 2019 20:00)  HR: 96 (13 Dec 2019 09:49)  BP: 112/68 (13 Dec 2019 09:49)  RR: 20 (13 Dec 2019 09:49)  SpO2: 96% (12 Dec 2019 21:48) (94% - 100%)  temp max in last 48H T(F): , Max: 101.2 (12-11-19 @ 17:38)    General:  No acute distress. FRAIL, DISHEVELED  Eye: Pupils are equal, round and reactive to light, Extraocular movements are intact, Normal conjunctiva.  HENT: Normocephalic, Oral mucosa is DRY; POOR DENTITION  Neck: Supple, No lymphadenopathy.  Respiratory: Lungs are clear anteriorly  Cardiovascular: Normal rate, Regular rhythm,   Gastrointestinal: Soft, Non-tender, Non-distended, Normal bowel sounds.  Genitourinary: No costovertebral angle tenderness.  Lymphatics: No lymphadenopathy neck,   Musculoskeletal: Normal range of motion, Normal strength.  Integumentary:   LEGS with dressing in place  VERY long toe nails bilaterally.   Neurologic: Alert,  No focal deficits, Cranial Nerves II-XII are grossly intact.  HARD OF HEARING  Psychiatric: Appropriate mood & affect.    =======================================================  Labs:                        10.8   6.83  )-----------( 200      ( 12 Dec 2019 07:46 )             35.3       WBC Count: 6.83 K/uL (12-12-19 @ 07:46)  WBC Count: 7.47 K/uL (12-11-19 @ 08:24)      12-12    132<L>  |  100  |  24.0<H>  ----------------------------<  97  4.6   |  19.0<L>  |  0.94    Ca    9.0      12 Dec 2019 07:46  Phos  3.3     12-12  Mg     2.1     12-12        Culture - Blood (collected 12-11-19 @ 08:27)  Source: .Blood    Culture - Blood (collected 12-11-19 @ 08:27)  Source: .Blood

## 2019-12-13 NOTE — PROGRESS NOTE ADULT - SUBJECTIVE AND OBJECTIVE BOX
INTERVAL HPI/OVERNIGHT EVENTS: c/o pain to b/l LE, very confused, A&Ox1, no overnight events    STATUS POST:  infected skin debridement    POST OPERATIVE DAY #: 1      MEDICATIONS  (STANDING):  carvedilol 12.5 milliGRAM(s) Oral every 12 hours  clopidogrel Tablet 75 milliGRAM(s) Oral daily  enalapril 10 milliGRAM(s) Oral daily  furosemide    Tablet 40 milliGRAM(s) Oral daily  heparin  Injectable 5000 Unit(s) SubCutaneous every 8 hours  piperacillin/tazobactam IVPB.. 3.375 Gram(s) IV Intermittent every 8 hours  saccharomyces boulardii 250 milliGRAM(s) Oral two times a day    MEDICATIONS  (PRN):  acetaminophen   Tablet .. 650 milliGRAM(s) Oral every 6 hours PRN Temp greater or equal to 38C (100.4F), Mild Pain (1 - 3)      Vital Signs Last 24 Hrs  T(C): 36.7 (13 Dec 2019 09:49), Max: 37 (12 Dec 2019 20:00)  T(F): 98 (13 Dec 2019 09:49), Max: 98.6 (12 Dec 2019 20:00)  HR: 96 (13 Dec 2019 09:49) (77 - 96)  BP: 112/68 (13 Dec 2019 09:49) (86/46 - 114/74)  BP(mean): --  RR: 20 (13 Dec 2019 09:49) (14 - 86)  SpO2: 96% (12 Dec 2019 21:48) (94% - 100%)    PHYSICAL EXAM:      Constitutional: NAD    Respiratory: no accessory muscle use, no conversational dyspnea    Cardiovascular: S1S2    Gastrointestinal: soft, NT/ND    Extremities: b/l LE dressings intact, exposed feet/toes still covered in debris with thickened nails and subungual debris          I&O's Detail    12 Dec 2019 07:01  -  13 Dec 2019 07:00  --------------------------------------------------------  IN:  Total IN: 0 mL    OUT:    Voided: 800 mL  Total OUT: 800 mL    Total NET: -800 mL          LABS:                        10.8   6.83  )-----------( 200      ( 12 Dec 2019 07:46 )             35.3     12-12    132<L>  |  100  |  24.0<H>  ----------------------------<  97  4.6   |  19.0<L>  |  0.94    Ca    9.0      12 Dec 2019 07:46  Phos  3.3     12-  Mg     2.1             Urinalysis Basic - ( 13 Dec 2019 05:46 )    Color: Yellow / Appearance: Clear / S.025 / pH: x  Gluc: x / Ketone: Trace  / Bili: Negative / Urobili: Negative mg/dL   Blood: x / Protein: 15 mg/dL / Nitrite: Negative   Leuk Esterase: Negative / RBC: 0-2 /HPF / WBC 3-5   Sq Epi: x / Non Sq Epi: Few / Bacteria: Moderate        RADIOLOGY & ADDITIONAL STUDIES:

## 2019-12-13 NOTE — PROGRESS NOTE ADULT - ASSESSMENT
78y/o Female with PMHx of systolic and diastolic CHF w/ EF 30% with PPM, CAD, HTN, HLD, PVD, BIBEMS s/p mechanical fall 12/11/19. Pt denies LOC, hitting her head, vomiting, neck pain, chest pain, SOB. EMS was called on patient's medical alert bracelet and encouraged her to come in after noticing b/l legs weeping, malodorous. Pt states that her legs do not cause her any pain unless they are touched, but notes they cause her significant difficulty with ADLs. She recalls being treated for a similar problem in her legs last spring and noticed about 3 months ago the malodor and weeping returned. In the ED patient received one dose of clindamycin and cefepime. Pt denies any sob, chest pain, cough, fevers n/v or abd pain.  Patient seen and examined.  Report tenderness in the feet.    She was found with bilateral cellulitis of leg, devitalized/ hyperkeratotic skin.      Impression:  bilateral leg celluliitis  no evidence of gangrene  POOR personal hygiene  candida infection of flexural skin  history of penicillin injection site pruritis; not allergy      Plan:  - Continue zosyn 3.375 grams Q 8H for empiric pseudomonal coverage  - no vancomycin for now - no hx of MRSA on culture x 3 years back  - NYSTATIN powder to FLEXURAL skin   - s/p OR debridement of feet/ legs   - surgical team following      - will follow

## 2019-12-13 NOTE — PROGRESS NOTE ADULT - SUBJECTIVE AND OBJECTIVE BOX
LESLY DELCID    [id number]    79y      Female    Patient is a 79y old  Female who presents with a chief complaint of Gangrene (13 Dec 2019 10:29)      INTERVAL HPI/OVERNIGHT EVENTS:      Patient is feeling some pain in her legs, denies SOB, chest pain, nausea, vomiting, fever, chills.     REVIEW OF SYSTEMS:    CONSTITUTIONAL: No fever, some fatigue  RESPIRATORY: No cough, No shortness of breath  CARDIOVASCULAR: No chest pain, palpitations  GASTROINTESTINAL: No abdominal, No nausea, vomiting  NEUROLOGICAL: No headaches,  loss of strength.  MISCELLANEOUS: leg pain      Vital Signs Last 24 Hrs  T(C): 37.2 (13 Dec 2019 17:04), Max: 37.2 (13 Dec 2019 17:04)  T(F): 98.9 (13 Dec 2019 17:04), Max: 98.9 (13 Dec 2019 17:04)  HR: 99 (13 Dec 2019 17:04) (79 - 99)  BP: 101/66 (13 Dec 2019 17:04) (86/46 - 114/74)  BP(mean): --  RR: 19 (13 Dec 2019 17:04) (14 - 20)  SpO2: 92% (13 Dec 2019 17:04) (92% - 100%)    PHYSICAL EXAM:    GENERAL: Elderly female looking comfortable   HEENT: PERRL, +EOMI  NECK: soft, Supple, No JVD,   CHEST/LUNG: Clear to auscultate bilaterally; No wheezing  HEART: S1S2+, Regular rate and rhythm; No murmurs  ABDOMEN: Soft, Nontender, Nondistended; Bowel sounds present  EXTREMITIES:  b/l lower extremities with crust and redness   SKIN: Redness, crust and blistering on the both lower extremities   NEURO: AAOX3  PSYCH: normal mood      LABS:                        10.8   6.83  )-----------( 200      ( 12 Dec 2019 07:46 )             35.3     12-12    132<L>  |  100  |  24.0<H>  ----------------------------<  97  4.6   |  19.0<L>  |  0.94    Ca    9.0      12 Dec 2019 07:46  Phos  3.3     12-12  Mg     2.1     12-12        Urinalysis Basic - ( 13 Dec 2019 05:46 )    Color: Yellow / Appearance: Clear / S.025 / pH: x  Gluc: x / Ketone: Trace  / Bili: Negative / Urobili: Negative mg/dL   Blood: x / Protein: 15 mg/dL / Nitrite: Negative   Leuk Esterase: Negative / RBC: 0-2 /HPF / WBC 3-5   Sq Epi: x / Non Sq Epi: Few / Bacteria: Moderate          I&O's Summary    12 Dec 2019 07:01  -  13 Dec 2019 07:00  --------------------------------------------------------  IN: 0 mL / OUT: 800 mL / NET: -800 mL        MEDICATIONS  (STANDING):  carvedilol 12.5 milliGRAM(s) Oral every 12 hours  clopidogrel Tablet 75 milliGRAM(s) Oral daily  enalapril 10 milliGRAM(s) Oral daily  furosemide    Tablet 40 milliGRAM(s) Oral daily  heparin  Injectable 5000 Unit(s) SubCutaneous every 8 hours  piperacillin/tazobactam IVPB.. 3.375 Gram(s) IV Intermittent every 8 hours  saccharomyces boulardii 250 milliGRAM(s) Oral two times a day    MEDICATIONS  (PRN):  acetaminophen   Tablet .. 650 milliGRAM(s) Oral every 6 hours PRN Temp greater or equal to 38C (100.4F), Mild Pain (1 - 3) LESLY DELCID    [id number]    79y      Female    Patient is a 79y old  Female who presents with a chief complaint of Gangrene (13 Dec 2019 10:29)      INTERVAL HPI/OVERNIGHT EVENTS:    Patient is s/p debridement of her legs eschar, s/p unna boats placement, feeling some pain in her legs, denies SOB, chest pain, nausea, vomiting, fever, chills.     REVIEW OF SYSTEMS:    CONSTITUTIONAL: No fever, some fatigue  RESPIRATORY: No cough, No shortness of breath  CARDIOVASCULAR: No chest pain, palpitations  GASTROINTESTINAL: No abdominal, No nausea, vomiting  NEUROLOGICAL: No headaches,  loss of strength.  MISCELLANEOUS: leg pain is better.       Vital Signs Last 24 Hrs  T(C): 37.2 (13 Dec 2019 17:04), Max: 37.2 (13 Dec 2019 17:04)  T(F): 98.9 (13 Dec 2019 17:04), Max: 98.9 (13 Dec 2019 17:04)  HR: 99 (13 Dec 2019 17:04) (79 - 99)  BP: 101/66 (13 Dec 2019 17:04) (86/46 - 114/74)  BP(mean): --  RR: 19 (13 Dec 2019 17:04) (14 - 20)  SpO2: 92% (13 Dec 2019 17:04) (92% - 100%)    PHYSICAL EXAM:    GENERAL: Elderly female looking comfortable   HEENT: PERRL, +EOMI  NECK: soft, Supple, No JVD,   CHEST/LUNG: Clear to auscultate bilaterally; No wheezing  HEART: S1S2+, Regular rate and rhythm; No murmurs  ABDOMEN: Soft, Nontender, Nondistended; Bowel sounds present  EXTREMITIES:  b/l lower extremities unna boats on.   SKIN: b/l feet skin damage.   NEURO: AAOX3  PSYCH: normal mood      LABS:                        10.8   6.83  )-----------( 200      ( 12 Dec 2019 07:46 )             35.3     12-12    132<L>  |  100  |  24.0<H>  ----------------------------<  97  4.6   |  19.0<L>  |  0.94    Ca    9.0      12 Dec 2019 07:46  Phos  3.3     12-12  Mg     2.1     12-12        Urinalysis Basic - ( 13 Dec 2019 05:46 )    Color: Yellow / Appearance: Clear / S.025 / pH: x  Gluc: x / Ketone: Trace  / Bili: Negative / Urobili: Negative mg/dL   Blood: x / Protein: 15 mg/dL / Nitrite: Negative   Leuk Esterase: Negative / RBC: 0-2 /HPF / WBC 3-5   Sq Epi: x / Non Sq Epi: Few / Bacteria: Moderate          I&O's Summary    12 Dec 2019 07:01  -  13 Dec 2019 07:00  --------------------------------------------------------  IN: 0 mL / OUT: 800 mL / NET: -800 mL        MEDICATIONS  (STANDING):  carvedilol 12.5 milliGRAM(s) Oral every 12 hours  clopidogrel Tablet 75 milliGRAM(s) Oral daily  enalapril 10 milliGRAM(s) Oral daily  furosemide    Tablet 40 milliGRAM(s) Oral daily  heparin  Injectable 5000 Unit(s) SubCutaneous every 8 hours  piperacillin/tazobactam IVPB.. 3.375 Gram(s) IV Intermittent every 8 hours  saccharomyces boulardii 250 milliGRAM(s) Oral two times a day    MEDICATIONS  (PRN):  acetaminophen   Tablet .. 650 milliGRAM(s) Oral every 6 hours PRN Temp greater or equal to 38C (100.4F), Mild Pain (1 - 3)

## 2019-12-14 LAB
CULTURE RESULTS: NO GROWTH — SIGNIFICANT CHANGE UP
SPECIMEN SOURCE: SIGNIFICANT CHANGE UP

## 2019-12-14 PROCEDURE — 99233 SBSQ HOSP IP/OBS HIGH 50: CPT

## 2019-12-14 RX ADMIN — PIPERACILLIN AND TAZOBACTAM 25 GRAM(S): 4; .5 INJECTION, POWDER, LYOPHILIZED, FOR SOLUTION INTRAVENOUS at 13:35

## 2019-12-14 RX ADMIN — PIPERACILLIN AND TAZOBACTAM 25 GRAM(S): 4; .5 INJECTION, POWDER, LYOPHILIZED, FOR SOLUTION INTRAVENOUS at 21:25

## 2019-12-14 RX ADMIN — HEPARIN SODIUM 5000 UNIT(S): 5000 INJECTION INTRAVENOUS; SUBCUTANEOUS at 06:51

## 2019-12-14 RX ADMIN — HEPARIN SODIUM 5000 UNIT(S): 5000 INJECTION INTRAVENOUS; SUBCUTANEOUS at 13:35

## 2019-12-14 RX ADMIN — PIPERACILLIN AND TAZOBACTAM 25 GRAM(S): 4; .5 INJECTION, POWDER, LYOPHILIZED, FOR SOLUTION INTRAVENOUS at 06:50

## 2019-12-14 RX ADMIN — Medication 10 MILLIGRAM(S): at 06:51

## 2019-12-14 RX ADMIN — Medication 250 MILLIGRAM(S): at 17:58

## 2019-12-14 RX ADMIN — CLOPIDOGREL BISULFATE 75 MILLIGRAM(S): 75 TABLET, FILM COATED ORAL at 13:35

## 2019-12-14 RX ADMIN — HEPARIN SODIUM 5000 UNIT(S): 5000 INJECTION INTRAVENOUS; SUBCUTANEOUS at 21:25

## 2019-12-14 RX ADMIN — CARVEDILOL PHOSPHATE 12.5 MILLIGRAM(S): 80 CAPSULE, EXTENDED RELEASE ORAL at 17:55

## 2019-12-14 RX ADMIN — CARVEDILOL PHOSPHATE 12.5 MILLIGRAM(S): 80 CAPSULE, EXTENDED RELEASE ORAL at 06:51

## 2019-12-14 RX ADMIN — Medication 40 MILLIGRAM(S): at 06:51

## 2019-12-14 RX ADMIN — Medication 250 MILLIGRAM(S): at 06:51

## 2019-12-14 NOTE — PROGRESS NOTE ADULT - SUBJECTIVE AND OBJECTIVE BOX
CHIEF COMPLAINT/INTERVAL HISTORY:    Patient is a 79y old  Female who presents with a chief complaint of Gangrene (14 Dec 2019 06:29)    SUBJECTIVE & OBJECTIVE: Pt seen and examined at bedside. No overnight events. Patient reports pain is controlled; no new complaints.    ROS: No chest pain, palpitations, SOB, light headedness, dizziness, headache, nausea/vomiting, fevers/chills, abdominal pain, dysuria or increased urinary frequency.    ICU Vital Signs Last 24 Hrs  T(C): 36.9 (14 Dec 2019 16:36), Max: 37.1 (14 Dec 2019 07:57)  T(F): 98.4 (14 Dec 2019 16:36), Max: 98.7 (14 Dec 2019 07:57)  HR: 88 (14 Dec 2019 17:51) (75 - 117)  BP: 106/52 (14 Dec 2019 17:51) (92/53 - 112/68)  RR: 20 (14 Dec 2019 16:36) (18 - 20)  SpO2: 97% (14 Dec 2019 16:36) (93% - 97%)    MEDICATIONS  (STANDING):  carvedilol 12.5 milliGRAM(s) Oral every 12 hours  clopidogrel Tablet 75 milliGRAM(s) Oral daily  enalapril 10 milliGRAM(s) Oral daily  furosemide    Tablet 40 milliGRAM(s) Oral daily  heparin  Injectable 5000 Unit(s) SubCutaneous every 8 hours  piperacillin/tazobactam IVPB.. 3.375 Gram(s) IV Intermittent every 8 hours  saccharomyces boulardii 250 milliGRAM(s) Oral two times a day    MEDICATIONS  (PRN):  acetaminophen   Tablet .. 650 milliGRAM(s) Oral every 6 hours PRN Temp greater or equal to 38C (100.4F), Mild Pain (1 - 3)    Urinalysis Basic - ( 13 Dec 2019 05:46 )    Color: Yellow / Appearance: Clear / S.025 / pH: x  Gluc: x / Ketone: Trace  / Bili: Negative / Urobili: Negative mg/dL   Blood: x / Protein: 15 mg/dL / Nitrite: Negative   Leuk Esterase: Negative / RBC: 0-2 /HPF / WBC 3-5   Sq Epi: x / Non Sq Epi: Few / Bacteria: Moderate      PHYSICAL EXAM:    GENERAL: elderly female, chronically ill appearing, not in acute distress  HEAD:  Atraumatic, Normocephalic  EYES: EOMI, PERRLA, conjunctiva and sclera clear  ENMT: Moist mucous membranes  NECK: Supple   NERVOUS SYSTEM:  Alert & Oriented X3   CHEST/LUNG: coarse breath sounds   HEART: Regular rate and rhythm; + S1/S2  ABDOMEN: Soft, Nontender, Nondistended; Bowel sounds present  EXTREMITIES:  bilateral leg dressings c/d/i

## 2019-12-14 NOTE — PROGRESS NOTE ADULT - ASSESSMENT
79F PMHx Alzheimer's Afib, HTN, CHF, PPM admit s/p fall at home, brought in by EMS due to weeping, malodorous b/l LE now POD#1 s/p debridement.     - Post-op dressings intact, Unna Boot in place, to change POD#3  - Podiatry to follow up for b/l foot care  - suggest Social Work consult as pt. most likely not able to care for herself and will need placement

## 2019-12-14 NOTE — PROGRESS NOTE ADULT - ASSESSMENT
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 Pt denied LOC, hitting her head, vomiting, neck pain, chest pain, SOB. EMS was called on patient's medical alert bracelet and encouraged her to come in after noticing b/l legs weeping, malodorous. Pt states that her legs do not cause her any pain unless they are touched, but notes they cause her significant difficulty with ADLs. She recalls being treated for a similar problem in her legs last spring and noticed about 3 months ago the malodor and weeping returned. In the ED patient received one dose of clindamycin and cefepime. Admitted to medicine for further medical management of cellulitis r/o b/l lower extremity gangrene.       1) Chronic PVD with b/l LE cellulitis (no evidence of gangrene per ID)  -s/p debridement (POD #1) and unna boat placement  -blood cultures negative  -podiatry evaluation initially in the ED; recommending local wound care  -continue zosyn per ID; f/u further recommendations  -analgesia PRN  -vascular surgery consult appreciated  -PT evaluation     2)Hyperkalemia  -due to KCL supplementation which has been held  -resolved with PO Kayexalate    -repeat labs in AM    3)HTN  -BP low/normal  -Continue coreg, lasix and enalapril with parameters.     4) Chronic Systolic CHF with EF <30%  -clinically euvolemic  -continue Furosemide, ACE and coreg  -TTE done showed severely decreased global left ventricular systolic function, Left ventricular ejection fraction, by visual estimation, is 25 to 30%, Spectral Doppler shows impaired relaxation pattern of left ventricular myocardial filling (Grade I diastolic dysfunction), Small pericardial effusion without tamponade physiology.   -strict I/Os, daily weights   -will obtain cardiology evaluation     DVT Prophylaxis- Heparin SC    Dispo - patient lives home alone; will most definitely need placement. PT evaluation ordered.

## 2019-12-14 NOTE — PROGRESS NOTE ADULT - SUBJECTIVE AND OBJECTIVE BOX
HPI/OVERNIGHT EVENTS:    Pt HDS, no pain no complains. Denies n/v, f/c, chest pain or SOB.     MEDICATIONS  (STANDING):  carvedilol 12.5 milliGRAM(s) Oral every 12 hours  clopidogrel Tablet 75 milliGRAM(s) Oral daily  enalapril 10 milliGRAM(s) Oral daily  furosemide    Tablet 40 milliGRAM(s) Oral daily  heparin  Injectable 5000 Unit(s) SubCutaneous every 8 hours  piperacillin/tazobactam IVPB.. 3.375 Gram(s) IV Intermittent every 8 hours  saccharomyces boulardii 250 milliGRAM(s) Oral two times a day    MEDICATIONS  (PRN):  acetaminophen   Tablet .. 650 milliGRAM(s) Oral every 6 hours PRN Temp greater or equal to 38C (100.4F), Mild Pain (1 - 3)      Vital Signs Last 24 Hrs  T(C): 37 (13 Dec 2019 20:54), Max: 37.2 (13 Dec 2019 17:04)  T(F): 98.6 (13 Dec 2019 20:54), Max: 98.9 (13 Dec 2019 17:04)  HR: 117 (13 Dec 2019 23:08) (90 - 117)  BP: 107/61 (13 Dec 2019 20:54) (101/66 - 112/68)  BP(mean): --  RR: 18 (13 Dec 2019 20:54) (18 - 20)  SpO2: 93% (13 Dec 2019 20:54) (92% - 93%)      PHYSICAL EXAM:  Constitutional: NAD  Respiratory: no accessory muscle use, no conversational dyspnea  Cardiovascular: S1S2  Gastrointestinal: soft, NT/ND  Extremities: b/l LE dressings intact, exposed feet/toes still covered in debris with thickened nails and subungual debris      I&O's Detail    12 Dec 2019 07:01  -  13 Dec 2019 07:00  --------------------------------------------------------  IN:  Total IN: 0 mL    OUT:    Voided: 800 mL  Total OUT: 800 mL    Total NET: -800 mL      13 Dec 2019 07:01  -  14 Dec 2019 06:33  --------------------------------------------------------  IN:    IV PiggyBack: 100 mL    Oral Fluid: 600 mL  Total IN: 700 mL    OUT:  Total OUT: 0 mL    Total NET: 700 mL          LABS:                        10.8   6.83  )-----------( 200      ( 12 Dec 2019 07:46 )             35.3         132<L>  |  100  |  24.0<H>  ----------------------------<  97  4.6   |  19.0<L>  |  0.94    Ca    9.0      12 Dec 2019 07:46  Phos  3.3       Mg     2.1             Urinalysis Basic - ( 13 Dec 2019 05:46 )    Color: Yellow / Appearance: Clear / S.025 / pH: x  Gluc: x / Ketone: Trace  / Bili: Negative / Urobili: Negative mg/dL   Blood: x / Protein: 15 mg/dL / Nitrite: Negative   Leuk Esterase: Negative / RBC: 0-2 /HPF / WBC 3-5   Sq Epi: x / Non Sq Epi: Few / Bacteria: Moderate

## 2019-12-15 LAB
ANION GAP SERPL CALC-SCNC: 10 MMOL/L — SIGNIFICANT CHANGE UP (ref 5–17)
BASOPHILS # BLD AUTO: 0.04 K/UL — SIGNIFICANT CHANGE UP (ref 0–0.2)
BASOPHILS NFR BLD AUTO: 0.9 % — SIGNIFICANT CHANGE UP (ref 0–2)
BUN SERPL-MCNC: 23 MG/DL — HIGH (ref 8–20)
CALCIUM SERPL-MCNC: 8.8 MG/DL — SIGNIFICANT CHANGE UP (ref 8.6–10.2)
CHLORIDE SERPL-SCNC: 100 MMOL/L — SIGNIFICANT CHANGE UP (ref 98–107)
CO2 SERPL-SCNC: 24 MMOL/L — SIGNIFICANT CHANGE UP (ref 22–29)
CREAT SERPL-MCNC: 0.98 MG/DL — SIGNIFICANT CHANGE UP (ref 0.5–1.3)
EOSINOPHIL # BLD AUTO: 0.22 K/UL — SIGNIFICANT CHANGE UP (ref 0–0.5)
EOSINOPHIL NFR BLD AUTO: 4.8 % — SIGNIFICANT CHANGE UP (ref 0–6)
GLUCOSE SERPL-MCNC: 85 MG/DL — SIGNIFICANT CHANGE UP (ref 70–115)
HCT VFR BLD CALC: 36.6 % — SIGNIFICANT CHANGE UP (ref 34.5–45)
HGB BLD-MCNC: 10.9 G/DL — LOW (ref 11.5–15.5)
IMM GRANULOCYTES NFR BLD AUTO: 0.2 % — SIGNIFICANT CHANGE UP (ref 0–1.5)
LYMPHOCYTES # BLD AUTO: 0.99 K/UL — LOW (ref 1–3.3)
LYMPHOCYTES # BLD AUTO: 21.8 % — SIGNIFICANT CHANGE UP (ref 13–44)
MAGNESIUM SERPL-MCNC: 2.1 MG/DL — SIGNIFICANT CHANGE UP (ref 1.6–2.6)
MCHC RBC-ENTMCNC: 27.4 PG — SIGNIFICANT CHANGE UP (ref 27–34)
MCHC RBC-ENTMCNC: 29.8 GM/DL — LOW (ref 32–36)
MCV RBC AUTO: 92 FL — SIGNIFICANT CHANGE UP (ref 80–100)
MONOCYTES # BLD AUTO: 0.49 K/UL — SIGNIFICANT CHANGE UP (ref 0–0.9)
MONOCYTES NFR BLD AUTO: 10.8 % — SIGNIFICANT CHANGE UP (ref 2–14)
NEUTROPHILS # BLD AUTO: 2.8 K/UL — SIGNIFICANT CHANGE UP (ref 1.8–7.4)
NEUTROPHILS NFR BLD AUTO: 61.5 % — SIGNIFICANT CHANGE UP (ref 43–77)
PHOSPHATE SERPL-MCNC: 2.9 MG/DL — SIGNIFICANT CHANGE UP (ref 2.4–4.7)
PLATELET # BLD AUTO: 222 K/UL — SIGNIFICANT CHANGE UP (ref 150–400)
POTASSIUM SERPL-MCNC: 4.3 MMOL/L — SIGNIFICANT CHANGE UP (ref 3.5–5.3)
POTASSIUM SERPL-SCNC: 4.3 MMOL/L — SIGNIFICANT CHANGE UP (ref 3.5–5.3)
RBC # BLD: 3.98 M/UL — SIGNIFICANT CHANGE UP (ref 3.8–5.2)
RBC # FLD: 16.3 % — HIGH (ref 10.3–14.5)
SODIUM SERPL-SCNC: 134 MMOL/L — LOW (ref 135–145)
WBC # BLD: 4.55 K/UL — SIGNIFICANT CHANGE UP (ref 3.8–10.5)
WBC # FLD AUTO: 4.55 K/UL — SIGNIFICANT CHANGE UP (ref 3.8–10.5)

## 2019-12-15 PROCEDURE — 99232 SBSQ HOSP IP/OBS MODERATE 35: CPT

## 2019-12-15 RX ADMIN — Medication 250 MILLIGRAM(S): at 16:56

## 2019-12-15 RX ADMIN — Medication 250 MILLIGRAM(S): at 05:52

## 2019-12-15 RX ADMIN — HEPARIN SODIUM 5000 UNIT(S): 5000 INJECTION INTRAVENOUS; SUBCUTANEOUS at 05:52

## 2019-12-15 RX ADMIN — Medication 40 MILLIGRAM(S): at 05:52

## 2019-12-15 RX ADMIN — CARVEDILOL PHOSPHATE 12.5 MILLIGRAM(S): 80 CAPSULE, EXTENDED RELEASE ORAL at 05:52

## 2019-12-15 RX ADMIN — PIPERACILLIN AND TAZOBACTAM 25 GRAM(S): 4; .5 INJECTION, POWDER, LYOPHILIZED, FOR SOLUTION INTRAVENOUS at 21:43

## 2019-12-15 RX ADMIN — HEPARIN SODIUM 5000 UNIT(S): 5000 INJECTION INTRAVENOUS; SUBCUTANEOUS at 13:28

## 2019-12-15 RX ADMIN — Medication 10 MILLIGRAM(S): at 05:52

## 2019-12-15 RX ADMIN — HEPARIN SODIUM 5000 UNIT(S): 5000 INJECTION INTRAVENOUS; SUBCUTANEOUS at 21:43

## 2019-12-15 RX ADMIN — PIPERACILLIN AND TAZOBACTAM 25 GRAM(S): 4; .5 INJECTION, POWDER, LYOPHILIZED, FOR SOLUTION INTRAVENOUS at 05:52

## 2019-12-15 RX ADMIN — CLOPIDOGREL BISULFATE 75 MILLIGRAM(S): 75 TABLET, FILM COATED ORAL at 13:28

## 2019-12-15 RX ADMIN — PIPERACILLIN AND TAZOBACTAM 25 GRAM(S): 4; .5 INJECTION, POWDER, LYOPHILIZED, FOR SOLUTION INTRAVENOUS at 13:27

## 2019-12-15 NOTE — PROGRESS NOTE ADULT - SUBJECTIVE AND OBJECTIVE BOX
CHIEF COMPLAINT/INTERVAL HISTORY:    Patient is a 79y old  Female who presents with a chief complaint of Gangrene (14 Dec 2019 19:27)    SUBJECTIVE & OBJECTIVE: Pt seen and examined at bedside. No overnight events. Denies any new complaints. Complaining of fatigue; pain controlled. PT evaluation pending.    ROS: No chest pain, palpitations, SOB, light headedness, dizziness, headache, nausea/vomiting, fevers/chills, abdominal pain, dysuria.    ICU Vital Signs Last 24 Hrs  T(C): 36.4 (15 Dec 2019 09:41), Max: 36.9 (14 Dec 2019 16:36)  T(F): 97.6 (15 Dec 2019 09:41), Max: 98.4 (14 Dec 2019 16:36)  HR: 76 (15 Dec 2019 15:27) (65 - 88)  BP: 98/56 (15 Dec 2019 09:41) (92/53 - 112/58)  RR: 18 (15 Dec 2019 09:41) (18 - 20)  SpO2: 95% (15 Dec 2019 09:41) (92% - 97%)    MEDICATIONS  (STANDING):  carvedilol 12.5 milliGRAM(s) Oral every 12 hours  clopidogrel Tablet 75 milliGRAM(s) Oral daily  enalapril 10 milliGRAM(s) Oral daily  furosemide    Tablet 40 milliGRAM(s) Oral daily  heparin  Injectable 5000 Unit(s) SubCutaneous every 8 hours  piperacillin/tazobactam IVPB.. 3.375 Gram(s) IV Intermittent every 8 hours  saccharomyces boulardii 250 milliGRAM(s) Oral two times a day    MEDICATIONS  (PRN):  acetaminophen   Tablet .. 650 milliGRAM(s) Oral every 6 hours PRN Temp greater or equal to 38C (100.4F), Mild Pain (1 - 3)      LABS:                        10.9   4.55  )-----------( 222      ( 15 Dec 2019 06:46 )             36.6     12-15    134<L>  |  100  |  23.0<H>  ----------------------------<  85  4.3   |  24.0  |  0.98    Ca    8.8      15 Dec 2019 06:46  Phos  2.9     12-15  Mg     2.1     12-15       PHYSICAL EXAM:    GENERAL: elderly female, chronically ill appearing, not in acute distress  HEAD:  Atraumatic, Normocephalic  EYES: EOMI, PERRLA, conjunctiva and sclera clear  ENMT: Moist mucous membranes  NECK: Supple   NERVOUS SYSTEM:  Alert & Oriented X3   CHEST/LUNG: coarse breath sounds   HEART: Regular rate and rhythm; + S1/S2  ABDOMEN: Soft, Nontender, Nondistended; Bowel sounds present  EXTREMITIES:  bilateral leg dressings c/d/i

## 2019-12-15 NOTE — PROGRESS NOTE ADULT - ASSESSMENT
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 Pt denied LOC, hitting her head, vomiting, neck pain, chest pain, SOB. EMS was called on patient's medical alert bracelet and encouraged her to come in after noticing b/l legs weeping, malodorous. Pt states that her legs do not cause her any pain unless they are touched, but notes they cause her significant difficulty with ADLs. She recalls being treated for a similar problem in her legs last spring and noticed about 3 months ago the malodor and weeping returned. In the ED patient received one dose of clindamycin and cefepime. Admitted to medicine for further medical management of cellulitis r/o b/l lower extremity gangrene.       1) Chronic PVD with b/l LE cellulitis (no evidence of gangrene per ID)  -s/p debridement and unna boat placement on 12/12  -blood cultures negative  -podiatry evaluation initially in the ED; recommending local wound care  -continue zosyn per ID; f/u further recommendations  -analgesia PRN  -vascular surgery consult appreciated; will discuss wound care management   -PT evaluation pending    2)Hyperkalemia- resolved  -due to KCL supplementation which has been held    3)HTN  -BP low/normal  -Continue coreg, lasix and enalapril with parameters.     4) Chronic Systolic CHF with EF <30%  -clinically euvolemic  -continue Furosemide, ACE and coreg  -TTE done showed severely decreased global left ventricular systolic function, Left ventricular ejection fraction, by visual estimation, is 25 to 30%, Spectral Doppler shows impaired relaxation pattern of left ventricular myocardial filling (Grade I diastolic dysfunction), Small pericardial effusion without tamponade physiology.   -strict I/Os, daily weights   -Dr. Park consulted     DVT Prophylaxis- Heparin SC    Dispo - patient lives home alone; will most definitely need placement. PT evaluation ordered.

## 2019-12-16 ENCOUNTER — TRANSCRIPTION ENCOUNTER (OUTPATIENT)
Age: 79
End: 2019-12-16

## 2019-12-16 VITALS
SYSTOLIC BLOOD PRESSURE: 99 MMHG | RESPIRATION RATE: 18 BRPM | TEMPERATURE: 98 F | HEART RATE: 74 BPM | OXYGEN SATURATION: 92 % | DIASTOLIC BLOOD PRESSURE: 61 MMHG

## 2019-12-16 PROCEDURE — 85730 THROMBOPLASTIN TIME PARTIAL: CPT

## 2019-12-16 PROCEDURE — 71045 X-RAY EXAM CHEST 1 VIEW: CPT

## 2019-12-16 PROCEDURE — 83735 ASSAY OF MAGNESIUM: CPT

## 2019-12-16 PROCEDURE — 87086 URINE CULTURE/COLONY COUNT: CPT

## 2019-12-16 PROCEDURE — C8929: CPT

## 2019-12-16 PROCEDURE — 85027 COMPLETE CBC AUTOMATED: CPT

## 2019-12-16 PROCEDURE — 36415 COLL VENOUS BLD VENIPUNCTURE: CPT

## 2019-12-16 PROCEDURE — 86140 C-REACTIVE PROTEIN: CPT

## 2019-12-16 PROCEDURE — 99285 EMERGENCY DEPT VISIT HI MDM: CPT | Mod: 25

## 2019-12-16 PROCEDURE — 84100 ASSAY OF PHOSPHORUS: CPT

## 2019-12-16 PROCEDURE — 96375 TX/PRO/DX INJ NEW DRUG ADDON: CPT

## 2019-12-16 PROCEDURE — 85610 PROTHROMBIN TIME: CPT

## 2019-12-16 PROCEDURE — 83605 ASSAY OF LACTIC ACID: CPT

## 2019-12-16 PROCEDURE — 99232 SBSQ HOSP IP/OBS MODERATE 35: CPT

## 2019-12-16 PROCEDURE — 80053 COMPREHEN METABOLIC PANEL: CPT

## 2019-12-16 PROCEDURE — 85652 RBC SED RATE AUTOMATED: CPT

## 2019-12-16 PROCEDURE — 87040 BLOOD CULTURE FOR BACTERIA: CPT

## 2019-12-16 PROCEDURE — 86850 RBC ANTIBODY SCREEN: CPT

## 2019-12-16 PROCEDURE — 99239 HOSP IP/OBS DSCHRG MGMT >30: CPT

## 2019-12-16 PROCEDURE — 93005 ELECTROCARDIOGRAM TRACING: CPT

## 2019-12-16 PROCEDURE — 80048 BASIC METABOLIC PNL TOTAL CA: CPT

## 2019-12-16 PROCEDURE — 86901 BLOOD TYPING SEROLOGIC RH(D): CPT

## 2019-12-16 PROCEDURE — 97163 PT EVAL HIGH COMPLEX 45 MIN: CPT

## 2019-12-16 PROCEDURE — 86900 BLOOD TYPING SEROLOGIC ABO: CPT

## 2019-12-16 PROCEDURE — 81001 URINALYSIS AUTO W/SCOPE: CPT

## 2019-12-16 PROCEDURE — 96365 THER/PROPH/DIAG IV INF INIT: CPT

## 2019-12-16 RX ORDER — ACETAMINOPHEN 500 MG
2 TABLET ORAL
Qty: 0 | Refills: 0 | DISCHARGE
Start: 2019-12-16

## 2019-12-16 RX ORDER — AZTREONAM 2 G
1 VIAL (EA) INJECTION
Qty: 20 | Refills: 0
Start: 2019-12-16 | End: 2019-12-25

## 2019-12-16 RX ORDER — CIPROFLOXACIN LACTATE 400MG/40ML
1 VIAL (ML) INTRAVENOUS
Qty: 10 | Refills: 0
Start: 2019-12-16 | End: 2019-12-25

## 2019-12-16 RX ORDER — SACCHAROMYCES BOULARDII 250 MG
1 POWDER IN PACKET (EA) ORAL
Qty: 20 | Refills: 0
Start: 2019-12-16 | End: 2019-12-25

## 2019-12-16 RX ORDER — POTASSIUM CHLORIDE 20 MEQ
1 PACKET (EA) ORAL
Qty: 0 | Refills: 0 | DISCHARGE

## 2019-12-16 RX ADMIN — PIPERACILLIN AND TAZOBACTAM 25 GRAM(S): 4; .5 INJECTION, POWDER, LYOPHILIZED, FOR SOLUTION INTRAVENOUS at 05:53

## 2019-12-16 RX ADMIN — PIPERACILLIN AND TAZOBACTAM 25 GRAM(S): 4; .5 INJECTION, POWDER, LYOPHILIZED, FOR SOLUTION INTRAVENOUS at 14:13

## 2019-12-16 RX ADMIN — CARVEDILOL PHOSPHATE 12.5 MILLIGRAM(S): 80 CAPSULE, EXTENDED RELEASE ORAL at 05:52

## 2019-12-16 RX ADMIN — Medication 10 MILLIGRAM(S): at 05:52

## 2019-12-16 RX ADMIN — HEPARIN SODIUM 5000 UNIT(S): 5000 INJECTION INTRAVENOUS; SUBCUTANEOUS at 05:52

## 2019-12-16 RX ADMIN — CLOPIDOGREL BISULFATE 75 MILLIGRAM(S): 75 TABLET, FILM COATED ORAL at 14:14

## 2019-12-16 RX ADMIN — HEPARIN SODIUM 5000 UNIT(S): 5000 INJECTION INTRAVENOUS; SUBCUTANEOUS at 14:14

## 2019-12-16 RX ADMIN — Medication 40 MILLIGRAM(S): at 05:52

## 2019-12-16 RX ADMIN — Medication 250 MILLIGRAM(S): at 05:52

## 2019-12-16 NOTE — DISCHARGE NOTE PROVIDER - CARE PROVIDER_API CALL
Allen Ferraro)  Surgery  [address] [facility] [place] [address]  Phone: [phone]  Fax: [phone]  Follow Up Time:     Samm Rodriguez (DPM)  Surgery  [address] [place] [address]  Phone: [phone]  Fax: [phone]  Follow Up Time: Allen Ferraro)  Surgery  [address] [facility], [address]nd [place] [address]  Phone: [phone]  Fax: [phone]  Follow Up Time: 2 weeks    Samm Rodriguez (LAM)  Surgery  [address] [place] [address]  Phone: [phone]  Fax: [phone]  Follow Up Time: 1 week    Dangelo Park)  Cardiovascular Disease; Interventional Cardiology  [address] [place] [address]  Phone: [phone]  Fax: [phone]  Follow Up Time:

## 2019-12-16 NOTE — PHYSICAL THERAPY INITIAL EVALUATION ADULT - ADDITIONAL COMMENTS
per patient she lives in a [place], has about 8 steps to the section of the home she lives in. Pt reporting sh stays on the top floor and has a chair lift if she needs. She ambulates with a RW at home.

## 2019-12-16 NOTE — PROGRESS NOTE ADULT - ASSESSMENT
80y/o Female with PMHx of systolic and diastolic CHF w/ EF 30% with PPM, CAD, HTN, HLD, PVD, BIBEMS s/p mechanical fall 12/11/19, brought to [facility] for further evaluation. Admitted with b/l LE cellulitis.        1) Chronic PVD with b/l LE cellulitis (no evidence of gangrene per ID)  - s/p debridement and unna boat placement by vascular on 12/12  - Podiatry evaluation initially in the ED, recommending local wound care and regular outpatient follow up  - Discussed with vascular today, next unna boot change is 12/19. If pt will be going to nursing facility they will continue unna boot changes/care at facility and pt to follow up with vascular as an outpatient in 2 weeks from [place].  - ID follow up pending, on zosyn currently with negative blood cultures   - Analgesia PRN   - PT recommending [facility]     2) Hyperkalemia  - Resolved  - Due to KCL supplementation which has been held    3) HTN  - BP low/normal  - Continue coreg, lasix and enalapril with parameters.     4) Chronic Systolic CHF with EF <30%  - Clinically euvolemic  - Continue Furosemide, ACE and coreg  - TTE done showed severely decreased global left ventricular systolic function, Left ventricular ejection fraction, by visual estimation, is 25 to 30%, Spectral Doppler shows impaired relaxation pattern of left ventricular myocardial filling (Grade I diastolic dysfunction), Small pericardial effusion without tamponade physiology.   - Dr. Park consulted, pending  - Strict I/Os, daily weights     DVT Prophylaxis- Heparin SC    Dispo - Eventual dispo to [facility] pending cardio consult, ID follow up. 80y/o Female with PMHx of systolic and diastolic CHF w/ EF 30% with PPM, CAD, HTN, HLD, PVD, BIBEMS s/p mechanical fall 12/11/19, brought to [facility] for further evaluation. Admitted with b/l LE cellulitis.        1) Chronic PVD with b/l LE cellulitis (no evidence of gangrene per ID)  - s/p debridement and unna boat placement by vascular on 12/12  - Podiatry evaluation initially in the ED, recommending local wound care and regular outpatient follow up  - Discussed with vascular today, next unna boot change is 12/19. If pt will be going to nursing facility they will continue unna boot changes/care at facility and pt to follow up with vascular as an outpatient in 2 weeks from [place].  - ID follow up pending, on zosyn currently with negative blood cultures. Switch to PO?  - Analgesia PRN   - PT recommending [facility]     2) Hyperkalemia  - Resolved  - Due to KCL supplementation which has been held    3) HTN  - BP low/normal  - Continue coreg, lasix and enalapril with parameters.     4) Chronic Systolic CHF with EF <30%  - Clinically euvolemic  - Continue Furosemide, ACE and coreg  - TTE done showed severely decreased global left ventricular systolic function, Left ventricular ejection fraction, by visual estimation, is 25 to 30%, Spectral Doppler shows impaired relaxation pattern of left ventricular myocardial filling (Grade I diastolic dysfunction), Small pericardial effusion without tamponade physiology.   - Dr. Park consulted, pending  - Strict I/Os, daily weights     DVT Prophylaxis- Heparin SC    Dispo - Eventual dispo to [facility] pending cardio consult, ID follow up.

## 2019-12-16 NOTE — DISCHARGE NOTE PROVIDER - NSDCCPCAREPLAN_GEN_ALL_CORE_FT
PRINCIPAL DISCHARGE DIAGNOSIS  Diagnosis: Cellulitis  Assessment and Plan of Treatment: S/p debridement by vascular surgery team. To continue oral antibiotics as prescribed. To follow up with vascular and podiatry teams on discharge as indicated. Unna boots to be changed every 7 days. Next change is due 12/19/19. PRINCIPAL DISCHARGE DIAGNOSIS  Diagnosis: Cellulitis  Assessment and Plan of Treatment: S/p debridement by vascular surgery team. To continue oral antibiotics as prescribed. To follow up with vascular and podiatry teams on discharge as indicated. Unna boots to be changed every 7 days. Next change is due 12/19/19.      SECONDARY DISCHARGE DIAGNOSES  Diagnosis: Systolic heart failure, chronic  Assessment and Plan of Treatment: Seen by cardiology. Outpatient follow up for further mgt    Diagnosis: HTN (hypertension)  Assessment and Plan of Treatment: Resume home meds PRINCIPAL DISCHARGE DIAGNOSIS  Diagnosis: Cellulitis  Assessment and Plan of Treatment: S/p debridement by vascular surgery team. To continue oral antibiotics as prescribed. To follow up with vascular and podiatry teams on discharge as indicated. Unna boots to be changed every 7 days. Next change is due 12/19/19.      SECONDARY DISCHARGE DIAGNOSES  Diagnosis: Hyperkalemia  Assessment and Plan of Treatment: Will hold potassium supplement on discharge. Follow up wiht PMD in one week for repeat BMP. Further mgt as per PMD    Diagnosis: Systolic heart failure, chronic  Assessment and Plan of Treatment: Seen by cardiology. Outpatient follow up for further mgt    Diagnosis: HTN (hypertension)  Assessment and Plan of Treatment: Resume home meds

## 2019-12-16 NOTE — PROGRESS NOTE ADULT - SUBJECTIVE AND OBJECTIVE BOX
[facility]  INFECTIOUS DISEASES AND INTERNAL MEDICINE at [place]  =======================================================  Car Ordonez MD  Diplomates American Board of Internal Medicine and Infectious Diseases  =======================================================    LESLY DELCID [id number]    Follow up: Leg cellulitis     No new complaint, has pain in legs with any contact. Both wrapped awaiting surgery to change dressing.   No fever or diarrhea.     Allergies:  aspirin (Unknown)  atorvastatin (Unknown)  penicillin (Unknown)    Antibiotics:  piperacillin/tazobactam IVPB.. 3.375 Gram(s) IV Intermittent every 8 hours     REVIEW OF SYSTEMS:  CONSTITUTIONAL:  No Fever or chills  HEENT:   No diplopia or blurred vision.  No earache, sore throat or runny nose.  CARDIOVASCULAR:  No chest pain or SOB  RESPIRATORY:  No cough, shortness of breath, PND or orthopnea.  GASTROINTESTINAL:  No nausea, vomiting or diarrhea.  GENITOURINARY:  No dysuria, frequency or urgency. No Blood in urine  MUSCULOSKELETAL:  no joint aches, no muscle pain  SKIN:  ulcers in legs, + pain   NEUROLOGIC:  No paresthesias or weakness.  PSYCHIATRIC:  No disorder of thought or mood.  ENDOCRINE:  No heat or cold intolerance, polyuria or polydipsia.  HEMATOLOGICAL:  No easy bruising or bleeding.      Physical Exam:  ICU Vital Signs Last 24 Hrs  T(C): 36.8 (16 Dec 2019 08:26), Max: 37.1 (15 Dec 2019 20:32)  T(F): 98.3 (16 Dec 2019 08:26), Max: 98.7 (15 Dec 2019 20:32)  HR: 69 (16 Dec 2019 08:26) (69 - 86)  BP: 98/58 (16 Dec 2019 08:34) (79/52 - 109/63)  RR: 18 (16 Dec 2019 08:26) (18 - 20)  SpO2: 92% (16 Dec 2019 08:26) (92% - 95%)  GEN: NAD  HEENT: normocephalic and atraumatic. EOMI. MARCO ANTONIO.    NECK: Supple.  No lymphadenopathy   LUNGS: Clear to auscultation.  HEART: Regular rate and rhythm   ABDOMEN: Soft, nontender, and nondistended.  Positive bowel sounds.    : No CVA tenderness  EXTREMITIES: both wrapped, awaiting surgery for dressing change.   MSK: no joint swelling  NEUROLOGIC: grossly intact.  PSYCHIATRIC: Appropriate affect .  SKIN: as above     Labs:  12-15    134<L>  |  100  |  23.0<H>  ----------------------------<  85  4.3   |  24.0  |  0.98    Ca    8.8      15 Dec 2019 06:46  Phos  2.9     12-15  Mg     2.1     12-15                        10.9   4.55  )-----------( 222      ( 15 Dec 2019 06:46 )             36.6     RECENT CULTURES:  12-13 @ 05:47 .Urine     No growth    12-11 @ 08:27 .Blood     No growth at 5 days.    All imaging and data are reviewed.     Assessment and plan:   78y/o Female with PMHx of systolic and diastolic CHF w/ EF 30% with PPM, CAD, HTN, HLD, PVD, BIBEMS s/p mechanical fall 12/11/19. Pt denies LOC, hitting her head, vomiting, neck pain, chest pain, SOB. EMS was called on patient's medical alert bracelet and encouraged her to come in after noticing b/l legs weeping, malodorous. Pt states that her legs do not cause her any pain unless they are touched, but notes they cause her significant difficulty with ADLs. She recalls being treated for a similar problem in her legs last spring and noticed about 3 months ago the malodor and weeping returned. In the ED patient received one dose of clindamycin and cefepime. Pt denies any sob, chest pain, cough, fevers n/v or abd pain.  Patient seen and examined.  Report tenderness in the feet.    She was found with bilateral cellulitis of leg, devitalized/ hyperkeratotic skin.    bilateral leg cellulitis  Tinea cruris   history of penicillin injection site pruritis; not allergy    - Blood culture neg on 12/11  - UC negative on 12/13  - Continue zosyn 3.375 grams Q 8H for empiric Abx coverage  - no vancomycin for now - no hx of MRSA on culture   - [place] powder to inguinal areas  - s/p OR debridement of legs on 12/12  - surgical team following  - No fever or leukocytosis    Will follow. [facility]  INFECTIOUS DISEASES AND INTERNAL MEDICINE at [place]  =======================================================  Car Ordonez MD  Diplomates American Board of Internal Medicine and Infectious Diseases  =======================================================    LESLY DELCID [id number]    Follow up: Leg cellulitis     No new complaint, has pain in legs with any contact. Both wrapped awaiting surgery to change dressing.   No fever or diarrhea.     Allergies:  aspirin (Unknown)  atorvastatin (Unknown)  penicillin (Unknown)    Antibiotics:  piperacillin/tazobactam IVPB.. 3.375 Gram(s) IV Intermittent every 8 hours     REVIEW OF SYSTEMS:  CONSTITUTIONAL:  No Fever or chills  HEENT:   No diplopia or blurred vision.  No earache, sore throat or runny nose.  CARDIOVASCULAR:  No chest pain or SOB  RESPIRATORY:  No cough, shortness of breath, PND or orthopnea.  GASTROINTESTINAL:  No nausea, vomiting or diarrhea.  GENITOURINARY:  No dysuria, frequency or urgency. No Blood in urine  MUSCULOSKELETAL:  no joint aches, no muscle pain  SKIN:  ulcers in legs, + pain   NEUROLOGIC:  No paresthesias or weakness.  PSYCHIATRIC:  No disorder of thought or mood.  ENDOCRINE:  No heat or cold intolerance, polyuria or polydipsia.  HEMATOLOGICAL:  No easy bruising or bleeding.      Physical Exam:  ICU Vital Signs Last 24 Hrs  T(C): 36.8 (16 Dec 2019 08:26), Max: 37.1 (15 Dec 2019 20:32)  T(F): 98.3 (16 Dec 2019 08:26), Max: 98.7 (15 Dec 2019 20:32)  HR: 69 (16 Dec 2019 08:26) (69 - 86)  BP: 98/58 (16 Dec 2019 08:34) (79/52 - 109/63)  RR: 18 (16 Dec 2019 08:26) (18 - 20)  SpO2: 92% (16 Dec 2019 08:26) (92% - 95%)  GEN: NAD  HEENT: normocephalic and atraumatic. EOMI. MARCO ANTONIO.    NECK: Supple.  No lymphadenopathy   LUNGS: Clear to auscultation.  HEART: Regular rate and rhythm   ABDOMEN: Soft, nontender, and nondistended.  Positive bowel sounds.    : No CVA tenderness  EXTREMITIES: both wrapped, awaiting surgery for dressing change.   MSK: no joint swelling  NEUROLOGIC: grossly intact.  PSYCHIATRIC: Appropriate affect .  SKIN: as above     Labs:  12-15    134<L>  |  100  |  23.0<H>  ----------------------------<  85  4.3   |  24.0  |  0.98    Ca    8.8      15 Dec 2019 06:46  Phos  2.9     12-15  Mg     2.1     12-15                        10.9   4.55  )-----------( 222      ( 15 Dec 2019 06:46 )             36.6     RECENT CULTURES:  12-13 @ 05:47 .Urine     No growth    12-11 @ 08:27 .Blood     No growth at 5 days.    All imaging and data are reviewed.     Assessment and plan:   80y/o Female with PMHx of systolic and diastolic CHF w/ EF 30% with PPM, CAD, HTN, HLD, PVD, BIBEMS s/p mechanical fall 12/11/19. Pt denies LOC, hitting her head, vomiting, neck pain, chest pain, SOB. EMS was called on patient's medical alert bracelet and encouraged her to come in after noticing b/l legs weeping, malodorous. Pt states that her legs do not cause her any pain unless they are touched, but notes they cause her significant difficulty with ADLs. She recalls being treated for a similar problem in her legs last spring and noticed about 3 months ago the malodor and weeping returned. In the ED patient received one dose of clindamycin and cefepime. Pt denies any sob, chest pain, cough, fevers n/v or abd pain.  Patient seen and examined.  Report tenderness in the feet.    She was found with bilateral cellulitis of leg, devitalized/ hyperkeratotic skin.    bilateral leg cellulitis  Tinea cruris   history of penicillin injection site pruritis; not allergy    - Blood culture neg on 12/11  - UC negative on 12/13  - Continue zosyn 3.375 grams Q 8H for empiric Abx coverage  - no vancomycin for now - no hx of MRSA on culture   - Nystatin powder to inguinal areas  - s/p OR debridement of legs on 12/12  - surgical team following  - No fever or leukocytosis  - When ready for discharge can be switched to po Levaquin 500mg to cover GNR including Pseudomonas and Bactrim DS q12h to complete 2weeks from the day of surgery.     Will sign off please call with any question.

## 2019-12-16 NOTE — DISCHARGE NOTE NURSING/CASE MANAGEMENT/SOCIAL WORK - PATIENT PORTAL LINK FT
You can access the FollowMyHealth Patient Portal offered by [facility] by registering at the following website: [email or web address]. By joining [facility]’s FollowMyHealth portal, you will also be able to view your health information using other applications (apps) compatible with our system.

## 2019-12-16 NOTE — PHYSICAL THERAPY INITIAL EVALUATION ADULT - PERTINENT HX OF CURRENT PROBLEM, REHAB EVAL
Pt presents to [facility] from home, reports she slipped and fell and used the life alert for help.

## 2019-12-16 NOTE — DISCHARGE NOTE PROVIDER - CARE PROVIDERS DIRECT ADDRESSES
,[email or web address]_Unknown [email or web address],DirectAddress_Unknown,DirectAddress_Unknown

## 2019-12-16 NOTE — DISCHARGE NOTE PROVIDER - PROVIDER TOKENS
PROVIDER:[TOKEN:[[id number]]],PROVIDER:[TOKEN:[[id number]]] PROVIDER:[TOKEN:[[id number]],FOLLOWUP:[2 weeks]],PROVIDER:[TOKEN:[[id number]],FOLLOWUP:[1 week]],PROVIDER:[TOKEN:[[id number]]]

## 2019-12-16 NOTE — DISCHARGE NOTE PROVIDER - NSDCMRMEDTOKEN_GEN_ALL_CORE_FT
acetaminophen 325 mg oral tablet: 2 tab(s) orally every 6 hours, As needed, Temp greater or equal to 38C (100.4F), Mild Pain (1 - 3)  Bactrim  mg-160 mg oral tablet: 1 tab(s) orally 2 times a day   carvedilol 12.5 mg oral tablet: 1 tab(s) orally 2 times a day  enalapril 10 mg oral tablet: 1 tab(s) orally once a day  furosemide 40 mg oral tablet: 1 tab(s) orally once a day  Klor-Con M20 oral tablet, extended release: 1 tab(s) orally once a day  Levaquin 500 mg oral tablet: 1 tab(s) orally once a day end date 12/26/19  Plavix 75 mg oral tablet: 1 tab(s) orally once a day  saccharomyces boulardii lyo 250 mg oral capsule: 1 cap(s) orally 2 times a day acetaminophen 325 mg oral tablet: 2 tab(s) orally every 6 hours, As needed, Temp greater or equal to 38C (100.4F), Mild Pain (1 - 3)  Bactrim  mg-160 mg oral tablet: 1 tab(s) orally 2 times a day   carvedilol 12.5 mg oral tablet: 1 tab(s) orally 2 times a day  enalapril 10 mg oral tablet: 1 tab(s) orally once a day  furosemide 40 mg oral tablet: 1 tab(s) orally once a day  Levaquin 500 mg oral tablet: 1 tab(s) orally once a day end date 12/26/19  Plavix 75 mg oral tablet: 1 tab(s) orally once a day  saccharomyces boulardii lyo 250 mg oral capsule: 1 cap(s) orally 2 times a day

## 2019-12-16 NOTE — PROGRESS NOTE ADULT - SUBJECTIVE AND OBJECTIVE BOX
CHIEF COMPLAINT/INTERVAL HISTORY:    Patient is a 79y old  Female who presents with a chief complaint of Gangrene (14 Dec 2019 19:27)    SUBJECTIVE & OBJECTIVE: Pt seen and examined at bedside. No overnight events. Complains of pain while trying to ambulate with PT this morning. Would like to try to just dangle feet over bedside. Otherwise denies any new complaints. ROS negative. VSS    Vital Signs Last 24 Hrs  T(C): 36.8 (16 Dec 2019 08:26), Max: 37.1 (15 Dec 2019 20:32)  T(F): 98.3 (16 Dec 2019 08:26), Max: 98.7 (15 Dec 2019 20:32)  HR: 69 (16 Dec 2019 08:26) (69 - 86)  BP: 98/58 (16 Dec 2019 08:34) (79/52 - 109/63)  BP(mean): --  RR: 18 (16 Dec 2019 08:26) (18 - 20)  SpO2: 92% (16 Dec 2019 08:26) (92% - 95%)    PHYSICAL EXAM:  GENERAL: elderly female, chronically ill appearing, not in acute distress  HEAD:  Atraumatic, Normocephalic  EYES: EOMI, PERRLA, conjunctiva and sclera clear  ENMT: Moist mucous membranes  NECK: Supple   NERVOUS SYSTEM:  Alert & Oriented X3   CHEST/LUNG: coarse breath sounds   HEART: Regular rate and rhythm; + S1/S2  ABDOMEN: Soft, Nontender, Nondistended; Bowel sounds present  EXTREMITIES:  bilateral leg dressings c/d/i    LABS:                                   10.9   4.55  )-----------( 222      ( 15 Dec 2019 06:46 )             36.6     12-15    134<L>  |  100  |  23.0<H>  ----------------------------<  85  4.3   |  24.0  |  0.98    Ca    8.8      15 Dec 2019 06:46  Phos  2.9     12-15  Mg     2.1     12-15

## 2019-12-16 NOTE — PHYSICAL THERAPY INITIAL EVALUATION ADULT - GENERAL OBSERVATIONS, REHAB EVAL
Pt rec'd in room sitting upright with the HOB elevated, breathing RA in NAD, Blaze LEs in ace bandage

## 2019-12-16 NOTE — DISCHARGE NOTE PROVIDER - HOSPITAL COURSE
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 Admitted with b/l LE cellulitis. Seen in consult by podiatry, vascular and infectious disease. S/p debridement and unna boat placement by vascular on 12/12. Stable for discharge with oral antibiotics. Will need regular follow up with podiatry on discharge. Will also need to follow up with     vascular surgery team 2 weeks from discharge. 78y/o Female with PMHx of systolic and diastolic CHF w/ EF 30% with PPM, CAD, HTN, HLD, PVD, BIBEMS s/p mechanical fall 12/11/19, brought to [facility] for further evaluation. Admitted with b/l LE cellulitis. Seen in consult by podiatry, vascular and infectious disease. S/p debridement and unna boat placement by vascular on 12/12. Stable for discharge with oral antibiotics. Will need regular follow up with podiatry on discharge. Will also need to follow up with vascular surgery team 2 weeks from discharge. 78y/o Female with PMHx of systolic and diastolic CHF w/ EF 30% with PPM, CAD, HTN, HLD, PVD, BIBEMS s/p mechanical fall 12/11/19, brought to [facility] for further evaluation. Admitted with b/l LE cellulitis. Seen in consult by podiatry, vascular and infectious disease. S/p debridement and unna boat placement by vascular on 12/12. Stable for discharge with oral antibiotics. Will need regular follow up with podiatry on discharge. Will also need to follow up with vascular surgery team 2 weeks from discharge. Incidental finding of severely depressed EF on TTE during admission. Cardiology consulted. 80y/o Female with PMHx of systolic and diastolic CHF w/ EF 30% with PPM, CAD, HTN, HLD, PVD, BIBEMS s/p mechanical fall 12/11/19, brought to [facility] for further evaluation. Admitted with b/l LE cellulitis. Seen in consult by podiatry, vascular and infectious disease. S/p debridement and unna boat placement by vascular on 12/12. Stable for discharge with oral antibiotics. Will need regular follow up with podiatry on discharge. Will also need to follow up with vascular surgery team 2 weeks from discharge. Incidental finding of severely depressed EF on TTE during admission. Cardiology consulted no additional work up. [place] follow up. 78y/o Female with PMHx of systolic and diastolic CHF w/ EF 30% with PPM, CAD, HTN, HLD, PVD, BIBEMS s/p mechanical fall 12/11/19, brought to [facility] for further evaluation. Admitted with b/l LE cellulitis. Seen in consult by podiatry, vascular and infectious disease. S/p debridement and unna boat placement by vascular on 12/12. Stable for discharge with oral antibiotics. Will need regular follow up with podiatry on discharge. Will also need to follow up with vascular surgery team 2 weeks from discharge. Incidental finding of severely depressed EF on TTE during admission. Cardiology consulted no additional work up. [place] follow up.         Medically stable for discharge to [facility]. Time spent on discharge 45 minutes.

## 2019-12-16 NOTE — CONSULT NOTE ADULT - SUBJECTIVE AND OBJECTIVE BOX
80 yo female for bilateral lower extremity wound debridement. History and labs reviewed. ASA 3. Pt will need medical clearance prior to OR
Patient is a 79y old  Female who presents with a chief complaint of Gangrene      HPI:  80y/o Female with PMHx of systolic and diastolic CHF w/ EF 30% with [place] [employer] BiV AICD, CAD, HTN, HLD, PVD, BIBEMS s/p mechanical fall 12/11/19. Pt denies LOC, hitting her head, vomiting, neck pain, chest pain, SOB. EMS was called on patient's medical alert bracelet and encouraged her to come in after noticing b/l legs weeping, malodorous. Pt states that her legs do not cause her any pain unless they are touched, but notes they cause her significant difficulty with ADLs. She recalls being treated for a similar problem in her legs last spring and noticed about 3 months ago the malodor and weeping returned. In the ED patient received one dose of clindamycin and cefepime. Pt denies any sob, chest pain, cough, fevers n/v or abd pain.     Patient is now s/p surgery for her legs.  Echo has shown EF 25-30% with mild circumferential pericardial effusion and mild MR/TR/ AI.  This is unchanged from echo from my office in March of 2018.  [place] she had a cardiac Cath in April of 2018 which showed non obstructive CAD with EF 30%.  She denies any chest pain or shortness of breath.        PAST MEDICAL & SURGICAL HISTORY:  Pacemaker  Essential hypertension  HLD (hyperlipidemia)  CHF (congestive heart failure)  S/P tonsillectomy  Artificial pacemaker: defib    Allergies    aspirin (Unknown)  atorvastatin (Unknown)  penicillin (Unknown)    Intolerances        MEDICATIONS  (STANDING):  carvedilol 12.5 milliGRAM(s) Oral every 12 hours  clopidogrel Tablet 75 milliGRAM(s) Oral daily  enalapril 10 milliGRAM(s) Oral daily  furosemide    Tablet 40 milliGRAM(s) Oral daily  heparin  Injectable 5000 Unit(s) SubCutaneous every 8 hours  piperacillin/tazobactam IVPB.. 3.375 Gram(s) IV Intermittent every 8 hours  saccharomyces boulardii 250 milliGRAM(s) Oral two times a day    MEDICATIONS  (PRN):  acetaminophen   Tablet .. 650 milliGRAM(s) Oral every 6 hours PRN Temp greater or equal to 38C (100.4F), Mild Pain (1 - 3)    acetaminophen   Tablet .. 650 milliGRAM(s) Oral every 6 hours PRN  carvedilol 12.5 milliGRAM(s) Oral every 12 hours  clopidogrel Tablet 75 milliGRAM(s) Oral daily  enalapril 10 milliGRAM(s) Oral daily  furosemide    Tablet 40 milliGRAM(s) Oral daily  heparin  Injectable 5000 Unit(s) SubCutaneous every 8 hours  piperacillin/tazobactam IVPB.. 3.375 Gram(s) IV Intermittent every 8 hours  saccharomyces boulardii 250 milliGRAM(s) Oral two times a day      FAMILY HISTORY:  FH: Alzheimers disease: Patient unable to recall in which family member      SOCIAL HISTORY:    CIGARETTES:  Former    ALCOHOL:  Rare    REVIEW OF SYSTEMS:  CONSTITUTIONAL: No fever, weight loss, or fatigue  EYES: No eye pain, visual disturbances, or discharge  ENMT:  No difficulty hearing, tinnitus, vertigo; No sinus or throat pain  NECK: No pain or stiffness  RESPIRATORY: No cough, wheezing, chills or hemoptysis; No Shortness of Breath  CARDIOVASCULAR: No chest pain, palpitations, passing out, dizziness, or leg swelling  GASTROINTESTINAL: No abdominal or epigastric pain. No nausea, vomiting, or hematemesis; No diarrhea or constipation. No melena or hematochezia.  GENITOURINARY: No dysuria, frequency, hematuria, or incontinence  NEUROLOGICAL: No headaches, memory loss, loss of strength, numbness, or tremors  SKIN: No itching, burning, rashes, or lesions   LYMPH Nodes: No enlarged glands  ENDOCRINE: No heat or cold intolerance; No hair loss  MUSCULOSKELETAL: No joint pain or swelling; No muscle, back, or extremity pain  PSYCHIATRIC: No depression, anxiety, mood swings, or difficulty sleeping  HEME/LYMPH: No easy bruising, or bleeding gums  ALLERY AND IMMUNOLOGIC: No hives or eczema	    Vital Signs Last 24 Hrs  T(C): 36.8 (16 Dec 2019 08:26), Max: 37.1 (15 Dec 2019 20:32)  T(F): 98.3 (16 Dec 2019 08:26), Max: 98.7 (15 Dec 2019 20:32)  HR: 69 (16 Dec 2019 08:26) (69 - 86)  BP: 98/58 (16 Dec 2019 08:34) (79/52 - 109/63)  BP(mean): --  RR: 18 (16 Dec 2019 08:26) (18 - 20)  SpO2: 92% (16 Dec 2019 08:26) (92% - 95%)    Daily     Daily     I&O's Detail    15 Dec 2019 07:01  -  16 Dec 2019 07:00  --------------------------------------------------------  IN:    Solution: 300 mL  Total IN: 300 mL    OUT:  Total OUT: 0 mL    Total NET: 300 mL          PHYSICAL EXAM:  Appearance: Normal, well nourished	  HEENT:   Normal oral mucosa, PERRL, EOMI, sclera non-icteric	  Lymphatic: No cervical lymphadenopathy  Cardiovascular: Normal S1 S2, No JVD, No cardiac murmurs, No carotid bruits, No peripheral edema  Respiratory: Lungs clear to auscultation	  Psychiatry: A & O x 3, Mood & affect appropriate  Gastrointestinal:  Soft, Non-tender, + BS, no bruits	  Skin: No rashes, No ecchymoses, No cyanosis  Neurologic: Grossly non-focal with full strength in all four extremities  Extremities: Normal range of motion, No clubbing, cyanosis or edema  Vascular: Peripheral pulses palpable 2+ bilaterally    LABS:                        10.9   4.55  )-----------( 222      ( 15 Dec 2019 06:46 )             36.6     12-15    134<L>  |  100  |  23.0<H>  ----------------------------<  85  4.3   |  24.0  |  0.98    Ca    8.8      15 Dec 2019 06:46  Phos  2.9     12-15  Mg     2.1     12-15              I&O's Summary    15 Dec 2019 07:01  -  16 Dec 2019 07:00  --------------------------------------------------------  IN: 300 mL / OUT: 0 mL / NET: 300 mL      BNP  RADIOLOGY & ADDITIONAL STUDIES:    Assessment:  80y/o Female with PMHx of systolic and diastolic CHF w/ EF 30% with [place] [employer] BiV AICD, CAD, HTN, HLD, PVD, BIBEMS s/p mechanical fall 12/11/19. Pt denies LOC, hitting her head, vomiting, neck pain, chest pain, SOB. EMS was called on patient's medical alert bracelet and encouraged her to come in after noticing b/l legs weeping, malodorous. Pt states that her legs do not cause her any pain unless they are touched, but notes they cause her significant difficulty with ADLs. She recalls being treated for a similar problem in her legs last spring and noticed about 3 months ago the malodor and weeping returned. In the ED patient received one dose of clindamycin and cefepime. Pt denies any sob, chest pain, cough, fevers n/v or abd pain.     Patient is now s/p surgery for her legs.  Echo has shown EF 25-30% with mild circumferential pericardial effusion and mild MR/TR/ AI.  This is unchanged from echo from my office in March of 2018.  [place] she had a cardiac Cath in April of 2018 which showed non obstructive CAD with EF 30%.  She denies any chest pain or shortness of breath.      Plan:  Cardiac status has been stable  I elicit no signs of decompensated heart failure  Her EF is the same as it has been in the past  Cath in April of 2018 showed non obstructive CAD  Patient is on therapy for her chronic systolic heart failure with beta blockers and ACE inhibitors  Continue same  Compliance with outpatient follow up is stressed.    Can [facility] to [facility] as per Medicine  Please reconsult if further questions arise.
Patient is a 79y old  Female who presents with a chief complaint of Gangrene (11 Dec 2019 14:22)      HPI:  80y/o Female with PMHx of systolic and diastolic CHF w/ EF 30% with PPM, CAD, HTN, HLD, PVD, BIBEMS s/p mechanical fall 12/11/19. Pt denies LOC, hitting her head, vomiting, neck pain, chest pain, SOB. EMS was called on patient's medical alert mary and encouraged her to come in after noticing b/l legs weeping, malodorous. Pt states that her legs do not cause her any pain unless they are touched, but notes they cause her significant difficulty with ADLs. She recalls being treated for a similar problem in her legs last spring and noticed about 3 months ago the malodor and weeping returned. In the ED patient received one dose of clindamycin and cefepime. Pt denies any sob, chest pain, cough, fevers n/v or abd pain. Will admit to medicine for further medical management. (11 Dec 2019 13:20)    Pt seen by Podiatry in NAD, AAO x 3. Pt admits to severe pain in her legs. Pt states she has followed up with Vascular in the past for her legs, but is unaware of the doctor's name. Pt states she does not follow up with Podiatry for routine care. Pt is refusing any dressings to her legs, stating it is too painful.     PMH:Pacemaker  Essential hypertension  HLD (hyperlipidemia)  CHF (congestive heart failure)    Allergies: aspirin (Unknown)  atorvastatin (Unknown)  penicillin (Unknown)    Medications: carvedilol Oral Tab/Cap - Peds 12.5 milliGRAM(s) Oral two times a day  cefepime   IVPB 2000 milliGRAM(s) IV Intermittent every 8 hours  clopidogrel Tablet 75 milliGRAM(s) Oral daily  furosemide    Tablet 40 milliGRAM(s) Oral daily  heparin  Injectable 5000 Unit(s) SubCutaneous every 12 hours  sodium polystyrene sulfonate Suspension 30 Gram(s) Oral once  vancomycin  IVPB 1000 milliGRAM(s) IV Intermittent every 12 hours    FH:FH: Alzheimers disease  No pertinent family history in first degree relatives  No pertinent family history in first degree relatives    PSX: S/P tonsillectomy  Artificial pacemaker    SH: carvedilol Oral Tab/Cap - Peds 12.5 milliGRAM(s) Oral two times a day  cefepime   IVPB 2000 milliGRAM(s) IV Intermittent every 8 hours  clopidogrel Tablet 75 milliGRAM(s) Oral daily  furosemide    Tablet 40 milliGRAM(s) Oral daily  heparin  Injectable 5000 Unit(s) SubCutaneous every 12 hours  sodium polystyrene sulfonate Suspension 30 Gram(s) Oral once  vancomycin  IVPB 1000 milliGRAM(s) IV Intermittent every 12 hours      Vital Signs Last 24 Hrs  T(C): 36.4 (11 Dec 2019 11:00), Max: 36.4 (11 Dec 2019 11:00)  T(F): 97.6 (11 Dec 2019 11:00), Max: 97.6 (11 Dec 2019 11:00)  HR: 98 (11 Dec 2019 11:00) (98 - 113)  BP: 121/82 (11 Dec 2019 11:00) (121/82 - 129/74)  BP(mean): --  RR: 18 (11 Dec 2019 11:00) (18 - 20)  SpO2: 98% (11 Dec 2019 11:00) (97% - 98%)    LABS                        12.9   7.47  )-----------( 248      ( 11 Dec 2019 08:24 )             43.1               12-11    132<L>  |  98  |  27.0<H>  ----------------------------<  90  6.0<H>   |  21.0<L>  |  0.77    Ca    9.9      11 Dec 2019 10:30    TPro  7.8  /  Alb  3.0<L>  /  TBili  0.8  /  DBili  x   /  AST  28  /  ALT  21  /  AlkPhos  130<H>  12-11      ROS  REVIEW OF SYSTEMS:    CONSTITUTIONAL: No fever, weight loss, or fatigue  EYES: No eye pain, visual disturbances, or discharge  ENMT:  No difficulty hearing, tinnitus, vertigo; No sinus or throat pain  NECK: No pain or stiffness  BREASTS: No pain, masses, or nipple discharge  RESPIRATORY: No cough, wheezing, chills or hemoptysis; No shortness of breath  CARDIOVASCULAR: No chest pain, palpitations, dizziness, or leg swelling  GASTROINTESTINAL: No abdominal or epigastric pain. No nausea, vomiting, or hematemesis; No diarrhea or constipation. No melena or hematochezia.  GENITOURINARY: No dysuria, frequency, hematuria, or incontinence  NEUROLOGICAL: No headaches, memory loss, loss of strength, numbness, or tremors  SKIN: No itching, burning, rashes, or lesions   LYMPH NODES: No enlarged glands  ENDOCRINE: No heat or cold intolerance; No hair loss  MUSCULOSKELETAL: No joint pain or swelling; No muscle, back, or extremity pain  PSYCHIATRIC: No depression, anxiety, mood swings, or difficulty sleeping  HEME/LYMPH: No easy bruising, or bleeding gums  ALLERY AND IMMUNOLOGIC: No hives or eczema      PHYSICAL EXAM  GEN: LESLY DELCID is a pleasant well-nourished, well developed 79y Female in no acute distress, alert awake, and oriented to person, place and time.   LE Focused:    Vasc: DP/PT palpable; CFT <3 sec x 10; TG warm to warm on b/l LE; +2 pitting edema on the dorsal foot b/l  Derm: Nails thickened, discolored and distrophic with noted subungual debris; noted venous stasis dermatitis to b/l LE with accompanying edema and erythema; desquamated skin noted to anterior shins   Neuro: Unable to assess
[facility]  INFECTIOUS DISEASES AND INTERNAL MEDICINE at [place]  =======================================================  Car Ordonez MD  Diplomates American Board of Internal Medicine and Infectious Diseases  Telephone [phone]  Fax            [phone]  =======================================================    [id number]  LESLY DELCID   HPI:  78y/o Female with PMHx of systolic and diastolic CHF w/ EF 30% with PPM, CAD, HTN, HLD, PVD, BIBEMS s/p mechanical fall 12/11/19. Pt denies LOC, hitting her head, vomiting, neck pain, chest pain, SOB. EMS was called on patient's medical alert bracelet and encouraged her to come in after noticing b/l legs weeping, malodorous. Pt states that her legs do not cause her any pain unless they are touched, but notes they cause her significant difficulty with ADLs. She recalls being treated for a similar problem in her legs last spring and noticed about 3 months ago the malodor and weeping returned. In the ED patient received one dose of clindamycin and cefepime. Pt denies any sob, chest pain, cough, fevers n/v or abd pain.  Patient seen and examined.  Report tenderness in the feet.    She was found with bilateral cellulitis of leg, devitalized/ hyperkeratotic skin.  She was given Cefepime and Vanco due to possible PCN allergy, but when clarified, not apparently an allergy.  History of recurrent pilonodal cyst age 22 y.o. had repeat penicillin injection, with  itching at injection site. NO rash. no SOB.     Hard of hearing.     I have personally reviewed the labs and data; pertinent labs and data are listed in this note; please see below.   =======================================================  Past Medical & Surgical Hx:  =====================  PAST MEDICAL & SURGICAL HISTORY:  Pacemaker  Essential hypertension  HLD (hyperlipidemia)  CHF (congestive heart failure)  S/P tonsillectomy  Artificial pacemaker: defib    Problem List:  ==========  HEALTH ISSUES - PROBLEM Dx:      Social Hx:  =======  no toxic habits currently    FAMILY HISTORY:  FH: Alzheimers disease: Patient unable to recall in which family member  no significant family history of immunosuppressive disorders in mother or father   =======================================================  REVIEW OF SYSTEMS:  CONSTITUTIONAL:  No Fever or chills  HEENT:  No diplopia or blurred vision.  No earache, sore throat or runny nose.  CARDIOVASCULAR:  No pressure, squeezing, strangling, tightness, heaviness or aching about the chest, neck, axilla or epigastrium.  RESPIRATORY:  No cough, shortness of breath  GASTROINTESTINAL:  No nausea, vomiting or diarrhea.  GENITOURINARY:  No dysuria, frequency or urgency. No Blood in urine  MUSCULOSKELETAL:  no joint aches, no muscle pain  SKIN:  No change in skin, hair or nails.  NEUROLOGIC:  No Headaches, seizures or weakness.  PSYCHIATRIC:  No disorder of thought or mood.  ENDOCRINE:  No heat or cold intolerance  HEMATOLOGICAL:  No easy bruising or bleeding.   =======================================================  Allergies  aspirin (Unknown)  atorvastatin (Unknown)  penicillin (Unknown)  - INjection site itching     Antibiotics:  piperacillin/tazobactam IVPB.. 3.375 Gram(s) IV Intermittent every 8 hours    Other medications:  carvedilol 12.5 milliGRAM(s) Oral every 12 hours  clopidogrel Tablet 75 milliGRAM(s) Oral daily  furosemide    Tablet 40 milliGRAM(s) Oral daily  heparin  Injectable 5000 Unit(s) SubCutaneous every 12 hours  sodium polystyrene sulfonate Suspension 30 Gram(s) Oral once     cefepime   IVPB   100 mL/Hr IV Intermittent (12-11-19 @ 09:00)  clindamycin IVPB   100 mL/Hr IV Intermittent (12-11-19 @ 09:45)      ======================================================  Physical Exam:  ============  T(F): 97.6 (11 Dec 2019 11:00), Max: 97.6 (11 Dec 2019 11:00)  HR: 98 (11 Dec 2019 11:00)  BP: 121/82 (11 Dec 2019 11:00)  RR: 18 (11 Dec 2019 11:00)  SpO2: 98% (11 Dec 2019 11:00) (97% - 98%)  temp max in last 48H T(F): , Max: 97.6 (12-11-19 @ 11:00)    General:  No acute distress. FRAIL  Eye: Pupils are equal, round and reactive to light, Extraocular movements are intact, Normal conjunctiva.  HENT: Normocephalic, Oral mucosa is DRY; POOR DENTITION  Neck: Supple, No lymphadenopathy.  Respiratory: Lungs are clear anteriorly  Cardiovascular: Normal rate, Regular rhythm,   Gastrointestinal: Soft, Non-tender, Non-distended, Normal bowel sounds.  Genitourinary: No costovertebral angle tenderness.  Lymphatics: No lymphadenopathy neck,   Musculoskeletal: Normal range of motion, Normal strength.  Integumentary:  HYPERTROPHIC KERATOTIC SKIN ON THE ANTERIOR LOWER LEGS.  WRINKLING OF THE SKIN BILATERALLY.   SKIN AT BASES APPEARS ERYTHEMATOUS HYPEREMIC.  TENDER TO PALPATION.   "necrotic appearance" is devitalized dried skin.     Neurologic: Alert,  , No focal deficits, Cranial Nerves II-XII are grossly intact.  HARD OF HEARING  Psychiatric: Appropriate mood & affect.    =======================================================  Labs:                        12.9   7.47  )-----------( 248      ( 11 Dec 2019 08:24 )             43.1       WBC Count: 7.47 K/uL (12-11-19 @ 08:24)      12-11    132<L>  |  98  |  27.0<H>  ----------------------------<  90  6.0<H>   |  21.0<L>  |  0.77    Ca    9.9      11 Dec 2019 10:30    TPro  7.8  /  Alb  3.0<L>  /  TBili  0.8  /  DBili  x   /  AST  28  /  ALT  21  /  AlkPhos  130<H>  12-11      Creatinine, Serum: 0.77 mg/dL (12-11-19 @ 10:30)      Sedimentation Rate, Erythrocyte: 58 mm/hr (01-19-19 @ 09:14)  Sedimentation Rate, Erythrocyte: 55 mm/hr (01-16-19 @ 10:40)
Vascular Attending:  Dr Ferraro      HPI:  78y/o Female with PMHx of systolic and diastolic CHF w/ EF 30% with PPM, CAD, HTN, HLD, PVD, BIBEMS s/p mechanical fall 12/11/19. Pt denies LOC, hitting her head, vomiting, neck pain, chest pain, SOB. EMS was called on patient's medical alert bracelet and encouraged her to come in after noticing b/l legs weeping, malodorous. Pt states that her legs do not cause her any pain unless they are touched, but notes they cause her significant difficulty with ADLs. She recalls being treated for a similar problem in her legs last spring and noticed about 3 months ago the malodor and weeping returned. In the ED patient received one dose of clindamycin and cefepime. Will admit to medicine for further medical management. (11 Dec 2019 13:20)    Vascular Surgery hx; Pt with h/o venous stasis and venous stasis ulcers who had been followed in vascular surgery office with [facility] boots in past however stopped coming to office. Last seen in May 2019. Pt reports she has had BLE edema and "dry skin worsening over past several months and then started having oozing from wounds with foul smelling drainage. Pt states she has not washed or cleaned her wounds in a "long" time.     PAST MEDICAL & SURGICAL HISTORY:  Pacemaker  Essential hypertension  HLD (hyperlipidemia)  CHF (congestive heart failure)  S/P tonsillectomy  Artificial pacemaker: defib      REVIEW OF SYSTEMS  General: denies fevers chills  Skin/Breast: as above	  Ophthalmologic: denies	  ENMT:	[place]  Respiratory and Thorax: denies SOB or cough	  Cardiovascular:	denies CP or palps  Gastrointestinal:	denies abd pain, N/V  Musculoskeletal:	limited ambulation  Neurological: denies lightheadedness, dizziness  Psychiatric: denies  Hematology/Lymphatics:	 as above  Endocrine:	  Allergic/Immunologic:	    MEDICATIONS  (STANDING):  carvedilol Oral Tab/Cap - Peds 12.5 milliGRAM(s) Oral two times a day  clopidogrel Tablet 75 milliGRAM(s) Oral daily  furosemide    Tablet 40 milliGRAM(s) Oral daily  heparin  Injectable 5000 Unit(s) SubCutaneous every 12 hours  sodium polystyrene sulfonate Suspension 30 Gram(s) Oral once    MEDICATIONS  (PRN):      Allergies  aspirin (Unknown)  atorvastatin (Unknown)  penicillin (Unknown)    Vital Signs Last 24 Hrs  T(C): 36.3 (11 Dec 2019 06:59), Max: 36.3 (11 Dec 2019 06:59)  T(F): 97.4 (11 Dec 2019 06:59), Max: 97.4 (11 Dec 2019 06:59)  HR: 113 (11 Dec 2019 06:59) (113 - 113)  BP: 129/74 (11 Dec 2019 06:59) (129/74 - 129/74)  BP(mean): --  RR: 20 (11 Dec 2019 06:59) (20 - 20)  SpO2: 97% (11 Dec 2019 06:59) (97% - 97%)    PHYSICAL EXAM:  Constitutional: unkempt F in NAD  Eyes: PERRL  Respiratory: essentially CTA  Cardiovascular: normal S1, S2  Gastrointestinal: softy, ND, NT  Extremities: BLE with severe hyperdermatisclerosis, post calf with large wound with  sloughing and purulent drainage; malodor; erythematous and 1+ edema bilat; thickened toenails with fungal appearance  Pulses:   Right:                                                                          Left:  DP [ ]2+ [ x]1+ [ ]doppler                                                DP [ ]2+ [x ]1+ [ ]doppler        LABS:                        12.9   7.47  )-----------( 248      ( 11 Dec 2019 08:24 )             43.1     12-11    132<L>  |  98  |  27.0<H>  ----------------------------<  90  6.0<H>   |  21.0<L>  |  0.77    Ca    9.9      11 Dec 2019 10:30    TPro  7.8  /  Alb  3.0<L>  /  TBili  0.8  /  DBili  x   /  AST  28  /  ALT  21  /  AlkPhos  130<H>  12-11    PT/INR - ( 11 Dec 2019 10:28 )   PT: 14.2 sec;   INR: 1.23 ratio      PTT - ( 11 Dec 2019 10:28 )  PTT:30.1 sec      RADIOLOGY & ADDITIONAL STUDIES    Impression and Plan: 80 y/o F with above medical history with BLE venous stasis and severe hyperdermatasclerosis and accompanying cellulitis  Pt needs debridement of BLE which would require sedation  Medical clearance requested  NPO p MN  IV ABX

## 2020-01-09 ENCOUNTER — APPOINTMENT (OUTPATIENT)
Dept: VASCULAR SURGERY | Facility: CLINIC | Age: 80
End: 2020-01-09

## 2020-01-28 NOTE — ED PROVIDER NOTE - SKIN AREA #2
[facility] Progress Note      Patient Name:  Carrie Aguilar  YOB: 1948  Medical Record Number:  [id number]    Date of visit: 1/29/2020    CHIEF COMPLAINT: Metastatic pancreatic carcinoma, daphne PE, L leg DVT.     HPI:     73year old t TAKE 1 TABLET BY MOUTH EVERY DAY AT BEDTIME 90 tablet 3   • pancrelipase, Lip-Prot-Amyl, 5000-79107 units Oral Cap DR Particles Take 1 capsule (5,000 Units total) by mouth 3 (three) times daily with meals.  90 capsule 3   • Cholecalciferol (VITAMIN D3 OR) T FOLFIRINOX on 3/12/19. Restaging after 12 cycles-continued response. Proceed with cycle # 21. Given good response, low plt and for qol, Oxaliplatin held after cycle # 7. He is aware that disease is incurable. Genetic testing negative.  Restaging 11/19-i diffuse

## 2021-01-21 NOTE — ED PROVIDER NOTE - NS ED ATTENDING STATEMENT MOD
"Chief Complaint   Patient presents with   • Abstract     Pt states she's still having vertigo and pressure on left side of head     Subjective   Halima Cardenas is a 50 y.o. female.     Patient here today with complaints of pressure over left side of head and vertigo symptoms.  She is having left frontal pressure over her left forehead and cheek area for past 4-5 days and started having some dizziness.   She stopped using her flonase due to dryness in her nose.  She resumed her flonase about 4 days ago.    Last week she turned her head quickly and felt like she may pass out and she sat down.   Her sxs have been less, but she is still having some mild dizziness with movement.  She always has PND and takes claritin.   She does feel her sxs are improving today.         The following portions of the patient's history were reviewed and updated as appropriate: allergies, current medications, past family history, past medical history, past social history, past surgical history and problem list.    Review of Systems   Constitutional: Negative.    HENT: Positive for postnasal drip. Negative for ear pain and sore throat.    Respiratory: Negative for cough.    Gastrointestinal: Positive for nausea.   Allergic/Immunologic: Positive for environmental allergies.   Neurological: Positive for dizziness.       Objective   /78   Pulse 95   Temp 97.1 °F (36.2 °C)   Ht 152.4 cm (60\")   Wt 94.8 kg (209 lb)   SpO2 99%   BMI 40.82 kg/m²   Body mass index is 40.82 kg/m².  Physical Exam  Vitals signs and nursing note reviewed.   Constitutional:       General: She is not in acute distress.     Appearance: Normal appearance. She is not ill-appearing.      Comments: Pleasant female, appears her age and in no distress today   HENT:      Head: Normocephalic and atraumatic.      Right Ear: Tympanic membrane, ear canal and external ear normal.      Left Ear: Tympanic membrane, ear canal and external ear normal.      Nose:      " Comments: No sinus pain     Mouth/Throat:      Mouth: Mucous membranes are moist.      Pharynx: No posterior oropharyngeal erythema.   Eyes:      General:         Right eye: No discharge.         Left eye: No discharge.      Extraocular Movements: Extraocular movements intact.      Pupils: Pupils are equal, round, and reactive to light.   Cardiovascular:      Rate and Rhythm: Normal rate and regular rhythm.      Heart sounds: Normal heart sounds. No murmur.   Pulmonary:      Effort: Pulmonary effort is normal. No respiratory distress.      Breath sounds: Normal breath sounds.   Neurological:      General: No focal deficit present.      Mental Status: She is alert and oriented to person, place, and time.      Cranial Nerves: No cranial nerve deficit.   Psychiatric:         Mood and Affect: Mood normal.         Behavior: Behavior normal.         Thought Content: Thought content normal.         Judgment: Judgment normal.         Assessment/Plan   Halima Cardenas is here today and the following problems have been addressed:      Diagnoses and all orders for this visit:    1. Vertigo (Primary)  -     meclizine (ANTIVERT) 25 MG tablet; Take 1 tablet by mouth 3 (Three) Times a Day As Needed for Dizziness or Nausea.  Dispense: 30 tablet; Refill: 0  -     cetirizine (zyrTEC) 10 MG tablet; Take 1 tablet by mouth Daily.  Dispense: 30 tablet; Refill: 5    Other orders  -     Triamcinolone Acetonide (NASACORT) 55 MCG/ACT nasal inhaler; 2 sprays into the nostril(s) as directed by provider Daily.  Dispense: 16.5 g; Refill: 11    She is having mild vertigo that is almost resolved today  I recommend she use Nasacort regularly 2 sprays in each nostril nightly, explained proper use as she had been using Flonase previously however was leaning head backwards and Flonase was running down back of throat  Would change Claritin to Zyrtec and take Zyrtec nightly  Given refill on meclizine to use as needed if her symptoms recur  Increase  fluids to prevent dehydration    Return to clinic as needed if symptoms worsen or persist    Please note that portions of this note were completed with a voice recognition program.  Efforts were made to edit dictation, but occasionally words are mistranscribed.  Answers for HPI/ROS submitted by the patient on 1/20/2021   What is the primary reason for your visit?: Other  Please describe your symptoms.: Vertigo:.  First signs I was dizzy,felt l would pass out. Had some sweating and headache on left side.  Have you had these symptoms before?: Yes  How long have you been having these symptoms?: 1-4 days  Please list any medications you are currently taking for this condition.: Meclizine 25mg (old prescription), Ibuprofen     I have personally performed a face to face diagnostic evaluation on this patient. I have reviewed the PA note and agree with the history, exam, and plan of care, except as noted.

## 2021-10-06 PROBLEM — I10 ESSENTIAL HYPERTENSION: Status: ACTIVE | Noted: 2019-04-01

## 2022-03-01 NOTE — ED PROVIDER NOTE - OBJECTIVE STATEMENT
HR=80 bpm, NVUC=519/69 mmhg, FnF6=993.0 %, Resp=20 B/min, Pain=0, Rondon=2, Comment=nsr Left foot "leaking" x months, and referred by Chance today for admission.   pmd- in [facility].

## 2022-07-19 NOTE — ED STATDOCS - NS ED ATTENDING STATEMENT MOD
Patient instructed to follow up with his regular Primary Care Physician or Walk-in Care if his symptoms fail to improve as anticipated, worsen, or new symptoms develop.  Please proceed to the nearest hospital Emergency Room or call 911 for medical emergencies.    Additional Educational Resources:  For additional resources regarding your symptoms, diagnosis, or further health information, please visit the Health Resources section on [email or web address] or the Online Health Resources section in [employer].    
Attending Only

## 2022-07-25 NOTE — CONSULT NOTE ADULT - ASSESSMENT
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 Pt denies LOC, hitting her head, vomiting, neck pain, chest pain, SOB. EMS was called on patient's medical alert bracelet and encouraged her to come in after noticing b/l legs weeping, malodorous. Pt states that her legs do not cause her any pain unless they are touched, but notes they cause her significant difficulty with ADLs. She recalls being treated for a similar problem in her legs last spring and noticed about 3 months ago the malodor and weeping returned. In the ED patient received one dose of clindamycin and cefepime. Pt denies any sob, chest pain, cough, fevers n/v or abd pain.  Patient seen and examined.  Report tenderness in the feet.    She was found with bilateral cellulitis of leg, devitalized/ hyperkeratotic skin.      iMPRESSION:  bilateral leg celluliitis  no evidence of gangrene  history of penicillin injection site pruritis; not allergy      Plan:  - stop all other antibiotics  zosyn 3.375 grams Q 8H for empiric pseudomonal coverage  - no vancomycin - no hx of MRSA on culture x 3 years back    - wound care    Consider WET TO DRY for  24 - 48 hours to help debride skin      will follow
A:  Venous stasis   Cellulitis    P:  Pt evaluated and chart reviewed  Recommend IV abx per primary team recommendations for noted cellulitis  Pt would benefit from debridement of legs; too painful to be done bedside - possible OR debridement with Vascular   Pt refusing any dressings to legs at this time due to pain  Recommend local wound care to b/l feet and legs   Recommend regularly scheduled out-patient podiatry follow up wound and nail care   Recommend elevation of LEs while patient is in bed to help with swelling  Discussed with attending Dr. Rodriguez
[de-identified] : 52 y/o F, referred by Dr. Martinez for possible CSF leak.  Pt. with hx of rhinoplasty 15 y/a.  Now with large amt of watery d/c from the right.  Notes right nasal obstruction.  was seeing Dr. Matthew Noyola who sent B2-transferrin.  \par

## 2022-12-08 NOTE — ED ADULT TRIAGE NOTE - CHIEF COMPLAINT QUOTE
Called patient to assist with FA for medical bills. Phone is out of service. Unable to reach out or leave a message.
right foot cellulitis

## 2023-01-07 NOTE — PHYSICAL THERAPY INITIAL EVALUATION ADULT - ASSISTIVE DEVICE FOR TRANSFER: SIT/STAND, REHAB EVAL
Kindra Maki is here for ***.  She denies any swelling, redness or warmth of her joints or any fever, chills, rash, chest pain, shortness of breath, abdominal pain, nausea, vomiting, diarrhea.  Other systems are negative.      Component Ref Range & Units 2 mo ago    COTY Titer <1:80 <1:80    COTY Pattern  Not Indicated    Resulting [facility]              Specimen Collected: 11/05/22 07:46 Last Resulted: 11/08/22 14:02             Component Ref Range & Units 2 mo ago    SSA Ab, IgG <1.0 AI 1.5 High     Comment: AI is a semiquantitative index of the amount of antibody detected, but does not directly correlate to a specific immunofluorescence titer.   [facility]ing [facility]              Specimen Collected: 11/05/22 07:46 Last Resulted: 11/07/22 08:09           Component Ref Range & Units 2 mo ago    SSB  Ab, IgG <1.0 AI <0.2    Resulting [facility]              Specimen Collected: 11/05/22 07:46 Last Resulted: 11/07/22 08:09            Ref Range & Units 2 mo ago    Double Stranded DNA Ab, IgG <=9 IUnits/mL <1    Comment: <5 IUnits/mL = negative   [facility]              Specimen Collected: 11/05/22 07:46 Last Resulted: 11/07/22 08                  Ref Range & Units 2 mo ago 8 yr ago   RBC Sedimentation Rate 0 - 20 mm/hr 28 High   33 High  R    [facility]ing [place]  [facility]              Specimen Collected: 11/05/22 07:46 Last Resulted: 11/05/22 09:            Ref Range & Units 2 mo ago 8 yr ago   Rheumatoid Factor <=14 Units/mL <10  <8.6 R    Resulting [facility]              Specimen Collected: 11/05/22 07:46 Last Resulted: 11/05/22            Other antibodies regarding the COTY testing  Nov 5 2022 normal        My last note           [facility]     DEPARTMENT OF RADIOLOGY     PATIENT NAME: Kindra Maki [facility]. REC. #: [id number]   DATE: 07- YOB: 1935   PHYSICIAN: Pollo Hawk,  M.D. PHONE: [phone]   [facility] ()   ORDERING PHYSICIAN: Giovanni Morris M.D.     PRE-EMP: IND:     PREOP: HOSPITAL:       EXAM:   XRA FOOT BILAT FINE DETAIL     HISTORY:   Pain and swelling for years; history of osteoarthritis.     FINDINGS:   6 views were performed. No comparison exams are available. There is   moderate narrowing in the right first metatarsal phalangeal joint.   Sesamoids are seen in both distal posterior tibial tendons. There is   mild narrowing of the left first metatarsal phalangeal joint. The   middle and distal phalanges of the fifth digits are fused. There is   no evidence of fracture or dislocation.    No erosions are demonstrated. No soft tissue calcifications are   demonstrated. There are no subluxations present. There are large   spurs off the posterior and plantar aspects of the calcaneus on the   left side and off the posterior calcaneus on the right side. There is   a small plantar calcaneal spur on the right side.    Dystrophic calcification is seen in the plantar calcaneal soft tissues   near the insertion of the plantar aponeurosis. This may be a   manifestation of prior plantar fasciitis. No acute abnormalities are   demonstrated.                Result Impression            IMPRESSION:   1. Chronic findings as discussed.       Pollo Hawk M.D.   [facility]   ()    THIS IS AN ELECTRONICALLY VERIFIED REPORT    7/17/2014 3:37 PM: Pollo Hawk M.D.           BGS/juan   D: 07- 15:37   T: 07- 15:37          HISTORY:   Pain and swelling in both hands     FINDINGS:   4 views were performed. No comparison exams are available. Diffuse   severe bone demineralization is demonstrated. There is mild spurring   in both first carpometacarpal joints. There is moderate narrowing of   both third MCP joints.    There is cystic change in the medial aspects of both lunate bones.    There is no definite positive ulnar variance present. This finding   can be associated with ulnolunate impingement. No erosions are   demonstrated.    No soft tissue calcifications are demonstrated. No subluxations are   demonstrated. There is mild spurring in the DIP joints. No acute   abnormalities are demonstrated.                Result Impression            IMPRESSION:   1. Chronic findings.       Pollo Hawk M.D.   [facility]   ()    THIS IS AN ELECTRONICALLY VERIFIED REPORT    7/17/2014 4:38 PM: Pollo Hawk M.D.           BGS/bgs   D: 07- 16:38   T: 07- 16:38                      ASSESSMENT/PLAN  OA with psoriasis overall improved cont meloxicam  F/u 3 months      Electronically Signed by:    Giovanni Morris MD , 8/14/2014       Ref Range & Units 8 yr ago Comments   CYCLIC CITRULINATED <20 Units 9  Approximately 70% of patients with Rheumatoid Arthritis(RA) are positive for C   Specimen Collected: 07/17/14 13:49 Last Resulted: 07/17/14 13:49   Received From: [facility].  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   • Other kyphoscoliosis and scoliosis 11/24/2004   • Other psoriasis 12/02/2003   • Seasonal allergies     • Senile nuclear sclerosis 10/30/2012   • Spinal stenosis, lumbar region, without neurogenic claudication 11/24/2004     Surgeries  Colonoscopy diagnostic Bilateral 2018    • Total abdom hysterectomy       • Total knee arthroplasty Bilateral          Family History          Social History        Allergies    Bactrim Ds    RASH Not Specified  6/14/2005    :    Lactase-rennet   Other (See Comments) Not Specified         Current Medications:  Current Outpatient Medications   Medication Sig Dispense Refill   • benzonatate  (TESSALON PERLES) 100 MG capsule Take 1 capsule by mouth 3 times daily as needed for Cough. 30 capsule 0   • olopatadine (PAZEO) 0.7 % ophthalmic solution Apply 1 drop to eye daily. Prn allergies 2.5 mL 6     No current facility-administered medications for this visit.      Most Recent Labs:    WHITE BLOOD CELL COUNT   Date Value Ref Range Status   04/23/2015 5.5 4.0 - 10.0 /CMM Final     HEMATOCRIT   Date Value Ref Range Status   04/23/2015 35.8 34.0 - 45.0 % Final     HEMOGLOBIN   Date Value Ref Range Status   04/23/2015 12.0 11.2 - 15.7 G/DL Final     PLATELET COUNT   Date Value Ref Range Status   04/23/2015 210 150 - 400 /CMM Final      NA (SODIUM, SERUM)   Date Value Ref Range Status   04/23/2015 142 136 - 146 MMOL/L Final     K (POTASSIUM, SERUM)   Date Value Ref Range Status   04/23/2015 4.6 3.3 - 5.2 MMOL/L Final     CHLORIDE, SERUM   Date Value Ref Range Status   04/23/2015 104 96 - 107 MMOL/L Final     GLUCOSE, RANDOM   Date Value Ref Range Status   07/17/2014 89 70 - 200 MG/DL Final     CALCIUM, SERUM   Date Value Ref Range Status   04/23/2015 9.6 8.3 - 10.4 MG/DL Final     CO2 VENOUS   Date Value Ref Range Status   04/23/2015 29 22 - 32 MMOL/L Final     BLOOD UREA NITROGEN   Date Value Ref Range Status   04/23/2015 14 5 - 24 MG/DL Final     CREATININE, SERUM   Date Value Ref Range Status   04/23/2015 0.7 0.4 - 1.4 MG/DL Final     SEDIMENTATION RATE, RBC   Date Value Ref Range Status   07/17/2014 33 (H) 0 - 20 MM/HR Final     No results found for: CREACTIVEPRO  RHEUMATOID FACTOR   Date Value Ref Range Status   07/17/2014 <8.6 <12.0 IU/mL Final     No results found for: CYCLICCITRUL  MARCOS SYMPHONY   Date Value Ref Range Status   07/17/2014 0.1 <0.6 RATIO Final     Comment:     Marcos Ponce includes the following YANG antibodies:  U1RNP, RNP 70,  SS-A/Ro, SS-B/La, Centromere B, Scl-70, Erin-1, and Sm.       MARCOS DNA, DOUBLE STRANDED   Date Value Ref Range Status   07/17/2014 0.9 <10.0 IU/mL Final      Comment:     <10.0        IU/mL   Negative   10.0-15.0   IU/mL   Equivocal  >15.0        IU/mL   Positive       No results found for: ELIAUONERNPA, YXGZGPD90QRM, ELIASMSMITHA, UPGCIJVPE77S, ELIASS-B/LA, ELIACENTROME, ELIASCL-70, ELIAJO-1      WHITE BLOOD CELL COUNT   Date Value Ref Range Status   04/23/2015 5.5 4.0 - 10.0 /CMM Final   10/04/2014 5.2 4.0 - 10.0 /CMM Final   07/17/2014 5.4 4.0 - 10.0 /CMM Final     HEMATOCRIT   Date Value Ref Range Status   04/23/2015 35.8 34.0 - 45.0 % Final   10/04/2014 35.0 34.0 - 45.0 % Final   07/17/2014 36.4 34.0 - 45.0 % Final     HEMOGLOBIN   Date Value Ref Range Status   04/23/2015 12.0 11.2 - 15.7 G/DL Final   10/04/2014 11.5 11.2 - 15.7 G/DL Final   07/17/2014 12.1 11.2 - 15.7 G/DL Final     PLATELET COUNT   Date Value Ref Range Status   04/23/2015 210 150 - 400 /CMM Final   10/04/2014 219 150 - 400 /CMM Final   07/17/2014 210 150 - 400 /CMM Final     NA (SODIUM, SERUM)   Date Value Ref Range Status   04/23/2015 142 136 - 146 MMOL/L Final   07/17/2014 138 136 - 146 MMOL/L Final   06/14/2014 143 136 - 146 MMOL/L Final     K (POTASSIUM, SERUM)   Date Value Ref Range Status   04/23/2015 4.6 3.3 - 5.2 MMOL/L Final   07/17/2014 4.5 3.3 - 5.2 MMOL/L Final   06/14/2014 4.5 3.3 - 5.2 MMOL/L Final     CHLORIDE, SERUM   Date Value Ref Range Status   04/23/2015 104 96 - 107 MMOL/L Final   07/17/2014 99 96 - 107 MMOL/L Final   06/14/2014 104 96 - 107 MMOL/L Final     GLUCOSE, RANDOM   Date Value Ref Range Status   07/17/2014 89 70 - 200 MG/DL Final     CALCIUM, SERUM   Date Value Ref Range Status   04/23/2015 9.6 8.3 - 10.4 MG/DL Final   07/17/2014 10.0 8.3 - 10.4 MG/DL Final   06/14/2014 9.6 8.3 - 10.4 MG/DL Final     CO2 VENOUS   Date Value Ref Range Status   04/23/2015 29 22 - 32 MMOL/L Final   07/17/2014 30 21 - 32 MMOL/L Final   06/14/2014 29 21 - 32 MMOL/L Final     BLOOD UREA NITROGEN   Date Value Ref Range Status   04/23/2015 14 5 - 24 MG/DL Final   07/17/2014 18 5 - 24 MG/DL  Final   06/14/2014 16 5 - 24 MG/DL Final     CREATININE, SERUM   Date Value Ref Range Status   04/23/2015 0.7 0.4 - 1.4 MG/DL Final   07/17/2014 0.8 0.4 - 1.4 MG/DL Final   06/14/2014 0.7 0.4 - 1.4 MG/DL Final     SEDIMENTATION RATE, RBC   Date Value Ref Range Status   07/17/2014 33 (H) 0 - 20 MM/HR Final       Review of systems see HPI other systems negative except as above    There were no vitals taken for this visit.  PHYSICAL EXAM      Skin:  No rash  HEENT:  No eye erythema, icterus, no pharyngeal erythema, ulcerations, or thrush  Neck:  No parotid gland enlargement, no adenopathy  Lungs:  Clear to auscultation  Heart:  RRR S1, S2, no murmurs or rubs  Abdomen:  Soft, non tender, no hepatomegaly  Extremities:  No edema  Musculoskeletal:  no swelling, redness or warmth of the DIP's, PIPs or MCPs, wrists, elbows, shoulders, hips, knees, ankles, MTPs or IP joints.  No pain on palpation.  Full range of motion of joints.  Back:  Full flexion, no pain.  No pain on palpation  Motor: 5/5 strength all extremities    ASSESSMENT/PLAN  *** ..... ***  Follow-Up in ***    Giovanni Morris MD   rolling walker

## 2023-04-04 NOTE — PROGRESS NOTE ADULT - PROBLEM/PLAN-3
DISPLAY PLAN FREE TEXT
Isotretinoin Pregnancy And Lactation Text: This medication is Pregnancy Category X and is considered extremely dangerous during pregnancy. It is unknown if it is excreted in breast milk.

## 2023-05-04 NOTE — HISTORY OF PRESENT ILLNESS
[FreeTextEntry1] : 78 year old female with a protracted history bilateral lower extremity chronic venous insufficiency with recurrent venous ulcers and several recent hospital admissions for cellulitis. Symptoms and ulcerations are usually worse in her left leg. She denies previous DVTs.\par She was most recently admitted to [facility] for infected left leg ulcers and has now been discharged to rehab. She does not use compression dressings or an Unna boot.\par Recent BELINDA/PVR performed last month was negative.\par Patient has a h/o CHF and is on Lasix [de-identified] : 79 y/o female with venous insufficiency and venous ulcers to [facility]. She was sent home from rehabilitation. She presents with an aide. She has been doing well at home. No pain or swelling to legs. She had an ID/ wound care evaluation for wound to left knee prior to leaving [facility] facility. She states they have been treating it with Bacitracin and a DSD changed daily. There is no further erythema or tenderness. Wound is approximately 3 mm. No current fever or chills. She completed Doxycycline. She continues to take Lasix 40 mg, Lovenox 100 mg, Plavix 75 mg daily. Yes

## 2023-07-18 NOTE — PATIENT PROFILE ADULT. - NS PRO AD NO ADVANCE DIRECTIVE
Spiritual Care Assessment/Progress Note  Alessio    Name: Ellan Apley MRN: [id number]    Age: 70 y.o. Sex: male   Language: English     Date: 7/18/2023            Total Time Calculated: 107 min              Spiritual Assessment begun in [facility] 2 CRITICAL CARE  Service Provided For[de-identified] Patient, Significant other, Friend  Referral/Consult From[de-identified] Nurse  Encounter Overview/Reason : Initial Encounter    Spiritual beliefs:      [x] Involved in a jessica tradition/spiritual practice:      [] Supported by a jessica community:      [] Claims no spiritual orientation:      [] Seeking spiritual identity:           [] Adheres to an individual form of spirituality:      [] Not able to assess:                Identified resources for coping and support system:   Support System: Significant other       [x] Prayer                  [] Devotional reading               [] Music                  [] Guided Imagery     [] Pet visits                                        [] Other: (COMMENT)     Specific area/focus of visit   Encounter: Type: Initial Screen/Assessment  Crisis: Type: Code Blue  Spiritual/Emotional needs: Type: Spiritual Support  Ritual, Rites and Sacraments:    Grief, Loss, and Adjustments:    Ethics/Mediation:    Behavioral Health:    Palliative Care: Advance Care Planning:           Narrative:   Mr. Jennifer Sam was grateful for this 's presence and asked for prayer outside the room. I sat at the nurse's station and prayed. Close friend/partner (Mr. Elisa Londono) and another woman friend came to see Mr. Throckmorton. Offered prayer with Mr. Chambers Loraine. Provided listening presence and pastoral care for friends. All were grateful for care provided for patient and loved ones. Mague Holliday [facility] service [phone] No

## 2023-09-04 NOTE — DISCHARGE NOTE ADULT - DATE:
Neurointerventional progress note:    Subjective:   No acute events overnight. Mentation improved slightly per family. Patient able to verbalize 5-7 words but continues to remain altered and aphasic.     Exam:  Vitals:    09/04/23 1000   BP: (!) 148/110   Pulse: 109   Resp: (!) 26   Temp:      Neuro Exam:  Confused and altered. Not oriented to place or time. Able to name his son.   Does not open eyes to command or follow complex commands.   Pupils asymmetric (at baseline) and left is reactive)  Moves all 4 extremities spontaneously but not making any purposeful movements. Able to  and let go with the left hand. Mild weakness noted in RUE compared to left.   Localizes to pain in all 4.      Imaging: no new imaging available.     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 She also has a [place] Grade 1 left MMA AV fistula draining into the SSS. Attempted embolization of the AV fistula but was unsuccessful due to tortuosity in the left MMA.     Discussed clinical course with family at bedside. Patient improving slowly but continues to remain aphasic and altered.     - Goal is to plan for AVM embolization in 3-4 weeks after the ICH has had the time to resolve.   - if patient has rebleeding during this hospital course, we will discuss operative management vs transfer to [place].   - Will obtain MRI brain with and without contrast once stable.   - -160  - PT/OT/ST Yaakov South MD  Vascular and Interventional Neurology     1/24/2019

## 2023-09-29 NOTE — DISCHARGE NOTE NURSING/CASE MANAGEMENT/SOCIAL WORK - NSTRANSFERDENTURES_GEN_A_NUR
none PAST MEDICAL & SURGICAL HISTORY:  HLD (hyperlipidemia)      Dysphagia      GERD (gastroesophageal reflux disease)      HTN (hypertension)      Dementia      No significant past surgical history

## 2023-10-16 NOTE — PROGRESS NOTE ADULT - PROBLEM/PLAN-1
10/16/2023      Bharti Mesa  [address]  [place] [address]        Dear Bharti    Thank you for choosing [facility] for your CT Calcium Heart scan. Below is an explanation of the results as well as follow up recommendations from your scan.     Findings:    Your calcium score is 1-99 and <75th percentile for age/gender. You have a small amount of identifiable coronary plaque. Please discuss your results and any risk factors with your primary care physician to formulate a plan for optimal cardiovascular health. A heart healthy lifestyle is recommended.    Your scan indicates an additional finding/findings that need to be reviewed with your primary care physician. This information has already been sent to your primary care physician on file, however please contact your physician's office to coordinate follow up.     If you have any questions, please call our Heart  at,   [phone].       Sincerely,    [facility] CT Heart Scan Program   DISPLAY PLAN FREE TEXT

## 2024-01-06 NOTE — ASU PATIENT PROFILE, ADULT - CENTRAL VENOUS CATHETER
Problem: Discharge Planning  Goal: Discharge to home or other facility with appropriate resources  1/6/2024 1712 by Chanell Robledo RN  Outcome: Adequate for Discharge  1/6/2024 1507 by Chanell Robledo RN  Outcome: Adequate for Discharge     Problem: Safety - Adult  Goal: Free from fall injury  1/6/2024 1712 by Chanell Robledo RN  Outcome: Adequate for Discharge  1/6/2024 1507 by Chanell Robledo RN  Outcome: Adequate for Discharge     Problem: Pain  Goal: Verbalizes/displays adequate comfort level or baseline comfort level  Outcome: Adequate for Discharge     Problem: ABCDS Injury Assessment  Goal: Absence of physical injury  Outcome: Adequate for Discharge      no

## 2024-06-05 NOTE — ED STATDOCS - DATE/TIME 1
"    [facility] Cardiology    Office Consult     Ernestina Medina  [id number]  2024    Referred By: Uma Anne MD    Chief Complaint   Patient presents with    Palpitations    Cardiomyopathy    Shortness of Breath     Anxiety due to loss of  last month        Subjective     History of Present Illness:   Ernestina Medina is a 59 y.o. female who presents to the Cardiology Clinic for evaluation of palpitations.  Patient with past health history significant for hypertension and anxiety.  Patient states that her  recently passed away after battling glioblastoma in May and she currently cares for her granddaughter who is autistic during the day.  She reports significant amount of increased stress and 20 pound weight loss.  She was referred to the Cardiology clinic for further investigation of palpitations.  She states she does get palpitations at times during the day when she is stressed out with her granddaughter, and also awakes every night around 3am with her heart racing and bounding.  Routine labs checked by PCP and were within normal limits.  She denies any chest pain or exertional shortness of breath.  No reported lower extremity edema.      Past Cardiac Testin. Last Coronary Angio: None  2. Prior Stress Testing: None  3. Last Echo: None  4. Prior Holter Monitor: --The predominant rhythm is sinus rhythm with an average heart rate 60 bpm.  Rare supraventricular ectopy with isolated PACs. One symptomatic event including \"palpitations\" corresponded to NSR    Review of Systems   Constitutional:  Positive for appetite change and unexpected weight loss.   Cardiovascular:  Positive for palpitations.   All other systems reviewed and are negative.       Past Medical History:   Diagnosis Date    Abdominal pain     Anxiety     Asthma     Elbow pain     Endometriosis     Fatigue     H/O seasonal allergies     Hand fracture, right     Herpes labialis     Joint pain " of ankle and foot     Leukopenia     Migraine     Ovarian cyst     Pain in left knee     Pain in limb     Paresthesia     Pernicious anemia     Plantar fasciitis     Polycystic ovarian syndrome     PONV (postoperative nausea and vomiting)     Recurrent aphthous stomatitis     Tattoo     Vitamin B12 deficiency     Wrist fracture, right        Past Surgical History:   Procedure Laterality Date    BREAST SURGERY  2009    BUNIONECTOMY Right 12/13/2021    Procedure: FIRST METATARSAL OSTEOTOMY  BUNIONECTOMY RIGHT FOOT WITH POSSIBLE ARTHROSURFACE JOINT REPLACEMENT RIGHT FOOT;  Surgeon: Prateek Martinez DPM;  Location: [facility] OR;  Service: Podiatry;  Laterality: Right;    CHOLECYSTECTOMY  1990    ENDOSCOPY N/A 6/2/2017    Procedure: ESOPHAGOGASTRODUODENOSCOPY WITH BIOPSIES AND COLD BIOPSY POLYPECTOMY;  Surgeon: Yovany Pacheco MD;  Location: [facility] ENDOSCOPY;  Service:     FOOT SURGERY Right 2015    HYSTERECTOMY      OTHER SURGICAL HISTORY      CYST FROM LEFT OVARY    SKIN BIOPSY      UPPER GASTROINTESTINAL ENDOSCOPY  06/02/2017    WISDOM TOOTH EXTRACTION         Family History   Problem Relation Age of Onset    Arthritis Mother     Hypertension Mother     Migraines Mother     Other Father         arteriosclerotic cardiovascular disease    Stroke Father     Heart attack Father     Stroke Brother     Hypertension Other         sibling    Colon cancer Neg Hx        Social History     Socioeconomic History    Marital status:    Tobacco Use    Smoking status: Never     Passive exposure: Never    Smokeless tobacco: Never   Vaping Use    Vaping status: Never Used   Substance and Sexual Activity    Alcohol use: Yes     Alcohol/week: 4.0 standard drinks of alcohol     Types: 4 Glasses of wine per week     Comment: social    Drug use: Never    Sexual activity: Defer         Current Outpatient Medications:     acetaminophen (TYLENOL) 500 MG tablet, Take 1 tablet by mouth Every 6 (Six) Hours As Needed for Mild Pain. prn,  "Disp: , Rfl:     albuterol sulfate  (90 Base) MCG/ACT inhaler, Inhale 1 puff Every 6 (Six) Hours As Needed for Wheezing., Disp: 18 g, Rfl: 3    ALPRAZolam (XANAX) 1 MG tablet, Take 1 tablet by mouth 2 (Two) Times a Day As Needed for Anxiety., Disp: 60 tablet, Rfl: 3    amLODIPine (Norvasc) 5 MG tablet, Take 0.5 tablets by mouth Daily., Disp: , Rfl:     FLUoxetine (PROzac) 40 MG capsule, Take 1 capsule by mouth Daily., Disp: 90 capsule, Rfl: 1    mirtazapine (REMERON) 7.5 MG tablet, Take 1 tablet by mouth Every Night., Disp: 30 tablet, Rfl: 4    naloxone (NARCAN) 4 MG/0.1ML nasal spray, Call 911. Don't prime. Archer in 1 nostril for overdose. Repeat in 2-3 minutes in other nostril if no or minimal breathing/responsiveness., Disp: 2 each, Rfl: 0    ondansetron (ZOFRAN) 4 MG tablet, Take 1 tablet by mouth Every 8 (Eight) Hours As Needed for Nausea or Vomiting., Disp: 30 tablet, Rfl: 1    Current Facility-Administered Medications:     cyanocobalamin injection 1,000 mcg, 1,000 mcg, Intramuscular, Q28 Days, Uma Anne MD, 1,000 mcg at 05/29/24 1125      Allergies   Allergen Reactions    Compazine [Prochlorperazine] Other (See Comments)     \"IT MAKES MY JAW LOCK-UP\"    Mirapex [Pramipexole] Nausea And Vomiting    Requip [Ropinirole] Dizziness     Syncope ?    Buspar [Buspirone] Nausea And Vomiting       Objective     Vitals:    06/05/24 1329   BP: 128/82   BP Location: Left arm   Patient Position: Sitting   Cuff Size: Adult   Pulse: 68   Resp: 18   SpO2: 98%   Weight: 57.2 kg (126 lb 3.2 oz)   Height: 162.6 cm (64\")     Body mass index is 21.66 kg/m².    Physical Exam  Vitals and nursing note reviewed.   Constitutional:       General: She is not in acute distress.     Appearance: Normal appearance. She is not toxic-appearing.   HENT:      Head: Normocephalic.      Mouth/Throat:      Mouth: Mucous membranes are moist.   Eyes:      Pupils: Pupils are equal, round, and reactive to light.   Cardiovascular:      Rate " and Rhythm: Normal rate and regular rhythm.      Pulses: Normal pulses.      Heart sounds: Normal heart sounds. No murmur heard.  Pulmonary:      Effort: Pulmonary effort is normal.      Breath sounds: Normal breath sounds. No wheezing, rhonchi or rales.   Musculoskeletal:      Right lower leg: No edema.      Left lower leg: No edema.   Skin:     General: Skin is warm and dry.      Capillary Refill: Capillary refill takes less than 2 seconds.   Neurological:      Mental Status: She is alert and oriented to person, place, and time. Mental status is at baseline.   Psychiatric:         Mood and Affect: Mood normal.         Behavior: Behavior normal.         Thought Content: Thought content normal.         Results Review:   I reviewed the patient's new clinical results.  I reviewed the patient's other test results and agree with the interpretation      ECG 12 Lead    Date/Time: 6/5/2024 2:24 PM  Performed by: Glenn Oglesby MD    Authorized by: Glenn Oglesby MD  Comparison: not compared with previous ECG   Previous ECG: no previous ECG available  Rhythm: sinus rhythm  Rate: normal  QRS axis: normal    Clinical impression: normal ECG          Assessment & Plan  Palpitations  -Holter monitor placed for 7 days to rule out arrhythmias  -May benefit from trial of Metoprolol if holter monitor indicates symptomatic palpitations  -May be related to increased stress/ anxiety  -Clinic to call with results and patient to follow up as needed with normal holter.  Any abnormalities noted will have patient return for follow up.    Mixed hyperlipidemia  -Being followed by PCP  -May benefit from statin therapy-continue with diet and exercise modifications  Anxiety  -Being managed by PCP  -Agreeable to ambulatory referral to behavioral health for potential counseling    Essential hypertension  -Blood pressure acceptable  -Continue Amlodipine 5mg    Orders Placed This Encounter   Procedures    Ambulatory Referral to Behavioral  15-John-2019 17:25 Health    Holter Monitor - 72 Hour Up To 15 Days        Preventative Cardiology:   Tobacco Cessation: N/A   Advance Care Planning: ACP discussion was held with the patient during this visit. Patient does not have an advance directive, declines further assistance.     Follow Up:   Return if symptoms worsen or fail to improve.    Thank you for allowing me to participate in the care of your patient. Please to not hesitate to contact me with additional questions or concerns.     Kassidy Trujillo, APRN

## 2024-09-04 NOTE — ED ADULT NURSE NOTE - CHPI ED NUR DURATION
Show Aperture Variable?: Yes Duration Of Freeze Thaw-Cycle (Seconds): 5 Detail Level: Simple Render Note In Bullet Format When Appropriate: No Post-Care Instructions: I reviewed with the patient in detail post-care instructions. Patient is to wear sunprotection, and avoid picking at any of the treated lesions. Pt may apply Vaseline to crusted or scabbing areas. Number Of Freeze-Thaw Cycles: 1 freeze-thaw cycle Consent: The patient's consent was obtained including but not limited to risks of crusting, scabbing, blistering, scarring, darker or lighter pigmentary change, recurrence, incomplete removal and infection. month(s)

## 2024-12-22 NOTE — H&P ADULT - ASSESSMENT
[facility]PITAL[facility] DISCHARGE SUMMARY:    Name: Shahzad Monzon  Age: 89 year old  Sex: male  MRN: [id number]    GENERAL INFORMATION:  - Admission Date/Time: 12/19/2024  5:15 PM  - Discharge Date/Time: 12/22/2024  - PCP: Rober Cardoso MD  - Consultants:   - Chief Complaint:   Chief Complaint   Patient presents with    Unresponsive        DISCHARGE DIAGNOSIS:  Acute hypoxic respiratory failure  (CMD)  Pneumonia - likely aspiration vs CAP  Sepsis - present on arrival 2/2 above  Elevated troponin likely type 2 demand ischemia  HTN  Hyperlipidemia  CAD  Paroxysmal Afib on eliquis  Non-insulin dependent DM  LUTS s/p sacral neuromodulation   Known lung and renal masses  Pyuria w/o UTI  PEN allergy       LAST ASSESSMENT AND PLAN:    Finish course of cefpodoxime for total of 7 days  Continue home medications as below  PT/OT/RN HHC services placed.     89-year-old male with past med history of A-fib on Eliquis, history of recurrent aspiration pneumonia, HTN, GERD, DM, presented initially for unresponsiveness and hypoxia to 50%, improved on CPAP.  CXR C/F PNA right greater than left, concerning for aspiration pneumonia, started on meropenem, placed on BiPAP 10/5, became more awake, switched to AVAPS to improve VT, improved back to his baseline about ANO x 3.  Troponin peaked at 402.  UA also showed UTI.  Patient was resumed back on his PTA medication regimen.    Of note, the patient has been following with palliative for over a year for recurrent aspiration pneumonia, with the goals that he does not want to be hospitalized for a prolonged period of time, that he is also DNR, DNI and does still receive pleasure feeds even with his history of recurrent aspiration pneumonia.  He does not need to be seen by speech therapy knowing this.  Physical therapy states he is back at his baseline.  Daughter states that the patient is not hospice, that the goal is to make him as functional as possible within the level of his goals of care.   She states he is able to get around with a walker short distances in the house, watches TV, sometimes is able to get into the car with help.    Urology also saw patient for recurrent UTI, has bladder stimulator, patient can follow-up as outpatient.    ID saw patient and made transition from meropenem to cefpodoxime. Cleared for discharge after 24 hr off of IV abx. He was seen and examined this am. No acute events overnight. Feels well over all. Denying any other acute concerns at this time.     I called and discussed with daughter regarding current hopsitalization and disposition planning. States that patient does not do well in hospital setting due to delirium + difficulty feeding. Daughter explains that her mother and her are able to help and cue patient's eating due to his underlying dysphagia. Patient also develops significant anxiety about being hospitalized.     HOME HEALTH FACE TO FACE DOCUMENTATION AND CERTIFICATION  CMS Rules: Certifying Patients for the Medicare Home Health Benefit    Home Health Eligibility Summary    Name: Shahzad Monzon  : 1927  MRN: [id number]    Service to Home Care order content:  SERVICE TO HOME CARE   Ordered at: 24 1220     Additional home health services needed:    Occupational therapy      Nursing needs:    Comprehensive nursing assessment/medication management      Physical therapy needs:    Eval and Treat      Select home health services needed:    Nursing     Physical therapy      Provider to follow patients home care:    NIKOLAY BOWDEN      Face to Face encounter completed on:    2024      Occupational therapy needs:    Eval and Treat      Reason for home bound status (please select all that apply):    Weakness which limits endurance     Requires supervision/assistance of another person      Certification of Face to Face:    I certify this patient had a Face to Face encounter performed on the date specified above by a physician or Medicare allowed  non-physician practitioner, related to the primary reason the patient requires home health services.      Certification of Home Bound Status:    I certify, based on my clinical findings,  this patient is confined to his/her home due to the homebound reason(s) identified. Leaving the home is a significant and challenging effort for the patient.      Has a face to face encounter been completed?    Yes      Reason for supervision / assistance:    Ambulation assist/supervision     Transfer assist/supervision      Home care service needed:    Home Health        I certify this patient is under my care and  I, or a nurse practitioner, clinical nurse specialist or physician assistant working with me, had a face-to-face encounter with this patient on the date listed.    Medical Condition Related to Home Health Services  The encounter with the patient was in whole, or in part, for the following medical condition(s), which are the reasons for home health care:   1. Acute hypoxic respiratory failure  (CMD)    2. Acute hypercapnic respiratory failure  (CMD)    3. Pneumonia due to infectious organism, unspecified laterality, unspecified part of lung    4. Acute and chronic respiratory failure with hypoxia  (CMD) [J96.21]    5. PAF (paroxysmal atrial fibrillation)  (CMD) [I48.0]           Certification of Medical Necessity(Certification is required for select Home Health beneficiaries only)  I certify based on my clinical findings, the services ordered in the referral are medically necessary home health services.    Homebound Status and Living Situation  I certify my clinical findings support this patient is homebound due to the homebound reason(s) listed in the Service to Home Care order content link above.  If reason for homebound status is not specified, this document does not certify the patient as homebound.    Physician to follow: mary ellen jurado md     I have authorized these skilled home health services and the \"Physician  to follow\" listed above will accept and assume care for this patient and sign the Home Health Plan of Care.    Date of completion of form: 12/22/2024      Plan of care to follow      ROS:  A comprehensive 10 point review of systems assessed and negative at time of discharge unless otherwise stated in above.     LAB RESULTS:  Recent Labs   Lab 12/22/24  0701 12/21/24  0415 12/20/24  0810   WBC 8.9 9.2 12.5*   RBC 4.25* 3.75* 3.83*   HGB 13.0 11.3* 11.7*   HCT 40.2 36.5* 37.0*   MCV 94.6 97.3 96.6   MCH 30.6 30.1 30.5   MCHC 32.3 31.0* 31.6*   RDW-CV 13.8 14.0 14.0    193 212   Lymphocytes, Percent 12 13 6       Recent Labs   Lab 12/22/24  0701 12/21/24  0415 12/20/24  0524 12/19/24  1732   Sodium 138 139 141 137   Chloride 102 103 103 98   BUN 19 23* 21* 18   Potassium 3.8 4.2 4.1 4.8   Glucose 111* 98 103* 185*   Creatinine 0.59* 0.66* 0.72 0.71   Calcium 8.6 8.5 8.7 9.2       No results found    IMAGING/MICRO/PATH:    XR CHEST PA OR AP 1 VIEW   Final Result   FINDINGS/IMPRESSION: There are interstitial changes in the lungs with right   more than left which could represent developing infection but without any   lobar consolidation identified. The previously seen opacity in the lingula   has resolved. No large pleural effusions. The cardiac silhouette is not   overtly enlarged.         Electronically Signed by: DOLORES SANTILLAN MD    Signed on: 12/19/2024 6:47 PM    Workstation ID: [id number]            Microbiology Results       None            * Cannot find OR log *    PENDING TESTS AT DISCHARGE:  Pending Labs       Order Current Status    Blood Culture Preliminary result    Blood Culture Preliminary result            PHYSICAL EXAM AT DISCHARGE:  Physical EXAM    General:  Pleasant and cooperative, awake, alert and oriented x 3, in no acute distress.  Head / Face:  Atraumatic.   Eyes:  Sclerae anicteric, EOM intact.  Neck / Thyroid:  Supple, without adenopathy.  Lymphatic:  No palpable cervical adenopathy.     Respiratory:  Lungs clear to auscultation bilaterally.  Cardiovascular:  Regular rate and rhythm. S1S2 No murmur or gallop  Abdomen:  Soft, non-tender, non-distended no masses  Musculoskeletal:  Normal appearing musculature.  Extremities:  No edema or cyanosis.   Neurological:  CNII-XII grossly intact, no facial asymmetry, no focal deficit  Psychiatric:  No unusual anxiety or evidence of depression    LABS: As summarized above and attached.    RADIOLOGY/OTHER STUDIES: As summarized above and attached.      DISCHARGE INSTRUCTIONS:    Conditions of patient on discharge: Stable  Code status:   Code Status Information       Code Status    Selective Treatment/DNR      Modified Resuscitation Specifics:    CPR in case of Cardiac Arrest?: No     Intubation ( Pre-Arrest ) ?: No     Antiarrhythmics (Pre-Arrest)?: Yes     Cardioversion (Pre-Arrest)?: No     Vasopressor (Pre-Arrest)?: Yes         Okay to discharge to:home w/ home visiting RN     Diet:  As tolerated from prior     Medication changes as per med rec  Lisinopril now 40 mg from previously 20mg. Can reduce back to 20mg if blood pressure <100/60  Finish course of antibiotics as prescribed  Can continue diclofenac for joint pain       Follow-up with:  Pcp in 1-2 weeks     If any worsening of symptoms or develop new chest pain, shortness of breath, fevers, intractable nausea vomiting please see PCP or return to ER        Discharge Medications       Summary of your Discharge Medications        Take these Medications        Details   acetaminophen 500 MG tablet  Commonly known as: TYLENOL   Take 500 mg by mouth every 6 hours as needed for Pain (mild to mod pain). as needed for mild pain     apixaBAN 5 MG Tab  Commonly known as: Eliquis   Take 1 tablet by mouth every 12 hours.     atorvastatin 10 MG tablet  Commonly known as: LIPITOR   Take 1 tablet by mouth daily.  Comment: Patient changed pharmacy request to send refills to [facility] mail order.     cefpodoxime 200 MG  tablet  Commonly known as: VANTIN   Take 1 tablet by mouth every 12 hours for 3 days.     diclofenac 1 % gel  Commonly known as: VOLTAREN   Apply 4 g topically 4 times daily. Apply to feet and to joint as needed.     lisinopril 20 MG tablet  Commonly known as: ZESTRIL   Take 2 tablets by mouth daily.  Comment: Patient changed pharmacy request to send refills to [facility] mail order.     nystatin 390720 UNIT/GM powder  Commonly known as: MYCOSTATIN   Apply 1 application. topically in the morning and 1 application. at noon and 1 application. in the evening.     oxygen gas  Commonly known as: O2   Inhale 1 L/min into the lungs continuous. 1 L/ min at night     pantoprazole 40 MG tablet  Commonly known as: PROTONIX   Take 1 tablet by mouth daily.  Comment: Patient changed pharmacy request to send refills to [facility] mail order.     polyethylene glycol 17 g packet  Commonly known as: MIRALAX   Take 17 g by mouth daily. Stir and dissolve powder in any 4 to 8 ounces of beverage, then drink.     Senna 8.6 MG Cap   Take 17.2 mg by mouth daily as needed (constipation).     sotalol 80 MG tablet  Commonly known as: BETAPACE   Take 1 tablet by mouth in the morning and 1 tablet in the evening.     traZODone 100 MG tablet  Commonly known as: DESYREL   Take 1 tablet by mouth nightly.  Comment: Patient changed pharmacy request to send refills to [facility] mail order.            Notified pcp via epic     Final diagnosis, treatment plan, and follow-up recommendations were discussed and explained to the patient. The patient was given an opportunity to ask questions concerning the diagnosis and treatment plan. The patient acknowledged understanding of the diagnosis, treatment, and follow-up recommendations. The patient was advised to seek urgent care upon discharge if worsening symptoms develop prior to scheduled follow-up. I spent >35 minutes completing this patient's discharge.     DO MANJIT Romano Hospitalist  Department of Internal  Medicine    12/22/2024 7:30 AM     Pt is a 79 yo F sent to ED by her vascular surgeon Dr. Kennedy, for LLE cellulitis, concern for osteo. PMH CHF w/ EF 30%, CAD, HTN, Pacemaker, PVD.     LLE Cellulitis: Afebrile and normal WBC count  -concern for osteomyelitis but MRI cannot be performed as patient has a pacemaker.   -will start on Vanco and Cefepime.   -consult ID.   -f/u cultures and obtain ESR and CRP.   -f/u Xrays.   -consider bone scan to eval for osteomyelitis     Chronic systolic CHF w/ EF 30%: continue lasix as ordered. No acute exacerbation.   -monitor renal fxn and electrolytes.   -continue coreg and ACE-i.   -pacemaker/ICD in place.     HTN: BP stable and at goal.   -continue current anti-htn regimen    PVD: continue Plavix. Not currently on ASA.     Patient is full code.

## 2025-03-20 NOTE — PROGRESS NOTE ADULT - PROBLEM/PLAN-1
DISPLAY PLAN FREE TEXT Quality 111:Pneumonia Vaccination Status For Older Adults: Pneumococcal Vaccination Previously Received Detail Level: Detailed Quality 226: Preventive Care And Screening: Tobacco Use: Screening And Cessation Intervention: Patient screened for tobacco use and is an ex/non-smoker Quality 130: Documentation Of Current Medications In The Medical Record: Current Medications Documented

## 2025-06-11 NOTE — PHYSICAL THERAPY INITIAL EVALUATION ADULT - ACTIVE RANGE OF MOTION EXAMINATION, REHAB EVAL
bilateral upper extremity Active ROM was WFL (within functional limits)/bilateral  lower extremity Active ROM was WFL (within functional limits)
Alert and oriented to person, place and time

## 2025-06-13 NOTE — PROGRESS NOTE ADULT - SUBJECTIVE AND OBJECTIVE BOX
[facility]  Internal Medicine  Patient Encounter  Hill James D.O., [facility]        Chief Complaint   Patient presents with    Rash     All over entire body x 4 days  \"Very itchy\"       HPI: 86 y.o. female seen today with a skin rash that she has had over the last 4 days.  This is different than the previous rash that she had several months ago which was a severe case of candidal intertrigo.  Current rash started 4 days ago (Monday).  She first noticed slightly raised, red spots on the left forearm.  The rash has spread to both UE's, BL LE's, face, trunk--all over.  The rash is itchy.  She has been using Benadryl and Calamine which has helped the itch.  Pt states she has not been on any new medications.  Patient is feeling new medication has been her hydrocodone that she been on for 4 months.  She denies any known new exposures.  She denies any shortness of breath, difficulty swallowing, rhinorrhea, itchy or watery eyes.    Past Medical History:   Diagnosis Date    Acquired spondylolisthesis of lumbosacral region     Bilateral primary osteoarthritis of hip     CAD (coronary artery disease)     one stent in heart    Chronic bilateral low back pain without sciatica 02/08/2018    CKD (chronic kidney disease), stage III (HCC)     COVID-19 virus infection 09/12/2024    DDD (degenerative disc disease), lumbar     Dr. Noel    Fibromyalgia     Dr. James    GERD (gastroesophageal reflux disease)     Headache(784.0)     Heel spur 08/2016    History of lumbar spinal fusion     Hyperlipidemia     Hypertension     IBS (irritable bowel syndrome)     Lyme disease 2009    suspected    Medical history reviewed with no changes     MVP (mitral valve prolapse)     Neuroma nos     Osteoarthritis     Osteopenia     Post-laminectomy syndrome     Rotator cuff tear     Dr. Cortes    Squamous cell cancer of skin of right hand 05/2018    squamous cell carcinoma    Stress incontinence, female     Urinary incontinence   77 yo female with PMHX CHF, HTN, Pacemaker seen for left foot painful ulcers. Patient is seen sitting in chair, with legs hanging off chair. Patient states her wounds are very painful .Pt denies being diabetic. Pt denies N/V/C/F/SOB. Pt denies any previous injury to the foot    Allergies: Aspirin, Atorvastatin, Penicillin                                       10.6   8.9   )-----------( 173      ( 17 Jan 2019 09:49 )             33.7       PE: Left Foot  Vascular: DP/PT: non-palable due to pain; CFT< 3 sec x 5; TG: wnl; severe edema noted on the left foot and leg  Derm: onychomycosis nails noted on the left foot 1 to 5; IDM noted; ulceration noted on the dorsal digits with necrotic fibrotic base; dorsal left foot ulcer noted with fibrogranular base; mild mal odor noted; no pus noted; severe pain upon palpation; cellulitis extending to proximal leg noted;   right foot; interdigital maceration noted on the 1st interspace with dorsal wound   Neuro: Protective sensation intact    Findings: Marked diffuse osteoporosis involving the bones of the foot. No   definite evidence of fracture or dislocation. Evaluation is limited. No   definite evidence of bone destruction. No evidence of soft tissue gas.   There is diffuse soft tissue swelling..    Impression:    Diffuse soft tissue swelling. Marked diffuse osteoporosis..        A: Left Foot Ulcer with Leg Cellulitis; Right Foot Ulcer     P:  Patient evaluated and chart reviewed  Educated pt on proper foot care and change in shoe gear  Xray reviewed; results noted above   BELINDA ordered; results pending  Cx: Proteus Mirabilis, Klebsiella Oxytoca, Corynebacterium, Alpha Hemolytic Strep   Left Foot Ulcer evaluated, possible fungal component to the wound base with cellulitis extending to leg, possible OM of 2nd Digit. Cannot perform MRI because pt has a pacemaker.     Bilateral Foot Ulcerations dressed with Christine/Dakins/DSD  Instructed pt to keep dressing clean dry and intact to bilateral foot   WCO Placed for Bilateral Dakins/DSD. Podiatry will perform dressing in morning. Nursing staff will perform dressing in evening.   Continue IV abx as per ID recommendation   Podiatry will follow patient while inhouse.
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
